# Patient Record
Sex: FEMALE | Race: WHITE | NOT HISPANIC OR LATINO | Employment: OTHER | URBAN - METROPOLITAN AREA
[De-identification: names, ages, dates, MRNs, and addresses within clinical notes are randomized per-mention and may not be internally consistent; named-entity substitution may affect disease eponyms.]

---

## 2018-06-30 ENCOUNTER — APPOINTMENT (EMERGENCY)
Dept: RADIOLOGY | Facility: HOSPITAL | Age: 53
End: 2018-06-30
Payer: MEDICARE

## 2018-06-30 ENCOUNTER — HOSPITAL ENCOUNTER (EMERGENCY)
Facility: HOSPITAL | Age: 53
Discharge: HOME | End: 2018-06-30
Attending: EMERGENCY MEDICINE | Admitting: EMERGENCY MEDICINE
Payer: MEDICARE

## 2018-06-30 VITALS
SYSTOLIC BLOOD PRESSURE: 136 MMHG | DIASTOLIC BLOOD PRESSURE: 72 MMHG | RESPIRATION RATE: 15 BRPM | TEMPERATURE: 98.8 F | WEIGHT: 130 LBS | HEART RATE: 78 BPM | HEIGHT: 59 IN | OXYGEN SATURATION: 96 % | BODY MASS INDEX: 26.21 KG/M2

## 2018-06-30 DIAGNOSIS — G43.909 MIGRAINE HEADACHE: Primary | ICD-10-CM

## 2018-06-30 PROCEDURE — 96374 THER/PROPH/DIAG INJ IV PUSH: CPT

## 2018-06-30 PROCEDURE — 99284 EMERGENCY DEPT VISIT MOD MDM: CPT

## 2018-06-30 PROCEDURE — 96375 TX/PRO/DX INJ NEW DRUG ADDON: CPT

## 2018-06-30 PROCEDURE — 70450 CT HEAD/BRAIN W/O DYE: CPT

## 2018-06-30 RX ORDER — VENLAFAXINE HYDROCHLORIDE 150 MG/1
CAPSULE, EXTENDED RELEASE ORAL
COMMUNITY
Start: 2018-06-21 | End: 2020-08-17 | Stop reason: SDUPTHER

## 2018-06-30 RX ORDER — LEVOTHYROXINE SODIUM 0.07 MG/1
75 TABLET ORAL
COMMUNITY
Start: 2018-04-23 | End: 2020-04-28

## 2018-06-30 RX ORDER — TOPIRAMATE 100 MG/1
100 TABLET, FILM COATED ORAL DAILY
COMMUNITY
Start: 2017-01-09 | End: 2020-04-28

## 2018-06-30 RX ORDER — SUMATRIPTAN 25 MG/1
25 TABLET, FILM COATED ORAL ONCE AS NEEDED
COMMUNITY
End: 2020-04-28

## 2018-06-30 RX ORDER — TOPIRAMATE 50 MG/1
100 TABLET, FILM COATED ORAL DAILY
COMMUNITY
Start: 2018-06-21 | End: 2020-08-17 | Stop reason: SDUPTHER

## 2018-06-30 RX ORDER — DEXAMETHASONE SODIUM PHOSPHATE 10 MG/ML
10 INJECTION, SOLUTION INTRAMUSCULAR; INTRAVENOUS ONCE
Status: COMPLETED | OUTPATIENT
Start: 2018-06-30 | End: 2018-06-30

## 2018-06-30 RX ORDER — METOCLOPRAMIDE HYDROCHLORIDE 5 MG/ML
10 INJECTION INTRAMUSCULAR; INTRAVENOUS ONCE
Status: COMPLETED | OUTPATIENT
Start: 2018-06-30 | End: 2018-06-30

## 2018-06-30 RX ORDER — DIPHENHYDRAMINE HYDROCHLORIDE 50 MG/ML
25 INJECTION INTRAMUSCULAR; INTRAVENOUS ONCE
Status: COMPLETED | OUTPATIENT
Start: 2018-06-30 | End: 2018-06-30

## 2018-06-30 RX ADMIN — METOCLOPRAMIDE 10 MG: 5 INJECTION, SOLUTION INTRAMUSCULAR; INTRAVENOUS at 03:15

## 2018-06-30 RX ADMIN — DIPHENHYDRAMINE HYDROCHLORIDE 25 MG: 50 INJECTION, SOLUTION INTRAMUSCULAR; INTRAVENOUS at 03:18

## 2018-06-30 RX ADMIN — DEXAMETHASONE SODIUM PHOSPHATE 10 MG: 10 INJECTION, SOLUTION INTRAMUSCULAR; INTRAVENOUS at 03:18

## 2018-06-30 NOTE — ED PROVIDER NOTES
History  Chief Complaint   Patient presents with    Migraine     Pt complaining of a migraine for 2 days and immatrex is not helping  Pt in ER with c/o migraine headache x 2 5 days  Pt states that she awakened with the pain and it has persisted since  Pt has a hx of migraines, but she states that this pain is worse than usual  Pt took Imitrex without relief  Pt with photophobia/phonophobia/vomiting, which is typical of her migraines  She denies fevers/neck pain            Prior to Admission Medications   Prescriptions Last Dose Informant Patient Reported? Taking? SUMAtriptan (IMITREX) 25 mg tablet   Yes Yes   Sig: Take 25 mg by mouth once as needed for migraine   levothyroxine 75 mcg tablet 2018 at 0600  Yes Yes   Sig: Take 75 mcg by mouth   topiramate (TOPAMAX) 100 mg tablet 2018 at 2000  Yes Yes   Si mg daily     topiramate (TOPAMAX) 50 MG tablet 2018 at 0900  Yes Yes   Si mg daily     venlafaxine (EFFEXOR-XR) 150 mg 24 hr capsule 2018 at 0900  Yes Yes   Sig: TAKE 1 CAPSULE ONCE A DAY  DO NOT CUT, CRUSH OR CHEW  Facility-Administered Medications: None       Past Medical History:   Diagnosis Date    Liver enzyme elevation     Migraines        Past Surgical History:   Procedure Laterality Date     SECTION      OVARIAN CYST SURGERY         Family History   Problem Relation Age of Onset   Elwyn Serge Parkinsonism Father     Hypertension Daughter     Cervical cancer Maternal Grandmother      I have reviewed and agree with the history as documented  Social History   Substance Use Topics    Smoking status: Former Smoker    Smokeless tobacco: Never Used    Alcohol use No        Review of Systems   Constitutional: Negative for chills and fever  Neurological: Positive for headaches  All other systems reviewed and are negative  Physical Exam  Physical Exam   Constitutional: She appears well-developed and well-nourished  No distress     HENT:   Head: Normocephalic and atraumatic  Eyes: Conjunctivae are normal  Pupils are equal, round, and reactive to light  Neck: Normal range of motion  Neck supple  Cardiovascular: Normal rate, regular rhythm and normal heart sounds  No murmur heard  Pulmonary/Chest: Effort normal and breath sounds normal  No respiratory distress  Abdominal: Soft  Bowel sounds are normal  She exhibits no distension  There is no tenderness  Musculoskeletal: Normal range of motion  She exhibits no edema or deformity  Neurological: She is alert  No cranial nerve deficit  Skin: Skin is warm and dry  No rash noted  She is not diaphoretic  No pallor  Psychiatric: She has a normal mood and affect  Her behavior is normal    Nursing note and vitals reviewed  Vital Signs  ED Triage Vitals [06/30/18 0241]   Temperature Pulse Respirations Blood Pressure SpO2   98 8 °F (37 1 °C) 74 18 (!) 183/86 98 %      Temp Source Heart Rate Source Patient Position - Orthostatic VS BP Location FiO2 (%)   Tympanic Monitor Lying Right arm --      Pain Score       Worst Possible Pain           Vitals:    06/30/18 0241 06/30/18 0429   BP: (!) 183/86 136/72   Pulse: 74 78   Patient Position - Orthostatic VS: Lying Lying       Visual Acuity      ED Medications  Medications   metoclopramide (REGLAN) injection 10 mg (10 mg Intravenous Given 6/30/18 0315)   diphenhydrAMINE (BENADRYL) injection 25 mg (25 mg Intravenous Given 6/30/18 0318)   dexamethasone (PF) (DECADRON) injection 10 mg (10 mg Intravenous Given 6/30/18 0318)       Diagnostic Studies  Results Reviewed     None                 CT head without contrast   Final Result by Michelle Roblero MD (06/30 0510)      No CT evidence of acute intracranial process  Workstation performed: [id number]                    Procedures  Procedures       Phone Contacts  ED Phone Contact    ED Course  ED Course as of Jul 02 0736   Sat Jun 30, 2018   0432 Pt is pain free at this time   CT report pending MDM  CritCare Time    Disposition  Final diagnoses:   Migraine headache     Time reflects when diagnosis was documented in both MDM as applicable and the Disposition within this note     Time User Action Codes Description Comment    6/30/2018  5:20 AM Carlota Crowe Matt [G43 909] Migraine headache       ED Disposition     ED Disposition Condition Comment    Discharge  Luis Alberto Herbert discharge to home/self care  Condition at discharge: Stable        Follow-up Information     Follow up With Specialties Details Why [address]  Schedule an appointment as soon as possible for a visit in 1 day for follow up [id number] [facility]    Buck Ambrosio MD Neurology Schedule an appointment as soon as possible for a visit in 1 day for follow up [address] [address]  [phone]            Discharge Medication List as of 6/30/2018  5:23 AM      CONTINUE these medications which have NOT CHANGED    Details   levothyroxine 75 mcg tablet Take 75 mcg by mouth, Starting Mon 4/23/2018, Historical Med      SUMAtriptan (IMITREX) 25 mg tablet Take 25 mg by mouth once as needed for migraine, Historical Med      !! topiramate (TOPAMAX) 100 mg tablet 100 mg daily  , Starting Mon 1/9/2017, Historical Med      !! topiramate (TOPAMAX) 50 MG tablet 50 mg daily  , Starting u 6/21/2018, Historical Med      venlafaxine (EFFEXOR-XR) 150 mg 24 hr capsule TAKE 1 CAPSULE ONCE A DAY  DO NOT CUT, CRUSH OR CHEW , Historical Med       !! - Potential duplicate medications found  Please discuss with provider  No discharge procedures on file      ED Provider  Electronically Signed by           aKyden Rosario DO  07/02/18 8990

## 2018-06-30 NOTE — ED NOTES
Patient transported to [address] [place] [address], [address] [place] [address]  06/30/18 5427

## 2018-06-30 NOTE — DISCHARGE INSTRUCTIONS
Migraine Headache   WHAT YOU NEED TO KNOW:   A migraine is a severe headache  The pain can be so severe that it interferes with your daily activities  A migraine can last a few hours up to several days  The exact cause of migraines is not known  DISCHARGE INSTRUCTIONS:   Return to the emergency department if:   · You have a headache that seems different or much worse than your usual migraine headache  · You have a severe headache with a fever or a stiff neck  · You have new problems with speech, vision, balance, or movement  · You feel like you are going to faint, you become confused, or you have a seizure  Contact your healthcare provider or neurologist if:   · Your migraines interfere with your daily activities  · Your medicines or treatments stop working  · You have questions or concerns about your condition or care  Medicines: You may need any of the following  Take medicine as soon as you feel a migraine begin  · Prescription pain medicine  may be given  Do not wait until the pain is severe before you take your medicine  · Migraine medicines  are used to help prevent a migraine or stop it once it starts  · Antinausea medicine  may be given to calm your stomach and to help prevent vomiting  This medicine can also help relieve pain  · Take your medicine as directed  Contact your healthcare provider if you think your medicine is not helping or if you have side effects  Tell him or her if you are allergic to any medicine  Keep a list of the medicines, vitamins, and herbs you take  Include the amounts, and when and why you take them  Bring the list or the pill bottles to follow-up visits  Carry your medicine list with you in case of an emergency  Manage your symptoms:   · Rest in a dark, quiet room  This will help decrease your pain  Sleep may also help relieve the pain  · Apply ice to decrease pain  Use an ice pack, or put crushed ice in a plastic bag   Cover the ice pack with a towel and place it on your head  Apply ice for 15 to 20 minutes every hour  · Apply heat to decrease pain and muscle spasms  Use a small towel dampened with warm water or a heating pad, or sit in a warm bath  Apply heat on the area for 20 to 30 minutes every 2 hours  You may alternate heat and ice  · Keep a migraine record  Write down when your migraines start and stop  Include your symptoms and what you were doing when a migraine began  Record what you ate or drank for 24 hours before the migraine started  Keep track of what you did to treat your migraine and if it worked  Bring the migraine record with you to visits with your healthcare provider  Follow up with your healthcare provider or neurologist as directed:  Bring your migraine record with you  Write down your questions so you remember to ask them during your visits  Prevent another migraine:   · Do not smoke  Nicotine and other chemicals in cigarettes and cigars can trigger a migraine or make it worse  Ask your healthcare provider for information if you currently smoke and need help to quit  E-cigarettes or smokeless tobacco still contain nicotine  Talk to your healthcare provider before you use these products  · Do not drink alcohol  Alcohol can trigger a migraine  It can also keep medicines used to treat your migraines from working  · Get regular exercise  Exercise may help prevent migraines  Talk to your healthcare provider about the best exercise plan for you  Try to get at least 30 minutes of exercise on most days  · Manage stress  Stress may trigger a migraine  Learn new ways to relax, such as deep breathing  · Create a sleep schedule  Go to bed and get up at the same times each day  Do not watch television before bed  · Eat regular meals  Include healthy foods such as include fruit, vegetables, whole-grain breads, low-fat dairy products, beans, lean meat, and fish   Do not have food or drinks that trigger your migraines  © 2017 [facility] [facility] Information is for End User's use only and may not be sold, redistributed or otherwise used for commercial purposes  All illustrations and images included in CareNotes® are the copyrighted property of A D A M , Inc  or Donnie Mclain  The above information is an  only  It is not intended as medical advice for individual conditions or treatments  Talk to your doctor, nurse or pharmacist before following any medical regimen to see if it is safe and effective for you

## 2020-04-28 ENCOUNTER — OFFICE VISIT (OUTPATIENT)
Dept: FAMILY MEDICINE CLINIC | Facility: CLINIC | Age: 55
End: 2020-04-28
Payer: MEDICARE

## 2020-04-28 VITALS
TEMPERATURE: 98 F | HEIGHT: 59 IN | WEIGHT: 132 LBS | BODY MASS INDEX: 26.61 KG/M2 | SYSTOLIC BLOOD PRESSURE: 114 MMHG | HEART RATE: 74 BPM | DIASTOLIC BLOOD PRESSURE: 70 MMHG

## 2020-04-28 DIAGNOSIS — G43.811 OTHER MIGRAINE WITH STATUS MIGRAINOSUS, INTRACTABLE: ICD-10-CM

## 2020-04-28 DIAGNOSIS — K21.9 GASTROESOPHAGEAL REFLUX DISEASE WITHOUT ESOPHAGITIS: ICD-10-CM

## 2020-04-28 DIAGNOSIS — Z00.00 MEDICARE ANNUAL WELLNESS VISIT, SUBSEQUENT: Primary | ICD-10-CM

## 2020-04-28 DIAGNOSIS — F32.5 MAJOR DEPRESSIVE DISORDER WITH SINGLE EPISODE, IN FULL REMISSION (HCC): ICD-10-CM

## 2020-04-28 DIAGNOSIS — R74.8 LIVER ENZYME ELEVATION: ICD-10-CM

## 2020-04-28 DIAGNOSIS — E03.9 HYPOTHYROIDISM, UNSPECIFIED TYPE: ICD-10-CM

## 2020-04-28 PROBLEM — F32.A DEPRESSION: Status: ACTIVE | Noted: 2020-04-28

## 2020-04-28 PROCEDURE — 1036F TOBACCO NON-USER: CPT | Performed by: FAMILY MEDICINE

## 2020-04-28 PROCEDURE — 3008F BODY MASS INDEX DOCD: CPT | Performed by: FAMILY MEDICINE

## 2020-04-28 PROCEDURE — 99203 OFFICE O/P NEW LOW 30 MIN: CPT | Performed by: FAMILY MEDICINE

## 2020-04-28 PROCEDURE — G0438 PPPS, INITIAL VISIT: HCPCS | Performed by: FAMILY MEDICINE

## 2020-04-28 RX ORDER — SUMATRIPTAN 100 MG/1
100 TABLET, FILM COATED ORAL ONCE AS NEEDED
COMMUNITY
Start: 2020-04-23 | End: 2020-12-04

## 2020-04-28 RX ORDER — CEFUROXIME AXETIL 250 MG/1
TABLET ORAL
COMMUNITY
Start: 2020-04-20 | End: 2020-07-28 | Stop reason: SDUPTHER

## 2020-04-28 RX ORDER — OMEPRAZOLE 20 MG/1
CAPSULE, DELAYED RELEASE ORAL
COMMUNITY
Start: 2020-04-04 | End: 2020-08-17 | Stop reason: SDUPTHER

## 2020-04-28 RX ORDER — LEVOTHYROXINE SODIUM 0.1 MG/1
100 TABLET ORAL DAILY
COMMUNITY
Start: 2020-04-04 | End: 2020-10-28 | Stop reason: SDUPTHER

## 2020-04-29 ENCOUNTER — TELEPHONE (OUTPATIENT)
Dept: ADMINISTRATIVE | Facility: OTHER | Age: 55
End: 2020-04-29

## 2020-05-12 ENCOUNTER — TELEPHONE (OUTPATIENT)
Dept: NEUROLOGY | Facility: CLINIC | Age: 55
End: 2020-05-12

## 2020-05-14 ENCOUNTER — TELEPHONE (OUTPATIENT)
Dept: NEUROLOGY | Facility: CLINIC | Age: 55
End: 2020-05-14

## 2020-05-14 ENCOUNTER — CONSULT (OUTPATIENT)
Dept: NEUROLOGY | Facility: CLINIC | Age: 55
End: 2020-05-14
Payer: MEDICARE

## 2020-05-14 VITALS
DIASTOLIC BLOOD PRESSURE: 84 MMHG | WEIGHT: 130.1 LBS | SYSTOLIC BLOOD PRESSURE: 125 MMHG | HEART RATE: 94 BPM | HEIGHT: 59 IN | RESPIRATION RATE: 16 BRPM | BODY MASS INDEX: 26.23 KG/M2

## 2020-05-14 DIAGNOSIS — G43.709 CHRONIC MIGRAINE WITHOUT AURA WITHOUT STATUS MIGRAINOSUS, NOT INTRACTABLE: Primary | ICD-10-CM

## 2020-05-14 DIAGNOSIS — M54.2 CERVICALGIA: ICD-10-CM

## 2020-05-14 DIAGNOSIS — G43.811 OTHER MIGRAINE WITH STATUS MIGRAINOSUS, INTRACTABLE: ICD-10-CM

## 2020-05-14 DIAGNOSIS — G44.86 CERVICOGENIC HEADACHE: ICD-10-CM

## 2020-05-14 PROCEDURE — 1036F TOBACCO NON-USER: CPT | Performed by: PSYCHIATRY & NEUROLOGY

## 2020-05-14 PROCEDURE — 3008F BODY MASS INDEX DOCD: CPT | Performed by: PSYCHIATRY & NEUROLOGY

## 2020-05-14 PROCEDURE — 99204 OFFICE O/P NEW MOD 45 MIN: CPT | Performed by: PSYCHIATRY & NEUROLOGY

## 2020-05-14 RX ORDER — PROCHLORPERAZINE MALEATE 10 MG
10 TABLET ORAL EVERY 6 HOURS PRN
Qty: 12 TABLET | Refills: 3 | Status: SHIPPED | OUTPATIENT
Start: 2020-05-14 | End: 2021-03-01 | Stop reason: SDUPTHER

## 2020-05-18 ENCOUNTER — EVALUATION (OUTPATIENT)
Dept: PHYSICAL THERAPY | Facility: CLINIC | Age: 55
End: 2020-05-18
Payer: MEDICARE

## 2020-05-18 DIAGNOSIS — M54.2 CERVICALGIA: ICD-10-CM

## 2020-05-18 DIAGNOSIS — G43.709 CHRONIC MIGRAINE WITHOUT AURA WITHOUT STATUS MIGRAINOSUS, NOT INTRACTABLE: ICD-10-CM

## 2020-05-18 DIAGNOSIS — G44.86 CERVICOGENIC HEADACHE: ICD-10-CM

## 2020-05-18 PROCEDURE — 97162 PT EVAL MOD COMPLEX 30 MIN: CPT | Performed by: PHYSICAL THERAPIST

## 2020-05-18 PROCEDURE — 97140 MANUAL THERAPY 1/> REGIONS: CPT | Performed by: PHYSICAL THERAPIST

## 2020-05-18 PROCEDURE — 97112 NEUROMUSCULAR REEDUCATION: CPT | Performed by: PHYSICAL THERAPIST

## 2020-05-22 ENCOUNTER — APPOINTMENT (OUTPATIENT)
Dept: PHYSICAL THERAPY | Facility: CLINIC | Age: 55
End: 2020-05-22
Payer: MEDICARE

## 2020-05-26 ENCOUNTER — OFFICE VISIT (OUTPATIENT)
Dept: PHYSICAL THERAPY | Facility: CLINIC | Age: 55
End: 2020-05-26
Payer: MEDICARE

## 2020-05-26 DIAGNOSIS — G44.86 CERVICOGENIC HEADACHE: ICD-10-CM

## 2020-05-26 DIAGNOSIS — M54.2 CERVICALGIA: Primary | ICD-10-CM

## 2020-05-26 DIAGNOSIS — G43.709 CHRONIC MIGRAINE WITHOUT AURA WITHOUT STATUS MIGRAINOSUS, NOT INTRACTABLE: ICD-10-CM

## 2020-05-26 PROCEDURE — 97112 NEUROMUSCULAR REEDUCATION: CPT | Performed by: PHYSICAL THERAPIST

## 2020-05-26 PROCEDURE — 97140 MANUAL THERAPY 1/> REGIONS: CPT | Performed by: PHYSICAL THERAPIST

## 2020-05-26 PROCEDURE — 97110 THERAPEUTIC EXERCISES: CPT | Performed by: PHYSICAL THERAPIST

## 2020-05-28 ENCOUNTER — TELEPHONE (OUTPATIENT)
Dept: NEUROLOGY | Facility: CLINIC | Age: 55
End: 2020-05-28

## 2020-05-29 ENCOUNTER — OFFICE VISIT (OUTPATIENT)
Dept: PHYSICAL THERAPY | Facility: CLINIC | Age: 55
End: 2020-05-29
Payer: MEDICARE

## 2020-05-29 DIAGNOSIS — G43.709 CHRONIC MIGRAINE WITHOUT AURA WITHOUT STATUS MIGRAINOSUS, NOT INTRACTABLE: ICD-10-CM

## 2020-05-29 DIAGNOSIS — G44.86 CERVICOGENIC HEADACHE: ICD-10-CM

## 2020-05-29 DIAGNOSIS — M54.2 CERVICALGIA: Primary | ICD-10-CM

## 2020-05-29 PROCEDURE — 97112 NEUROMUSCULAR REEDUCATION: CPT | Performed by: PHYSICAL THERAPIST

## 2020-05-29 PROCEDURE — 97140 MANUAL THERAPY 1/> REGIONS: CPT | Performed by: PHYSICAL THERAPIST

## 2020-06-01 ENCOUNTER — OFFICE VISIT (OUTPATIENT)
Dept: PHYSICAL THERAPY | Facility: CLINIC | Age: 55
End: 2020-06-01
Payer: MEDICARE

## 2020-06-01 DIAGNOSIS — G44.86 CERVICOGENIC HEADACHE: ICD-10-CM

## 2020-06-01 DIAGNOSIS — M54.2 CERVICALGIA: Primary | ICD-10-CM

## 2020-06-01 DIAGNOSIS — G43.709 CHRONIC MIGRAINE WITHOUT AURA WITHOUT STATUS MIGRAINOSUS, NOT INTRACTABLE: ICD-10-CM

## 2020-06-01 PROCEDURE — 97140 MANUAL THERAPY 1/> REGIONS: CPT | Performed by: PHYSICAL THERAPIST

## 2020-06-01 PROCEDURE — 97112 NEUROMUSCULAR REEDUCATION: CPT | Performed by: PHYSICAL THERAPIST

## 2020-06-01 PROCEDURE — 97110 THERAPEUTIC EXERCISES: CPT | Performed by: PHYSICAL THERAPIST

## 2020-06-05 ENCOUNTER — APPOINTMENT (OUTPATIENT)
Dept: PHYSICAL THERAPY | Facility: CLINIC | Age: 55
End: 2020-06-05
Payer: MEDICARE

## 2020-06-08 ENCOUNTER — OFFICE VISIT (OUTPATIENT)
Dept: PHYSICAL THERAPY | Facility: CLINIC | Age: 55
End: 2020-06-08
Payer: MEDICARE

## 2020-06-08 DIAGNOSIS — G43.709 CHRONIC MIGRAINE WITHOUT AURA WITHOUT STATUS MIGRAINOSUS, NOT INTRACTABLE: ICD-10-CM

## 2020-06-08 DIAGNOSIS — M54.2 CERVICALGIA: Primary | ICD-10-CM

## 2020-06-08 DIAGNOSIS — G44.86 CERVICOGENIC HEADACHE: ICD-10-CM

## 2020-06-08 PROCEDURE — 97140 MANUAL THERAPY 1/> REGIONS: CPT | Performed by: PHYSICAL THERAPIST

## 2020-06-08 PROCEDURE — 97112 NEUROMUSCULAR REEDUCATION: CPT | Performed by: PHYSICAL THERAPIST

## 2020-06-12 ENCOUNTER — OFFICE VISIT (OUTPATIENT)
Dept: PHYSICAL THERAPY | Facility: CLINIC | Age: 55
End: 2020-06-12
Payer: MEDICARE

## 2020-06-12 DIAGNOSIS — M54.2 CERVICALGIA: Primary | ICD-10-CM

## 2020-06-12 DIAGNOSIS — G43.709 CHRONIC MIGRAINE WITHOUT AURA WITHOUT STATUS MIGRAINOSUS, NOT INTRACTABLE: ICD-10-CM

## 2020-06-12 DIAGNOSIS — G44.86 CERVICOGENIC HEADACHE: ICD-10-CM

## 2020-06-12 PROCEDURE — 97140 MANUAL THERAPY 1/> REGIONS: CPT | Performed by: PHYSICAL THERAPIST

## 2020-06-12 PROCEDURE — 97112 NEUROMUSCULAR REEDUCATION: CPT | Performed by: PHYSICAL THERAPIST

## 2020-06-16 ENCOUNTER — EVALUATION (OUTPATIENT)
Dept: PHYSICAL THERAPY | Facility: CLINIC | Age: 55
End: 2020-06-16
Payer: MEDICARE

## 2020-06-16 DIAGNOSIS — G43.709 CHRONIC MIGRAINE WITHOUT AURA WITHOUT STATUS MIGRAINOSUS, NOT INTRACTABLE: ICD-10-CM

## 2020-06-16 DIAGNOSIS — G44.86 CERVICOGENIC HEADACHE: ICD-10-CM

## 2020-06-16 DIAGNOSIS — M54.2 CERVICALGIA: Primary | ICD-10-CM

## 2020-06-16 PROCEDURE — 97164 PT RE-EVAL EST PLAN CARE: CPT | Performed by: PHYSICAL THERAPIST

## 2020-06-16 PROCEDURE — 97140 MANUAL THERAPY 1/> REGIONS: CPT | Performed by: PHYSICAL THERAPIST

## 2020-06-22 ENCOUNTER — OFFICE VISIT (OUTPATIENT)
Dept: PHYSICAL THERAPY | Facility: CLINIC | Age: 55
End: 2020-06-22
Payer: MEDICARE

## 2020-06-22 DIAGNOSIS — G44.86 CERVICOGENIC HEADACHE: ICD-10-CM

## 2020-06-22 DIAGNOSIS — M54.2 CERVICALGIA: Primary | ICD-10-CM

## 2020-06-22 DIAGNOSIS — G43.709 CHRONIC MIGRAINE WITHOUT AURA WITHOUT STATUS MIGRAINOSUS, NOT INTRACTABLE: ICD-10-CM

## 2020-06-22 PROCEDURE — 97110 THERAPEUTIC EXERCISES: CPT | Performed by: PHYSICAL THERAPIST

## 2020-06-22 PROCEDURE — 97112 NEUROMUSCULAR REEDUCATION: CPT | Performed by: PHYSICAL THERAPIST

## 2020-06-22 PROCEDURE — 97140 MANUAL THERAPY 1/> REGIONS: CPT | Performed by: PHYSICAL THERAPIST

## 2020-06-25 NOTE — PROGRESS NOTES
Daily Note     Today's date: 2020  Patient name: Suleiman Shell  : 1965  MRN: [id number]  Referring provider: Rianna Osei MD  Dx:   No diagnosis found  Subjective: No new complaints today  The patient reports that she experienced a severe migraine over the weekend and presents today with some increased neck pain  Objective: See treatment diary below      Assessment: The PT performed focused OA manual and active treatments during today's treatment  Y/T/I on incline bench added to the patient's current program to promote further development of critical postural muscles in the posterior chain  The patient tolerated manual and active treatment well today  Plan: Continue per plan of care        Precautions: Hypothyroidism, depression      Manuals     Cervical side-glide (gr I-II) SRB SRB SRB SRB SRB SRB SRB SRB SRB    STM paraspinals, SCM SRB SRB SRB SRB SRB SRB SRB SRB SRB    Sub-occipital release SRB SRB SRB SRB SRB SRB SRB SRB SRB    Cervical OA nodding mob       SRB      OA headache SNAG        SRB SRB                              Neuro Re-Ed             Cervical retraction X20, HEP Supine  3 secx25 Supine  3 secx25 Supine  3 secx25 Supine  3 secx25 Supine  3 secx25       Cervical chin tuck + head lift     5 sec 2x10 5 sec 2x10 3 sec x20 3 sec x20 3 sec x20    Scapular retraction X20, HEP 3x10 3x10 3x10 3x10        Cervical propriocetion w/ laser  [place] tracing x3 ea [place] tracing x3 ea, horizontal & vertical line tracing [place] tracing x3 ea, horizontal & vertical line tracing [place] tracing x3 ea, horizontal & vertical line tracing Prone cervical retraction w/ laser, [place] tracing  Prone cervical retraction w/ laser, [place] tracing Prone cervical retraction w/ laser, [place] tracing    Cervical extension/rotatoin SNAG    2x10, HEP 2x10 ea 2x10 ea       OA ext/rot SNAG        2x10 ea 2x10 ea    Prone scap retract    2x10 ea 2x10 ea                                  Ther Ex             No money  3x10 GTB 3x10 GTB 3x10 GTB 3x10 GTB        Levator scap stretch  10 sec x10 ea 10 sec x10 ea 10 sec x10 ea 10 sec x10 ea        Y/T/I   3x10 GTB, HEP 3x10 GTB 3x10 GTB 3x10 GTB  Incline bench 3x10 ea Incline bench 3x10 ea    Seated D2 flexion   2x10 ea GTB, HEP 2x10 ea GTB 2x10 ea GTB 3x10 GTB                                                           Ther Activity                                       Gait Training                                       Modalities                                       Assessment IE     FOTO RE      Education HEP, prognosis, POC      UPOC, prognosis

## 2020-06-26 ENCOUNTER — APPOINTMENT (OUTPATIENT)
Dept: PHYSICAL THERAPY | Facility: CLINIC | Age: 55
End: 2020-06-26
Payer: MEDICARE

## 2020-06-29 ENCOUNTER — OFFICE VISIT (OUTPATIENT)
Dept: PHYSICAL THERAPY | Facility: CLINIC | Age: 55
End: 2020-06-29
Payer: MEDICARE

## 2020-06-29 DIAGNOSIS — M54.2 CERVICALGIA: Primary | ICD-10-CM

## 2020-06-29 DIAGNOSIS — G44.86 CERVICOGENIC HEADACHE: ICD-10-CM

## 2020-06-29 DIAGNOSIS — G43.709 CHRONIC MIGRAINE WITHOUT AURA WITHOUT STATUS MIGRAINOSUS, NOT INTRACTABLE: ICD-10-CM

## 2020-06-29 PROCEDURE — 97112 NEUROMUSCULAR REEDUCATION: CPT | Performed by: PHYSICAL THERAPIST

## 2020-06-29 PROCEDURE — 97140 MANUAL THERAPY 1/> REGIONS: CPT | Performed by: PHYSICAL THERAPIST

## 2020-07-02 NOTE — PROGRESS NOTES
Daily Note     Today's date: 7/3/2020  Patient name: Suleiman Shell  : 1965  MRN: [id number]  Referring provider: Rianna Osei MD  Dx:   Encounter Diagnosis     ICD-10-CM    1  Cervicalgia M54 2    2  Cervicogenic headache R51    3  Chronic migraine without aura without status migrainosus, not intractable G43 709                   Subjective: No new complaints today  The patient reports improvement in symptoms since previous session, reports that she had no pain all week  Objective: See treatment diary below      Assessment: The PT continued to promote cervical mobility and postural muscle control during today's treatment  No new exercises added to the patient's current program to promote consistency  The patient tolerated manual and active treatment well today  Plan: Continue per plan of care        Precautions: Hypothyroidism, depression      Manuals    Cervical side-glide (gr I-II) SRB    SRB SRB SRB SRB EM   STM paraspinals, SCM SRB    SRB SRB SRB SRB EM   Sub-occipital release SRB    SRB SRB SRB SRB EM   Cervical OA nodding mob SRB      SRB     OA headache SNAG SRB       SRB EM                           Neuro Re-Ed            Cervical retraction     Supine  3 secx25 Supine  3 secx25      Cervical chin tuck + head lift 3 sec x20    5 sec 2x10 5 sec 2x10 3 sec x20 3 sec x20 3 sec x20   Scapular retraction     3x10       Cervical propriocetion w/ laser Prone cervical retraction w/ laser, [place] tracing    [place] tracing x3 ea, horizontal & vertical line tracing Prone cervical retraction w/ laser, [place] tracing  Prone cervical retraction w/ laser, [place] tracing Prone cervical retraction w/ laser, [place] tracing   Cervical extension/rotatoin SNAG     2x10 ea 2x10 ea      OA ext/rot SNAG 2x10 ea       2x10 ea 2x10 ea   Prone scap retract     2x10 ea                               Ther Ex            No money     3x10 GTB       Levator scap stretch     10 sec x10 ea       Y/T/I Inclince bench 1# 3x10 ea    3x10 GTB 3x10 GTB  Incline bench 3x10 ea Inclince bench 3x10 ea   Seated D2 flexion     2x10 ea GTB 3x10 GTB                                                      Ther Activity                                    Gait Training                                    Modalities                                    Assessment      FOTO RE     Education       [address]     PT 1:1 5636 - [id number]

## 2020-07-03 ENCOUNTER — OFFICE VISIT (OUTPATIENT)
Dept: PHYSICAL THERAPY | Facility: CLINIC | Age: 55
End: 2020-07-03
Payer: MEDICARE

## 2020-07-03 DIAGNOSIS — G44.86 CERVICOGENIC HEADACHE: ICD-10-CM

## 2020-07-03 DIAGNOSIS — G43.709 CHRONIC MIGRAINE WITHOUT AURA WITHOUT STATUS MIGRAINOSUS, NOT INTRACTABLE: ICD-10-CM

## 2020-07-03 DIAGNOSIS — M54.2 CERVICALGIA: Primary | ICD-10-CM

## 2020-07-03 PROCEDURE — 97140 MANUAL THERAPY 1/> REGIONS: CPT | Performed by: PHYSICAL THERAPIST

## 2020-07-03 PROCEDURE — 97112 NEUROMUSCULAR REEDUCATION: CPT | Performed by: PHYSICAL THERAPIST

## 2020-07-07 ENCOUNTER — OFFICE VISIT (OUTPATIENT)
Dept: PHYSICAL THERAPY | Facility: CLINIC | Age: 55
End: 2020-07-07
Payer: MEDICARE

## 2020-07-07 DIAGNOSIS — G43.709 CHRONIC MIGRAINE WITHOUT AURA WITHOUT STATUS MIGRAINOSUS, NOT INTRACTABLE: ICD-10-CM

## 2020-07-07 DIAGNOSIS — M54.2 CERVICALGIA: Primary | ICD-10-CM

## 2020-07-07 DIAGNOSIS — G44.86 CERVICOGENIC HEADACHE: ICD-10-CM

## 2020-07-07 PROCEDURE — 97140 MANUAL THERAPY 1/> REGIONS: CPT | Performed by: PHYSICAL THERAPIST

## 2020-07-07 PROCEDURE — 97112 NEUROMUSCULAR REEDUCATION: CPT | Performed by: PHYSICAL THERAPIST

## 2020-07-07 NOTE — PROGRESS NOTES
Daily Note     Today's date: 2020  Patient name: Lala Lloyd  : 1965  MRN: [id number]  Referring provider: Tiffanie Ambrosio MD  Dx:   Encounter Diagnosis     ICD-10-CM    1  Cervicalgia M54 2    2  Cervicogenic headache R51    3  Chronic migraine without aura without status migrainosus, not intractable G43 709        Start Time: 0800  Stop Time: 0845  Total time in clinic (min): 45 minutes  The patient was treated by CARLOS Escalera under direct supervision of Karla Rob DPT    Subjective: Pt reports feeling a little tight in her neck today however over the weekend she reported feeling good with only 1 minor headache  Objective: See treatment diary below      Assessment: Tolerated responded to treatment well reporting increased "looseness" in her neck following manual treatment  Pt was able to complete 1 set of wall slides with chin tuck with her arms in scaption, but only 6/10 for her second set with her arms more in the sagittal plane  Wall slides with chin tuck were added to pt's program to promote cervical and scapular strengthening  Pt was able to increase weight for I's, T's, and Y's however could only complete 1 full set with 2# weights  Patient demonstrated fatigue post treatment, exhibited good technique with therapeutic exercises and would benefit from continued PT      Plan: Continue per plan of care        Precautions: Hypothyroidism, depression      Manuals    Cervical side-glide (gr I-II) SRB EM   SRB SRB SRB SRB EM   STM paraspinals, SCM SRB EM   SRB SRB SRB SRB EM   Sub-occipital release SRB EM   SRB SRB SRB SRB EM   Cervical OA nodding mob SRB EM     SRB     OA headache SNAG SRB EM      SRB EM                           Neuro Re-Ed            Cervical retraction     Supine  3 secx25 Supine  3 secx25      Cervical chin tuck + head lift 3 sec x20 3 sec x20   5 sec 2x10 5 sec 2x10 3 sec x20 3 sec x20 3 sec x20   Scapular retraction     3x10 Cervical propriocetion w/ laser Prone cervical retraction w/ laser, [place] tracing Prone cervical retraction w/ laser, [place] tracing   [place] tracing x3 ea, horizontal & vertical line tracing Prone cervical retraction w/ laser, [place] tracing  Prone cervical retraction w/ laser, [place] tracing Prone cervical retraction w/ laser, [place] tracing   Cervical extension/rotatoin SNAG     2x10 ea 2x10 ea      OA ext/rot SNAG 2x10 ea 2x10 ea      2x10 ea 2x10 ea   Prone scap retract     2x10 ea       Wall slides with chin tuck  1x10 scaption, 1x6 flexion                      Ther Ex            No money  2x10 GTB   3x10 GTB       Levator scap stretch     10 sec x10 ea       Y/T/I Inclince bench 1# 3x10 ea Inclince bench 1# 1x10, 2# 2x10   3x10 GTB 3x10 GTB  Incline bench 3x10 ea Inclince bench 3x10 ea   Seated D2 flexion     2x10 ea GTB 3x10 GTB                                                      Ther Activity                                    Gait Training                                    Modalities                                    Assessment      FOTO RE     Education       UPOC, prognosis     PT 1:1 5100 - [id number]

## 2020-07-08 NOTE — PROGRESS NOTES
Daily Note     Today's date: 2020  Patient name: Patricia Diego  : 1965  MRN: [id number]  Referring provider: Selene Wright MD  Dx:   Encounter Diagnosis     ICD-10-CM    1  Cervicalgia M54 2    2  Cervicogenic headache R51    3  Chronic migraine without aura without status migrainosus, not intractable G43 709        Start Time: 0755  Stop Time: 0840  Total time in clinic (min): 45 minutes  The patient was treated by CARLOS Escalera under direct supervision of Aurea Major DPT    Subjective: Pt reports waking up with a small migraine yesterday however feels fine today  Objective: See treatment diary below      Assessment: Pt was able to complete 3 sets of wall slides in the sagittal plane with no increase in symptoms  W's and B/L D2 pattern were added to pt's program in order to promote postural muscle strengthening  Pt required verbal cueing and physical demonstration for correct form with W's and lost form towards the end of the exercise due to fatigue  Patient demonstrated fatigue post treatment, exhibited good technique with therapeutic exercises and would benefit from continued PT      Plan: Continue per plan of care        Precautions: Hypothyroidism, depression      Manuals    Cervical side-glide (gr I-II) SRB EM EM  SRB SRB SRB SRB EM   STM paraspinals, SCM SRB EM EM  SRB SRB SRB SRB EM   Sub-occipital release SRB EM EM  SRB SRB SRB SRB EM   Cervical OA nodding mob SRB EM EM    SRB     OA headache SNAG SRB EM EM     SRB EM                           Neuro Re-Ed            Cervical retraction     Supine  3 secx25 Supine  3 secx25      Cervical chin tuck + head lift 3 sec x20 3 sec x20 3sec x20  5 sec 2x10 5 sec 2x10 3 sec x20 3 sec x20 3 sec x20   Scapular retraction     3x10       Cervical propriocetion w/ laser Prone cervical retraction w/ laser, [place] tracing Prone cervical retraction w/ laser, [place] tracing   [place] tracing x3 ea, horizontal & vertical line tracing Prone cervical retraction w/ laser, [place] tracing  Prone cervical retraction w/ laser, [place] tracing Prone cervical retraction w/ laser, [place] tracing   Cervical extension/rotatoin SNAG     2x10 ea 2x10 ea      OA ext/rot SNAG 2x10 ea 2x10 ea 2x10 ea     2x10 ea 2x10 ea   Prone scap retract     2x10 ea       Wall slides with chin tuck  1x10 scaption, 1x6 flexion 3x10 flexion                     Ther Ex            No money  2x10 GTB   3x10 GTB       Levator scap stretch     10 sec x10 ea       Y/T/I Inclince bench 1# 3x10 ea Inclince bench 1# 1x10, 2# 2x10 Inclince bench 2# 3x10  3x10 GTB 3x10 GTB  Incline bench 3x10 ea Inclince bench 3x10 ea   Seated D2 flexion     2x10 ea GTB 3x10 GTB      W's   2x10 GTB         Standing D2 flexion   2x10 RTB                                 Ther Activity                                    Gait Training                                    Modalities                                    Assessment      FOTO RE     Education       UPOC, prognosis     PT 1:1 9216 - [id number]

## 2020-07-09 ENCOUNTER — OFFICE VISIT (OUTPATIENT)
Dept: PHYSICAL THERAPY | Facility: CLINIC | Age: 55
End: 2020-07-09
Payer: MEDICARE

## 2020-07-09 DIAGNOSIS — G43.709 CHRONIC MIGRAINE WITHOUT AURA WITHOUT STATUS MIGRAINOSUS, NOT INTRACTABLE: ICD-10-CM

## 2020-07-09 DIAGNOSIS — G44.86 CERVICOGENIC HEADACHE: ICD-10-CM

## 2020-07-09 DIAGNOSIS — M54.2 CERVICALGIA: Primary | ICD-10-CM

## 2020-07-09 PROCEDURE — 97112 NEUROMUSCULAR REEDUCATION: CPT | Performed by: PHYSICAL THERAPIST

## 2020-07-09 PROCEDURE — 97140 MANUAL THERAPY 1/> REGIONS: CPT | Performed by: PHYSICAL THERAPIST

## 2020-07-13 ENCOUNTER — APPOINTMENT (OUTPATIENT)
Dept: PHYSICAL THERAPY | Facility: CLINIC | Age: 55
End: 2020-07-13
Payer: MEDICARE

## 2020-07-15 NOTE — PROGRESS NOTES
Daily Note     Today's date: 7/15/2020  Patient name: Nicole Chapman  : 1965  MRN: [id number]  Referring provider: Armida Juárez MD  Dx: No diagnosis found  Subjective: No new complaints today  The patient reports improvement in symptoms since having a migraine earlier in the week  Objective: See treatment diary below      Assessment: The PT reassessed the status of the patient's cervical spine during today's treatment  Further manual intervention added to the patient's current program to promote further symptom control  The patient tolerated manual and active treatment fair today  The patient was educated on the importance of engaging in her home exercise program even when she's experiencing increased headache symptoms  The patient was also instructed on the nature of her pain and how it can be exacerbated by other factors which are under her control  The patient reported the appropriate understanding    Plan: Continue per plan of care        Precautions: Hypothyroidism, depression      Manuals         Cervical side-glide (gr I-II) SRB EM EM SRB        STM paraspinals, SCM SRB EM EM SRB        Sub-occipital release SRB EM EM SRB        Cervical OA nodding mob SRB EM EM SRB        OA headache SNAG SRB EM EM SRB        Supine manual retraction w/ PT overpressure    SRB                    Neuro Re-Ed            Cervical retraction            Cervical chin tuck + head lift 3 sec x20 3 sec x20 3sec x20 3sec x20        Scapular retraction            Cervical propriocetion w/ laser Prone cervical retraction w/ laser, [place] tracing Prone cervical retraction w/ laser, [place] tracing          Cervical extension/rotatoin SNAG            OA ext/rot SNAG 2x10 ea 2x10 ea 2x10 ea 2x10 ea        Prone scap retract            Wall slides with chin tuck  1x10 scaption, 1x6 flexion 3x10 flexion HELD                    Ther Ex            No money  2x10 GTB          Levator scap stretch            Y/T/I Inclince bench 1# 3x10 ea Inclince bench 1# 1x10, 2# 2x10 Inclince bench 2# 3x10 HELD        Seated D2 flexion            W's   2x10 GTB HELD        Standing D2 flexion   2x10 RTB                                 Ther Activity                                    Gait Training                                    Modalities                                    Assessment    FOTO        Education    PNE x15 min

## 2020-07-16 ENCOUNTER — OFFICE VISIT (OUTPATIENT)
Dept: PHYSICAL THERAPY | Facility: CLINIC | Age: 55
End: 2020-07-16
Payer: MEDICARE

## 2020-07-16 DIAGNOSIS — M54.2 CERVICALGIA: Primary | ICD-10-CM

## 2020-07-16 DIAGNOSIS — G44.86 CERVICOGENIC HEADACHE: ICD-10-CM

## 2020-07-16 DIAGNOSIS — G43.709 CHRONIC MIGRAINE WITHOUT AURA WITHOUT STATUS MIGRAINOSUS, NOT INTRACTABLE: ICD-10-CM

## 2020-07-16 PROCEDURE — 97140 MANUAL THERAPY 1/> REGIONS: CPT | Performed by: PHYSICAL THERAPIST

## 2020-07-16 PROCEDURE — 97112 NEUROMUSCULAR REEDUCATION: CPT | Performed by: PHYSICAL THERAPIST

## 2020-07-17 ENCOUNTER — TELEPHONE (OUTPATIENT)
Dept: NEUROLOGY | Facility: CLINIC | Age: 55
End: 2020-07-17

## 2020-07-17 DIAGNOSIS — G43.709 CHRONIC MIGRAINE WITHOUT AURA WITHOUT STATUS MIGRAINOSUS, NOT INTRACTABLE: Primary | ICD-10-CM

## 2020-07-17 NOTE — TELEPHONE ENCOUNTER
Pt states that even with PA approval her medication is $400  She is asking if a Tier exception is possible  Made her aware that I can look into next week

## 2020-07-20 NOTE — PROGRESS NOTES
Re-evaluation Note     Today's date: 2020  Patient name: Shade Moreno  : 1965  MRN: [id number]  Referring provider: Mateo Michaels MD  Dx:   Encounter Diagnosis     ICD-10-CM    1  Cervicalgia M54 2    2  Cervicogenic headache R51    3  Chronic migraine without aura without status migrainosus, not intractable G43 709    4  Chronic left shoulder pain M25 512     G89 29        Start Time: 0755  Stop Time: 0840  Total time in clinic (min): 45 minutes  The PT was assisted by CARLOS Escalera throughout this assessment      Subjective: No new complaints today  The patient reports some change in symptoms since previous session  The patient reports 0/10 pain at it's worst over the past 24 hours, and reports 60% improvement in overall condition since beginning formal PT care  The patient's chief remaining concern is further strengthening of her neck to try and prevent her migraines      The patient reports increased incidence of left shoulder pain which is often exacerbated with a migraine and limits her ability to perform daily activities    Objective: See treatment diary below  Active Range of Motion   Cervical/Thoracic Spine       Cervical    Flexion:  Restriction level: minimal  Extension:  Restriction level: WFL  Left lateral flexion:  Restriction level: WFL  Right lateral flexion:  Restriction level minimal  Left rotation: 62 degrees  Right rotation: 72 degrees     Shoulders  Left Shoulder  Flexion: 160 degrees  Abduction: 170 degrees  External Rotation BTH: T2  Internal Rotation BTB: T10    Right Shoulder  Flexion: 168 degrees  Abduction: 174 degrees  External Rotation BTH: T5  Internal Rotation BTB: T8    Joint Play   Cervical Spine  Hypomobile: OA (improved), AA (impoved), C3 (improved), C4 (improved), C5, C6 and C7   Thoracic Spine  Hypomobile T1-T10    Shoulder  Left Shoulder  Glenohumeral Joint: WNL  Scapulothoracic Joint: hypomobile all planes  Acromioclavicular Joint: hypomobile and painful inferior and posterior glides     Right Shoulder  Glenohumeral Joint: WNL  Scapulothoracic Joint: WNL  Acromiclavicular Joint: WNL    Tests    Shoulder  Left Shoulder  Positive Hawkin's-Zackary test, Speed's test, Barillas's compression test, Yeargason's test, biceps load I & II test, AC compression    Negative External rotation lag, empty can test, anterior laxity exam    Right Shoulder  WNL    Headaches   Patient reports headaches: Yes, minor headache pain weekly, the patient reports she is able to manage these symptoms on her own  Migraines occur infrequently  Assessment: Kathleen Luke is a pleasant 63 y o  female who has been receiving PT intervention for cervical pain and associated headache symptoms  The patient has demonstrated decreased pain, increased strength, increased ROM, increased joint mobility and improved posture since beginning treatment  Pt still lacks some ROM with flexion, R SB and L rotation but has made improvements in all other motions  Pt still demonstrates increased lower cervical spine hypomobility and was given lowe cervical SNAGs in attempt to improve this  Pt still complains of HA/migraines however has had a lower frequency since starting therapy  Pt is a good candidate for continued therapy in order increase ROM and joint mobility  Primary remaining impairments include:    1)  Decreased endurance and neuromotor control in the deep cervical musculature  2)  Increased muscle tension throughout cervical paraspinal musculature   3)  Restricted mobility of the lower cervical spine    The patient is now also complaining of left shoulder pain which is limiting her ability to reach overhead, lift, sleep, and perform her daily activities due to increased pain  The patient's biggest concern is how limited she is when she experiences a combination of her headaches symptoms and significant pain in her shoulder      Primary impairments include:    1)  Poor thoracic spine and scapular mobility  2)  Decreased postural control leading to significant anterior translation/protraction    Goals    Cervical  Patient will be independent with home exercise program    Patient will be able to manage symptoms independently  Short Term Goals: to be achieved by 4 weeks  1) Patient to be independent with basic HEP  - MET  2) Decrease pain to 4/10 at its worst  - MET  3) Increase cervical spine ROM by 5-10 degrees in all deficient planes  Partially met  4) Increase UE strength by 1/2 MMT grade in all deficient planes  Met  5) Improve joint mobility in cervical spine to normal - partially met    Long Term Goals: to be achieved by discharge  1) FOTO equal to or greater than 55  - NOT MET, PRORESSING  2) Patient to be independent with comprehensive HEP  - Partially met  3) Cervical spine ROM WNL all planes to improve a/iadls  - partially met  4) Increase UE strength to 1 MMT grade in all planes to improve a/iadls  - Partially met  5) Lifting is improved to maximal level of function - NO MET - PROGRESSING    Shoulder (NEW 7/22/20)  Short Term Goals: to be achieved by 4 weeks  1) Patient to be independent with basic HEP  2) Decrease pain to 4/10 at its worst  3) Increase shoulder ROM by 5-10 degrees in all planes  4) Increase shoulder strength by 1/2 MMT grade in all deficient planes    Long Term Goals: to be achieved by discharge  1) Patient to be independent with comprehensive HEP  2) Patient will demonstrate maximal over head reaching  3) Increase UE strength to 5/5 MMT grade in all planes to improve a/iadls  4) Patient to report no sleep interruption secondary to pain        Plan  Plan details: Begin to incorporate care for the patient's shoulder pain in addition to cervical spine and headaches  Patient would benefit from: skilled physical therapy  Planned modality interventions: Modalities PRN    Planned therapy interventions: activity modification, manual therapy, neuromuscular re-education, patient education, therapeutic activities, therapeutic exercise, graded activity, home exercise program, behavior modification and self care  Frequency: 2x week  Duration in weeks: 6  Treatment plan discussed with: patient       Precautions: Hypothyroidism, depression      Manuals 7/2 7/7 7/9 7/16 7/22       Cervical side-glide (gr I-II) SRB EM EM SRB EM       STM paraspinals, SCM SRB EM EM SRB EM       Sub-occipital release SRB EM EM SRB        Cervical OA nodding mob SRB EM EM SRB        OA headache SNAG SRB EM EM SRB        Supine manual retraction w/ PT overpressure    SRB        T-Spine Supine HVLAT     SRB       Neuro Re-Ed            Cervical retraction            Cervical chin tuck + head lift 3 sec x20 3 sec x20 3sec x20 3sec x20        Scapular retraction            Cervical propriocetion w/ laser Prone cervical retraction w/ laser, [place] tracing Prone cervical retraction w/ laser, [place] tracing          Cervical extension/rotatoin SNAG            OA ext/rot SNAG 2x10 ea 2x10 ea 2x10 ea 2x10 ea        Prone scap retract            Wall slides with chin tuck  1x10 scaption, 1x6 flexion 3x10 flexion HELD        Lower cervical rotation SNAG     1x10 ea       Ther Ex            No money  2x10 GTB          Levator scap stretch            Y/T/I Inclince bench 1# 3x10 ea Inclince bench 1# 1x10, 2# 2x10 Inclince bench 2# 3x10 HELD        Seated D2 flexion            W's   2x10 GTB HELD        Standing D2 flexion   2x10 RTB         Scapular retraction     2x20       Seated Thoracic extension w ball behind back     1x10       pec doorway stretch     1x10, 3"       Ther Activity                                    Gait Training                                    Modalities                                    Assessment    KEENAN RE, PIERO assessment for shoulder       Education    PNE x15 min UHEP, UPOC, prognosis       PT 1:1 from [id number]

## 2020-07-20 NOTE — TELEPHONE ENCOUNTER
Called [facility] and spoke with Dorethia Schwab to initiate tier exception request  Tyrese Seymour all clinical questions for both a new PA and tier exception (I had to complete a new PA because pt's is due to  within 90 days)  PA was reapproved until 2021  Auth# [id number]  Dorethia Schwab then transferred me the clinical pharmacist who states that this medication is a brand tier 3 and they are unable to lower the tier because of this  Reviewed last office note  Future option noted in note is prescribing Ajovy  Called pt and made her aware of the above  She is agreeable to Ajovy to see if this medication would be more affordable  Script pending if you are agreeable,please send to pharmacy

## 2020-07-22 ENCOUNTER — EVALUATION (OUTPATIENT)
Dept: PHYSICAL THERAPY | Facility: CLINIC | Age: 55
End: 2020-07-22
Payer: MEDICARE

## 2020-07-22 DIAGNOSIS — M54.2 CERVICALGIA: Primary | ICD-10-CM

## 2020-07-22 DIAGNOSIS — G89.29 CHRONIC LEFT SHOULDER PAIN: ICD-10-CM

## 2020-07-22 DIAGNOSIS — M25.512 CHRONIC LEFT SHOULDER PAIN: ICD-10-CM

## 2020-07-22 DIAGNOSIS — G44.86 CERVICOGENIC HEADACHE: ICD-10-CM

## 2020-07-22 DIAGNOSIS — G43.709 CHRONIC MIGRAINE WITHOUT AURA WITHOUT STATUS MIGRAINOSUS, NOT INTRACTABLE: ICD-10-CM

## 2020-07-22 PROCEDURE — 97110 THERAPEUTIC EXERCISES: CPT | Performed by: PHYSICAL THERAPIST

## 2020-07-22 PROCEDURE — 97164 PT RE-EVAL EST PLAN CARE: CPT | Performed by: PHYSICAL THERAPIST

## 2020-07-22 PROCEDURE — 97140 MANUAL THERAPY 1/> REGIONS: CPT | Performed by: PHYSICAL THERAPIST

## 2020-07-23 NOTE — PROGRESS NOTES
Daily Note     Today's date: 2020  Patient name: Suleiman Shell  : 1965  MRN: [id number]  Referring provider: Rianna Osei MD  Dx:   Encounter Diagnosis     ICD-10-CM    1  Cervicalgia M54 2    2  Cervicogenic headache R51    3  Chronic migraine without aura without status migrainosus, not intractable G43 709        Start Time: 0756  Stop Time: 0841  Total time in clinic (min): 45 minutes    Subjective: No new complaints today  The patient reports improvement in symptoms since previous session, and reports that she was able to incorporate her shoulder interventions successfully  Objective: See treatment diary below      Assessment: The PT continued to provide treatment for the patient's cervical spine while simultaneously introducing new intervention for her shoulder following assessment performed last session  Introductory shoulder interventions added to the patient's current program to instruct the patient in appropriate home management moving forward  The patient tolerated manual and active treatment well today  Plan: Continue per plan of care        Precautions: Hypothyroidism, depression      Manuals       Cervical side-glide (gr I-II) SRB EM EM SRB EM SRB      STM paraspinals, SCM SRB EM EM SRB EM SRB      Sub-occipital release SRB EM EM SRB  SRB      Cervical OA nodding mob SRB EM EM SRB        OA headache SNAG SRB EM EM SRB        Supine manual retraction w/ PT overpressure    SRB        T-Spine Supine HVLAT     SRB       GHJ post/inf glides (gr II-III)      SRB      Neuro Re-Ed            Sidelying scapular AROM all planes      x20 ea      Cervical chin tuck + head lift 3 sec x20 3 sec x20 3sec x20 3sec x20        Scapular retraction            Cervical propriocetion w/ laser Prone cervical retraction w/ laser, [place] tracing Prone cervical retraction w/ laser, [place] tracing          Cervical extension/rotatoin SNAG            OA ext/rot SNAG 2x10 ea 2x10 ea 2x10 ea 2x10 ea        Prone scap retract            Wall slides with chin tuck  1x10 scaption, 1x6 flexion 3x10 flexion HELD        Lower cervical rotation SNAG     1x10 ea       Ther Ex            No money  2x10 GTB    Low/mid/hi x20 ea      Serratus press       3# 3x10      Y/T/I Inclince bench 1# 3x10 ea Inclince bench 1# 1x10, 2# 2x10 Inclince bench 2# 3x10 HELD  Prone unilateral T's 3# 3x10                  W's   2x10 GTB HELD        Standing D2 flexion   2x10 RTB         Scapular retraction     2x20       Seated Thoracic extension w ball behind back     1x10 x20      pec doorway stretch     1x10, 3"       Supine UE ER "butterfly" stretch, arms behind head      10 sec x20      Ther Activity                                    Gait Training                                    Modalities                                    Assessment    KEENAN RE, DA assessment for shoulder       Education    PNE x15 min Dariana Fields, karan [facility]

## 2020-07-24 ENCOUNTER — OFFICE VISIT (OUTPATIENT)
Dept: PHYSICAL THERAPY | Facility: CLINIC | Age: 55
End: 2020-07-24
Payer: MEDICARE

## 2020-07-24 DIAGNOSIS — G44.86 CERVICOGENIC HEADACHE: ICD-10-CM

## 2020-07-24 DIAGNOSIS — M54.2 CERVICALGIA: Primary | ICD-10-CM

## 2020-07-24 DIAGNOSIS — G43.709 CHRONIC MIGRAINE WITHOUT AURA WITHOUT STATUS MIGRAINOSUS, NOT INTRACTABLE: ICD-10-CM

## 2020-07-24 PROCEDURE — 97140 MANUAL THERAPY 1/> REGIONS: CPT | Performed by: PHYSICAL THERAPIST

## 2020-07-24 PROCEDURE — 97112 NEUROMUSCULAR REEDUCATION: CPT | Performed by: PHYSICAL THERAPIST

## 2020-07-28 DIAGNOSIS — G43.709 CHRONIC MIGRAINE WITHOUT AURA WITHOUT STATUS MIGRAINOSUS, NOT INTRACTABLE: Primary | ICD-10-CM

## 2020-07-28 RX ORDER — CEFUROXIME AXETIL 250 MG/1
TABLET ORAL
Qty: 4 CARTRIDGE | Refills: 2 | Status: SHIPPED | OUTPATIENT
Start: 2020-07-28 | End: 2020-12-23

## 2020-07-28 NOTE — TELEPHONE ENCOUNTER
Pt called requesting imitrex inj refill be sent to [facility]  This was previously prescribed by her previous neurologist  Rx entered   If agreeable, pls sign off    thanks              [phone] ok to leave detailed message

## 2020-07-30 ENCOUNTER — OFFICE VISIT (OUTPATIENT)
Dept: PHYSICAL THERAPY | Facility: CLINIC | Age: 55
End: 2020-07-30
Payer: MEDICARE

## 2020-07-30 DIAGNOSIS — M25.512 CHRONIC LEFT SHOULDER PAIN: ICD-10-CM

## 2020-07-30 DIAGNOSIS — M54.2 CERVICALGIA: Primary | ICD-10-CM

## 2020-07-30 DIAGNOSIS — G44.86 CERVICOGENIC HEADACHE: ICD-10-CM

## 2020-07-30 DIAGNOSIS — G89.29 CHRONIC LEFT SHOULDER PAIN: ICD-10-CM

## 2020-07-30 DIAGNOSIS — G43.709 CHRONIC MIGRAINE WITHOUT AURA WITHOUT STATUS MIGRAINOSUS, NOT INTRACTABLE: ICD-10-CM

## 2020-07-30 PROCEDURE — 97110 THERAPEUTIC EXERCISES: CPT | Performed by: PHYSICAL THERAPIST

## 2020-07-30 PROCEDURE — 97112 NEUROMUSCULAR REEDUCATION: CPT | Performed by: PHYSICAL THERAPIST

## 2020-07-30 PROCEDURE — 97140 MANUAL THERAPY 1/> REGIONS: CPT | Performed by: PHYSICAL THERAPIST

## 2020-07-30 NOTE — PROGRESS NOTES
Daily Note     Today's date: 2020  Patient name: Yuly Downey  : 1965  MRN: [id number]  Referring provider: Elbert Benitez MD  Dx:   Encounter Diagnosis     ICD-10-CM    1  Cervicalgia M54 2    2  Cervicogenic headache R51    3  Chronic migraine without aura without status migrainosus, not intractable G43 709    4  Chronic left shoulder pain M25 512     G89 29        Start Time: 0755  Stop Time: 0840  Total time in clinic (min): 45 minutes  The patient was treated by CARLOS Escalera under direct supervision of Carla Guzmán DPT    Subjective: Pt reported that she has had a few migraines this week and missed her appointment due to not setting her alarm  Objective: See treatment diary below      Assessment: Tolerated treatment well reporting that her neck felt better following manual treatment  Pt had increased hypertonicity in her cervical paraspinals which was improved through extended STM  Pt demonstrated increased thoracic extension following seated extensions over a ball  Pt reported no increased in shoulder with any interventions and reported feeling more loose in both her neck and shoulder following treatment  Patient demonstrated fatigue post treatment, exhibited good technique with therapeutic exercises and would benefit from continued PT      Plan: Potential discharge next visit       Precautions: Hypothyroidism, depression      Manuals    Cervical side-glide (gr I-II) SRB EM EM SRB EM SRB EM   STM paraspinals, SCM SRB EM EM SRB EM SRB EM   Sub-occipital release SRB EM EM SRB  SRB EM   Cervical OA nodding mob SRB EM EM SRB   EM   OA headache SNAG SRB EM EM SRB   EM   Supine manual retraction w/ PT overpressure    SRB      T-Spine Supine HVLAT     SRB     GHJ post/inf glides (gr II-III)      SRB EM   Neuro Re-Ed          Sidelying scapular AROM all planes      x20 ea x20 ea   Cervical chin tuck + head lift 3 sec x20 3 sec x20 3sec x20 3sec x20 Scapular retraction          Cervical propriocetion w/ laser Prone cervical retraction w/ laser, [place] tracing Prone cervical retraction w/ laser, [place] tracing        Cervical extension/rotatoin SNAG          OA ext/rot SNAG 2x10 ea 2x10 ea 2x10 ea 2x10 ea      Prone scap retract          Wall slides with chin tuck  1x10 scaption, 1x6 flexion 3x10 flexion HELD      Lower cervical rotation SNAG     1x10 ea     Ther Ex          No money  2x10 GTB    Low/mid/hi x20 ea Low/mid/hi x20 ea   Serratus press       3# 3x10 3# 3x10   Y/T/I Inclince bench 1# 3x10 ea Inclince bench 1# 1x10, 2# 2x10 Inclince bench 2# 3x10 HELD  Prone unilateral T's 3# 3x10 Prone unilateral T's 3# 3x10             W's   2x10 GTB HELD      Standing D2 flexion   2x10 RTB       Scapular retraction     2x20     Seated Thoracic extension w ball behind back     1x10 x20 x20   pec doorway stretch     1x10, 3"  1x10, 3"   Supine UE ER "butterfly" stretch, arms behind head      10 sec x20  10 sec x20   Face pulls       2x10 RTB   Ther Activity                              Gait Training                              Modalities                              Assessment    PIREO RESENDIZ assessment for shoulder     Education    PNE x15 min UHEP, UPOC, prognosis UHEP    PT 1:1 [id number]

## 2020-08-06 ENCOUNTER — APPOINTMENT (OUTPATIENT)
Dept: PHYSICAL THERAPY | Facility: CLINIC | Age: 55
End: 2020-08-06
Payer: MEDICARE

## 2020-08-10 NOTE — PROGRESS NOTES
Daily Note     Today's date: 2020  Patient name: Yuly Downey  : 1965  MRN: [id number]  Referring provider: Elbert Benitez MD  Dx:   Encounter Diagnosis     ICD-10-CM    1  Cervicalgia  M54 2    2  Cervicogenic headache  R51    3  Chronic migraine without aura without status migrainosus, not intractable  G43 709    4  Chronic left shoulder pain  M25 512     G89 29        Start Time: 0845  Stop Time: 930  Total time in clinic (min): 45 minutes  The patient was treated by CARLOS Escalera under direct supervision of Carla Guzmán DPT    Subjective: Pt reports that she has had a few minor migraines since the last visit almost 2 weeks ago  Pt also reports that her shoulder bothered her a bit yesterday however today it feels pretty good  Objective: See treatment diary below      Assessment: Yuly Downey is a pleasant 63 y o  female who has been receiving PT intervention for cervical and shoulder pain  Pt still has minor shoulder pain with activity at times however reports that she feels she can self manage with the HEP and education provided  The patient has demonstrated decreased pain, increased strength, increased ROM, increased joint mobility, improved body mechanics, improved posture  and increased activity tolerance since beginning treatment  Primary remaining impairments include:    1)  Minor shoulder pain     Goals     Cervical  Patient will be independent with home exercise program    Patient will be able to manage symptoms independently  Short Term Goals: to be achieved by 4 weeks  1) Patient to be independent with basic HEP  - MET  2) Decrease pain to 4/10 at its worst  - MET  3) Increase cervical spine ROM by 5-10 degrees in all deficient planes  Met  4) Increase UE strength by 1/2 MMT grade in all deficient planes  Met  5) Improve joint mobility in cervical spine to normal - Met    Long Term Goals: to be achieved by discharge  1) FOTO equal to or greater than 55   - MET  2) Patient to be independent with comprehensive HEP  - Met  3) Cervical spine ROM WNL all planes to improve a/iadls  - Met  4) Increase UE strength to 1 MMT grade in all planes to improve a/iadls   - Met  5) Lifting is improved to maximal level of function - MET     Shoulder (NEW 7/22/20)  Short Term Goals: to be achieved by 4 weeks  1) Patient to be independent with basic HEP - MET  2) Decrease pain to 4/10 at its worst - MET  3) Increase shoulder ROM by 5-10 degrees in all planes - MET  4) Increase shoulder strength by 1/2 MMT grade in all deficient planes - - MET     Long Term Goals: to be achieved by discharge  1) Patient to be independent with comprehensive HEP - MET  2) Patient will demonstrate maximal over head reaching - MET  3) Increase UE strength to 5/5 MMT grade in all planes to improve a/iadls - MET  4) Patient to report no sleep interruption secondary to pain - MET    Plan: The patient has met or exceeded her short & long term goals and is a candidate for discharge from formal PT care to an independent home program        Precautions: Hypothyroidism, depression      Manuals 7/2 7/7 7/9 7/16 7/22 7/24 7/30 8/6 8/11   Cervical side-glide (gr I-II) SRB EM EM SRB EM SRB EM EM EM   STM paraspinals, SCM SRB EM EM SRB EM SRB EM EM    Sub-occipital release SRB EM EM SRB  SRB EM EM    Cervical OA nodding mob SRB EM EM SRB   EM     OA headache SNAG SRB EM EM SRB   EM     Supine manual retraction w/ PT overpressure    SRB        T-Spine Supine HVLAT     SRB       GHJ post/inf glides (gr II-III)      SRB EM EM    Neuro Re-Ed            Sidelying scapular AROM all planes      x20 ea x20 ea x20 ea    Cervical chin tuck + head lift 3 sec x20 3 sec x20 3sec x20 3sec x20        Scapular retraction            Cervical propriocetion w/ laser Prone cervical retraction w/ laser, [place] tracing Prone cervical retraction w/ laser, [place] tracing          Cervical extension/rotatoin SNAG            OA ext/rot SNAG 2x10 ea 2x10 ea 2x10 ea 2x10 ea        Prone scap retract            Wall slides with chin tuck  1x10 scaption, 1x6 flexion 3x10 flexion HELD        Lower cervical rotation SNAG     1x10 ea       Ther Ex            No money  2x10 GTB    Low/mid/hi x20 ea Low/mid/hi x20 ea Low/mid/hi x20 ea Low/mid/hi x20 ea   Serratus press       3# 3x10 3# 3x10 3# 3x10 5# 3x10   Y/T/I Inclince bench 1# 3x10 ea Inclince bench 1# 1x10, 2# 2x10 Inclince bench 2# 3x10 HELD  Prone unilateral T's 3# 3x10 Prone unilateral T's 3# 3x10 Prone unilateral T's 3# 3x10 Prone unilateral T's 3# 3x10               W's   2x10 GTB HELD        Standing D2 flexion   2x10 RTB         Scapular retraction     2x20       Seated Thoracic extension w ball behind back     1x10 x20 x20 x20    pec doorway stretch     1x10, 3"  1x10, 3"  1x10, 3"  1x5, 10"   Supine UE ER "butterfly" stretch, arms behind head      10 sec x20  10 sec x20  10 sec x20  10 sec x20   Face pulls       2x10 RTB  2x10 RTB  2x10 RTB   Ther Activity                                    Gait Training                                    Modalities                                    Assessment    PIERO RESENDIZ assessment for shoulder       Education    PNE x15 min UHEP, UPOC, prognosis UHEP      PT 1:1 from 5149 - 5590

## 2020-08-11 ENCOUNTER — OFFICE VISIT (OUTPATIENT)
Dept: PHYSICAL THERAPY | Facility: CLINIC | Age: 55
End: 2020-08-11
Payer: MEDICARE

## 2020-08-11 DIAGNOSIS — G43.709 CHRONIC MIGRAINE WITHOUT AURA WITHOUT STATUS MIGRAINOSUS, NOT INTRACTABLE: ICD-10-CM

## 2020-08-11 DIAGNOSIS — G44.86 CERVICOGENIC HEADACHE: ICD-10-CM

## 2020-08-11 DIAGNOSIS — G89.29 CHRONIC LEFT SHOULDER PAIN: ICD-10-CM

## 2020-08-11 DIAGNOSIS — M25.512 CHRONIC LEFT SHOULDER PAIN: ICD-10-CM

## 2020-08-11 DIAGNOSIS — M54.2 CERVICALGIA: Primary | ICD-10-CM

## 2020-08-11 PROCEDURE — 97112 NEUROMUSCULAR REEDUCATION: CPT | Performed by: PHYSICAL THERAPIST

## 2020-08-11 PROCEDURE — 97140 MANUAL THERAPY 1/> REGIONS: CPT | Performed by: PHYSICAL THERAPIST

## 2020-08-11 PROCEDURE — 97110 THERAPEUTIC EXERCISES: CPT | Performed by: PHYSICAL THERAPIST

## 2020-08-17 DIAGNOSIS — K21.9 GERD (GASTROESOPHAGEAL REFLUX DISEASE): Primary | ICD-10-CM

## 2020-08-17 DIAGNOSIS — IMO0002 CHRONIC MIGRAINE: ICD-10-CM

## 2020-08-17 DIAGNOSIS — F32.A DEPRESSION: ICD-10-CM

## 2020-08-17 RX ORDER — TOPIRAMATE 50 MG/1
100 TABLET, FILM COATED ORAL 2 TIMES DAILY
Qty: 180 TABLET | Refills: 1 | Status: SHIPPED | OUTPATIENT
Start: 2020-08-17 | End: 2021-01-22 | Stop reason: SDUPTHER

## 2020-08-17 RX ORDER — OMEPRAZOLE 20 MG/1
20 CAPSULE, DELAYED RELEASE ORAL 2 TIMES DAILY
Qty: 180 CAPSULE | Refills: 1 | Status: SHIPPED | OUTPATIENT
Start: 2020-08-17 | End: 2021-10-26

## 2020-08-17 RX ORDER — VENLAFAXINE HYDROCHLORIDE 150 MG/1
150 CAPSULE, EXTENDED RELEASE ORAL DAILY
Qty: 90 CAPSULE | Refills: 1 | Status: SHIPPED | OUTPATIENT
Start: 2020-08-17 | End: 2021-03-08 | Stop reason: SDUPTHER

## 2020-08-19 NOTE — PROGRESS NOTES
[address] [facility] Consult - Follow up   PATIENT:  Svitlana Cook  MRN:  [id number]  :  1965  DATE OF SERVICE:  2020  REFERRED BY: No ref  provider found  PMD: Ericka Betts, DO    Assessment/Plan:     Svitlana Cook is a very pleasant 63 y o  female with a past medical history that includes hypothyroidism, migraines, GERD, depression, Cervical spinal stenosis, chronic neck pain, slightly elevated liver enzymes referred here for evaluation of headache  My initial evaluation 2020  Follow up 2020       Chronic migraine without aura without status migrainosus, not intractable  Cervicalgia  Cervicogenic headache  Luis Felipe Sanchez reports a long history of headaches and migraines have been difficult to control  She reports pain is read left-sided, worse when on the right side  Starts typically the back of the neck and radiates unilateral side of the head the frontal/I region  She denies aura reports typical associated migrainous features as well as some autonomic features  She denies any new worsening and did have slight improvement on aimovig but not enough  She denies any new or concerning symptoms  - as of 2020: 5 days a week migraines   - as of 2020:  Physical therapy has helped somewhat   migraines 3-4 times per week, in July 2-3 really bad migraines that lasted 3-4 days    Already on venlafaxine 150 mg, topiramate 100 mg BID, emgality was too expensive, trial of ajovy         Workup:  - Neurologic assessment reveals normal neurological exam   - noncontrast head CT 2018:  No acute pathology, Tiny cavum septum pellucidum, a normal variant    - MRI Brain  - normal for patient-she will try to obtain MRI brain on CD for my review     Preventative:  - referral to PT  - we discussed headache hygiene and lifestyle factors that may improve headaches  - she is already on through other providers topiramate 100 mg b i d , venlafaxine 150 mg daily  - trial of ajovy  Discussed proper use, possible side effects and risks  - past/failed: aimovig 70 mg, botox, venlafaxine, amitriptyline would be contraindicated due interaction with venlafaxine, propranolol the remote past, topiramate  - future options: 140 mg of aimovig, emgality     Abortive:  - discussed not taking over-the-counter or prescription pain medications more than 3 days per week to prevent medication overuse/rebound headache  - she is already prescribe for the providers: sumatriptan 100 mg p o  And 6mg/0 5mL inj   Discussed proper use, possible side effects and risks  -     prochlorperazine (COMPAZINE) 10 mg tablet; Take 1 tablet (10 mg total) by mouth every 6 (six) hours as needed for nausea or vomiting  Discussed proper use, possible side effects and risks  - past/failed:  OTC meds, sumatriptan mouth does not work as well as injection  - future options:  ubrelvy, Alternative Triptan, metoclopramide, Toradol IM or p o , could consider trial for 5 days of Depakote or dexamethasone 4 prolonged migraine, reyvow           Patient instructions      Follow up with eye doctor for dilated eye exam  Labs ordered by primary doc   Try and get MRI Brain on CD for my review      Headache/migraine treatment:   Abortive medications (for immediate treatment of a headache): It is ok to take ibuprofen, acetaminophen or naproxen (Advil, Tylenol,  Aleve, Excedrin) if they help your headaches you should limit these to No more than 3 times a week to avoid medication overuse/rebound headaches       Continued sumatriptan but no more than 3 days per week     Alternative is prochlorperazine/Compazine 10 mg - this is technically a nausea medication but can be used for migraines as well        Prescription preventive medications for headaches/migraines   (to take every day to help prevent headaches - not to take at the time of headache):  [x]? continue topiramate 50 mg in a m  And p m   And venlafaxine 150 mg daily  [x]? trial of ajovy      *Typically these types of medications take time untill you see the benefit, although some may see improvement in days, often it may take weeks, especially if the medication is being titrated up to a beneficial level  Please contact us if there are any concerns or questions regarding the medication          Self-Monitoring:  [x]? Headache calendar  Each day [place] a number from 0-10 indicating if there was a headache and how bad it was  This can be used to monitor gradual improvement and is helpful to make medication adjustments  You can do this on paper or there is an LUCIA for a smart phone called "Migraine e Diary"       Lifestyle Recommendations:  [x]? SLEEP - Maintain a regular sleep schedule: Adults need at least 7-8 hours of uninterrupted a night  Maintain good sleep hygiene:  Going to bed and waking up at consistent times, avoiding excessive daytime naps, avoiding caffeinated beverages in the evening, avoid excessive stimulation in the evening and generally using bed primarily for sleeping  One hour before bedtime would recommend turning lights down lower, decreasing your activity (may read quietly, listen to music at a low volume)  When you get into bed, should eliminate all technology (no texting, emailing, playing with your phone, iPad or tablet in bed)  [x]? HYDRATION - Maintain good hydration  Drink  2L of fluid a day (4 typical small water bottles)  [x]? DIET - Maintain good nutrition  In particular don't skip meals and try and eat healthy balanced meals regularly  [x]? TRIGGERS - Look for other triggers and avoid them: Limit caffeine to 1-2 cups a day or less  Avoid dietary triggers that you have noticed bring on your headaches (this could include aged cheese, peanuts, MSG, aspartame and nitrates)  [x]?  EXERCISE - physical exercise as we all know is good for you in many ways, and not only is good for your heart, but also is beneficial for your mental health, cognitive health and  chronic pain/headaches  I would encourage at the least 5 days of physical exercise weekly for at least 30 minutes       Education and Follow-up  [x]? Please call with any questions or concerns  Of course if any new concerning symptoms go to the emergency department  [x]? Follow up with WILMAN Martin 11/5/20           CC: We had the pleasure of evaluating Adeline Herbert in neurological consultation today  Kameron Martin is a 63 y o    right handed female who presents today for evaluation of headaches       History obtained from patient as well as available medical record review  History of Present Illness:   Current medical illnesses include hypothyroidism, migraines, GERD, depression, Cervical spinal stenosis, chronic neck pain, slightly elevated liver enzymes        Interval history as of 08/20/2020:    -she has seen physical therapy - has seen some improvement   -labs not done yet - but seeing PCP soon  -has  MRI brain on CD for my review - forgot it today       Headaches and migraines - 3-4 times per week   Topiramate 100 mg b i d  Venlafaxine 150 mg  -switched from plan for emgality to ajovy since more affordable with her insurance - but never got this either     Sumatriptan injection - helps   Prochlorperazine - helped a little    Headaches started at what age? 11years old  How often do the headaches occur?   - as of 5/14/2020: 5 days a week migraines   - as of 8/20/2020: 3-4 times per week, in July 2-3 really bad migraines that lasted 3-4 days    What time of the day do the headaches start? A lot wakes up with them   How long do the headaches last? Over 4 hours up to 3 days  Are you ever headache free? Yes     Aura? without aura     Last eye exam: 2 years ago, normal except needing glasses         Where is your headache located and pain quality?    - Right or left side, worse when on right  - start back of neck   - worse on right side comes up the back of the head and goes to frontal/eye region on that side  - pulsating, pounding, sharp stabbing, pressure     What is the intensity of pain? Average: 7-8/10, worst 10/10  Associated symptoms:   [x]? Nausea       [x]? Vomiting        []? Diarrhea  [x]? Insomnia    [x]? Stiff or sore neck   [x]? Problems with concentration  [x]? Photophobia     [x]? Phonophobia      [x]? Osmophobia  []? Blurred vision   [x]? Prefer quiet, dark room  [x]? Light-headed or dizzy     [x]? Tinnitus   []? Hands or feet tingle or feel numb/paresthesias       []? Red ear      []? Ptosis      []? Facial droop  [x]? Lacrimation - tears  [x]? Nasal congestion   []? Flushing of face  []? Change in pupil size     Things that make the headache worse? No specific movements, any movement      Headache triggers:  unknown     Have you seen someone else for headaches or pain? Yes, neurology, pain management  Have you had trigger point injection performed and how often? No  Have you had Botox injection performed and how often? Yes, 4 sessions and did not help   Have you had epidural injections or transforaminal injections performed? Yes  Are you current pregnant or planning on getting pregnant? No - no regular periods since early 402  Have you ever had any Brain imaging? yes - MRI Brain 2015 - normal     What medications do you take or have you taken for your headaches?    ABORTIVE:    OTC medications have been ineffective      Sumatriptan 100 mg - works well unless vomiting   Sumatriptan 6mg/0 5mL inj - works well      Has had zofran for nausea before     Past:  advil migraine  ED cocktails - help - compazine, benadryl   Prednisone      PREVENTIVE:      Venlafaxine 150 mg - 5 5 years   Topiramate 100 mg BID - more than 5 years     Past:  aimovig 70 mg- last took in January - was decreasing N/V but not huge decrease in migraines  Amitriptyline would be contraindicated due to interaction with venlafaxine  Inderal - proproanolol         Alternative therapies used in the past for headaches?  PT for chronic neck pain in the past - last 4 years ago - helped      LIFESTYLE  Sleep   - averages: 930-730   Problems falling asleep?:   No  Problems staying asleep?:  Yes, 2   - snores  - sometimes naps, not usually  - sometimes falls asleep watching TV  - never had a sleep study     Physical activity: none     Water: 4 bottles per day  Caffeine: 1-2 cups per day     Mood: - depression after  and granddaughter passing away      The following portions of the patient's history were reviewed and updated as appropriate: allergies, current medications, past family history, past medical history, past social history, past surgical history and problem list      Pertinent family history:  Family history of headaches:  migraine headaches in mother, grandmother and daughter, granddaughter  Any family history of aneurysms - No     Pertinent social history:  Work: no  Education: 12th  Lives with mother     Illicit Drugs: denies  Alcohol/tobacco: Denies tobacco use, alcohol intake: rarely      Past Medical History:     Past Medical History:   Diagnosis Date    Bilateral breast cysts June 2019 r    right breast cysts; dense breast tissue bilaterally    Cervical spinal stenosis 2015    Liver enzyme elevation     Migraines        Patient Active Problem List   Diagnosis    Hypothyroidism    GERD (gastroesophageal reflux disease)    Depression       Medications:      Current Outpatient Medications   Medication Sig Dispense Refill    levothyroxine 100 mcg tablet 100 mcg daily       omeprazole (PriLOSEC) 20 mg delayed release capsule Take 1 capsule (20 mg total) by mouth 2 (two) times a day 180 capsule 1    prochlorperazine (COMPAZINE) 10 mg tablet Take 1 tablet (10 mg total) by mouth every 6 (six) hours as needed for nausea or vomiting 12 tablet 3    SUMAtriptan (IMITREX) 100 mg tablet 100 mg once as needed       SUMAtriptan Succinate 6 MG/0 5ML SOAJ 1 injection for severe headache  may repeat once after 2 hour  Max 2 per week 4 Cartridge 2    topiramate (TOPAMAX) 50 MG tablet Take 2 tablets (100 mg total) by mouth 2 (two) times a day 180 tablet 1    venlafaxine (EFFEXOR-XR) 150 mg 24 hr capsule Take 1 capsule (150 mg total) by mouth daily 90 capsule 1    Fremanezumab-vfrm (Ajovy) 225 MG/1 5ML SOAJ Inject 225 mg under the skin every 30 (thirty) days 1 pen 11     No current facility-administered medications for this visit  Allergies:    No Known Allergies    Family History:     Family History   Problem Relation Age of Onset    Parkinsonism Father     Dementia Father     Hypertension Daughter     Cervical cancer Maternal Grandmother     Bipolar disorder Sister     No Known Problems Mother        Social History:     Social History     Socioeconomic History    Marital status:       Spouse name: Not on file    Number of children: Not on file    Years of education: Not on file    Highest education level: Not on file   Occupational History    Not on file   Social Needs    Financial resource strain: Not on file    Food insecurity     Worry: Not on file     Inability: Not on file    Transportation needs     Medical: Not on file     Non-medical: Not on file   Tobacco Use    Smoking status: Former Smoker    Smokeless tobacco: Never Used   Substance and Sexual Activity    Alcohol use: No    Drug use: No    Sexual activity: Not Currently   Lifestyle    Physical activity     Days per week: Not on file     Minutes per session: Not on file    Stress: Not on file   Relationships    Social connections     Talks on phone: Not on file     Gets together: Not on file     Attends Congregation service: Not on file     Active member of club or organization: Not on file     Attends meetings of clubs or organizations: Not on file     Relationship status: Not on file    Intimate partner violence     Fear of current or ex partner: Not on file     Emotionally abused: Not on file     Physically abused: Not on file     Forced sexual activity: Not on file   Other Topics Concern    Not on file   Social History Narrative    Not on file         Objective:       Physical Exam:                                                                 Vitals:            Constitutional:    /80 (BP Location: Left arm, Patient Position: Sitting, Cuff Size: Standard)   Temp 98 4 °F (36 9 °C) (Oral)   Ht 4' 11" (1 499 m)   Wt 61 7 kg (136 lb)   BMI 27 47 kg/m²   BP Readings from Last 3 Encounters:   08/20/20 120/80   05/14/20 125/84   04/28/20 114/70     Pulse Readings from Last 3 Encounters:   05/14/20 94   04/28/20 74   06/30/18 78         Well developed, well nourished, well groomed  No dysmorphic features  HEENT:  Normocephalic atraumatic  See neuro exam   Chest:  Respirations regular and unlabored  Cardiovascular:  Regular rate, no observed significant swelling  Musculoskeletal:  Full range of motion  (see below under neurologic exam for evaluation of motor function and gait)   Skin:  warm and dry, not diaphoretic  No apparent birthmarks or stigmata of neurocutaneous disease  Psychiatric:  Normal behavior and appropriate affect        Neurological Examination:     Mental status/cognitive function:   Recent and remote memory intact  Attention span and concentration as well as fund of knowledge are appropriate for age  Normal language and spontaneous speech  Cranial Nerves:  III, IV, VI-Pupils were equal, round  Extraocular movements were full and conjugate   VII-facial expression symmetric  VIII-hearing grossly intact bilaterally   Motor Exam: symmetric bulk throughout  no atrophy, fasciculations or abnormal movements noted     Coordination:  no apparent dysmetria, ataxia or tremor noted  Gait: steady casual gait      Pertinent lab results:   No labs noted in system     Imaging:   - noncontrast head CT 06/30/2018:  No acute pathology, Tiny cavum septum pellucidum, a normal variant    - MRI Brain 2015 - normal  Review of Systems:   ROS obtained by medical assistant Personally reviewed and updated if indicated  Review of Systems   Constitutional: Negative  Negative for appetite change and fever  HENT: Negative  Negative for hearing loss, tinnitus, trouble swallowing and voice change  Eyes: Negative  Negative for photophobia and pain  Respiratory: Negative  Negative for shortness of breath  Cardiovascular: Negative  Negative for palpitations  Gastrointestinal: Negative  Negative for nausea and vomiting  Endocrine: Negative  Negative for cold intolerance  Genitourinary: Negative  Negative for dysuria, frequency and urgency  Musculoskeletal: Negative  Negative for myalgias and neck pain  Skin: Negative  Negative for rash  Neurological: Negative  Negative for dizziness, tremors, seizures, syncope, facial asymmetry, speech difficulty, weakness, light-headedness, numbness and headaches  Hematological: Negative  Does not bruise/bleed easily  Psychiatric/Behavioral: Negative  Negative for confusion, hallucinations and sleep disturbance  I have spent over 25 minutes with Patient  today in which greater than 50% of this time was spent in counseling/coordination of care regarding Prognosis, Risks and benefits of tx options, Intructions for management, Patient education, Importance of tx compliance, Risk factor reductions and Impressions        Author:  Gregg Carpio MD 8/20/2020 10:01 AM

## 2020-08-20 ENCOUNTER — OFFICE VISIT (OUTPATIENT)
Dept: NEUROLOGY | Facility: CLINIC | Age: 55
End: 2020-08-20
Payer: MEDICARE

## 2020-08-20 VITALS
BODY MASS INDEX: 27.42 KG/M2 | WEIGHT: 136 LBS | DIASTOLIC BLOOD PRESSURE: 80 MMHG | SYSTOLIC BLOOD PRESSURE: 120 MMHG | TEMPERATURE: 98.4 F | HEIGHT: 59 IN

## 2020-08-20 DIAGNOSIS — M54.2 CERVICALGIA: ICD-10-CM

## 2020-08-20 DIAGNOSIS — G43.709 CHRONIC MIGRAINE WITHOUT AURA WITHOUT STATUS MIGRAINOSUS, NOT INTRACTABLE: Primary | ICD-10-CM

## 2020-08-20 DIAGNOSIS — G44.86 CERVICOGENIC HEADACHE: ICD-10-CM

## 2020-08-20 PROCEDURE — 3008F BODY MASS INDEX DOCD: CPT | Performed by: PSYCHIATRY & NEUROLOGY

## 2020-08-20 PROCEDURE — 99214 OFFICE O/P EST MOD 30 MIN: CPT | Performed by: PSYCHIATRY & NEUROLOGY

## 2020-08-20 PROCEDURE — 1036F TOBACCO NON-USER: CPT | Performed by: PSYCHIATRY & NEUROLOGY

## 2020-08-20 RX ORDER — FREMANEZUMAB-VFRM 225 MG/1.5ML
225 INJECTION SUBCUTANEOUS
Qty: 1 PEN | Refills: 11 | Status: SHIPPED | OUTPATIENT
Start: 2020-08-20 | End: 2020-08-24

## 2020-08-20 NOTE — TELEPHONE ENCOUNTER
I called [facility] as this is where Hermes Mares was sent to  Spoke to pharmacist  She states that they thought the patient moved and was not using their pharmacy any longer  She states she tried to run medication but said it needed a PA  I reviewed chart and does not look like we received notification of this  I then called patient  She states she uses [facility] in [place]  Pended new script below  please sign off  [facility]   ID [id number]  BIn [id number]  PCN  [id number]  grp [id number]  phone# [phone]    Patient states she has greater than 20 migraine days per month     Attempted to submit on [facility]  Key: [id number]  "[facility] is not able to process this request through [address] [place] [address], please contact the plan at [phone] or fax in request to [phone]"

## 2020-08-20 NOTE — TELEPHONE ENCOUNTER
Signed off on ajovy in July, seeing patient in office today and she never heard about getting ajovy, could someone please follow up on this?

## 2020-08-20 NOTE — TELEPHONE ENCOUNTER
Called [facility] at [phone] and spoke to Aide Brady  She states we can do PA over the phone  CASE # [id number]  Qty 1 5mL per 30 days  She states as of now, there are no clinical questions to submit but she is going to send to a pharmacist for review  Pharmacist will review and send clinical questions if they are indicated   Will await fax/determination

## 2020-08-20 NOTE — PATIENT INSTRUCTIONS
Follow up with eye doctor for dilated eye exam  Labs ordered by primary doc   Try and get MRI Brain on CD for my review      Headache/migraine treatment:   Abortive medications (for immediate treatment of a headache): It is ok to take ibuprofen, acetaminophen or naproxen (Advil, Tylenol,  Aleve, Excedrin) if they help your headaches you should limit these to No more than 3 times a week to avoid medication overuse/rebound headaches       Continued sumatriptan but no more than 3 days per week     Alternative is prochlorperazine/Compazine 10 mg - this is technically a nausea medication but can be used for migraines as well        Prescription preventive medications for headaches/migraines   (to take every day to help prevent headaches - not to take at the time of headache):  [x]? continue topiramate 50 mg in a m  And p m  And venlafaxine 150 mg daily  [x]? trial of ajovy      *Typically these types of medications take time untill you see the benefit, although some may see improvement in days, often it may take weeks, especially if the medication is being titrated up to a beneficial level  Please contact us if there are any concerns or questions regarding the medication          Self-Monitoring:  [x]? Headache calendar  Each day [place] a number from 0-10 indicating if there was a headache and how bad it was  This can be used to monitor gradual improvement and is helpful to make medication adjustments  You can do this on paper or there is an LUCIA for a smart phone called "Migraine e Diary"       Lifestyle Recommendations:  [x]? SLEEP - Maintain a regular sleep schedule: Adults need at least 7-8 hours of uninterrupted a night  Maintain good sleep hygiene:  Going to bed and waking up at consistent times, avoiding excessive daytime naps, avoiding caffeinated beverages in the evening, avoid excessive stimulation in the evening and generally using bed primarily for sleeping    One hour before bedtime would recommend turning lights down lower, decreasing your activity (may read quietly, listen to music at a low volume)  When you get into bed, should eliminate all technology (no texting, emailing, playing with your phone, iPad or tablet in bed)  [x]? HYDRATION - Maintain good hydration  Drink  2L of fluid a day (4 typical small water bottles)  [x]? DIET - Maintain good nutrition  In particular don't skip meals and try and eat healthy balanced meals regularly  [x]? TRIGGERS - Look for other triggers and avoid them: Limit caffeine to 1-2 cups a day or less  Avoid dietary triggers that you have noticed bring on your headaches (this could include aged cheese, peanuts, MSG, aspartame and nitrates)  [x]? EXERCISE - physical exercise as we all know is good for you in many ways, and not only is good for your heart, but also is beneficial for your mental health, cognitive health and  chronic pain/headaches  I would encourage at the least 5 days of physical exercise weekly for at least 30 minutes       Education and Follow-up  [x]? Please call with any questions or concerns  Of course if any new concerning symptoms go to the emergency department  [x]?  Follow up with WILMAN Coronado 11/5/20

## 2020-08-21 NOTE — TELEPHONE ENCOUNTER
Received fax that Cornelius Morales is denied due to not being on formulary  Called [phone] and spoke to Brittany  I spoke to Brittany and he states that Cornelius Morales was approved  Effective dates 4/22/20-8/20/21  He transferred me to coverage dept so we can get approved letter   They will fax approval to [facility] #    Case # [id number]    Called patient and made her aware of approval  Called pharmacy and left a vm to make them aware of approval

## 2020-08-24 ENCOUNTER — TELEPHONE (OUTPATIENT)
Dept: NEUROLOGY | Facility: CLINIC | Age: 55
End: 2020-08-24

## 2020-08-24 ENCOUNTER — TELEPHONE (OUTPATIENT)
Dept: FAMILY MEDICINE CLINIC | Facility: CLINIC | Age: 55
End: 2020-08-24

## 2020-08-24 DIAGNOSIS — G43.709 CHRONIC MIGRAINE WITHOUT AURA WITHOUT STATUS MIGRAINOSUS, NOT INTRACTABLE: Primary | ICD-10-CM

## 2020-08-24 NOTE — TELEPHONE ENCOUNTER
Patient called  She has a follow appt w/ you on 09/02/20  She is concerned because she has been extremely fatigued lately and she has been having pain under her left armpit  She is requesting you order labs and a mammogram for her

## 2020-08-24 NOTE — TELEPHONE ENCOUNTER
Again, I would rather examine her first rather than blindly order studies and then have to order more  If she wants to be see sooner offer with Briana

## 2020-08-24 NOTE — TELEPHONE ENCOUNTER
Totally agree to that, had discussed with her already that we would go to aimovig 140 mg monthly if the others not approved  Just sent   140 mg tends to work much better than 70 mg

## 2020-08-24 NOTE — TELEPHONE ENCOUNTER
Patient stated she cannot afford the ajovy  It would cost her over $100 per month  She's questioning if she could try the aimovig again, but try the higher dose (she stated she was on the lower dose ordered by another physician in the past)  Ulices Albert

## 2020-08-25 ENCOUNTER — TELEPHONE (OUTPATIENT)
Dept: FAMILY MEDICINE CLINIC | Facility: CLINIC | Age: 55
End: 2020-08-25

## 2020-08-25 ENCOUNTER — OFFICE VISIT (OUTPATIENT)
Dept: FAMILY MEDICINE CLINIC | Facility: CLINIC | Age: 55
End: 2020-08-25
Payer: MEDICARE

## 2020-08-25 VITALS
SYSTOLIC BLOOD PRESSURE: 124 MMHG | HEART RATE: 82 BPM | DIASTOLIC BLOOD PRESSURE: 82 MMHG | TEMPERATURE: 97.9 F | HEIGHT: 59 IN | BODY MASS INDEX: 27.21 KG/M2 | WEIGHT: 135 LBS

## 2020-08-25 DIAGNOSIS — N64.4 BREAST PAIN, LEFT: Primary | ICD-10-CM

## 2020-08-25 DIAGNOSIS — N64.4 PAIN OF LEFT BREAST: Primary | ICD-10-CM

## 2020-08-25 DIAGNOSIS — R53.83 FATIGUE, UNSPECIFIED TYPE: ICD-10-CM

## 2020-08-25 DIAGNOSIS — Z00.00 HEALTH CARE MAINTENANCE: ICD-10-CM

## 2020-08-25 PROCEDURE — 99214 OFFICE O/P EST MOD 30 MIN: CPT | Performed by: NURSE PRACTITIONER

## 2020-08-25 PROCEDURE — 1036F TOBACCO NON-USER: CPT | Performed by: NURSE PRACTITIONER

## 2020-08-25 PROCEDURE — 3008F BODY MASS INDEX DOCD: CPT | Performed by: NURSE PRACTITIONER

## 2020-08-25 NOTE — TELEPHONE ENCOUNTER
Fax received from pharmacy  Aimovig requires WILMAN STOVALL submitted via [facility]  Key: [id number]  Awaiting determination      [employer] Part D at [phone]

## 2020-08-25 NOTE — PROGRESS NOTES
Assessment/Plan:       Diagnoses and all orders for this visit:    Breast pain, left  -     Mammo diagnostic bilateral w 3d & cad; Future    Fatigue, unspecified type  -     Comprehensive metabolic panel; Future  -     TSH, 3rd generation; Future  -     CBC and differential; Future    Health care maintenance  -     Lipid panel; Future    #1 Breast pain, left  Discussed with patient plan to obtain diagnostic bilateral mammogram  #2 Fatigue, unspecified  Discussed with patient plan to obtain lab work to further evaluate potential etiology  #3 Health care maintenance  Discussed with patient due to going for other lab work will also obtain lipid panel  Patient instructed to call if no improvement in 72 hours or symptoms worsen    Subjective:      Patient ID: Radha Burns is a 63 y o  female  63 y  o female presenting with fatigue and left axilla pain that radiates to her breast  Patient reports that she moved to the region at the beginning of the year and as not had any lab testing performed this year due to current pandemic  She does have hypothyroidism and levels not check for awhile so thinking that might be one of her issues  She also suffers from migraine headaches so was thinking some of the fatigue could be related to them  She reports pain in her left axilla that as been recently radiating to her upper left breast  She states that her last mammogram did show multiple cysts in the right breast so was thinking that she might have them in the left breast  She denies noticing any lumps around the breast         Family History   Problem Relation Age of Onset    Parkinsonism Father     Dementia Father     Hypertension Daughter     Cervical cancer Maternal Grandmother     Bipolar disorder Sister     No Known Problems Mother      Social History     Socioeconomic History    Marital status:       Spouse name: Not on file    Number of children: Not on file    Years of education: Not on file    Highest education level: Not on file   Occupational History    Not on file   Social Needs    Financial resource strain: Not on file    Food insecurity     Worry: Not on file     Inability: Not on file    Transportation needs     Medical: Not on file     Non-medical: Not on file   Tobacco Use    Smoking status: Former Smoker    Smokeless tobacco: Never Used   Substance and Sexual Activity    Alcohol use: No    Drug use: No    Sexual activity: Not Currently   Lifestyle    Physical activity     Days per week: Not on file     Minutes per session: Not on file    Stress: Not on file   Relationships    Social connections     Talks on phone: Not on file     Gets together: Not on file     Attends Church service: Not on file     Active member of club or organization: Not on file     Attends meetings of clubs or organizations: Not on file     Relationship status: Not on file    Intimate partner violence     Fear of current or ex partner: Not on file     Emotionally abused: Not on file     Physically abused: Not on file     Forced sexual activity: Not on file   Other Topics Concern    Not on file   Social History Narrative    Not on file     E-Cigarette/Vaping    E-Cigarette Use Never User      E-Cigarette/Vaping Substances     Past Medical History:   Diagnosis Date    Bilateral breast cysts 2019 r    right breast cysts; dense breast tissue bilaterally    Cervical spinal stenosis 2015    Liver enzyme elevation     Migraines      Past Surgical History:   Procedure Laterality Date     SECTION      two    OVARIAN CYST SURGERY       No Known Allergies    Current Outpatient Medications:     Erenumab-aooe 140 MG/ML SOAJ, Inject 140 mg under the skin every 30 (thirty) days, Disp: 1 pen, Rfl: 11    levothyroxine 100 mcg tablet, 100 mcg daily , Disp: , Rfl:     omeprazole (PriLOSEC) 20 mg delayed release capsule, Take 1 capsule (20 mg total) by mouth 2 (two) times a day, Disp: 180 capsule, Rfl: 1   prochlorperazine (COMPAZINE) 10 mg tablet, Take 1 tablet (10 mg total) by mouth every 6 (six) hours as needed for nausea or vomiting, Disp: 12 tablet, Rfl: 3    SUMAtriptan (IMITREX) 100 mg tablet, 100 mg once as needed , Disp: , Rfl:     SUMAtriptan Succinate 6 MG/0 5ML SOAJ, 1 injection for severe headache  may repeat once after 2 hour  Max 2 per week, Disp: 4 Cartridge, Rfl: 2    topiramate (TOPAMAX) 50 MG tablet, Take 2 tablets (100 mg total) by mouth 2 (two) times a day, Disp: 180 tablet, Rfl: 1    venlafaxine (EFFEXOR-XR) 150 mg 24 hr capsule, Take 1 capsule (150 mg total) by mouth daily, Disp: 90 capsule, Rfl: 1    Review of Systems   Constitutional: Positive for fatigue  Negative for activity change, appetite change, chills, fever and unexpected weight change  HENT: Negative  Eyes: Negative for visual disturbance  Respiratory: Negative for cough, chest tightness, shortness of breath and wheezing  Cardiovascular: Negative for chest pain, palpitations and leg swelling  Gastrointestinal: Negative for abdominal pain, diarrhea and nausea  Endocrine: Negative for polydipsia and polyuria  Genitourinary: Negative  Musculoskeletal: Negative  Left axilla pain radiating to left upper breat   Skin: Negative  Allergic/Immunologic: Negative  Neurological: Negative for dizziness, weakness, light-headedness and headaches  Hematological: Negative for adenopathy  Does not bruise/bleed easily  Psychiatric/Behavioral: Negative  Objective:    /82   Pulse 82   Temp 97 9 °F (36 6 °C)   Ht 4' 11" (1 499 m)   Wt 61 2 kg (135 lb)   BMI 27 27 kg/m² (Reviewed)     Physical Exam  Vitals signs reviewed  Constitutional:       General: She is not in acute distress  Appearance: Normal appearance  She is well-developed and well-groomed  She is not ill-appearing  HENT:      Head: Normocephalic and atraumatic        Right Ear: External ear normal       Left Ear: External ear normal       Nose: Nose normal       Comments: Patient had a facial covering in place as per office protocol     Mouth/Throat:      Lips: Pink  Mouth: Mucous membranes are moist       Pharynx: Oropharynx is clear  Eyes:      General: Lids are normal       Extraocular Movements: Extraocular movements intact  Conjunctiva/sclera: Conjunctivae normal       Pupils: Pupils are equal, round, and reactive to light  Neck:      Musculoskeletal: Normal range of motion  Trachea: Trachea normal    Cardiovascular:      Rate and Rhythm: Normal rate and regular rhythm  Pulses: Normal pulses  Radial pulses are 2+ on the right side and 2+ on the left side  Dorsalis pedis pulses are 2+ on the right side and 2+ on the left side  Posterior tibial pulses are 2+ on the right side and 2+ on the left side  Heart sounds: Normal heart sounds  No murmur  No friction rub  No gallop  Pulmonary:      Effort: Pulmonary effort is normal       Breath sounds: Normal breath sounds  Chest:      Breasts: Breasts are symmetrical          Right: Normal          Left: Mass and tenderness present  No nipple discharge or skin change  Abdominal:      General: Abdomen is flat  Bowel sounds are normal       Palpations: Abdomen is soft  Tenderness: There is no abdominal tenderness  Musculoskeletal:      Right lower leg: No edema  Left lower leg: No edema  Lymphadenopathy:      Cervical: No cervical adenopathy  Skin:     General: Skin is warm and dry  Capillary Refill: Capillary refill takes less than 2 seconds  Coloration: Skin is not jaundiced, pale or sallow  Neurological:      Mental Status: She is alert and oriented to person, place, and time  Cranial Nerves: Cranial nerves are intact  Motor: Motor function is intact  Psychiatric:         Mood and Affect: Mood normal          Behavior: Behavior normal  Behavior is cooperative

## 2020-08-25 NOTE — TELEPHONE ENCOUNTER
Patient called   Central scheduling said her mammo should be ordered as a b/l diagnostic mammogram because of the pain in one side and hx of cysts in the other

## 2020-08-26 ENCOUNTER — APPOINTMENT (OUTPATIENT)
Dept: LAB | Facility: CLINIC | Age: 55
End: 2020-08-26
Payer: MEDICARE

## 2020-08-26 DIAGNOSIS — R53.83 FATIGUE, UNSPECIFIED TYPE: ICD-10-CM

## 2020-08-26 DIAGNOSIS — Z00.00 HEALTH CARE MAINTENANCE: ICD-10-CM

## 2020-08-26 LAB
ALBUMIN SERPL BCP-MCNC: 3.9 G/DL (ref 3.5–5)
ALP SERPL-CCNC: 168 U/L (ref 46–116)
ALT SERPL W P-5'-P-CCNC: 29 U/L (ref 12–78)
ANION GAP SERPL CALCULATED.3IONS-SCNC: 7 MMOL/L (ref 4–13)
AST SERPL W P-5'-P-CCNC: 17 U/L (ref 5–45)
BASOPHILS # BLD AUTO: 0.06 THOUSANDS/ΜL (ref 0–0.1)
BASOPHILS NFR BLD AUTO: 1 % (ref 0–1)
BILIRUB SERPL-MCNC: 0.28 MG/DL (ref 0.2–1)
BUN SERPL-MCNC: 20 MG/DL (ref 5–25)
CALCIUM SERPL-MCNC: 9 MG/DL (ref 8.3–10.1)
CHLORIDE SERPL-SCNC: 110 MMOL/L (ref 100–108)
CHOLEST SERPL-MCNC: 215 MG/DL (ref 50–200)
CO2 SERPL-SCNC: 24 MMOL/L (ref 21–32)
CREAT SERPL-MCNC: 0.9 MG/DL (ref 0.6–1.3)
EOSINOPHIL # BLD AUTO: 0.27 THOUSAND/ΜL (ref 0–0.61)
EOSINOPHIL NFR BLD AUTO: 4 % (ref 0–6)
ERYTHROCYTE [DISTWIDTH] IN BLOOD BY AUTOMATED COUNT: 13.2 % (ref 11.6–15.1)
GFR SERPL CREATININE-BSD FRML MDRD: 72 ML/MIN/1.73SQ M
GLUCOSE P FAST SERPL-MCNC: 91 MG/DL (ref 65–99)
HCT VFR BLD AUTO: 45.3 % (ref 34.8–46.1)
HDLC SERPL-MCNC: 59 MG/DL
HGB BLD-MCNC: 14.5 G/DL (ref 11.5–15.4)
IMM GRANULOCYTES # BLD AUTO: 0 THOUSAND/UL (ref 0–0.2)
IMM GRANULOCYTES NFR BLD AUTO: 0 % (ref 0–2)
LDLC SERPL CALC-MCNC: 133 MG/DL (ref 0–100)
LYMPHOCYTES # BLD AUTO: 2.05 THOUSANDS/ΜL (ref 0.6–4.47)
LYMPHOCYTES NFR BLD AUTO: 33 % (ref 14–44)
MCH RBC QN AUTO: 29 PG (ref 26.8–34.3)
MCHC RBC AUTO-ENTMCNC: 32 G/DL (ref 31.4–37.4)
MCV RBC AUTO: 91 FL (ref 82–98)
MONOCYTES # BLD AUTO: 0.53 THOUSAND/ΜL (ref 0.17–1.22)
MONOCYTES NFR BLD AUTO: 8 % (ref 4–12)
NEUTROPHILS # BLD AUTO: 3.39 THOUSANDS/ΜL (ref 1.85–7.62)
NEUTS SEG NFR BLD AUTO: 54 % (ref 43–75)
NONHDLC SERPL-MCNC: 156 MG/DL
NRBC BLD AUTO-RTO: 0 /100 WBCS
PLATELET # BLD AUTO: 327 THOUSANDS/UL (ref 149–390)
PMV BLD AUTO: 9.9 FL (ref 8.9–12.7)
POTASSIUM SERPL-SCNC: 4.3 MMOL/L (ref 3.5–5.3)
PROT SERPL-MCNC: 7.5 G/DL (ref 6.4–8.2)
RBC # BLD AUTO: 5 MILLION/UL (ref 3.81–5.12)
SODIUM SERPL-SCNC: 141 MMOL/L (ref 136–145)
TRIGL SERPL-MCNC: 114 MG/DL
TSH SERPL DL<=0.05 MIU/L-ACNC: 0.45 UIU/ML (ref 0.36–3.74)
WBC # BLD AUTO: 6.3 THOUSAND/UL (ref 4.31–10.16)

## 2020-08-26 PROCEDURE — 36415 COLL VENOUS BLD VENIPUNCTURE: CPT

## 2020-08-26 PROCEDURE — 85025 COMPLETE CBC W/AUTO DIFF WBC: CPT

## 2020-08-26 PROCEDURE — 80053 COMPREHEN METABOLIC PANEL: CPT

## 2020-08-26 PROCEDURE — 80061 LIPID PANEL: CPT

## 2020-08-26 PROCEDURE — 84443 ASSAY THYROID STIM HORMONE: CPT

## 2020-08-28 ENCOUNTER — HOSPITAL ENCOUNTER (OUTPATIENT)
Dept: MAMMOGRAPHY | Facility: CLINIC | Age: 55
Discharge: HOME | End: 2020-08-28
Payer: MEDICARE

## 2020-08-28 ENCOUNTER — HOSPITAL ENCOUNTER (OUTPATIENT)
Dept: ULTRASOUND IMAGING | Facility: CLINIC | Age: 55
Discharge: HOME | End: 2020-08-28
Payer: MEDICARE

## 2020-08-28 VITALS — HEIGHT: 59 IN | BODY MASS INDEX: 27.21 KG/M2 | WEIGHT: 135 LBS

## 2020-08-28 DIAGNOSIS — N64.4 PAIN OF LEFT BREAST: ICD-10-CM

## 2020-08-28 DIAGNOSIS — N64.4 BREAST PAIN: ICD-10-CM

## 2020-08-28 PROCEDURE — 76642 ULTRASOUND BREAST LIMITED: CPT

## 2020-08-28 PROCEDURE — 77066 DX MAMMO INCL CAD BI: CPT

## 2020-08-28 PROCEDURE — G0279 TOMOSYNTHESIS, MAMMO: HCPCS

## 2020-08-31 ENCOUNTER — TELEPHONE (OUTPATIENT)
Dept: FAMILY MEDICINE CLINIC | Facility: CLINIC | Age: 55
End: 2020-08-31

## 2020-08-31 NOTE — TELEPHONE ENCOUNTER
----- Message from [id number] sent at 8/31/2020  9:23 AM EDT -----  The mammogram was normal - inquire if still having left breast pains

## 2020-09-04 ENCOUNTER — OFFICE VISIT (OUTPATIENT)
Dept: FAMILY MEDICINE CLINIC | Facility: CLINIC | Age: 55
End: 2020-09-04
Payer: MEDICARE

## 2020-09-04 VITALS
WEIGHT: 135 LBS | HEART RATE: 80 BPM | TEMPERATURE: 98.1 F | BODY MASS INDEX: 27.21 KG/M2 | SYSTOLIC BLOOD PRESSURE: 120 MMHG | DIASTOLIC BLOOD PRESSURE: 80 MMHG | HEIGHT: 59 IN

## 2020-09-04 DIAGNOSIS — E03.9 HYPOTHYROIDISM, UNSPECIFIED TYPE: ICD-10-CM

## 2020-09-04 DIAGNOSIS — M79.622 LEFT AXILLARY PAIN: Primary | ICD-10-CM

## 2020-09-04 DIAGNOSIS — F32.5 MAJOR DEPRESSIVE DISORDER WITH SINGLE EPISODE, IN FULL REMISSION (HCC): ICD-10-CM

## 2020-09-04 DIAGNOSIS — N32.81 OAB (OVERACTIVE BLADDER): ICD-10-CM

## 2020-09-04 PROCEDURE — 99214 OFFICE O/P EST MOD 30 MIN: CPT | Performed by: FAMILY MEDICINE

## 2020-09-04 RX ORDER — TOLTERODINE 4 MG/1
4 CAPSULE, EXTENDED RELEASE ORAL DAILY
Qty: 30 CAPSULE | Refills: 2 | Status: SHIPPED | OUTPATIENT
Start: 2020-09-04 | End: 2021-04-13 | Stop reason: ALTCHOICE

## 2020-09-04 RX ORDER — METHYLPREDNISOLONE 4 MG/1
TABLET ORAL
Qty: 21 EACH | Refills: 0 | Status: SHIPPED | OUTPATIENT
Start: 2020-09-04 | End: 2021-04-13 | Stop reason: ALTCHOICE

## 2020-09-15 ENCOUNTER — TELEPHONE (OUTPATIENT)
Dept: NEUROLOGY | Facility: CLINIC | Age: 55
End: 2020-09-15

## 2020-09-21 ENCOUNTER — TELEPHONE (OUTPATIENT)
Dept: FAMILY MEDICINE CLINIC | Facility: CLINIC | Age: 55
End: 2020-09-21

## 2020-09-21 NOTE — TELEPHONE ENCOUNTER
When she is due for a refill, I will try to find something more affordable for her overactive bladder

## 2020-09-21 NOTE — TELEPHONE ENCOUNTER
Patient reporting in  She is doing better with armpit pain  She used prednisone and she also changed deodorant   She was unable to  Detrol [place] due to cost  She is in the [place]ut hole and it was $180

## 2020-10-26 ENCOUNTER — OFFICE VISIT (OUTPATIENT)
Dept: AUDIOLOGY | Facility: CLINIC | Age: 55
End: 2020-10-26
Payer: MEDICARE

## 2020-10-26 DIAGNOSIS — H90.3 SENSORINEURAL HEARING LOSS, BILATERAL: Primary | ICD-10-CM

## 2020-10-26 PROCEDURE — 92557 COMPREHENSIVE HEARING TEST: CPT | Performed by: AUDIOLOGIST

## 2020-10-26 PROCEDURE — 92567 TYMPANOMETRY: CPT | Performed by: AUDIOLOGIST

## 2020-10-28 DIAGNOSIS — E03.9 HYPOTHYROIDISM: Primary | ICD-10-CM

## 2020-10-28 RX ORDER — LEVOTHYROXINE SODIUM 0.1 MG/1
100 TABLET ORAL DAILY
Qty: 30 TABLET | Refills: 3 | Status: SHIPPED | OUTPATIENT
Start: 2020-10-28 | End: 2021-04-02

## 2020-11-04 ENCOUNTER — TELEPHONE (OUTPATIENT)
Dept: NEUROLOGY | Facility: CLINIC | Age: 55
End: 2020-11-04

## 2020-11-05 ENCOUNTER — TELEMEDICINE (OUTPATIENT)
Dept: NEUROLOGY | Facility: CLINIC | Age: 55
End: 2020-11-05
Payer: MEDICARE

## 2020-11-05 DIAGNOSIS — G43.709 CHRONIC MIGRAINE WITHOUT AURA WITHOUT STATUS MIGRAINOSUS, NOT INTRACTABLE: Primary | ICD-10-CM

## 2020-11-05 PROCEDURE — G2012 BRIEF CHECK IN BY MD/QHP: HCPCS | Performed by: PHYSICIAN ASSISTANT

## 2020-11-05 RX ORDER — GABAPENTIN 300 MG/1
300 CAPSULE ORAL
Qty: 30 CAPSULE | Refills: 3 | Status: SHIPPED | OUTPATIENT
Start: 2020-11-05 | End: 2021-03-01 | Stop reason: SDUPTHER

## 2020-12-02 ENCOUNTER — TELEPHONE (OUTPATIENT)
Dept: NEUROLOGY | Facility: CLINIC | Age: 55
End: 2020-12-02

## 2020-12-04 DIAGNOSIS — G43.709 CHRONIC MIGRAINE WITHOUT AURA WITHOUT STATUS MIGRAINOSUS, NOT INTRACTABLE: Primary | ICD-10-CM

## 2020-12-04 RX ORDER — SUMATRIPTAN 100 MG/1
TABLET, FILM COATED ORAL
Qty: 9 TABLET | Refills: 0 | Status: SHIPPED | OUTPATIENT
Start: 2020-12-04 | End: 2021-01-07 | Stop reason: SDUPTHER

## 2020-12-21 DIAGNOSIS — G43.709 CHRONIC MIGRAINE WITHOUT AURA WITHOUT STATUS MIGRAINOSUS, NOT INTRACTABLE: ICD-10-CM

## 2020-12-21 RX ORDER — SUMATRIPTAN 100 MG/1
TABLET, FILM COATED ORAL
Qty: 9 TABLET | Refills: 0 | OUTPATIENT
Start: 2020-12-21

## 2020-12-22 ENCOUNTER — TELEPHONE (OUTPATIENT)
Dept: NEUROLOGY | Facility: CLINIC | Age: 55
End: 2020-12-22

## 2020-12-22 DIAGNOSIS — G43.709 CHRONIC MIGRAINE WITHOUT AURA WITHOUT STATUS MIGRAINOSUS, NOT INTRACTABLE: ICD-10-CM

## 2020-12-23 DIAGNOSIS — G43.709 CHRONIC MIGRAINE WITHOUT AURA WITHOUT STATUS MIGRAINOSUS, NOT INTRACTABLE: ICD-10-CM

## 2020-12-23 RX ORDER — CEFUROXIME AXETIL 250 MG/1
TABLET ORAL
Qty: 4 ML | Refills: 0 | Status: SHIPPED | OUTPATIENT
Start: 2020-12-23 | End: 2021-01-25 | Stop reason: SDUPTHER

## 2021-01-04 DIAGNOSIS — G43.709 CHRONIC MIGRAINE WITHOUT AURA WITHOUT STATUS MIGRAINOSUS, NOT INTRACTABLE: ICD-10-CM

## 2021-01-07 DIAGNOSIS — G43.709 CHRONIC MIGRAINE WITHOUT AURA WITHOUT STATUS MIGRAINOSUS, NOT INTRACTABLE: ICD-10-CM

## 2021-01-07 RX ORDER — SUMATRIPTAN 100 MG/1
TABLET, FILM COATED ORAL
Qty: 9 TABLET | Refills: 0 | Status: SHIPPED | OUTPATIENT
Start: 2021-01-07 | End: 2021-02-05 | Stop reason: SDUPTHER

## 2021-01-22 DIAGNOSIS — IMO0002 CHRONIC MIGRAINE: ICD-10-CM

## 2021-01-22 RX ORDER — TOPIRAMATE 50 MG/1
100 TABLET, FILM COATED ORAL 2 TIMES DAILY
Qty: 180 TABLET | Refills: 1 | Status: SHIPPED | OUTPATIENT
Start: 2021-01-22 | End: 2021-09-07

## 2021-01-25 DIAGNOSIS — G43.709 CHRONIC MIGRAINE WITHOUT AURA WITHOUT STATUS MIGRAINOSUS, NOT INTRACTABLE: ICD-10-CM

## 2021-01-25 RX ORDER — CEFUROXIME AXETIL 250 MG/1
TABLET ORAL
Qty: 4 ML | Refills: 0 | Status: SHIPPED | OUTPATIENT
Start: 2021-01-25 | End: 2021-02-24 | Stop reason: SDUPTHER

## 2021-01-25 RX ORDER — CEFUROXIME AXETIL 250 MG/1
TABLET ORAL
Qty: 4 ML | Refills: 0 | OUTPATIENT
Start: 2021-01-25

## 2021-01-25 NOTE — TELEPHONE ENCOUNTER
Pt called for refill of sumatriptan injections  migraine started over night  States that she has been getting headaches a lot  Took a sumatriptan tab but then vomited  +n/v   +light sensitivity  She can't confirm her medications at this time due to pain  Currently 10/10  topamax 50mg   Gabapentin 300mg 1 tab hs  Venlafaxine     When migraine gets this bad the sumatriptan injection is the only thing that helps      Has not tried decadron or olanzapine in the past   depakote sounds familiar but not sure if she took it in the past     Please advise  [phone] to leave detailed message

## 2021-02-05 DIAGNOSIS — G43.709 CHRONIC MIGRAINE WITHOUT AURA WITHOUT STATUS MIGRAINOSUS, NOT INTRACTABLE: ICD-10-CM

## 2021-02-05 RX ORDER — SUMATRIPTAN 100 MG/1
TABLET, FILM COATED ORAL
Qty: 9 TABLET | Refills: 0 | Status: SHIPPED | OUTPATIENT
Start: 2021-02-05 | End: 2021-03-01 | Stop reason: SDUPTHER

## 2021-02-16 ENCOUNTER — TELEPHONE (OUTPATIENT)
Dept: NEUROLOGY | Facility: CLINIC | Age: 56
End: 2021-02-16

## 2021-02-16 NOTE — TELEPHONE ENCOUNTER
Called to remind patient of their upcoming appointment with Dr Jennifer Rosenberg in [facility]  Voicemail left to advise patient to call back with any urgent symptoms or concerns  Patient also made aware that we will be calling back two days prior to appointment to complete the COVID screening

## 2021-02-24 DIAGNOSIS — G43.709 CHRONIC MIGRAINE WITHOUT AURA WITHOUT STATUS MIGRAINOSUS, NOT INTRACTABLE: ICD-10-CM

## 2021-02-24 RX ORDER — CEFUROXIME AXETIL 250 MG/1
TABLET ORAL
Qty: 4 ML | Refills: 6 | Status: SHIPPED | OUTPATIENT
Start: 2021-02-24 | End: 2022-03-07 | Stop reason: SDUPTHER

## 2021-03-01 ENCOUNTER — OFFICE VISIT (OUTPATIENT)
Dept: NEUROLOGY | Facility: CLINIC | Age: 56
End: 2021-03-01
Payer: MEDICARE

## 2021-03-01 VITALS
HEIGHT: 59 IN | WEIGHT: 144 LBS | BODY MASS INDEX: 29.03 KG/M2 | HEART RATE: 99 BPM | DIASTOLIC BLOOD PRESSURE: 98 MMHG | SYSTOLIC BLOOD PRESSURE: 136 MMHG

## 2021-03-01 DIAGNOSIS — G43.709 CHRONIC MIGRAINE WITHOUT AURA WITHOUT STATUS MIGRAINOSUS, NOT INTRACTABLE: Primary | ICD-10-CM

## 2021-03-01 PROCEDURE — 99214 OFFICE O/P EST MOD 30 MIN: CPT | Performed by: PSYCHIATRY & NEUROLOGY

## 2021-03-01 RX ORDER — GABAPENTIN 300 MG/1
300 CAPSULE ORAL
Qty: 30 CAPSULE | Refills: 3 | Status: SHIPPED | OUTPATIENT
Start: 2021-03-01 | End: 2022-05-23

## 2021-03-01 RX ORDER — PROCHLORPERAZINE MALEATE 10 MG
10 TABLET ORAL EVERY 6 HOURS PRN
Qty: 12 TABLET | Refills: 3 | Status: SHIPPED | OUTPATIENT
Start: 2021-03-01

## 2021-03-01 RX ORDER — SUMATRIPTAN 100 MG/1
TABLET, FILM COATED ORAL
Qty: 9 TABLET | Refills: 6 | Status: SHIPPED | OUTPATIENT
Start: 2021-03-01 | End: 2021-08-05

## 2021-03-01 NOTE — PROGRESS NOTES
Review of Systems    {LimROS-complete:81037}      Review of Systems   Constitutional: Negative  Negative for appetite change and fever  HENT: Negative  Negative for hearing loss, tinnitus, trouble swallowing and voice change  Eyes: Negative  Negative for photophobia and pain  Respiratory: Negative  Negative for shortness of breath  Cardiovascular: Negative  Negative for palpitations  Gastrointestinal: Negative  Negative for nausea and vomiting  Endocrine: Negative  Negative for cold intolerance  Genitourinary: Negative  Negative for dysuria, frequency and urgency  Musculoskeletal: Negative  Negative for myalgias and neck pain  Skin: Negative  Negative for rash  Neurological: Positive for headaches (4 per week)  Negative for dizziness, tremors, seizures, syncope, facial asymmetry, speech difficulty, weakness, light-headedness and numbness  Hematological: Negative  Does not bruise/bleed easily  Psychiatric/Behavioral: Negative  Negative for confusion, hallucinations and sleep disturbance

## 2021-03-01 NOTE — PROGRESS NOTES
[address] [facility] Consult - Follow up   PATIENT:  Mayelin Anna  MRN:  [id number]  :  1965  DATE OF SERVICE:  3/1/2021  REFERRED BY: No ref  provider found  PMD: Maricruz Ann DO    Assessment/Plan:   Klaudia Angel a very pleasant 61 y  o  female with a past medical history that includes hypothyroidism, migraines, GERD, depression, Cervical spinal stenosis, chronic neck pain, slightly elevated liver enzymes referred here for evaluation of headache  My initial evaluation 2020  Follow up 2020, 3/1/2021       Chronic migraine without aura without status migrainosus, not intractable  Tiffany reports a long history of headaches and migraines have been difficult to control   She reports pain is often left-sided, worse when on the right side   Starts typically the back of the neck and radiates unilateral side of the head the frontal/I region   She denies aura, reports typical associated migrainous features as well as some autonomic features   She denies any new worsening and did have slight improvement on aimovig but not enough   She denies any new or concerning symptoms  - as of 2020: 5 days a week migraines   - as of 2020:  Physical therapy has helped somewhat   migraines 3-4 times per week, in July 2-3 really bad migraines that lasted 3-4 days  Already on venlafaxine 150 mg, topiramate 100 mg BID, emgality was too expensive, trial of ajovy      - as of 3/1/2021: She continues to have uncontrolled migraines with milder migraines 4 days per week really bad migraines 3 times a month    Continue topiramate 100 mg b i d , venlafaxine 150 mg, all CGRPs are too expensive rec'd emgality [facility] program       Workup:  - Neurologic assessment reveals normal neurological exam   - noncontrast head CT 2018:  No acute pathology, Tiny cavum septum pellucidum, a normal variant    - MRI Brain  - normal for patient-she will try to obtain MRI brain on CD for my review     Preventative:  - we discussed headache hygiene and lifestyle factors that may improve headaches  - she is already on through other providers topiramate 100 mg b i d , venlafaxine 150 mg daily, gabapentin 300 mg nightly   - trial of emgality  Discussed proper use, possible side effects and risks  - past/failed: aimovig 70 mg, botox, venlafaxine, amitriptyline would be contraindicated due interaction with venlafaxine, propranolol the remote past, topiramate, gabapentin   - future options: 140 mg of aimovig, ajovy    Abortive:  - discussed not taking over-the-counter or prescription pain medications more than 3 days per week to prevent medication overuse/rebound headache  - sumatriptan 100 mg p o  And 6mg/0 5mL inj   Discussed proper use, possible side effects and risks  -     prochlorperazine (COMPAZINE) 10 mg tablet; Take 1 tablet (10 mg total) by mouth every 6 (six) hours as needed for nausea or vomiting  Discussed proper use, possible side effects and risks  - past/failed:  OTC meds, sumatriptan by mouth does not work as well as injection  - future options:  ubrelvy, Alternative Triptan, metoclopramide, Toradol IM or p o , could consider trial for 5 days of Depakote or dexamethasone 4 prolonged migraine, reyvow           Patient instructions      Go to [employer] and fill out [employer] patient assistance program     Follow up with eye doctor for dilated eye exam     Headache/migraine treatment:   Abortive medications (for immediate treatment of a headache):    It is ok to take ibuprofen, acetaminophen or naproxen (Advil, Tylenol,  Aleve, Excedrin) if they help your headaches you should limit these to No more than 3 times a week to avoid medication overuse/rebound headaches       Continue sumatriptan but no more than 3 days per week     Alternative is prochlorperazine/Compazine 10 mg - this is technically a nausea medication but can be used for migraines as well        Prescription preventive medications for headaches/migraines   (to take every day to help prevent headaches - not to take at the time of headache):  [x]? ? continue topiramate 50 mg in a m  And p m  And venlafaxine 150 mg daily  [x]? ? trial of emgality  Emgality/Galcanezumab - the 1st dose is 240 mg loading dose of 2 consecutive 120 mg injections  Thereafter, 120 mg injections every 30 days     Keep out of direct sunlight  Prior to administration, allow to come to room temperature for 30 minutes  Do not warm using a heat source (eg, microwave or hot water)  Do not shake  Administer in abdomen (avoiding 2 inches around the navel), thigh, upper arm, or buttocks avoiding areas of skin that are tender, bruised, red or hard  Deliver entire contents of single-use prefilled pen or syringe        *Typically these types of medications take time untill you see the benefit, although some may see improvement in days, often it may take weeks, especially if the medication is being titrated up to a beneficial level  Please contact us if there are any concerns or questions regarding the medication          Self-Monitoring:  [x]? ? Headache calendar  Each day [place] a number from 0-10 indicating if there was a headache and how bad it was   This can be used to monitor gradual improvement and is helpful to make medication adjustments  You can do this on paper or there is an LUCIA for a smart phone called "Migraine e Diary"       Lifestyle Recommendations:  [x]? ? SLEEP - Maintain a regular sleep schedule: Adults need at least 7-8 hours of uninterrupted a night  Maintain good sleep hygiene:  Going to bed and waking up at consistent times, avoiding excessive daytime naps, avoiding caffeinated beverages in the evening, avoid excessive stimulation in the evening and generally using bed primarily for sleeping   One hour before bedtime would recommend turning lights down lower, decreasing your activity (may read quietly, listen to music at a low volume)  When you get into bed, should eliminate all technology (no texting, emailing, playing with your phone, iPad or tablet in bed)  [x]?? HYDRATION - Maintain good hydration   Drink  2L of fluid a day (4 typical small water bottles)  [x]? ? DIET - Maintain good nutrition  In particular don't skip meals and try and eat healthy balanced meals regularly  [x]? ? TRIGGERS - Look for other triggers and avoid them: Limit caffeine to 1-2 cups a day or less  Avoid dietary triggers that you have noticed bring on your headaches (this could include aged cheese, peanuts, MSG, aspartame and nitrates)  [x]? ? EXERCISE - physical exercise as we all know is good for you in many ways, and not only is good for your heart, but also is beneficial for your mental health, cognitive health and  chronic pain/headaches  I would encourage at the least 5 days of physical exercise weekly for at least 30 minutes       Education and Follow-up  [x]? ? Please call with any questions or concerns  Of course if any new concerning symptoms go to the emergency department  [x]? ? Follow up 3 months Dr Natalia Bojorquez and 6 months WILMAN Ghotra            CC: We had the pleasure of evaluating Tiffany Herbert in neurological consultation today  Antonio Boy a  right handed female who presents today for evaluation of headaches       History obtained from patient as well as available medical record review    History of Present Illness:     Interval history as of 03/01/2021:   - She saw WILMAN Pham 11/5/20, see EMR  - eye doctor apmt end of this month     Headaches and migraines  4 headaches per week that improve with injection if caught early, worse 3 times a month    Preventive:  Venlafaxine 150mg , topiramate 100 mg BID  - was not able to start ajovy due to price   -Gabapentin 300 mg p o  q h s  added 11/05/2020    Abortive:   Sumatriptan injection - helps   Prochlorperazine - helped a little     Interval history as of 08/20/2020:    -she has seen physical therapy - has seen some improvement   -labs not done yet - but seeing PCP soon  -has  MRI brain on CD for my review - forgot it today         Headaches and migraines - 3-4 times per week   Topiramate 100 mg b i d  Venlafaxine 150 mg  -switched from plan for emgality to ajovy since more affordable with her insurance - but never got this either      Sumatriptan injection - helps   Prochlorperazine - helped a little     Headaches started at what age? 11years old  How often do the headaches occur?   - as of 5/14/2020: 5 days a week migraines   - as of 8/20/2020: 3-4 times per week, in July 2-3 really bad migraines that lasted 3-4 days    What time of the day do the headaches start?  A lot wakes up with them   How long do the headaches last? Over 4 hours up to 3 days  Are you ever headache free? Yes     Aura? without aura     Last eye exam: 2 years ago, normal except needing glasses         Where is your headache located and pain quality?   - Right or left side, worse when on right  - start back of neck   - worse on right side comes up the back of the head and goes to frontal/eye region on that side  - pulsating, pounding, sharp stabbing, pressure     What is the intensity of pain? Average: 7-8/10, worst 10/10  Associated symptoms:   [x]? ? Nausea       [x]? ? Vomiting        []? ?Iram Lose  [x]? ? Insomnia    [x]? ? Stiff or sore neck   [x]? ? Problems with concentration  [x]? ? Photophobia     [x]? ?Phonophobia      [x]? ? Osmophobia  []? ? Blurred vision   [x]? ? Prefer quiet, dark room  [x]? ? Light-headed or dizzy     [x]? ? Tinnitus   []? ? Hands or feet tingle or feel numb/paresthesias       []? ? Red ear      []? ? Ptosis      []? ? Facial droop  [x]? ? Lacrimation - tears  [x]? ? Nasal congestion   []? ? Flushing of face  []? ? Change in pupil size     Things that make the headache worse? No specific movements, any movement      Headache triggers:  unknown     Have you seen someone else for headaches or pain? Yes, neurology, pain management  Have you had trigger point injection performed and how often? No  Have you had Botox injection performed and how often? Yes, 4 sessions and did not help   Have you had epidural injections or transforaminal injections performed? Yes  Are you current pregnant or planning on getting pregnant? No - no regular periods since early 402  Have you ever had any Brain imaging? yes - MRI Brain 2015 - normal     What medications do you take or have you taken for your headaches?    ABORTIVE:    OTC medications have been ineffective      Sumatriptan 100 mg - works well unless vomiting   Sumatriptan 6mg/0 5mL inj - works well      Has had zofran for nausea before     Past:  advil migraine  ED cocktails - help - compazine, benadryl   Prednisone      PREVENTIVE:      Venlafaxine 150 mg - 5 5 years   Topiramate 100 mg BID - more than 5 years     Past:  aimovig 70 mg- last took in January - was decreasing N/V but not huge decrease in migraines  Amitriptyline would be contraindicated due to interaction with venlafaxine  Inderal - proproanolol         Alternative therapies used in the past for headaches? PT for chronic neck pain in the past - last 4 years ago - helped      LIFESTYLE  Sleep   - averages: 930-730   Problems falling asleep?:   No  Problems staying asleep?:  Yes, 2   - snores  - sometimes naps, not usually  - sometimes falls asleep watching TV  - never had a sleep study     Physical activity: none     Water: 4 bottles per day  Caffeine: 1-2 cups per day     Mood: - depression after  and granddaughter passing away      The following portions of the patient's history were reviewed and updated as appropriate: allergies, current medications, past family history, past medical history, past social history, past surgical history and problem list      Pertinent family history:  Family history of headaches:  migraine headaches in mother, grandmother and daughter, granddaughter  Any family history of aneurysms - No     Pertinent social history:  Work: no  [employer]  Lives with mother     Illicit Drugs: denies  Alcohol/tobacco: Denies tobacco use, alcohol intake: rarely         Past Medical History:     Past Medical History:   Diagnosis Date    Bilateral breast cysts June 2019 r    right breast cysts; dense breast tissue bilaterally    Cervical spinal stenosis 2015    Liver enzyme elevation     Migraines        Patient Active Problem List   Diagnosis    Hypothyroidism    GERD (gastroesophageal reflux disease)    Depression    Chronic migraine without aura without status migrainosus, not intractable       Medications:      Current Outpatient Medications   Medication Sig Dispense Refill    gabapentin (NEURONTIN) 300 mg capsule Take 1 capsule (300 mg total) by mouth daily at bedtime 30 capsule 3    levothyroxine 100 mcg tablet Take 1 tablet (100 mcg total) by mouth daily 30 tablet 3    omeprazole (PriLOSEC) 20 mg delayed release capsule Take 1 capsule (20 mg total) by mouth 2 (two) times a day 180 capsule 1    prochlorperazine (COMPAZINE) 10 mg tablet Take 1 tablet (10 mg total) by mouth every 6 (six) hours as needed for nausea or vomiting 12 tablet 3    SUMAtriptan (IMITREX) 100 mg tablet carlos enrique 1/2 tab to 1 tablet by mouth at start of headache  May repeat once after 2 hours  Use triptans only 2 day per week  9 tablet 6    SUMAtriptan Succinate 6 MG/0 5ML SOAJ inject 1 injection for severe headache  may repeat once after 2 hours   max 2 per week 4 mL 6    topiramate (TOPAMAX) 50 MG tablet Take 2 tablets (100 mg total) by mouth 2 (two) times a day 180 tablet 1    venlafaxine (EFFEXOR-XR) 150 mg 24 hr capsule Take 1 capsule (150 mg total) by mouth daily 90 capsule 1    Galcanezumab-gnlm 120 MG/ML SOAJ Inject 240 mg under the skin once for 1 dose 2 pen 0    Galcanezumab-gnlm 120 MG/ML SOAJ Inject 120 mg under the skin every 30 (thirty) days 1 pen 11    methylPREDNISolone 4 MG tablet therapy pack Use as directed on package (Patient not taking: Reported on 2020) 21 each 0    tolterodine (DETROL LA) 4 mg 24 hr capsule Take 1 capsule (4 mg total) by mouth daily 30 capsule 2     No current facility-administered medications for this visit  Allergies:    No Known Allergies    Family History:     Family History   Problem Relation Age of Onset    Parkinsonism Father     Dementia Father     Hypertension Daughter     Cervical cancer Maternal Grandmother     Bipolar disorder Sister     No Known Problems Mother        Social History:     Social History     Socioeconomic History    Marital status:       Spouse name: Not on file    Number of children: Not on file    Years of education: Not on file    Highest education level: Not on file   Occupational History    Not on file   Social Needs    Financial resource strain: Not on file    Food insecurity     Worry: Not on file     Inability: Not on file    Transportation needs     Medical: Not on file     Non-medical: Not on file   Tobacco Use    Smoking status: Former Smoker     Quit date:      Years since quittin 1    Smokeless tobacco: Never Used   Substance and Sexual Activity    Alcohol use: No    Drug use: No    Sexual activity: Not Currently   Lifestyle    Physical activity     Days per week: Not on file     Minutes per session: Not on file    Stress: Not on file   Relationships    Social connections     Talks on phone: Not on file     Gets together: Not on file     Attends Christianity service: Not on file     Active member of club or organization: Not on file     Attends meetings of clubs or organizations: Not on file     Relationship status: Not on file    Intimate partner violence     Fear of current or ex partner: Not on file     Emotionally abused: Not on file     Physically abused: Not on file     Forced sexual activity: Not on file   Other Topics Concern    Not on file   Social History Narrative    Not on file         Objective:       Physical Exam:                                                                 Vitals:            Constitutional:    /98 (BP Location: Left arm, Patient Position: Sitting, Cuff Size: Standard)   Pulse 99   Ht 4' 11" (1 499 m)   Wt 65 3 kg (144 lb)   BMI 29 08 kg/m²   BP Readings from Last 3 Encounters:   03/01/21 136/98   09/04/20 120/80   08/25/20 124/82     Pulse Readings from Last 3 Encounters:   03/01/21 99   09/04/20 80   08/25/20 82         Well developed, well nourished, well groomed  No dysmorphic features  HEENT:  Normocephalic atraumatic  See neuro exam   Chest:  Respirations regular and unlabored  Cardiovascular:  Regular rate, no observed significant swelling  Musculoskeletal:  Full range of motion  (see below under neurologic exam for evaluation of motor function and gait)   Skin:  warm and dry, not diaphoretic  No apparent birthmarks or stigmata of neurocutaneous disease  Psychiatric:  Normal behavior and appropriate affect        Neurological Examination:     Mental status/cognitive function:   Recent and remote memory intact  Attention span and concentration as well as fund of knowledge are appropriate for age  Normal language and spontaneous speech  Cranial Nerves:  III, IV, VI-Pupils were equal, round  Extraocular movements were full and conjugate   VII-facial expression symmetric  VIII-hearing grossly intact bilaterally   Motor Exam: symmetric bulk throughout  no atrophy, fasciculations or abnormal movements noted     Coordination:  no apparent dysmetria, ataxia or tremor noted  Gait: steady casual gait       Pertinent lab results:   8/26/20 CMP with chloride 110, alk-phos 168   CBC unremarkable  TSH normal      Imaging:   - noncontrast head CT 06/30/2018:  No acute pathology, Tiny cavum septum pellucidum, a normal variant    - MRI Brain 2015 - normal  Review of Systems:   ROS obtained by medical assistant Personally reviewed and updated if indicated  Review of Systems   Constitutional: Negative  Negative for appetite change and fever  HENT: Negative  Negative for hearing loss, tinnitus, trouble swallowing and voice change  Eyes: Negative  Negative for photophobia and pain  Respiratory: Negative  Negative for shortness of breath  Cardiovascular: Negative  Negative for palpitations  Gastrointestinal: Negative  Negative for nausea and vomiting  Endocrine: Negative  Negative for cold intolerance  Genitourinary: Negative  Negative for dysuria, frequency and urgency  Musculoskeletal: Negative  Negative for myalgias and neck pain  Skin: Negative  Negative for rash  Neurological: Positive for headaches (4 per week)  Negative for dizziness, tremors, seizures, syncope, facial asymmetry, speech difficulty, weakness, light-headedness and numbness  Hematological: Negative  Does not bruise/bleed easily  Psychiatric/Behavioral: Negative  Negative for confusion, hallucinations and sleep disturbance  I have spent 25 minutes with Patient  today in which greater than 50% of this time was spent in counseling/coordination of care  I also spent 10 minutes non face to face for this patient the same day         Author:  Carmen Maldonado MD 3/1/2021 4:26 PM

## 2021-03-01 NOTE — PATIENT INSTRUCTIONS
Go to [employer] and fill out [employer] patient assistance program     Follow up with eye doctor for dilated eye exam     Headache/migraine treatment:   Abortive medications (for immediate treatment of a headache): It is ok to take ibuprofen, acetaminophen or naproxen (Advil, Tylenol,  Aleve, Excedrin) if they help your headaches you should limit these to No more than 3 times a week to avoid medication overuse/rebound headaches       Continue sumatriptan but no more than 3 days per week     Alternative is prochlorperazine/Compazine 10 mg - this is technically a nausea medication but can be used for migraines as well        Prescription preventive medications for headaches/migraines   (to take every day to help prevent headaches - not to take at the time of headache):  [x]? ? continue topiramate 50 mg in a m  And p m  And venlafaxine 150 mg daily  [x]? ? trial of emgality  Emgality/Galcanezumab - the 1st dose is 240 mg loading dose of 2 consecutive 120 mg injections  Thereafter, 120 mg injections every 30 days     Keep out of direct sunlight  Prior to administration, allow to come to room temperature for 30 minutes  Do not warm using a heat source (eg, microwave or hot water)  Do not shake  Administer in abdomen (avoiding 2 inches around the navel), thigh, upper arm, or buttocks avoiding areas of skin that are tender, bruised, red or hard  Deliver entire contents of single-use prefilled pen or syringe        *Typically these types of medications take time untill you see the benefit, although some may see improvement in days, often it may take weeks, especially if the medication is being titrated up to a beneficial level  Please contact us if there are any concerns or questions regarding the medication          Self-Monitoring:  [x]? ? Headache calendar   Each day [place] a number from 0-10 indicating if there was a headache and how bad it was   This can be used to monitor gradual improvement and is helpful to make medication adjustments  You can do this on paper or there is an LUCIA for a smart phone called "Migraine e Diary"       Lifestyle Recommendations:  [x]? ? SLEEP - Maintain a regular sleep schedule: Adults need at least 7-8 hours of uninterrupted a night  Maintain good sleep hygiene:  Going to bed and waking up at consistent times, avoiding excessive daytime naps, avoiding caffeinated beverages in the evening, avoid excessive stimulation in the evening and generally using bed primarily for sleeping   One hour before bedtime would recommend turning lights down lower, decreasing your activity (may read quietly, listen to music at a low volume)  When you get into bed, should eliminate all technology (no texting, emailing, playing with your phone, iPad or tablet in bed)  [x]?? HYDRATION - Maintain good hydration   Drink  2L of fluid a day (4 typical small water bottles)  [x]? ? DIET - Maintain good nutrition  In particular don't skip meals and try and eat healthy balanced meals regularly  [x]? ? TRIGGERS - Look for other triggers and avoid them: Limit caffeine to 1-2 cups a day or less  Avoid dietary triggers that you have noticed bring on your headaches (this could include aged cheese, peanuts, MSG, aspartame and nitrates)  [x]? ? EXERCISE - physical exercise as we all know is good for you in many ways, and not only is good for your heart, but also is beneficial for your mental health, cognitive health and  chronic pain/headaches  I would encourage at the least 5 days of physical exercise weekly for at least 30 minutes       Education and Follow-up  [x]? ? Please call with any questions or concerns  Of course if any new concerning symptoms go to the emergency department  [x]? ? Follow up 3 months Dr Rabia Monsalve and 6 months WILMAN French

## 2021-03-02 ENCOUNTER — TELEPHONE (OUTPATIENT)
Dept: NEUROLOGY | Facility: CLINIC | Age: 56
End: 2021-03-02

## 2021-03-02 NOTE — TELEPHONE ENCOUNTER
Received fax from [place], [place] needed for Emgality    KEY: [id number]  Submitted on [facility]    Of note, in office note, it is indicated that pt may need to use [facility] if either not approved or too expensive

## 2021-03-08 DIAGNOSIS — F32.A DEPRESSION: ICD-10-CM

## 2021-03-08 RX ORDER — VENLAFAXINE HYDROCHLORIDE 150 MG/1
150 CAPSULE, EXTENDED RELEASE ORAL DAILY
Qty: 90 CAPSULE | Refills: 0 | Status: SHIPPED | OUTPATIENT
Start: 2021-03-08 | End: 2021-10-18 | Stop reason: ALTCHOICE

## 2021-04-02 DIAGNOSIS — E03.9 HYPOTHYROIDISM: ICD-10-CM

## 2021-04-02 RX ORDER — LEVOTHYROXINE SODIUM 0.1 MG/1
TABLET ORAL
Qty: 30 TABLET | Refills: 0 | Status: SHIPPED | OUTPATIENT
Start: 2021-04-02 | End: 2021-05-21 | Stop reason: SDUPTHER

## 2021-04-13 ENCOUNTER — OFFICE VISIT (OUTPATIENT)
Dept: FAMILY MEDICINE CLINIC | Facility: CLINIC | Age: 56
End: 2021-04-13
Payer: MEDICARE

## 2021-04-13 ENCOUNTER — TELEPHONE (OUTPATIENT)
Dept: ADMINISTRATIVE | Facility: OTHER | Age: 56
End: 2021-04-13

## 2021-04-13 VITALS
SYSTOLIC BLOOD PRESSURE: 118 MMHG | BODY MASS INDEX: 28.63 KG/M2 | HEIGHT: 59 IN | TEMPERATURE: 97.8 F | WEIGHT: 142 LBS | HEART RATE: 123 BPM | OXYGEN SATURATION: 97 % | DIASTOLIC BLOOD PRESSURE: 82 MMHG

## 2021-04-13 DIAGNOSIS — E03.9 HYPOTHYROIDISM, UNSPECIFIED TYPE: ICD-10-CM

## 2021-04-13 DIAGNOSIS — R06.00 DYSPNEA ON EXERTION: ICD-10-CM

## 2021-04-13 DIAGNOSIS — M25.373 UNSTABLE ANKLE, UNSPECIFIED LATERALITY: Primary | ICD-10-CM

## 2021-04-13 DIAGNOSIS — Z23 ENCOUNTER FOR IMMUNIZATION: ICD-10-CM

## 2021-04-13 DIAGNOSIS — E78.00 HYPERCHOLESTEROLEMIA: ICD-10-CM

## 2021-04-13 DIAGNOSIS — R53.83 OTHER FATIGUE: ICD-10-CM

## 2021-04-13 DIAGNOSIS — F32.5 MAJOR DEPRESSIVE DISORDER WITH SINGLE EPISODE, IN FULL REMISSION (HCC): ICD-10-CM

## 2021-04-13 DIAGNOSIS — G43.709 CHRONIC MIGRAINE WITHOUT AURA WITHOUT STATUS MIGRAINOSUS, NOT INTRACTABLE: ICD-10-CM

## 2021-04-13 PROCEDURE — 99215 OFFICE O/P EST HI 40 MIN: CPT | Performed by: FAMILY MEDICINE

## 2021-04-13 PROCEDURE — 93000 ELECTROCARDIOGRAM COMPLETE: CPT | Performed by: FAMILY MEDICINE

## 2021-04-13 NOTE — PROGRESS NOTES
BMI Counseling: Body mass index is 28 68 kg/m²  The BMI is above normal  Nutrition recommendations include reducing portion sizes, decreasing overall calorie intake and moderation in carbohydrate intake  Patient ID: Rekha Brooks is a 61 y o  female  HPI: 61 y  o female presents for complaints of bilateral ankle instability  She has some dull, constant pain which worsens with weight bearing  She denies any recent injury  But symptoms have been persistant for past few mos  She also complains of dyspnea on exertion and is concerned due to a significant family history of heart disease  She is due for a check of her tsh and complains of fatigue  She states her depression is well controlled on effexor XR  Her migraine headaches are stable on topamax therapy  SUBJECTIVE    Family History   Problem Relation Age of Onset    Parkinsonism Father     Dementia Father     Hypertension Daughter     Cervical cancer Maternal Grandmother     Bipolar disorder Sister     No Known Problems Mother      Social History     Socioeconomic History    Marital status:       Spouse name: Not on file    Number of children: Not on file    Years of education: Not on file    Highest education level: Not on file   Occupational History    Not on file   Social Needs    Financial resource strain: Not on file    Food insecurity     Worry: Not on file     Inability: Not on file    Transportation needs     Medical: Not on file     Non-medical: Not on file   Tobacco Use    Smoking status: Former Smoker     Quit date:      Years since quittin 3    Smokeless tobacco: Never Used   Substance and Sexual Activity    Alcohol use: No    Drug use: No    Sexual activity: Not Currently   Lifestyle    Physical activity     Days per week: Not on file     Minutes per session: Not on file    Stress: Not on file   Relationships    Social connections     Talks on phone: Not on file     Gets together: Not on file Attends [employer] service: Not on file     Active member of club or organization: Not on file     Attends meetings of clubs or organizations: Not on file     Relationship status: Not on file    Intimate partner violence     Fear of current or ex partner: Not on file     Emotionally abused: Not on file     Physically abused: Not on file     Forced sexual activity: Not on file   Other Topics Concern    Not on file   Social History Narrative    Not on file     Past Medical History:   Diagnosis Date    Bilateral breast cysts 2019 r    right breast cysts; dense breast tissue bilaterally    Cervical spinal stenosis 2015    Liver enzyme elevation     Migraines      Past Surgical History:   Procedure Laterality Date     SECTION      two    OVARIAN CYST SURGERY       No Known Allergies    Current Outpatient Medications:     gabapentin (NEURONTIN) 300 mg capsule, Take 1 capsule (300 mg total) by mouth daily at bedtime, Disp: 30 capsule, Rfl: 3    Galcanezumab-gnlm 120 MG/ML SOAJ, Inject 120 mg under the skin every 30 (thirty) days, Disp: 1 pen, Rfl: 11    levothyroxine 100 mcg tablet, TAKE ONE TABLET BY MOUTH ONE TIME DAILY , Disp: 30 tablet, Rfl: 0    omeprazole (PriLOSEC) 20 mg delayed release capsule, Take 1 capsule (20 mg total) by mouth 2 (two) times a day, Disp: 180 capsule, Rfl: 1    prochlorperazine (COMPAZINE) 10 mg tablet, Take 1 tablet (10 mg total) by mouth every 6 (six) hours as needed for nausea or vomiting, Disp: 12 tablet, Rfl: 3    SUMAtriptan (IMITREX) 100 mg tablet, carlos enrique 1/2 tab to 1 tablet by mouth at start of headache  May repeat once after 2 hours  Use triptans only 2 day per week , Disp: 9 tablet, Rfl: 6    SUMAtriptan Succinate 6 MG/0 5ML SOAJ, inject 1 injection for severe headache  may repeat once after 2 hours   max 2 per week, Disp: 4 mL, Rfl: 6    topiramate (TOPAMAX) 50 MG tablet, Take 2 tablets (100 mg total) by mouth 2 (two) times a day, Disp: 180 tablet, Rfl: 1   venlafaxine (EFFEXOR-XR) 150 mg 24 hr capsule, Take 1 capsule (150 mg total) by mouth daily, Disp: 90 capsule, Rfl: 0    Review of Systems  Constitutional:     Denies fever, chills ,fatigue ,weakness ,weight loss, weight gain     ENT: Denies earache ,loss of hearing ,nosebleed, nasal discharge,nasal congestion ,sore throat ,hoarseness  Pulmonary: Denies shortness of breath ,cough  +,dyspnea on exertion,denies any  orthopnea  Or PND   Cardiovascular:  Denies bradycardia , tachycardia  ,palpations, lower extremity edema leg, claudication  Breast:  Denies new or changing breast lumps ,nipple discharge ,nipple changes  Abdomen:  Denies abdominal pain , anorexia , indigestion, nausea, vomiting, constipation, diarrhea  Musculoskeletal: Denies myalgias,+ arthralgia of ankles bilateral as well as instability but no swelling,+ joint stiffness ,denies any  limb pain,or limb swelling  Gu: denies dysuria, polyuria  Skin: Denies skin rash, skin lesion, skin wound, itching, dry skin  Neuro: Denies headache, numbness, tingling, confusion, loss of consciousness, dizziness, vertigo  Psychiatric: Denies feelings of depression, suicidal ideation, anxiety, sleep disturbances    OBJECTIVE  /82   Pulse (!) 123   Temp 97 8 °F (36 6 °C)   Ht 4' 11" (1 499 m)   Wt 64 4 kg (142 lb)   SpO2 97%   BMI 28 68 kg/m²   Constitutional:   NAD, well appearing and well nourished      ENT:   Conjunctiva and lids: no injection, edema, or discharge     Pupils and iris: LUIS bilaterally    External inspection of ears and nose: normal without deformities or discharge  Otoscopic exam: Canals patent without erythema  Nasal mucosa, septum and turbinates: Normal or edema or discharge         Oropharynx:  Moist mucosa, normal tongue and tonsils without lesions  No erythema        Pulmonary:Respiratory effort normal rate and rhythm, no increased work of breathing   Auscultation of lungs:  Clear bilaterally with no adventitious breath sounds Cardiovascular: regular rate and rhythm, S1 and S2, no murmur, no edema and/or varicosities of LE      Abdomen: Soft and non-distended     Positive bowel sounds      No heptomegaly or splenomegaly      Gu: no suprapubic tenderness or CVA tenderness, no urethral discharge  Lymphatic:  No anterior or posterior cervical lymphadenopathy         Musculoskeletal:  Gait and station: Normal gait      Digits and nails normal without clubbing or cyanosis       Inspection/palpation of joints, bones, and muscles:  +lateral ankle tenderness about lateral malleolus bilaterally without swelling, full active and passive range of motion of ankles bilaterally       Skin: Normal skin turgor and no rashes      Neuro:      Normal reflexes      Psych:   alert and oriented to person, place and time     normal mood and affect       Assessment/Plan:Diagnoses and all orders for this visit:    Unstable ankle, unspecified laterality  -     XR ankle 3+ vw left; Future  -     XR ankle 3+ vw right; Future    Dyspnea on exertion  -     Echo stress test w contrast if indicated; Future  -     POCT ECG    Hypercholesterolemia  -     Lipid Panel with Direct LDL reflex; Future    Major depressive disorder with single episode, in full remission [facility] )  Comments:  Continue with effexor therapy  Hypothyroidism, unspecified type  -     TSH, 3rd generation; Future    Other fatigue  -     Comprehensive metabolic panel; Future    Chronic migraine without aura without status migrainosus, not intractable  Comments:  Stable on topamax and prn imitrex for rescue        Reviewed with patient plan to treat with above plan      Patient instructed to call in 72 hours if not feeling better or if symptoms worsen

## 2021-04-13 NOTE — TELEPHONE ENCOUNTER
----- Message from Lina Nielsen, [address] [place] sent at 4/13/2021 11:19 AM EDT -----  Regarding: pap - fam med tami  04/13/21 11:19 AM    Hello, our patient José Stokes has had Pap Smear (HPV) aka Cervical Cancer Screening completed/performed  Please assist in updating the patient chart by pulling the Care Everywhere (CE) document  The date of service is 10/2019       Thank you,  Lina Nielsen MA [facility] MED Nita Beatty Unable to reach patient. Will try again later.

## 2021-04-13 NOTE — TELEPHONE ENCOUNTER
Upon review of the In Basket request we were able to locate, review, and update the patient chart as requested for Pap Smear (HPV) aka Cervical Cancer Screening  Any additional questions or concerns should be emailed to the Practice Liaisons via [email or web address] email, please do not reply via In Basket      Thank you  Jos Brady

## 2021-04-14 ENCOUNTER — TRANSCRIBE ORDERS (OUTPATIENT)
Dept: LAB | Facility: CLINIC | Age: 56
End: 2021-04-14

## 2021-04-14 ENCOUNTER — APPOINTMENT (OUTPATIENT)
Dept: LAB | Facility: CLINIC | Age: 56
End: 2021-04-14
Payer: MEDICARE

## 2021-04-14 ENCOUNTER — APPOINTMENT (OUTPATIENT)
Dept: RADIOLOGY | Facility: CLINIC | Age: 56
End: 2021-04-14
Payer: MEDICARE

## 2021-04-14 DIAGNOSIS — M25.373 UNSTABLE ANKLE, UNSPECIFIED LATERALITY: ICD-10-CM

## 2021-04-14 DIAGNOSIS — E03.9 HYPOTHYROIDISM, UNSPECIFIED TYPE: ICD-10-CM

## 2021-04-14 DIAGNOSIS — R53.83 OTHER FATIGUE: ICD-10-CM

## 2021-04-14 DIAGNOSIS — E78.00 HYPERCHOLESTEROLEMIA: ICD-10-CM

## 2021-04-14 LAB
ALBUMIN SERPL BCP-MCNC: 4.1 G/DL (ref 3.5–5)
ALP SERPL-CCNC: 202 U/L (ref 46–116)
ALT SERPL W P-5'-P-CCNC: 52 U/L (ref 12–78)
ANION GAP SERPL CALCULATED.3IONS-SCNC: 7 MMOL/L (ref 4–13)
AST SERPL W P-5'-P-CCNC: 34 U/L (ref 5–45)
BILIRUB SERPL-MCNC: 0.41 MG/DL (ref 0.2–1)
BUN SERPL-MCNC: 17 MG/DL (ref 5–25)
CALCIUM SERPL-MCNC: 9.1 MG/DL (ref 8.3–10.1)
CHLORIDE SERPL-SCNC: 108 MMOL/L (ref 100–108)
CHOLEST SERPL-MCNC: 275 MG/DL (ref 50–200)
CO2 SERPL-SCNC: 24 MMOL/L (ref 21–32)
CREAT SERPL-MCNC: 1.04 MG/DL (ref 0.6–1.3)
GFR SERPL CREATININE-BSD FRML MDRD: 60 ML/MIN/1.73SQ M
GLUCOSE P FAST SERPL-MCNC: 103 MG/DL (ref 65–99)
HDLC SERPL-MCNC: 75 MG/DL
LDLC SERPL CALC-MCNC: 173 MG/DL (ref 0–100)
POTASSIUM SERPL-SCNC: 4 MMOL/L (ref 3.5–5.3)
PROT SERPL-MCNC: 7.6 G/DL (ref 6.4–8.2)
SODIUM SERPL-SCNC: 139 MMOL/L (ref 136–145)
TRIGL SERPL-MCNC: 136 MG/DL
TSH SERPL DL<=0.05 MIU/L-ACNC: 3.79 UIU/ML (ref 0.36–3.74)

## 2021-04-14 PROCEDURE — 73610 X-RAY EXAM OF ANKLE: CPT

## 2021-04-14 PROCEDURE — 84443 ASSAY THYROID STIM HORMONE: CPT

## 2021-04-14 PROCEDURE — 36415 COLL VENOUS BLD VENIPUNCTURE: CPT

## 2021-04-14 PROCEDURE — 80061 LIPID PANEL: CPT

## 2021-04-14 PROCEDURE — 80053 COMPREHEN METABOLIC PANEL: CPT

## 2021-04-20 ENCOUNTER — TELEPHONE (OUTPATIENT)
Dept: FAMILY MEDICINE CLINIC | Facility: CLINIC | Age: 56
End: 2021-04-20

## 2021-04-20 ENCOUNTER — TELEPHONE (OUTPATIENT)
Dept: NEUROLOGY | Facility: CLINIC | Age: 56
End: 2021-04-20

## 2021-04-20 DIAGNOSIS — G43.019 INTRACTABLE MIGRAINE WITHOUT AURA AND WITHOUT STATUS MIGRAINOSUS: Primary | ICD-10-CM

## 2021-04-20 DIAGNOSIS — M25.572 ACUTE BILATERAL ANKLE PAIN: Primary | ICD-10-CM

## 2021-04-20 DIAGNOSIS — M25.571 ACUTE BILATERAL ANKLE PAIN: Primary | ICD-10-CM

## 2021-04-20 RX ORDER — DEXAMETHASONE 2 MG/1
2 TABLET ORAL
Qty: 5 TABLET | Refills: 0 | Status: SHIPPED | OUTPATIENT
Start: 2021-04-20 | End: 2021-04-25

## 2021-04-20 NOTE — TELEPHONE ENCOUNTER
Her labs are fine:  First of all normal glucose is 100 or below 103 is considered to be acceptable  Alk phos is an enzyme that gets raised from arthritis  Her chol panel is looked at as a whole; if the ration (total divided by good , HDL) is <5 then her risk of blocking arteries is low  Hers is 3; she is fine    Lastly, her tsh is 5/10 above normal, that is considered lab error and Im not changing a dose of med based on that slight amount

## 2021-04-20 NOTE — TELEPHONE ENCOUNTER
Patient reviewed blood work on my chart and is concerned about some high results that she saw   Please address

## 2021-04-20 NOTE — TELEPHONE ENCOUNTER
I recommend Decadron 2 mg daily for 5 days to try and abort this headache, let me know if she is agreeable

## 2021-04-20 NOTE — TELEPHONE ENCOUNTER
Pt calls in to state that she is on day 3 of a migraine and having trouble getting sumatriptan from her pharmacy  I called [address]  The problem is that pt filled this med 18 days ago and it is too soon to fill  I made pt aware that she cannot have more sumatriptan tablets at this time  She states that the compazine has not been helpful as she is also having nausea and vomiting  Pt is having stabbing pain behind right eyes and back of head on right side  She is unaware if she has every tried a depakote or decadron taper in the past but would be agreeable  Please advise

## 2021-04-24 ENCOUNTER — IMMUNIZATIONS (OUTPATIENT)
Dept: FAMILY MEDICINE CLINIC | Facility: HOSPITAL | Age: 56
End: 2021-04-24

## 2021-04-24 DIAGNOSIS — Z23 ENCOUNTER FOR IMMUNIZATION: Primary | ICD-10-CM

## 2021-04-24 PROCEDURE — 0001A SARS-COV-2 / COVID-19 MRNA VACCINE (PFIZER-BIONTECH) 30 MCG: CPT

## 2021-04-24 PROCEDURE — 91300 SARS-COV-2 / COVID-19 MRNA VACCINE (PFIZER-BIONTECH) 30 MCG: CPT

## 2021-05-04 ENCOUNTER — CONSULT (OUTPATIENT)
Dept: OBGYN CLINIC | Facility: CLINIC | Age: 56
End: 2021-05-04
Payer: MEDICARE

## 2021-05-04 VITALS
WEIGHT: 142 LBS | BODY MASS INDEX: 28.63 KG/M2 | SYSTOLIC BLOOD PRESSURE: 140 MMHG | HEIGHT: 59 IN | DIASTOLIC BLOOD PRESSURE: 103 MMHG | HEART RATE: 99 BPM

## 2021-05-04 DIAGNOSIS — M25.572 ACUTE BILATERAL ANKLE PAIN: ICD-10-CM

## 2021-05-04 DIAGNOSIS — M25.571 ACUTE BILATERAL ANKLE PAIN: ICD-10-CM

## 2021-05-04 DIAGNOSIS — S93.491A SPRAIN OF ANTERIOR TALOFIBULAR LIGAMENT OF RIGHT ANKLE, INITIAL ENCOUNTER: Primary | ICD-10-CM

## 2021-05-04 DIAGNOSIS — M25.571 PAIN, JOINT, ANKLE AND FOOT, RIGHT: ICD-10-CM

## 2021-05-04 PROCEDURE — 99203 OFFICE O/P NEW LOW 30 MIN: CPT | Performed by: ORTHOPAEDIC SURGERY

## 2021-05-04 NOTE — PROGRESS NOTES
DAE Odom  Attending, Orthopaedic Surgery  Foot and [facility] [address] [facility]        ORTHOPAEDIC FOOT AND ANKLE CLINIC VISIT     Assessment:     Encounter Diagnoses   Name Primary?  Acute bilateral ankle pain     Pain, joint, ankle and foot, right     Sprain of anterior talofibular ligament of right ankle, initial encounter Yes              Plan:   · The patient verbalized understanding of exam findings and treatment plan  We engaged in the shared decision-making process and treatment options were discussed at length with the patient  Surgical and conservative management discussed today along with risks and benefits  · Edmond Owens has pain over the right ATFL and ankle weakness on exam  · Recommend PT to work on ankle strengthening  · Compression stocking for swelling control  · Discussed importance of supportive shoe wear  Return in about 7 weeks (around 6/22/2021)  History of Present Illness:   Chief Complaint:   Chief Complaint   Patient presents with    Right Ankle - Pain     Doug Cunha is a 61 y o  female who is being seen for right ankle pain and instability  Patient reports for the past 3 months, she reports recurrent episodes of ankle instability while going up/down steps  Pain is localized at anterolateral right ankle with minimal radiating and described as sharp and severe  She reports intermittent swelling of the left ankle but no significant pain  Patient denies numbness, tingling or radicular pain  Denies history of neuropathy  Patient does not smoke, does not have diabetes and does not take blood thinners  Patient denies family history of anesthesia complications and has not had any complications with anesthesia       Pain/symptom timing:  Worse during the day when active  Pain/symptom context:  Worse with activites and work  Pain/symptom modifying factors:  Rest makes better, activities make worse  Pain/symptom associated signs/symptoms: none    Prior treatment   · NSAIDsNo    · Injections No   · Bracing/Orthotics No   · Physical Therapy No     Orthopedic Surgical History:   See below    Past Medical, Surgical and Social History:  Past Medical History:  has a past medical history of Bilateral breast cysts (2019 r), Cervical spinal stenosis (), Liver enzyme elevation, and Migraines  Problem List: does not have any pertinent problems on file  Past Surgical History:  has a past surgical history that includes Ovarian cyst surgery and  section  Family History: family history includes Bipolar disorder in her sister; Cervical cancer in her maternal grandmother; Dementia in her father; Hypertension in her daughter; No Known Problems in her mother; Parkinsonism in her father  Social History:  reports that she quit smoking about 23 years ago  She has never used smokeless tobacco  She reports that she does not drink alcohol or use drugs  Current Medications: has a current medication list which includes the following prescription(s): gabapentin, galcanezumab-gnlm, levothyroxine, omeprazole, prochlorperazine, sumatriptan, sumatriptan succinate, topiramate, and venlafaxine  Allergies: has No Known Allergies  Review of Systems:  General- denies fever/chills  HEENT- denies hearing loss or sore throat  Eyes- denies eye pain or visual disturbances, denies red eyes  Respiratory- denies cough or SOB  Cardio- denies chest pain or palpitations  GI- denies abdominal pain  Endocrine- denies urinary frequency  Urinary- denies pain with urination  Musculoskeletal- Negative except noted above  Skin- denies rashes or wounds  Neurological- denies dizziness or headache  Psychiatric- denies anxiety or difficulty concentrating    Physical Exam:   BP (!) 140/103   Pulse 99   Ht 4' 11" (1 499 m)   Wt 64 4 kg (142 lb)   BMI 28 68 kg/m²   General/Constitutional: No apparent distress: well-nourished and well developed    Eyes: normal ocular motion  Cardio: RRR, Normal S1S2, No m/r/g  Lymphatic: No appreciable lymphadenopathy  Respiratory: Non-labored breathing, CTA b/l no w/c/r  Vascular: No edema, swelling or tenderness, except as noted in detailed exam   Integumentary: No impressive skin lesions present, except as noted in detailed exam   Neuro: No ataxia or tremors noted  Psych: Normal mood and affect, oriented to person, place and time  Appropriate affect  Musculoskeletal: Normal, except as noted in detailed exam and in HPI  Examination    Right    Gait Normal   Musculoskeletal Mildly tender to palpation at ATFL    Skin Normal       Nails Normal    Range of Motion  15 degrees dorsiflexion, 40 degrees plantarflexion  Subtalar motion: normal    Stability Stable - negative anterior drawer    Muscle Strength 5/5 tibialis anterior  5/5 gastrocnemius-soleus  4/5 posterior tibialis  4/5 peroneal/eversion strength  5/5 EHL  5/5 FHL    Neurologic Normal    Sensation  Intact to light touch throughout sural, saphenous, superficial peroneal, deep peroneal and medial/lateral plantar nerve distributions  [place] 5 07 filament (10g) testing  deferred  Cardiovascular Brisk capillary refill < 2 seconds,intact DP and PT pulses    Special Tests None      Imaging Studies:   3 views of the right ankle were taken, reviewed and interpreted independently that demonstrate no fracture, dislocation, or any other significant bony abnormalities  Reviewed by me personally  Scribe Attestation    I,:  Zohreh Christina PA-C am acting as a scribe while in the presence of the attending physician :       I,:  Terrence Lambert MD personally performed the services described in this documentation    as scribed in my presence :               Dory Cross Lachman, MD  Foot & Ankle Surgery   Department [place] [address]      I personally performed the service  Dory Cross Lachman, MD

## 2021-05-04 NOTE — PATIENT INSTRUCTIONS
Begin PT at this time     Recommend compression stocking (knee high, 20-30 mm Hg pressure) for swelling control     Recommend supportive shoe wear    Gabriel Funez, [employer], Venkatesh are good brands but I recommend going to a dedicate shoe store (not [employer] or [employer] ) At these types of stores, they have experts that can fit you for shoes appropriate for your foot problem  [employer]  [address] [place] [address]a [address], [address]    [employer] [address] [facility], [address] [place], [address] [facility]    [place] [employer]   [address]   [facility] [address], [address] [facility]  [address] [facility], [facility] 35, [address] [place]     [employer]  [address] [facility] [address], [place], [address] [place] [address] [address] [place] [address], [address] [facility], [place]     [employer]  [address] [place], [place]

## 2021-05-10 NOTE — PROGRESS NOTES
PT EVALUATION    Today's date: 21  Patient name: José Miguel Acosta  : 1965  MRN: [id number]  Referring provider: Almas Infante PA-C  Dx:   1  Sprain of anterior talofibular ligament of right ankle, initial encounter        José Miguel Acosta is a 61 y o  female who presents with signs and symptoms consistent of chronic R ankle pain and instability  Patient presents with pain, decreased strength, decreased ROM and decreased joint mobility  Patient has the most pain in the anterolateral ankle and made worse with eversion AROM and PROM  Patient has difficulty with coordinating inversion and eversion into full range despite having full PROM  Patient has moderate pain but biggest impairments are strength, proprioception, and coordination issues  Due to these impairments, Patient has difficulty performing a/iadls, recreational activities and work-related activities  Patient would benefit from skilled physical therapy to address the impairments, improve their level of function, and to improve their overall quality of life  Impairments:    restricted ROM    decreased strength   activity intolerance   abnormal gait   imbalance     Prognosis:  Good  Positive and negative prognostic indicator(s):  positive attitude about recovery    Goals:    Short Term Goals: to be achieved by 4 weeks  1) Patient to be independent with basic HEP  2) Decrease pain to 2/10 at its worst   3) Increase ankle AROM by 5-10 degrees   4) Increase LE strength by 1/2 MMT grade in all deficient planes  Long Term Goals: to be achieved by discharge  1) FOTO equal to or greater than target score indicating improvements with overall function  2) Patient will demonstrate improvements with stability with ascending/descending stairs as well as on uneven surfaces in order to participate in daily activities  3) Patient will be able to tolerate prolong walking with little to no discomfort in order to participate in daily activities    4) Patient to be independent in comprehensive [facility]  Planned interventions:  home exercise program, patient education, manual therapy, flexibility, functional range of motion exercises, strengthening and balance and weight bearing training    Duration in visits:  8  Frequency: 2 visits per week  Duration in weeks:  4    History of Current Injury: Patient reports that about 3-3 5 months ago patient notes that she started to have ankle instability while going up and down steps  Patient notes that she hasn't not fallen but on the second to last step patient noted losing her balance because of her ankle giving out  Patient notes that the L ankle swells but once and while but she feels the instability is worse on the R>L  Patient notes that she does get pain associated with his that is intermittent  Patient notes that the pain occurs based on the activity that she is doing  Patient denies a certain WILLIAM and no episodes in the past       Pain location: anterolateral ankle pain (area of the ATFL); localized pain denies radiating pain  Pain descriptors: sharp pain (lingers for a little while)   Pain at Currently:  4/10  Pain at Best:  0/10  Pain at Worst:  5/10      Aggravating factors: going up and down the steps, prolong walking, walking on uneven surfaces  Easing factors: rest, tylenol (helps sometimes)     Imaging: x-ray   Special Questions: denies numbness, tingling, burning  No sleep disturbances or waking up from pain  If it is sore patient notes having trouble falling asleep but once she falls asleep she is fine  Hobbies/Interests: none  Occupation: Not working       Patient goals:  decreased pain, increased mobility, increased strength and improved balance  Patient reports goals for physical therapy would be to decrease pain and increase the strength         Objective     Observations     Additional Observation Details  No swelling, redness, or skin changes     Tenderness   Left Ankle/Foot   Tenderness in the anterior talofibular ligament  Right Ankle/Foot   Tenderness in the anterior talofibular ligament  Neurological Testing     Sensation     Ankle/Foot   Left Ankle/Foot   Intact: light touch    Right Ankle/Foot   Intact: light touch     Active Range of Motion   Left Ankle/Foot   Dorsiflexion (ke): WFL  Plantar flexion: WFL  Inversion: 5 degrees   Eversion: 5 degrees     Right Ankle/Foot   Dorsiflexion (ke): WFL  Plantar flexion: WFL and with pain  Inversion: 5 degrees   Eversion: 5 degrees     Additional Active Range of Motion Details  Patient has difficulty with coordination inversion and eversion movements; full passive ROM     Passive Range of Motion   Left Ankle/Foot  Normal passive range of motion    Right Ankle/Foot  Normal passive range of motion    Joint Play   Left Ankle/Foot  Joints within functional limits are the subtalar joint  Hypomobile in the talocrural joint and midfoot  Right Ankle/Foot  Joints within functional limits are the subtalar joint  Hypomobile in the talocrural joint and midfoot  Strength/Myotome Testing     Left Hip   Planes of Motion   Flexion: 4+  Extension: 4  Abduction: 4    Right Hip   Planes of Motion   Flexion: 4+  Extension: 4  Abduction: 4    Left Knee   Flexion: 4+  Extension: 4+    Right Knee   Flexion: 4+  Extension: 4+    Left Ankle/Foot   Dorsiflexion: 4+  Plantar flexion: 4  Inversion: 4  Eversion: 4    Right Ankle/Foot   Dorsiflexion: 4+  Plantar flexion: 4  Inversion: 4  Eversion: 4    Tests     Right Ankle/Foot   Positive for eversion talar tilt  Negative for inversion talar tilt       Additional Tests Details  Bilateral heel raise: able to complete 10 with mild pain   Single heel raise: able to perform 5 with moderate pain; decreased heel clearance compared to bilateral    SLS:   R: 6 seconds   L: 3 seconds           Precautions: chronic pain      Manuals 5/11            DF mobs              Manual stretching Neuro Re-Ed             SLS             SLS with band              Tandem stance              Wobble board              Rocker board              FT on foam beam              Lateral walking on foam beam              Ther Ex             3-way hip              4 way ankle              DF slides              Heel raises bilateral              Single leg heel raise                                        Recumbent bike              Ther Activity             Step-ups onto foam             bosu lunges              Gait Training                                       HEP             See media

## 2021-05-11 ENCOUNTER — EVALUATION (OUTPATIENT)
Dept: PHYSICAL THERAPY | Facility: CLINIC | Age: 56
End: 2021-05-11
Payer: MEDICARE

## 2021-05-11 DIAGNOSIS — S93.491A SPRAIN OF ANTERIOR TALOFIBULAR LIGAMENT OF RIGHT ANKLE, INITIAL ENCOUNTER: ICD-10-CM

## 2021-05-11 PROCEDURE — 97110 THERAPEUTIC EXERCISES: CPT | Performed by: PHYSICAL THERAPIST

## 2021-05-11 PROCEDURE — 97161 PT EVAL LOW COMPLEX 20 MIN: CPT | Performed by: PHYSICAL THERAPIST

## 2021-05-13 ENCOUNTER — APPOINTMENT (OUTPATIENT)
Dept: PHYSICAL THERAPY | Facility: CLINIC | Age: 56
End: 2021-05-13
Payer: MEDICARE

## 2021-05-15 ENCOUNTER — IMMUNIZATIONS (OUTPATIENT)
Dept: FAMILY MEDICINE CLINIC | Facility: HOSPITAL | Age: 56
End: 2021-05-15

## 2021-05-15 DIAGNOSIS — Z23 ENCOUNTER FOR IMMUNIZATION: Primary | ICD-10-CM

## 2021-05-15 PROCEDURE — 0002A SARS-COV-2 / COVID-19 MRNA VACCINE (PFIZER-BIONTECH) 30 MCG: CPT

## 2021-05-15 PROCEDURE — 91300 SARS-COV-2 / COVID-19 MRNA VACCINE (PFIZER-BIONTECH) 30 MCG: CPT

## 2021-05-17 ENCOUNTER — APPOINTMENT (OUTPATIENT)
Dept: PHYSICAL THERAPY | Facility: CLINIC | Age: 56
End: 2021-05-17
Payer: MEDICARE

## 2021-05-20 ENCOUNTER — OFFICE VISIT (OUTPATIENT)
Dept: PHYSICAL THERAPY | Facility: CLINIC | Age: 56
End: 2021-05-20
Payer: MEDICARE

## 2021-05-20 DIAGNOSIS — S93.491A SPRAIN OF ANTERIOR TALOFIBULAR LIGAMENT OF RIGHT ANKLE, INITIAL ENCOUNTER: Primary | ICD-10-CM

## 2021-05-20 PROCEDURE — 97112 NEUROMUSCULAR REEDUCATION: CPT | Performed by: PHYSICAL THERAPIST

## 2021-05-20 PROCEDURE — 97140 MANUAL THERAPY 1/> REGIONS: CPT | Performed by: PHYSICAL THERAPIST

## 2021-05-20 PROCEDURE — 97110 THERAPEUTIC EXERCISES: CPT | Performed by: PHYSICAL THERAPIST

## 2021-05-20 NOTE — PROGRESS NOTES
Daily Note     Today's date: 2021  Patient name: Samuel Tijerina  : 1965  MRN: [id number]  Referring provider: Fredo Pagan  Dx:   Encounter Diagnosis     ICD-10-CM    1  Sprain of anterior talofibular ligament of right ankle, initial encounter  S93 [id number]        Start Time:   Stop Time:   Total time in clinic (min): 45 minutes    Subjective: Patient reports, "My ankle feels good today but yesterday I was having a lot of pain "      Objective: See treatment diary below      Assessment: Patient tolerated treatment well  Patient responded well to the introduction of exercise program this date  Patient demonstrated improved quality of movement as well as control with inversion and eversion compared to initial evaluation  Patient challenged appropriately with dynamic strengthening exercises  Patient had minimal complaints of pain throughout session  Patient would benefit from continued PT      Plan: Continue per plan of care        Precautions: chronic pain      Manuals             DF mobs  Gr2-3 ACL            Manual stretching                                        Neuro Re-Ed             SLS             SLS with band              Tandem stance  3x30"             Wobble board  seated   30x ea direction            Rocker board  1 min ea             FT on foam beam  3x30"             Lateral walking on foam beam  6x             Ther Ex             3-way hip              4 way ankle  YTB 30x ea             DF slides              Heel raises bilateral  3x10             Single leg heel raise                                        Recumbent bike  lvl 3 5 min             Ther Activity             Step-ups onto foam             bosu lunges              Gait Training                                       HEP             See media

## 2021-05-21 DIAGNOSIS — E03.9 HYPOTHYROIDISM: ICD-10-CM

## 2021-05-21 RX ORDER — LEVOTHYROXINE SODIUM 0.1 MG/1
100 TABLET ORAL DAILY
Qty: 30 TABLET | Refills: 0 | Status: SHIPPED | OUTPATIENT
Start: 2021-05-21 | End: 2021-06-18 | Stop reason: SDUPTHER

## 2021-05-24 ENCOUNTER — OFFICE VISIT (OUTPATIENT)
Dept: PHYSICAL THERAPY | Facility: CLINIC | Age: 56
End: 2021-05-24
Payer: MEDICARE

## 2021-05-24 ENCOUNTER — HOSPITAL ENCOUNTER (OUTPATIENT)
Dept: NON INVASIVE DIAGNOSTICS | Facility: CLINIC | Age: 56
Discharge: HOME | End: 2021-05-24
Payer: MEDICARE

## 2021-05-24 DIAGNOSIS — R06.00 DYSPNEA ON EXERTION: ICD-10-CM

## 2021-05-24 DIAGNOSIS — S93.491A SPRAIN OF ANTERIOR TALOFIBULAR LIGAMENT OF RIGHT ANKLE, INITIAL ENCOUNTER: Primary | ICD-10-CM

## 2021-05-24 PROCEDURE — 93351 STRESS TTE COMPLETE: CPT | Performed by: INTERNAL MEDICINE

## 2021-05-24 PROCEDURE — 97112 NEUROMUSCULAR REEDUCATION: CPT | Performed by: PHYSICAL THERAPIST

## 2021-05-24 PROCEDURE — 97110 THERAPEUTIC EXERCISES: CPT | Performed by: PHYSICAL THERAPIST

## 2021-05-24 PROCEDURE — 93350 STRESS TTE ONLY: CPT

## 2021-05-24 NOTE — PROGRESS NOTES
Daily Note     Today's date: 2021  Patient name: Nell Milan  : 1965  MRN: [id number]  Referring provider: Morales Carreon  Dx:   Encounter Diagnosis     ICD-10-CM    1  Sprain of anterior talofibular ligament of right ankle, initial encounter  S93 [id number]        Start Time: 915  Stop Time: 1000  Total time in clinic (min): 45 minutes    Subjective: Patient reports, "My ankle was a little sore the day after but then was fine  It was doing okay today "      Objective: See treatment diary below      Assessment: Patient tolerated treatment well  Patient challenged appropriately with dynamic strengthening exercises by demonstrating medial-lateral sway  Patient had minimal complaints of pain throughout session  Patient would benefit from continued PT      Plan: Continue per plan of care        Precautions: chronic pain      Manuals            DF mobs  Gr2-3 ACL            Manual stretching                                        Neuro Re-Ed             SLS             SLS with band              Tandem stance  3x30"  3x30"   Left in front of right           Wobble board   FWD-BWD  Side-Side seated   30x ea direction seated   30x ea direction           Rocker board   FWD-BWD  LAT 1 min ea  1 min ea            FT on foam beam  3x30"  3x30"            Lateral walking on foam beam  6x  8x            Ther Ex             3-way hip              4 way ankle  YTB 30x ea  YTB 30x ea            DF slides              Heel raises bilateral  3x10  3x10           Single leg heel raise                                        Recumbent bike  lvl 3 5 min  lvl 3 5 min            Ther Activity             Step-ups onto foam             bosu lunges   2x10            Gait Training                                       HEP             See media

## 2021-05-25 ENCOUNTER — TELEPHONE (OUTPATIENT)
Dept: NEUROLOGY | Facility: CLINIC | Age: 56
End: 2021-05-25

## 2021-05-25 ENCOUNTER — APPOINTMENT (OUTPATIENT)
Dept: PHYSICAL THERAPY | Facility: CLINIC | Age: 56
End: 2021-05-25
Payer: MEDICARE

## 2021-05-25 DIAGNOSIS — R94.39 ABNORMAL STRESS ECHO: Primary | ICD-10-CM

## 2021-05-25 LAB
MAX DIASTOLIC BP: 96 MMHG
MAX HEART RATE: 148 BPM
MAX PREDICTED HEART RATE: 163 BPM
MAX. SYSTOLIC BP: 176 MMHG
PROTOCOL NAME: NORMAL
TARGET HR FORMULA: NORMAL
TEST INDICATION: NORMAL
TIME IN EXERCISE PHASE: NORMAL

## 2021-05-25 NOTE — TELEPHONE ENCOUNTER
Called and spoke to patient - confirmed upcoming appointment with Dr Selina Puentes  Provided patient with apt date, time and location  Informed patient that check in is at least 15 minutes prior to apt time

## 2021-05-27 ENCOUNTER — OFFICE VISIT (OUTPATIENT)
Dept: PHYSICAL THERAPY | Facility: CLINIC | Age: 56
End: 2021-05-27
Payer: MEDICARE

## 2021-05-27 DIAGNOSIS — S93.491A SPRAIN OF ANTERIOR TALOFIBULAR LIGAMENT OF RIGHT ANKLE, INITIAL ENCOUNTER: Primary | ICD-10-CM

## 2021-05-27 PROCEDURE — 97110 THERAPEUTIC EXERCISES: CPT

## 2021-05-27 PROCEDURE — 97112 NEUROMUSCULAR REEDUCATION: CPT

## 2021-05-27 PROCEDURE — 97140 MANUAL THERAPY 1/> REGIONS: CPT

## 2021-05-27 NOTE — PROGRESS NOTES
Daily Note     Today's date: 2021  Patient name: Doug Cunha  : 1965  MRN: [id number]  Referring provider: Eliana Li  Dx:   Encounter Diagnosis     ICD-10-CM    1  Sprain of anterior talofibular ligament of right ankle, initial encounter  [id number]                   Subjective: Patient noted that she is feeling pretty good today  Objective: See treatment diary below      Assessment: Tolerated treatment fair  Patient needed VC to correct form with TB ankle 4 way to not move her knee while performing exercise  Patient still challenged with rocker board balance m/l         Plan: Continue per plan of care        Precautions: chronic pain      Manuals           DF mobs  Gr2-3 ACL            Manual stretching    AF                                     Neuro Re-Ed             SLS             SLS with band              Tandem stance  3x30"  3x30"   Left in front of right 3x30"   Left in front of right          Wobble board   FWD-BWD  Side-Side seated   30x ea direction seated   30x ea direction seated 30x ea direction          Rocker board   FWD-BWD  LAT 1 min ea  1 min ea  1 min ea          FT on foam beam  3x30"  3x30"  3 x 30"          Lateral walking on foam beam  6x  8x  8x          Ther Ex             3-way hip              4 way ankle  YTB 30x ea  YTB 30x ea  YTB 30x ea           DF slides              Heel raises bilateral  3x10  3x10 3x10          Single leg heel raise                                        Recumbent bike  lvl 3 5 min  lvl 3 5 min  lvl 3 5 min          Ther Activity             Step-ups onto foam             bosu lunges   2x10  2x10          Gait Training                                       HEP             See media

## 2021-06-01 ENCOUNTER — OFFICE VISIT (OUTPATIENT)
Dept: NEUROLOGY | Facility: CLINIC | Age: 56
End: 2021-06-01
Payer: MEDICARE

## 2021-06-01 VITALS
SYSTOLIC BLOOD PRESSURE: 131 MMHG | HEIGHT: 59 IN | DIASTOLIC BLOOD PRESSURE: 84 MMHG | WEIGHT: 139 LBS | BODY MASS INDEX: 28.02 KG/M2 | HEART RATE: 97 BPM

## 2021-06-01 DIAGNOSIS — G43.709 CHRONIC MIGRAINE WITHOUT AURA WITHOUT STATUS MIGRAINOSUS, NOT INTRACTABLE: Primary | ICD-10-CM

## 2021-06-01 PROCEDURE — 99215 OFFICE O/P EST HI 40 MIN: CPT | Performed by: PSYCHIATRY & NEUROLOGY

## 2021-06-01 NOTE — PROGRESS NOTES
[facility] Consult - Follow up   PATIENT:  Vera Hernandez  MRN:  [id number]  :  1965  DATE OF SERVICE:  2021  REFERRED BY: No ref  provider found  PMD: Santi Parents, DO    Assessment/Plan:     Karuna Qureshi a very pleasant 61 y  o  female with a past medical history that includes hypothyroidism, migraines, GERD, depression, Cervical spinal stenosis, chronic neck pain, slightly elevated liver enzymes referred here for evaluation of headache  My initial evaluation 2020  Follow up 2020, 3/1/2021, 2021    Chronic migraine without aura without status migrainosus, not intractable  Tiffany reports a long history of headaches and migraines have been difficult to control   She reports pain is often left-sided, worse when on the right side   Starts typically the back of the neck and radiates unilateral side of the head the frontal/I region   She denies aura, reports typical associated migrainous features as well as some autonomic features   She denies any new worsening and did have slight improvement on aimovig but not enough   She denies any new or concerning symptoms  - as of 2020: 5 days a week migraines   - as of 2020:  Physical therapy has helped somewhat   migraines 3-4 times per week, in July 2-3 really bad migraines that lasted 3-4 days   Already on venlafaxine 150 mg, topiramate 100 mg BID, emgality was too expensive, trial of ajovy      - as of 3/1/2021: She continues to have uncontrolled migraines with milder migraines 4 days per week really bad migraines 3 times a month  Continue topiramate 100 mg b i d , venlafaxine 150 mg, all CGRPs are too expensive rec'd emgality [facility] program    - as of 2021: She reports migraines on average still 3 days per week, some weeks better than others   On topiramate 100 mg BID (and not sure if helping - discussed could consider very gradual wean down to 50 mg BID see if changes migraines or possible side effects) and venlafaxine 150 mg through PCP  Trying to get emgality through [facility], but has not heard back yet  In past 3 months 3 bad migraines ones  Typically migraines improve with sumatriptan PO or Inj, back up prochlorperazine  We discussed options and she would not like to add medications at this time  Workup:  - Neurologic assessment reveals normal neurological exam   - noncontrast head CT 06/30/2018:  No acute pathology, Tiny cavum septum pellucidum, a normal variant    - MRI Brain 2015 - normal per patient-she will try to obtain MRI brain on CD for my review     Preventative:  - we discussed headache hygiene and lifestyle factors that may improve headaches  - she is already on through other providers topiramate 100 mg b i d , venlafaxine 150 mg daily  - continue gabapentin 300 mg nightly  Discussed proper use, possible side effects and risks  - trial of emgality  Discussed proper use, possible side effects and risks  - past/failed: aimovig 70 mg, botox, venlafaxine, amitriptyline would be contraindicated due interaction with venlafaxine, propranolol the remote past, topiramate, gabapentin, botox for 4 sessions without improvement   - future options: could consider increasing gabapentin, verapamil, 140 mg of aimovig, ajovy, consider [facility] referral - mentioned to patient 6/1/21 as a future option if needed since I am not a fellowship trained headache specialist      Abortive:  - discussed not taking over-the-counter or prescription pain medications more than 3 days per week to prevent medication overuse/rebound headache  - sumatriptan 100 mg p o  And 6mg/0 5mL inj   Discussed proper use, possible side effects and risks  -     prochlorperazine (COMPAZINE) 10 mg tablet; Take 1 tablet (10 mg total) by mouth every 6 (six) hours as needed for nausea or vomiting  Discussed proper use, possible side effects and risks     - past/failed:  OTC meds, sumatriptan by mouth does not work as well as injection  - future options:  ubrelvy, Alternative Triptan, metoclopramide, Toradol IM or p o , could consider trial for 5 days of Depakote or dexamethasone 4 prolonged migraine, reyvow           Patient instructions      Go to [employer] and fill out [employer] patient assistance program     Headache/migraine treatment:   Abortive medications (for immediate treatment of a headache): It is ok to take ibuprofen, acetaminophen or naproxen (Advil, Tylenol,  Aleve, Excedrin) if they help your headaches you should limit these to No more than 3 times a week to avoid medication overuse/rebound headaches       Continue sumatriptan but no more than 3 days per week, dont take shot and pill together      Alternative is prochlorperazine/Compazine 10 mg - this is technically a nausea medication but can be used for migraines as well        Prescription preventive medications for headaches/migraines   (to take every day to help prevent headaches - not to take at the time of headache):  [x]? ?? topiramate  And venlafaxine through PCP  - if you want could consider weaning down topiramate to 50 mg in am and 100 mg in pm for 1 month and if no worsening try 50 mg in am and in pm for 1 month   - if at any point you find migraines generally are worse, go back up     Continue gabapentin 300 mg nightly for now     [x]? ?? trial of emgality      Emgality/Galcanezumab - the 1st dose is 240 mg loading dose of 2 consecutive 120 mg injections   Thereafter, 120 mg injections every 30 days     Keep out of direct sunlight  Prior to administration, allow to come to room temperature for 30 minutes  Do not warm using a heat source (eg, microwave or hot water)  Do not shake  Administer in abdomen (avoiding 2 inches around the navel), thigh, upper arm, or buttocks avoiding areas of skin that are tender, bruised, red or hard   Deliver entire contents of single-use prefilled pen or syringe         *Typically these types of medications take time untill you see the benefit, although some may see improvement in days, often it may take weeks, especially if the medication is being titrated up to a beneficial level  Please contact us if there are any concerns or questions regarding the medication          Lifestyle Recommendations:  [x]? ?? SLEEP - Maintain a regular sleep schedule: Adults need at least 7-8 hours of uninterrupted a night  Maintain good sleep hygiene:  Going to bed and waking up at consistent times, avoiding excessive daytime naps, avoiding caffeinated beverages in the evening, avoid excessive stimulation in the evening and generally using bed primarily for sleeping   One hour before bedtime would recommend turning lights down lower, decreasing your activity (may read quietly, listen to music at a low volume)  When you get into bed, should eliminate all technology (no texting, emailing, playing with your phone, iPad or tablet in bed)  [x]??? HYDRATION - Maintain good hydration   Drink  2L of fluid a day (4 typical small water bottles)  [x]? ?? DIET - Maintain good nutrition  In particular don't skip meals and try and eat healthy balanced meals regularly  [x]? ?? TRIGGERS - Look for other triggers and avoid them: Limit caffeine to 1-2 cups a day or less  Avoid dietary triggers that you have noticed bring on your headaches (this could include aged cheese, peanuts, MSG, aspartame and nitrates)  [x]? ?? EXERCISE - physical exercise as we all know is good for you in many ways, and not only is good for your heart, but also is beneficial for your mental health, cognitive health and  chronic pain/headaches  I would encourage at the least 5 days of physical exercise weekly for at least 30 minutes       Education and Follow-up  [x]? ?? Please call with any questions or concerns  Of course if any new concerning symptoms go to the emergency department  [x]??? Follow up as scheduled with Agustín Forward 9/14/21           CC:    We had the pleasure of evaluating Tiffany Herbert in neurological consultation today  Reg Vivar a  right handed female who presents today for evaluation of headaches       History obtained from patient as well as available medical record review  History of Present Illness:   Interval history as of 6/1/2021  - saw eye doctor and got new glasses and then saw ophthalmology - cataracts and needs laser iridotomy - both being done in June and July     Headaches and migraines   On average 3 migraines per week     Preventative: Through PCP: topiramate 100 mg BID, venlafaxine 150 mg daily - she is not sure if this is helping   -Gabapentin 300 mg p o  q h s  added 11/05/2020 By Rubi Coleman   - emgality approved 3/2/21 - was not able to start emgality due to jaffe -did [facility] and have not heard back yet     Abortive:   Sumatriptan injection - helps (gets 2 needles every 8-9 days)  Sumatriptan 100 mg PO (9 pills a month)  Prochlorperazine - not as helpful as sumatriptan, but takes  - 4/20/21 - decadron for 5 days - helped a little     Denies bothersome side effects          Interval history as of 03/01/2021:   - She saw WILMAN Pham 11/5/20, see EMR  - eye doctor apmt end of this month      Headaches and migraines  4 headaches per week that improve with injection if caught early, worse 3 times a month     Preventive:  Venlafaxine 150mg , topiramate 100 mg BID  - was not able to start ajovy due to price   -Gabapentin 300 mg p o  q h s  added 11/05/2020     Abortive:   Sumatriptan injection - helps   Prochlorperazine - helped a little     Interval history as of 08/20/2020:    -she has seen physical therapy - has seen some improvement   -labs not done yet - but seeing PCP soon  -has  MRI brain on CD for my review - forgot it today         Headaches and migraines - 3-4 times per week   Topiramate 100 mg b i d    Venlafaxine 150 mg  -switched from plan for emgality to ajovy since more affordable with her insurance - but never got this either      Sumatriptan injection - helps   Prochlorperazine - helped a little     Headaches started at what age? 11years old  How often do the headaches occur?   - as of 5/14/2020: 5 days a week migraines   - as of 8/20/2020: 3-4 times per week, in July 2-3 really bad migraines that lasted 3-4 days    What time of the day do the headaches start?  A lot wakes up with them   How long do the headaches last? Over 4 hours up to 3 days  Are you ever headache free? Yes     Aura? without aura     Last eye exam: 2 years ago, normal except needing glasses         Where is your headache located and pain quality?   - Right or left side, worse when on right  - start back of neck   - worse on right side comes up the back of the head and goes to frontal/eye region on that side  - pulsating, pounding, sharp stabbing, pressure     What is the intensity of pain? Average: 7-8/10, worst 10/10  Associated symptoms:   [x]? ?? Nausea       [x]? ?? Vomiting        []??? Diarrhea  [x]? ?? Insomnia    [x]? ?? Stiff or sore neck   [x]? ?? Problems with concentration  [x]? ?? Photophobia     [x]? ? ? Phonophobia      [x]? ?? Osmophobia  []??? Blurred vision   [x]? ?? Prefer quiet, dark room  [x]??? Light-headed or dizzy     [x]? ?? Tinnitus   []? ?? Hands or feet tingle or feel numb/paresthesias       []? ?? Red ear      []? ?? Ptosis      []??? Facial droop  [x]? ?? Lacrimation - tears  [x]? ?? Nasal congestion   []? ?? Flushing of face  []? ?? Change in pupil size     Things that make the headache worse? No specific movements, any movement      Headache triggers:  unknown     Have you seen someone else for headaches or pain? Yes, neurology, pain management  Have you had trigger point injection performed and how often? No  Have you had Botox injection performed and how often? Yes, 4 sessions and did not help   Have you had epidural injections or transforaminal injections performed? Yes  Are you current pregnant or planning on getting pregnant? No - no regular periods since early 402  Have you ever had any Brain imaging? yes - MRI Brain 2015 - normal     What medications do you take or have you taken for your headaches?    ABORTIVE:    OTC medications have been ineffective      Sumatriptan 100 mg - works well unless vomiting   Sumatriptan 6mg/0 5mL inj - works well      Has had zofran for nausea before     Past:  advil migraine  ED cocktails - help - compazine, benadryl   Prednisone      PREVENTIVE:      Venlafaxine 150 mg - 5 5 years   Topiramate 100 mg BID - more than 5 years     Past:  aimovig 70 mg- last took in January - was decreasing N/V but not huge decrease in migraines  Amitriptyline would be contraindicated due to interaction with venlafaxine  Inderal - proproanolol         Alternative therapies used in the past for headaches? PT for chronic neck pain in the past - last 4 years ago - helped      LIFESTYLE  Sleep   - averages: 930-730   Problems falling asleep?:   No  Problems staying asleep?:  Yes, 2   - snores  - sometimes naps, not usually  - sometimes falls asleep watching TV  - never had a sleep study     Physical activity: none     Water: 4 bottles per day  Caffeine: 1-2 cups per day     Mood: - depression after  and granddaughter passing away      The following portions of the patient's history were reviewed and updated as appropriate: allergies, current medications, past family history, past medical history, past social history, past surgical history and problem list      Pertinent family history:  Family history of headaches:  migraine headaches in mother, grandmother and daughter, granddaughter  Any family history of aneurysms - No     Pertinent social history:  Work: no  [address] [employer]  Lives with mother     Illicit Drugs: denies  Alcohol/tobacco: Denies tobacco use, alcohol intake: rarely      Past Medical History:     Past Medical History:   Diagnosis Date    Bilateral breast cysts June 2019 r    right breast cysts; dense breast tissue bilaterally    Cervical spinal stenosis 2015    Liver enzyme elevation     Migraines        Patient Active Problem List   Diagnosis    Hypothyroidism    GERD (gastroesophageal reflux disease)    Depression    Chronic migraine without aura without status migrainosus, not intractable    Major depressive disorder with single episode, in full remission ([facility])       Medications:      Current Outpatient Medications   Medication Sig Dispense Refill    gabapentin (NEURONTIN) 300 mg capsule Take 1 capsule (300 mg total) by mouth daily at bedtime 30 capsule 3    levothyroxine 100 mcg tablet Take 1 tablet (100 mcg total) by mouth daily 30 tablet 0    omeprazole (PriLOSEC) 20 mg delayed release capsule Take 1 capsule (20 mg total) by mouth 2 (two) times a day 180 capsule 1    prochlorperazine (COMPAZINE) 10 mg tablet Take 1 tablet (10 mg total) by mouth every 6 (six) hours as needed for nausea or vomiting 12 tablet 3    SUMAtriptan (IMITREX) 100 mg tablet carlos enrique 1/2 tab to 1 tablet by mouth at start of headache  May repeat once after 2 hours  Use triptans only 2 day per week  9 tablet 6    SUMAtriptan Succinate 6 MG/0 5ML SOAJ inject 1 injection for severe headache  may repeat once after 2 hours  max 2 per week 4 mL 6    topiramate (TOPAMAX) 50 MG tablet Take 2 tablets (100 mg total) by mouth 2 (two) times a day 180 tablet 1    venlafaxine (EFFEXOR-XR) 150 mg 24 hr capsule Take 1 capsule (150 mg total) by mouth daily 90 capsule 0    Galcanezumab-gnlm 120 MG/ML SOAJ Inject 120 mg under the skin every 30 (thirty) days (Patient not taking: Reported on 6/1/2021) 1 pen 11     No current facility-administered medications for this visit           Allergies:    No Known Allergies    Family History:     Family History   Problem Relation Age of Onset    Parkinsonism Father     Dementia Father     Hypertension Daughter     Cervical cancer Maternal Grandmother     Bipolar disorder Sister  No Known Problems Mother        Social History:     Social History     Socioeconomic History    Marital status:       Spouse name: Not on file    Number of children: Not on file    Years of education: Not on file    Highest education level: Not on file   Occupational History    Not on file   Social Needs    Financial resource strain: Not on file    Food insecurity     Worry: Not on file     Inability: Not on file    Transportation needs     Medical: Not on file     Non-medical: Not on file   Tobacco Use    Smoking status: Former Smoker     Quit date:      Years since quittin 4    Smokeless tobacco: Never Used   Substance and Sexual Activity    Alcohol use: No    Drug use: No    Sexual activity: Not Currently   Lifestyle    Physical activity     Days per week: Not on file     Minutes per session: Not on file    Stress: Not on file   Relationships    Social connections     Talks on phone: Not on file     Gets together: Not on file     Attends Faith service: Not on file     Active member of club or organization: Not on file     Attends meetings of clubs or organizations: Not on file     Relationship status: Not on file    Intimate partner violence     Fear of current or ex partner: Not on file     Emotionally abused: Not on file     Physically abused: Not on file     Forced sexual activity: Not on file   Other Topics Concern    Not on file   Social History Narrative    Not on file         Objective:       Physical Exam:                                                                 Vitals:            Constitutional:    /84 (BP Location: Left arm, Patient Position: Sitting, Cuff Size: Standard)   Pulse 97   Ht 4' 11" (1 499 m)   Wt 63 kg (139 lb)   BMI 28 07 kg/m²   BP Readings from Last 3 Encounters:   21 131/84   21 (!) 140/103   21 118/82     Pulse Readings from Last 3 Encounters:   21 97   21 99   21 (!) 123         Well developed, well nourished, well groomed  No dysmorphic features  HEENT:  Normocephalic atraumatic  See neuro exam   Chest:  Respirations appear regular and unlabored  Cardiovascular:  Regular rate, no observed significant swelling  Musculoskeletal:  (see below under neurologic exam for evaluation of motor function and gait)   Skin:  warm and dry, not diaphoretic  No apparent birthmarks or stigmata of neurocutaneous disease  Psychiatric:  Normal behavior and appropriate affect        Neurological Examination:     Mental status/cognitive function:   Attention span and concentration as well as fund of knowledge are appropriate for age  Normal language and spontaneous speech  Cranial Nerves:  III, IV, VI-Pupils were equal, round  Extraocular movements were full and conjugate   VII-facial expression symmetric  VIII-hearing grossly intact bilaterally   Motor Exam: symmetric bulk throughout  no atrophy, fasciculations or abnormal movements noted  Coordination:  no apparent dysmetria, ataxia or tremor noted  Gait: steady casual gait          Pertinent lab results:   8/26/20 CMP with chloride 110, alk-phos 168   CBC unremarkable  TSH normal      Imaging:   - noncontrast head CT 06/30/2018:  No acute pathology, Tiny cavum septum pellucidum, a normal variant    - MRI Brain 2015 - normal    Review of Systems:   ROS obtained by medical assistant Personally reviewed and updated if indicated  Review of Systems   Constitutional: Negative  Negative for appetite change and fever  HENT: Negative  Negative for hearing loss, tinnitus, trouble swallowing and voice change  Eyes: Negative  Negative for photophobia and pain  Respiratory: Negative  Negative for shortness of breath  Cardiovascular: Negative  Negative for palpitations  Gastrointestinal: Negative  Negative for nausea and vomiting  Endocrine: Negative  Negative for cold intolerance  Genitourinary: Negative   Negative for dysuria, frequency and urgency  Musculoskeletal: Negative  Negative for myalgias and neck pain  Skin: Negative  Negative for rash  Neurological: Positive for headaches (3 per week)  Negative for dizziness, tremors, seizures, syncope, facial asymmetry, speech difficulty, weakness, light-headedness and numbness  Hematological: Negative  Does not bruise/bleed easily  Psychiatric/Behavioral: Negative  Negative for confusion, hallucinations and sleep disturbance  I have spent 25 minutes with Patient  today in which greater than 50% of this time was spent in counseling/coordination of care  I also spent 16 minutes non face to face for this patient the same day         Author:  Doug Brooks MD 6/1/2021 1:01 PM

## 2021-06-01 NOTE — PATIENT INSTRUCTIONS
Go to [employer] and fill out [employer] patient assistance program        Headache/migraine treatment:   Abortive medications (for immediate treatment of a headache): It is ok to take ibuprofen, acetaminophen or naproxen (Advil, Tylenol,  Aleve, Excedrin) if they help your headaches you should limit these to No more than 3 times a week to avoid medication overuse/rebound headaches       Continue sumatriptan but no more than 3 days per week, dont take shot and pill together      Alternative is prochlorperazine/Compazine 10 mg - this is technically a nausea medication but can be used for migraines as well        Prescription preventive medications for headaches/migraines   (to take every day to help prevent headaches - not to take at the time of headache):  [x]? ?? topiramate  And venlafaxine through PCP  - if you want could consider weaning down topiramate to 50 mg in am and 100 mg in pm for 1 month and if no worsening try 50 mg in am and in pm for 1 month   - if at any point you find migraines generally are worse, go back up     Continue gabapentin 300 mg nightly for now     [x]? ?? trial of emgality      Emgality/Galcanezumab - the 1st dose is 240 mg loading dose of 2 consecutive 120 mg injections   Thereafter, 120 mg injections every 30 days     Keep out of direct sunlight  Prior to administration, allow to come to room temperature for 30 minutes  Do not warm using a heat source (eg, microwave or hot water)  Do not shake  Administer in abdomen (avoiding 2 inches around the navel), thigh, upper arm, or buttocks avoiding areas of skin that are tender, bruised, red or hard  Deliver entire contents of single-use prefilled pen or syringe         *Typically these types of medications take time untill you see the benefit, although some may see improvement in days, often it may take weeks, especially if the medication is being titrated up to a beneficial level   Please contact us if there are any concerns or questions regarding the medication          Lifestyle Recommendations:  [x]? ?? SLEEP - Maintain a regular sleep schedule: Adults need at least 7-8 hours of uninterrupted a night  Maintain good sleep hygiene:  Going to bed and waking up at consistent times, avoiding excessive daytime naps, avoiding caffeinated beverages in the evening, avoid excessive stimulation in the evening and generally using bed primarily for sleeping   One hour before bedtime would recommend turning lights down lower, decreasing your activity (may read quietly, listen to music at a low volume)  When you get into bed, should eliminate all technology (no texting, emailing, playing with your phone, iPad or tablet in bed)  [x]??? HYDRATION - Maintain good hydration   Drink  2L of fluid a day (4 typical small water bottles)  [x]? ?? DIET - Maintain good nutrition  In particular don't skip meals and try and eat healthy balanced meals regularly  [x]? ?? TRIGGERS - Look for other triggers and avoid them: Limit caffeine to 1-2 cups a day or less  Avoid dietary triggers that you have noticed bring on your headaches (this could include aged cheese, peanuts, MSG, aspartame and nitrates)  [x]? ?? EXERCISE - physical exercise as we all know is good for you in many ways, and not only is good for your heart, but also is beneficial for your mental health, cognitive health and  chronic pain/headaches  I would encourage at the least 5 days of physical exercise weekly for at least 30 minutes       Education and Follow-up  [x]? ?? Please call with any questions or concerns  Of course if any new concerning symptoms go to the emergency department    [x]??? Follow up as scheduled with Hali Hickey 9/14/21

## 2021-06-03 ENCOUNTER — OFFICE VISIT (OUTPATIENT)
Dept: PHYSICAL THERAPY | Facility: CLINIC | Age: 56
End: 2021-06-03
Payer: MEDICARE

## 2021-06-03 DIAGNOSIS — S93.491A SPRAIN OF ANTERIOR TALOFIBULAR LIGAMENT OF RIGHT ANKLE, INITIAL ENCOUNTER: Primary | ICD-10-CM

## 2021-06-03 PROCEDURE — 97110 THERAPEUTIC EXERCISES: CPT | Performed by: PHYSICAL THERAPIST

## 2021-06-03 PROCEDURE — 97530 THERAPEUTIC ACTIVITIES: CPT | Performed by: PHYSICAL THERAPIST

## 2021-06-03 PROCEDURE — 97112 NEUROMUSCULAR REEDUCATION: CPT | Performed by: PHYSICAL THERAPIST

## 2021-06-03 NOTE — PROGRESS NOTES
Daily Note     Today's date: 6/3/2021  Patient name: José Miguel Acosta  : 1965  MRN: [id number]  Referring provider: Filemon Churchill  Dx:   Encounter Diagnosis     ICD-10-CM    1  Sprain of anterior talofibular ligament of right ankle, initial encounter  S93 491A        Start Time: 1633  Stop Time: 1700  Total time in clinic (min): 45 minutes    Subjective: Patient noted that she is feeling pretty good today  Objective: See treatment diary below      Assessment: Patient tolerated treatment well  Patient  Challenged with SLS this date  Patient demonstrated moderate medial lateral instability at the ankle requiring fingertip support on the bar for correction  Patient had minimal complaints of pain throughout session  Patient would benefit from continued skilled physical therapy to address the impairments, improve their level of function, and to improve their overall quality of life  Plan: Continue per plan of care        Precautions: chronic pain      Manuals 5/20 5/24 5/27 6/3         DF mobs  Gr2-3 ACL            Manual stretching    AF                                     Neuro Re-Ed             SLS    3x30" with 1 finger tip touch         SLS with band              Tandem stance  3x30"  3x30"   Left in front of right 3x30"   Left in front of right 3x30"   Left in front of right         Wobble board   FWD-BWD  Side-Side seated   30x ea direction seated   30x ea direction seated 30x ea direction seated 30x ea direction         Rocker board   FWD-BWD  LAT 1 min ea  1 min ea  1 min ea          FT on foam beam  3x30"  3x30"  3 x 30" 3 x 30"         Lateral walking on foam beam  6x  8x  8x 8x         Ther Ex             3-way hip              4 way ankle  YTB 30x ea  YTB 30x ea  YTB 30x ea  RTB 30x ea          DF slides              Heel raises bilateral  3x10  3x10 3x10 3x10          Single leg heel raise                                        Recumbent bike  lvl 3 5 min  lvl 3 5 min  lvl 3 5 min lvl 3 5 min         Ther Activity             Step-ups onto foam             bosu lunges   2x10  2x10 2x10         Gait Training                                       HEP             See media

## 2021-06-07 ENCOUNTER — APPOINTMENT (OUTPATIENT)
Dept: PHYSICAL THERAPY | Facility: CLINIC | Age: 56
End: 2021-06-07
Payer: MEDICARE

## 2021-06-08 ENCOUNTER — CONSULT (OUTPATIENT)
Dept: CARDIOLOGY CLINIC | Facility: CLINIC | Age: 56
End: 2021-06-08
Payer: MEDICARE

## 2021-06-08 VITALS
HEART RATE: 112 BPM | OXYGEN SATURATION: 98 % | WEIGHT: 140.2 LBS | SYSTOLIC BLOOD PRESSURE: 134 MMHG | DIASTOLIC BLOOD PRESSURE: 102 MMHG | HEIGHT: 59 IN | BODY MASS INDEX: 28.26 KG/M2

## 2021-06-08 DIAGNOSIS — R94.39 ABNORMAL STRESS ECHO: ICD-10-CM

## 2021-06-08 DIAGNOSIS — R06.02 SOB (SHORTNESS OF BREATH): Primary | ICD-10-CM

## 2021-06-08 PROCEDURE — 99204 OFFICE O/P NEW MOD 45 MIN: CPT | Performed by: INTERNAL MEDICINE

## 2021-06-08 PROCEDURE — 93000 ELECTROCARDIOGRAM COMPLETE: CPT | Performed by: INTERNAL MEDICINE

## 2021-06-08 NOTE — PROGRESS NOTES
Consultation - Cardiology   Ana Ulloa 61 y o  female MRN: [id number]  Unit/Bed#:  Encounter: [id number]  Physician Requesting Consult: No att  providers found  Reason for Consult / Principal Problem: SOB    Assessment:  1  SOB / WEAVER  2  Hypercholesterolemia      Plan:  Her stress echo study was not able to fully evaluate for myocardial ischemia  I will ordered an exercise nuclear stress test and also an echo to fully evaluate her EF and evaluate for ischemia  I will defer her cholesterol therapy to her PCP  If her she test is normal, I will see her back on an as needed basis  History of Present Illness     HPI: Ana Ulloa is a 61y o  year old female who denies any past cardiac history  She had a recent stress echo study done to evaluate for symptoms of WEAVER  She denies CP or chest pressure  She exercised for 6 minutes without CP  ECG response was normal  ECHO images were sub optimal  EF was read as 45 to 55%  Could not evaluate wall motion abnl  She continues to get WEAVER at lower levels of activity  She denies CP or chest pressure  She denies FH of CAD, HTN, DM  She quit smoking in   LDL is 173  ECG shows ST with  BPM, no ischemia          Review of Systems:    Alert awake oriented, comfortable, denies any complaints  No fevers chills nausea vomiting  No weakness, dizziness, seizures  positive for - shortness of breath  Denies any palpitations, chest pain, diaphoresis  Denies leg edema, pain or paresthesias  Denies any skin rashes  Denies abdominal pain, bloody stools, masses  Denies any depression or suicidal ideations      Historical Information   Past Medical History:   Diagnosis Date    Bilateral breast cysts 2019 r    right breast cysts; dense breast tissue bilaterally    Cervical spinal stenosis 2015    Liver enzyme elevation     Migraines      Past Surgical History:   Procedure Laterality Date     SECTION      two    OVARIAN CYST SURGERY Social History     Substance and Sexual Activity   Alcohol Use No     Social History     Substance and Sexual Activity   Drug Use No     Social History     Tobacco Use   Smoking Status Former Smoker    Quit date:     Years since quittin 4   Smokeless Tobacco Never Used     Family History: non-contributory    Meds/Allergies   all current active meds have been reviewed  No Known Allergies    Objective   Vitals: Blood pressure (!) 134/102, pulse (!) 112, height 4' 11" (1 499 m), weight 63 6 kg (140 lb 3 2 oz), SpO2 98 %  , Body mass index is 28 32 kg/m²  ,   Weight (last 2 days)     Date/Time   Weight    21 0834   63 6 (140 2)                        Physical Exam:  GEN: [facility] NEENA Herbert appears well, alert and oriented x 3, pleasant and cooperative   HEENT: pupils equal, round, and reactive to light; extraocular muscles intact  NECK: supple, no carotid bruits   HEART: regular rhythm, normal S1 and S2, no murmurs, clicks, gallops or rubs   LUNGS: clear to auscultation bilaterally; no wheezes, rales, or rhonchi   ABDOMEN: normal bowel sounds, soft, no tenderness, no distention  EXTREMITIES: peripheral pulses normal; no clubbing, cyanosis, or edema  NEURO: no focal findings   SKIN: normal without suspicious lesions on exposed skin    Lab Results:   Consult on 2021   Component Date Value Ref Range Status    INTERPRETATIONS 2021    Final    Sinus Tachycardia                         Imaging: I have personally reviewed pertinent reports

## 2021-06-10 ENCOUNTER — APPOINTMENT (OUTPATIENT)
Dept: PHYSICAL THERAPY | Facility: CLINIC | Age: 56
End: 2021-06-10
Payer: MEDICARE

## 2021-06-15 ENCOUNTER — APPOINTMENT (OUTPATIENT)
Dept: PHYSICAL THERAPY | Facility: CLINIC | Age: 56
End: 2021-06-15
Payer: MEDICARE

## 2021-06-17 ENCOUNTER — APPOINTMENT (OUTPATIENT)
Dept: PHYSICAL THERAPY | Facility: CLINIC | Age: 56
End: 2021-06-17
Payer: MEDICARE

## 2021-06-18 DIAGNOSIS — E03.9 HYPOTHYROIDISM: ICD-10-CM

## 2021-06-18 RX ORDER — LEVOTHYROXINE SODIUM 0.1 MG/1
100 TABLET ORAL DAILY
Qty: 30 TABLET | Refills: 0 | Status: SHIPPED | OUTPATIENT
Start: 2021-06-18 | End: 2021-07-12

## 2021-06-18 RX ORDER — DEXAMETHASONE 2 MG/1
2 TABLET ORAL 2 TIMES DAILY WITH MEALS
Qty: 60 TABLET | Refills: 0 | Status: SHIPPED | OUTPATIENT
Start: 2021-06-18

## 2021-06-21 ENCOUNTER — APPOINTMENT (OUTPATIENT)
Dept: PHYSICAL THERAPY | Facility: CLINIC | Age: 56
End: 2021-06-21
Payer: MEDICARE

## 2021-06-21 NOTE — PROGRESS NOTES
PT DISCHARGE    Patient canceled last appointment noting her ankle feeling good but is suffering severe migraines and unable to come in consistently  Patient appropriate for DC this date

## 2021-06-24 ENCOUNTER — APPOINTMENT (OUTPATIENT)
Dept: PHYSICAL THERAPY | Facility: CLINIC | Age: 56
End: 2021-06-24
Payer: MEDICARE

## 2021-07-12 DIAGNOSIS — E03.9 HYPOTHYROIDISM: ICD-10-CM

## 2021-07-12 RX ORDER — LEVOTHYROXINE SODIUM 0.1 MG/1
TABLET ORAL
Qty: 30 TABLET | Refills: 0 | Status: SHIPPED | OUTPATIENT
Start: 2021-07-12 | End: 2021-09-07

## 2021-08-06 ENCOUNTER — HOSPITAL ENCOUNTER (OUTPATIENT)
Dept: NON INVASIVE DIAGNOSTICS | Facility: CLINIC | Age: 56
Discharge: HOME | End: 2021-08-06
Payer: MEDICARE

## 2021-08-06 DIAGNOSIS — R06.02 SOB (SHORTNESS OF BREATH): ICD-10-CM

## 2021-08-06 PROCEDURE — 78452 HT MUSCLE IMAGE SPECT MULT: CPT

## 2021-08-06 PROCEDURE — A9502 TC99M TETROFOSMIN: HCPCS

## 2021-08-06 PROCEDURE — 93017 CV STRESS TEST TRACING ONLY: CPT

## 2021-08-06 PROCEDURE — 93016 CV STRESS TEST SUPVJ ONLY: CPT | Performed by: INTERNAL MEDICINE

## 2021-08-06 PROCEDURE — 93018 CV STRESS TEST I&R ONLY: CPT | Performed by: INTERNAL MEDICINE

## 2021-08-06 PROCEDURE — G1004 CDSM NDSC: HCPCS

## 2021-08-06 PROCEDURE — 78452 HT MUSCLE IMAGE SPECT MULT: CPT | Performed by: INTERNAL MEDICINE

## 2021-08-13 LAB
CHEST PAIN STATEMENT: NORMAL
MAX DIASTOLIC BP: 100 MMHG
MAX HEART RATE: 157 BPM
MAX PREDICTED HEART RATE: 163 BPM
MAX. SYSTOLIC BP: 166 MMHG
PROTOCOL NAME: NORMAL
REASON FOR TERMINATION: NORMAL
TARGET HR FORMULA: NORMAL
TIME IN EXERCISE PHASE: NORMAL

## 2021-08-23 ENCOUNTER — POST-OP CHECK (OUTPATIENT)
Dept: URBAN - METROPOLITAN AREA CLINIC 6 | Facility: CLINIC | Age: 56
End: 2021-08-23

## 2021-08-23 DIAGNOSIS — H40.033: ICD-10-CM

## 2021-08-23 PROCEDURE — 92014 COMPRE OPH EXAM EST PT 1/>: CPT

## 2021-08-23 ASSESSMENT — TONOMETRY
OS_IOP_MMHG: 16
OD_IOP_MMHG: 20
OD_IOP_MMHG: 14
OS_IOP_MMHG: 21

## 2021-08-23 ASSESSMENT — VISUAL ACUITY
OD_CC: 20/25+1
OS_CC: 20/30

## 2021-09-01 ENCOUNTER — HOSPITAL ENCOUNTER (EMERGENCY)
Facility: HOSPITAL | Age: 56
Discharge: HOME | End: 2021-09-01
Attending: EMERGENCY MEDICINE
Payer: MEDICARE

## 2021-09-01 ENCOUNTER — APPOINTMENT (EMERGENCY)
Dept: CT IMAGING | Facility: HOSPITAL | Age: 56
End: 2021-09-01
Payer: MEDICARE

## 2021-09-01 ENCOUNTER — APPOINTMENT (EMERGENCY)
Dept: RADIOLOGY | Facility: HOSPITAL | Age: 56
End: 2021-09-01
Payer: MEDICARE

## 2021-09-01 VITALS
DIASTOLIC BLOOD PRESSURE: 66 MMHG | SYSTOLIC BLOOD PRESSURE: 142 MMHG | RESPIRATION RATE: 18 BRPM | OXYGEN SATURATION: 98 % | HEART RATE: 109 BPM | TEMPERATURE: 99.4 F

## 2021-09-01 DIAGNOSIS — R51.9 HEADACHE: ICD-10-CM

## 2021-09-01 DIAGNOSIS — B34.9 VIRAL ILLNESS: Primary | ICD-10-CM

## 2021-09-01 LAB
ANION GAP SERPL CALCULATED.3IONS-SCNC: 13 MMOL/L (ref 4–13)
BASOPHILS # BLD AUTO: 0.06 THOUSANDS/ΜL (ref 0–0.1)
BASOPHILS NFR BLD AUTO: 1 % (ref 0–1)
BUN SERPL-MCNC: 21 MG/DL (ref 5–25)
CALCIUM SERPL-MCNC: 8.9 MG/DL (ref 8.3–10.1)
CHLORIDE SERPL-SCNC: 98 MMOL/L (ref 100–108)
CO2 SERPL-SCNC: 21 MMOL/L (ref 21–32)
CREAT SERPL-MCNC: 1.02 MG/DL (ref 0.6–1.3)
EOSINOPHIL # BLD AUTO: 0.4 THOUSAND/ΜL (ref 0–0.61)
EOSINOPHIL NFR BLD AUTO: 5 % (ref 0–6)
ERYTHROCYTE [DISTWIDTH] IN BLOOD BY AUTOMATED COUNT: 12.7 % (ref 11.6–15.1)
FLUAV RNA RESP QL NAA+PROBE: NEGATIVE
FLUBV RNA RESP QL NAA+PROBE: NEGATIVE
GFR SERPL CREATININE-BSD FRML MDRD: 61 ML/MIN/1.73SQ M
GLUCOSE SERPL-MCNC: 103 MG/DL (ref 65–140)
HCT VFR BLD AUTO: 42.9 % (ref 34.8–46.1)
HGB BLD-MCNC: 14 G/DL (ref 11.5–15.4)
IMM GRANULOCYTES # BLD AUTO: 0.03 THOUSAND/UL (ref 0–0.2)
IMM GRANULOCYTES NFR BLD AUTO: 0 % (ref 0–2)
LYMPHOCYTES # BLD AUTO: 1.66 THOUSANDS/ΜL (ref 0.6–4.47)
LYMPHOCYTES NFR BLD AUTO: 22 % (ref 14–44)
MCH RBC QN AUTO: 28.7 PG (ref 26.8–34.3)
MCHC RBC AUTO-ENTMCNC: 32.6 G/DL (ref 31.4–37.4)
MCV RBC AUTO: 88 FL (ref 82–98)
MONOCYTES # BLD AUTO: 0.73 THOUSAND/ΜL (ref 0.17–1.22)
MONOCYTES NFR BLD AUTO: 10 % (ref 4–12)
NEUTROPHILS # BLD AUTO: 4.82 THOUSANDS/ΜL (ref 1.85–7.62)
NEUTS SEG NFR BLD AUTO: 62 % (ref 43–75)
NRBC BLD AUTO-RTO: 0 /100 WBCS
PLATELET # BLD AUTO: 252 THOUSANDS/UL (ref 149–390)
PMV BLD AUTO: 10.1 FL (ref 8.9–12.7)
POTASSIUM SERPL-SCNC: 3.8 MMOL/L (ref 3.5–5.3)
RBC # BLD AUTO: 4.88 MILLION/UL (ref 3.81–5.12)
RSV RNA RESP QL NAA+PROBE: NEGATIVE
SARS-COV-2 RNA RESP QL NAA+PROBE: NEGATIVE
SODIUM SERPL-SCNC: 132 MMOL/L (ref 136–145)
WBC # BLD AUTO: 7.7 THOUSAND/UL (ref 4.31–10.16)

## 2021-09-01 PROCEDURE — 0241U HB NFCT DS VIR RESP RNA 4 TRGT: CPT | Performed by: EMERGENCY MEDICINE

## 2021-09-01 PROCEDURE — 36415 COLL VENOUS BLD VENIPUNCTURE: CPT | Performed by: EMERGENCY MEDICINE

## 2021-09-01 PROCEDURE — 71045 X-RAY EXAM CHEST 1 VIEW: CPT

## 2021-09-01 PROCEDURE — 99284 EMERGENCY DEPT VISIT MOD MDM: CPT

## 2021-09-01 PROCEDURE — 96361 HYDRATE IV INFUSION ADD-ON: CPT

## 2021-09-01 PROCEDURE — 80048 BASIC METABOLIC PNL TOTAL CA: CPT | Performed by: EMERGENCY MEDICINE

## 2021-09-01 PROCEDURE — 99284 EMERGENCY DEPT VISIT MOD MDM: CPT | Performed by: EMERGENCY MEDICINE

## 2021-09-01 PROCEDURE — 96374 THER/PROPH/DIAG INJ IV PUSH: CPT

## 2021-09-01 PROCEDURE — 96375 TX/PRO/DX INJ NEW DRUG ADDON: CPT

## 2021-09-01 PROCEDURE — 85025 COMPLETE CBC W/AUTO DIFF WBC: CPT | Performed by: EMERGENCY MEDICINE

## 2021-09-01 PROCEDURE — 70450 CT HEAD/BRAIN W/O DYE: CPT

## 2021-09-01 PROCEDURE — G1004 CDSM NDSC: HCPCS

## 2021-09-01 RX ORDER — GUAIFENESIN 600 MG
600 TABLET, EXTENDED RELEASE 12 HR ORAL EVERY 12 HOURS SCHEDULED
Qty: 14 TABLET | Refills: 0 | Status: SHIPPED | OUTPATIENT
Start: 2021-09-01 | End: 2021-09-08

## 2021-09-01 RX ORDER — ONDANSETRON 2 MG/ML
4 INJECTION INTRAMUSCULAR; INTRAVENOUS ONCE
Status: COMPLETED | OUTPATIENT
Start: 2021-09-01 | End: 2021-09-01

## 2021-09-01 RX ORDER — GUAIFENESIN/DEXTROMETHORPHAN 100-10MG/5
10 SYRUP ORAL ONCE
Status: COMPLETED | OUTPATIENT
Start: 2021-09-01 | End: 2021-09-01

## 2021-09-01 RX ORDER — KETOROLAC TROMETHAMINE 30 MG/ML
15 INJECTION, SOLUTION INTRAMUSCULAR; INTRAVENOUS ONCE
Status: COMPLETED | OUTPATIENT
Start: 2021-09-01 | End: 2021-09-01

## 2021-09-01 RX ORDER — FLUTICASONE PROPIONATE 50 MCG
1 SPRAY, SUSPENSION (ML) NASAL DAILY
Qty: 16 G | Refills: 0 | Status: SHIPPED | OUTPATIENT
Start: 2021-09-01 | End: 2021-10-18 | Stop reason: ALTCHOICE

## 2021-09-01 RX ADMIN — GUAIFENESIN AND DEXTROMETHORPHAN 10 ML: 100; 10 SYRUP ORAL at 18:18

## 2021-09-01 RX ADMIN — ONDANSETRON 4 MG: 2 INJECTION INTRAMUSCULAR; INTRAVENOUS at 17:48

## 2021-09-01 RX ADMIN — KETOROLAC TROMETHAMINE 15 MG: 30 INJECTION, SOLUTION INTRAMUSCULAR at 17:48

## 2021-09-01 RX ADMIN — SODIUM CHLORIDE 1000 ML: 0.9 INJECTION, SOLUTION INTRAVENOUS at 17:48

## 2021-09-01 NOTE — ED PROVIDER NOTES
History  Chief Complaint   Patient presents with    Fever - 9 weeks to 74 years     pt [facility] saturday dx with viral infection  pt continues with headache, nausea and cough  pt covid test on Saturday was negative     62 yo female comes in for headache fever cough congestion  Started Friday went to urgent care Saturday negative covid test told "viral" headache worsening mostly right temple took migraine meds at home without relief  History provided by:  Patient   used: No    Fever - 9 weeks to 74 years  Max temp prior to arrival:  101  Temp source:  Oral  Severity:  Moderate  Onset quality:  Sudden  Timing:  Intermittent  Progression:  Waxing and waning  Chronicity:  New  Ineffective treatments:  Acetaminophen and ibuprofen  Associated symptoms: chills, congestion, cough and headaches    Associated symptoms: no chest pain, no diarrhea, no ear pain and no rash    Risk factors: no contaminated food, no occupational exposure and no recent travel        Prior to Admission Medications   Prescriptions Last Dose Informant Patient Reported? Taking? Galcanezumab-gnlm 120 MG/ML SOAJ  Self No No   Sig: Inject 120 mg under the skin every 30 (thirty) days   SUMAtriptan (IMITREX) 100 mg tablet   No No   Sig: take 1/2 to 1 tablet by mouth at start of headache  may repeat once after 2 hours  use triptans only 2 days per week   SUMAtriptan Succinate 6 MG/0 5ML SOAJ  Self No No   Sig: inject 1 injection for severe headache  may repeat once after 2 hours   max 2 per week   dexamethasone (DECADRON) 2 mg tablet   No No   Sig: Take 1 tablet (2 mg total) by mouth 2 (two) times a day with meals   gabapentin (NEURONTIN) 300 mg capsule  Self No No   Sig: Take 1 capsule (300 mg total) by mouth daily at bedtime   levothyroxine 100 mcg tablet   No No   Sig: TAKE ONE TABLET BY MOUTH DAILY    omeprazole (PriLOSEC) 20 mg delayed release capsule  Self No No   Sig: Take 1 capsule (20 mg total) by mouth 2 (two) times a day   prochlorperazine (COMPAZINE) 10 mg tablet  Self No No   Sig: Take 1 tablet (10 mg total) by mouth every 6 (six) hours as needed for nausea or vomiting   topiramate (TOPAMAX) 50 MG tablet  Self No No   Sig: Take 2 tablets (100 mg total) by mouth 2 (two) times a day   venlafaxine (EFFEXOR-XR) 150 mg 24 hr capsule  Self No No   Sig: Take 1 capsule (150 mg total) by mouth daily      Facility-Administered Medications: None       Past Medical History:   Diagnosis Date    Bilateral breast cysts 2019 r    right breast cysts; dense breast tissue bilaterally    Cervical spinal stenosis 2015    Liver enzyme elevation     Migraines        Past Surgical History:   Procedure Laterality Date     SECTION      two    OVARIAN CYST SURGERY         Family History   Problem Relation Age of Onset    Parkinsonism Father     Dementia Father     Hypertension Daughter     Cervical cancer Maternal Grandmother     Bipolar disorder Sister     No Known Problems Mother      I have reviewed and agree with the history as documented  E-Cigarette/Vaping    E-Cigarette Use Never User      E-Cigarette/Vaping Substances    Nicotine No     THC No     CBD No     Flavoring No     Other No     Unknown No      Social History     Tobacco Use    Smoking status: Former Smoker     Quit date:      Years since quittin 6    Smokeless tobacco: Never Used   Vaping Use    Vaping Use: Never used   Substance Use Topics    Alcohol use: No    Drug use: No       Review of Systems   Constitutional: Positive for chills and fever  Negative for fatigue  HENT: Positive for congestion  Negative for ear pain  Eyes: Negative for discharge and redness  Respiratory: Positive for cough  Negative for apnea, shortness of breath and wheezing  Cardiovascular: Negative for chest pain  Gastrointestinal: Negative for abdominal pain and diarrhea  Endocrine: Negative for cold intolerance and polydipsia  Genitourinary: Negative for difficulty urinating and hematuria  Musculoskeletal: Negative for arthralgias and back pain  Skin: Negative for color change and rash  Allergic/Immunologic: Negative for environmental allergies and immunocompromised state  Neurological: Positive for headaches  Negative for numbness  Hematological: Negative for adenopathy  Does not bruise/bleed easily  Psychiatric/Behavioral: Negative for agitation and behavioral problems  Physical Exam  Physical Exam  Vitals and nursing note reviewed  Constitutional:       Appearance: Normal appearance  She is well-developed  She is not toxic-appearing  HENT:      Head: Normocephalic and atraumatic  Right Ear: External ear normal  A middle ear effusion is present  Left Ear: External ear normal  A middle ear effusion is present  Nose: Congestion present  No nasal deformity or rhinorrhea  Mouth/Throat:      Dentition: Normal dentition  Pharynx: Uvula midline  Eyes:      General: Lids are normal          Right eye: No discharge  Left eye: No discharge  Conjunctiva/sclera: Conjunctivae normal       Pupils: Pupils are equal, round, and reactive to light  Neck:      Vascular: No carotid bruit or JVD  Trachea: Trachea normal    Cardiovascular:      Rate and Rhythm: Normal rate and regular rhythm  No extrasystoles are present  Chest Wall: PMI is not displaced  Pulses: Normal pulses  Pulmonary:      Effort: Pulmonary effort is normal  No accessory muscle usage or respiratory distress  Breath sounds: Normal breath sounds  No wheezing, rhonchi or rales  Abdominal:      General: Bowel sounds are normal       Palpations: Abdomen is soft  Abdomen is not rigid  There is no mass  Tenderness: There is no abdominal tenderness  There is no guarding or rebound  Musculoskeletal:      Right shoulder: No swelling, deformity or bony tenderness  Normal range of motion        Cervical back: Normal range of motion and neck supple  No deformity, tenderness or bony tenderness  Lymphadenopathy:      Cervical: No cervical adenopathy  Skin:     General: Skin is warm and dry  Findings: No rash  Neurological:      Mental Status: She is alert and oriented to person, place, and time  GCS: GCS eye subscore is 4  GCS verbal subscore is 5  GCS motor subscore is 6  Cranial Nerves: No cranial nerve deficit  Sensory: No sensory deficit  Deep Tendon Reflexes: Reflexes are normal and symmetric  Psychiatric:         Speech: Speech normal          Behavior: Behavior normal          Vital Signs  ED Triage Vitals [09/01/21 1723]   Temperature Pulse Respirations Blood Pressure SpO2   99 4 °F (37 4 °C) (!) 109 18 142/66 98 %      Temp Source Heart Rate Source Patient Position - Orthostatic VS BP Location FiO2 (%)   Oral Monitor Sitting Left arm --      Pain Score       --           Vitals:    09/01/21 1723   BP: 142/66   Pulse: (!) 109   Patient Position - Orthostatic VS: Sitting         Visual Acuity      ED Medications  Medications   ondansetron (ZOFRAN) injection 4 mg (4 mg Intravenous Given 9/1/21 1748)   ketorolac (TORADOL) injection 15 mg (15 mg Intravenous Given 9/1/21 1748)   sodium chloride 0 9 % bolus 1,000 mL (1,000 mL Intravenous New Bag 9/1/21 1748)   dextromethorphan-guaiFENesin (ROBITUSSIN DM) oral syrup 10 mL (10 mL Oral Given 9/1/21 1818)       Diagnostic Studies  Results Reviewed     Procedure Component Value Units Date/Time    COVID19, Influenza A/B, RSV PCR, [facility] [[id number]]  (Normal) Collected: 09/01/21 1746    Lab Status: Final result Specimen: Nares from Nasopharyngeal Swab Updated: 09/01/21 1837     SARS-CoV-2 Negative     INFLUENZA A PCR Negative     INFLUENZA B PCR Negative     RSV PCR Negative    Narrative: This test has been authorized by FDA under an EUA (Emergency Use Assay) for use by authorized laboratories    Clinical caution and judgement should be used with the interpretation of these results with consideration of the clinical impression and other laboratory testing  Testing reported as "Positive" or "Negative" has been proven to be accurate according to standard laboratory validation requirements  All testing is performed with control materials showing appropriate reactivity at standard intervals      Basic metabolic panel [[id number]]  (Abnormal) Collected: 09/01/21 1746    Lab Status: Final result Specimen: Blood from Arm, Right Updated: 09/01/21 1828     Sodium 132 mmol/L      Potassium 3 8 mmol/L      Chloride 98 mmol/L      CO2 21 mmol/L      ANION GAP 13 mmol/L      BUN 21 mg/dL      Creatinine 1 02 mg/dL      Glucose 103 mg/dL      Calcium 8 9 mg/dL      eGFR 61 ml/min/1 73sq m     Narrative:      [place] guidelines for Chronic Kidney Disease (CKD):     Stage 1 with normal or high GFR (GFR > 90 mL/min/1 73 square meters)    Stage 2 Mild CKD (GFR = 60-89 mL/min/1 73 square meters)    Stage 3A Moderate CKD (GFR = 45-59 mL/min/1 73 square meters)    Stage 3B Moderate CKD (GFR = 30-44 mL/min/1 73 square meters)    Stage 4 Severe CKD (GFR = 15-29 mL/min/1 73 square meters)    Stage 5 End Stage CKD (GFR <15 mL/min/1 73 square meters)  Note: GFR calculation is accurate only with a steady state creatinine    CBC and differential [[id number]] Collected: 09/01/21 1746    Lab Status: Final result Specimen: Blood from Arm, Right Updated: 09/01/21 1759     WBC 7 70 Thousand/uL      RBC 4 88 Million/uL      Hemoglobin 14 0 g/dL      Hematocrit 42 9 %      MCV 88 fL      MCH 28 7 pg      MCHC 32 6 g/dL      RDW 12 7 %      MPV 10 1 fL      Platelets 095 Thousands/uL      nRBC 0 /100 WBCs      Neutrophils Relative 62 %      Immat GRANS % 0 %      Lymphocytes Relative 22 %      Monocytes Relative 10 %      Eosinophils Relative 5 %      Basophils Relative 1 %      Neutrophils Absolute 4 82 Thousands/µL      Immature Grans Absolute 0 03 Thousand/uL      Lymphocytes Absolute 1 66 Thousands/µL      Monocytes Absolute 0 73 Thousand/µL      Eosinophils Absolute 0 40 Thousand/µL      Basophils Absolute 0 06 Thousands/µL                  CT head without contrast   Final Result by Mirtha Ahmadi MD (09/01 1814)      No acute intracranial abnormality  Workstation performed: [id number]         XR chest 1 view portable    (Results Pending)              Procedures  Procedures         ED Course                                           MDM  Number of Diagnoses or Management Options  Headache: new and requires workup  Viral illness: new and requires workup     Amount and/or Complexity of Data Reviewed  Clinical lab tests: ordered and reviewed  Tests in the radiology section of CPT®: ordered and reviewed  Tests in the medicine section of CPT®: ordered and reviewed  Independent visualization of images, tracings, or specimens: yes    Patient Progress  Patient progress: stable      Disposition  Final diagnoses:   Viral illness   Headache     Time reflects when diagnosis was documented in both MDM as applicable and the Disposition within this note     Time User Action Codes Description Comment    9/1/2021  7:10 PM Ronnie ERIC Add [B34 9] Viral illness     9/1/2021  7:10 PM Greta Catherine Add [R51 9] Headache       ED Disposition     ED Disposition Condition Date/Time Comment    Discharge Stable Wed Sep 1, 2021  7:10 PM Cassidy Herbert discharge to home/self care  Follow-up Information     Follow up With Specialties Details Why DO [facility] Schedule an appointment as soon as possible for a visit   [address]  [phone]            Patient's Medications   Discharge Prescriptions    FLUTICASONE (FLONASE) 50 MCG/ACT NASAL SPRAY    1 spray into each nostril daily       Start Date: 9/1/2021  End Date: --       Order Dose: 1 spray       Quantity: 16 g Refills: 0    GUAIFENESIN (MUCINEX) 600 MG 12 HR TABLET    Take 1 tablet (600 mg total) by mouth every 12 (twelve) hours for 7 days       Start Date: 9/1/2021  End Date: 9/8/2021       Order Dose: 600 mg       Quantity: 14 tablet    Refills: 0     No discharge procedures on file      PDMP Review     None          ED Provider  Electronically Signed by           Carrie Ulrich DO  09/01/21 2795

## 2021-09-05 DIAGNOSIS — IMO0002 CHRONIC MIGRAINE: ICD-10-CM

## 2021-09-05 DIAGNOSIS — E03.9 HYPOTHYROIDISM: ICD-10-CM

## 2021-09-07 RX ORDER — LEVOTHYROXINE SODIUM 0.1 MG/1
TABLET ORAL
Qty: 30 TABLET | Refills: 0 | Status: SHIPPED | OUTPATIENT
Start: 2021-09-07 | End: 2021-10-05

## 2021-09-07 RX ORDER — TOPIRAMATE 50 MG/1
TABLET, FILM COATED ORAL
Qty: 180 TABLET | Refills: 0 | Status: SHIPPED | OUTPATIENT
Start: 2021-09-07 | End: 2022-04-14 | Stop reason: SDUPTHER

## 2021-10-04 DIAGNOSIS — E03.9 HYPOTHYROIDISM: ICD-10-CM

## 2021-10-05 RX ORDER — LEVOTHYROXINE SODIUM 0.1 MG/1
TABLET ORAL
Qty: 30 TABLET | Refills: 0 | Status: SHIPPED | OUTPATIENT
Start: 2021-10-05 | End: 2021-11-15

## 2021-10-18 ENCOUNTER — OFFICE VISIT (OUTPATIENT)
Dept: FAMILY MEDICINE CLINIC | Facility: CLINIC | Age: 56
End: 2021-10-18
Payer: MEDICARE

## 2021-10-18 VITALS
HEIGHT: 59 IN | SYSTOLIC BLOOD PRESSURE: 120 MMHG | BODY MASS INDEX: 28.02 KG/M2 | DIASTOLIC BLOOD PRESSURE: 78 MMHG | WEIGHT: 139 LBS | HEART RATE: 98 BPM | TEMPERATURE: 98.1 F

## 2021-10-18 DIAGNOSIS — Z12.31 BREAST CANCER SCREENING BY MAMMOGRAM: ICD-10-CM

## 2021-10-18 DIAGNOSIS — E03.9 HYPOTHYROIDISM, UNSPECIFIED TYPE: ICD-10-CM

## 2021-10-18 DIAGNOSIS — E78.00 HYPERCHOLESTEROLEMIA: ICD-10-CM

## 2021-10-18 DIAGNOSIS — Z00.00 MEDICARE ANNUAL WELLNESS VISIT, SUBSEQUENT: Primary | ICD-10-CM

## 2021-10-18 DIAGNOSIS — R11.0 NAUSEA: ICD-10-CM

## 2021-10-18 DIAGNOSIS — G43.709 CHRONIC MIGRAINE WITHOUT AURA WITHOUT STATUS MIGRAINOSUS, NOT INTRACTABLE: ICD-10-CM

## 2021-10-18 DIAGNOSIS — K21.9 GASTROESOPHAGEAL REFLUX DISEASE WITHOUT ESOPHAGITIS: ICD-10-CM

## 2021-10-18 PROCEDURE — 99214 OFFICE O/P EST MOD 30 MIN: CPT | Performed by: FAMILY MEDICINE

## 2021-10-18 PROCEDURE — G0439 PPPS, SUBSEQ VISIT: HCPCS | Performed by: FAMILY MEDICINE

## 2021-10-18 RX ORDER — ONDANSETRON 4 MG/1
4 TABLET, FILM COATED ORAL EVERY 8 HOURS PRN
Qty: 20 TABLET | Refills: 0
Start: 2021-10-18 | End: 2022-05-23 | Stop reason: SDUPTHER

## 2021-10-19 ENCOUNTER — APPOINTMENT (OUTPATIENT)
Dept: LAB | Facility: CLINIC | Age: 56
End: 2021-10-19
Payer: MEDICARE

## 2021-10-19 DIAGNOSIS — E78.00 HYPERCHOLESTEROLEMIA: ICD-10-CM

## 2021-10-19 DIAGNOSIS — E03.9 HYPOTHYROIDISM, UNSPECIFIED TYPE: ICD-10-CM

## 2021-10-19 LAB
CHOLEST SERPL-MCNC: 226 MG/DL (ref 50–200)
HDLC SERPL-MCNC: 68 MG/DL
LDLC SERPL CALC-MCNC: 135 MG/DL (ref 0–100)
TRIGL SERPL-MCNC: 114 MG/DL
TSH SERPL DL<=0.05 MIU/L-ACNC: 0.7 UIU/ML (ref 0.36–3.74)

## 2021-10-19 PROCEDURE — 36415 COLL VENOUS BLD VENIPUNCTURE: CPT

## 2021-10-19 PROCEDURE — 84443 ASSAY THYROID STIM HORMONE: CPT

## 2021-10-19 PROCEDURE — 80061 LIPID PANEL: CPT

## 2021-10-26 DIAGNOSIS — K21.9 GERD (GASTROESOPHAGEAL REFLUX DISEASE): ICD-10-CM

## 2021-10-26 RX ORDER — OMEPRAZOLE 20 MG/1
CAPSULE, DELAYED RELEASE ORAL
Qty: 30 CAPSULE | Refills: 0 | Status: SHIPPED | OUTPATIENT
Start: 2021-10-26 | End: 2021-12-23

## 2021-11-15 DIAGNOSIS — E03.9 HYPOTHYROIDISM: ICD-10-CM

## 2021-11-15 RX ORDER — LEVOTHYROXINE SODIUM 0.1 MG/1
TABLET ORAL
Qty: 30 TABLET | Refills: 0 | Status: SHIPPED | OUTPATIENT
Start: 2021-11-15 | End: 2021-12-23

## 2021-11-30 ENCOUNTER — APPOINTMENT (EMERGENCY)
Dept: RADIOLOGY | Facility: HOSPITAL | Age: 56
End: 2021-11-30
Payer: MEDICARE

## 2021-11-30 ENCOUNTER — HOSPITAL ENCOUNTER (EMERGENCY)
Facility: HOSPITAL | Age: 56
Discharge: HOME | End: 2021-11-30
Attending: EMERGENCY MEDICINE
Payer: MEDICARE

## 2021-11-30 VITALS
WEIGHT: 135 LBS | SYSTOLIC BLOOD PRESSURE: 128 MMHG | DIASTOLIC BLOOD PRESSURE: 64 MMHG | RESPIRATION RATE: 16 BRPM | BODY MASS INDEX: 27.27 KG/M2 | OXYGEN SATURATION: 98 % | TEMPERATURE: 98 F | HEART RATE: 84 BPM

## 2021-11-30 DIAGNOSIS — R55 PRE-SYNCOPE: ICD-10-CM

## 2021-11-30 DIAGNOSIS — S92.009A CALCANEAL FRACTURE: Primary | ICD-10-CM

## 2021-11-30 LAB
ANION GAP SERPL CALCULATED.3IONS-SCNC: 17 MMOL/L (ref 4–13)
BASOPHILS # BLD AUTO: 0.05 THOUSANDS/ΜL (ref 0–0.1)
BASOPHILS NFR BLD AUTO: 1 % (ref 0–1)
BUN SERPL-MCNC: 21 MG/DL (ref 5–25)
CALCIUM SERPL-MCNC: 8.9 MG/DL (ref 8.3–10.1)
CARDIAC TROPONIN I PNL SERPL HS: 2 NG/L
CHLORIDE SERPL-SCNC: 106 MMOL/L (ref 100–108)
CO2 SERPL-SCNC: 19 MMOL/L (ref 21–32)
CREAT SERPL-MCNC: 1.17 MG/DL (ref 0.6–1.3)
EOSINOPHIL # BLD AUTO: 0.17 THOUSAND/ΜL (ref 0–0.61)
EOSINOPHIL NFR BLD AUTO: 2 % (ref 0–6)
ERYTHROCYTE [DISTWIDTH] IN BLOOD BY AUTOMATED COUNT: 13.4 % (ref 11.6–15.1)
GFR SERPL CREATININE-BSD FRML MDRD: 52 ML/MIN/1.73SQ M
GLUCOSE SERPL-MCNC: 103 MG/DL (ref 65–140)
GLUCOSE SERPL-MCNC: 108 MG/DL (ref 65–140)
HCT VFR BLD AUTO: 41.9 % (ref 34.8–46.1)
HGB BLD-MCNC: 13.9 G/DL (ref 11.5–15.4)
IMM GRANULOCYTES # BLD AUTO: 0.02 THOUSAND/UL (ref 0–0.2)
IMM GRANULOCYTES NFR BLD AUTO: 0 % (ref 0–2)
LYMPHOCYTES # BLD AUTO: 3.37 THOUSANDS/ΜL (ref 0.6–4.47)
LYMPHOCYTES NFR BLD AUTO: 40 % (ref 14–44)
MCH RBC QN AUTO: 28.9 PG (ref 26.8–34.3)
MCHC RBC AUTO-ENTMCNC: 33.2 G/DL (ref 31.4–37.4)
MCV RBC AUTO: 87 FL (ref 82–98)
MONOCYTES # BLD AUTO: 0.54 THOUSAND/ΜL (ref 0.17–1.22)
MONOCYTES NFR BLD AUTO: 6 % (ref 4–12)
NEUTROPHILS # BLD AUTO: 4.37 THOUSANDS/ΜL (ref 1.85–7.62)
NEUTS SEG NFR BLD AUTO: 51 % (ref 43–75)
NRBC BLD AUTO-RTO: 0 /100 WBCS
PLATELET # BLD AUTO: 358 THOUSANDS/UL (ref 149–390)
PMV BLD AUTO: 9.9 FL (ref 8.9–12.7)
POTASSIUM SERPL-SCNC: 3.5 MMOL/L (ref 3.5–5.3)
RBC # BLD AUTO: 4.81 MILLION/UL (ref 3.81–5.12)
SODIUM SERPL-SCNC: 142 MMOL/L (ref 136–145)
WBC # BLD AUTO: 8.52 THOUSAND/UL (ref 4.31–10.16)

## 2021-11-30 PROCEDURE — 99285 EMERGENCY DEPT VISIT HI MDM: CPT

## 2021-11-30 PROCEDURE — 93005 ELECTROCARDIOGRAM TRACING: CPT

## 2021-11-30 PROCEDURE — 96360 HYDRATION IV INFUSION INIT: CPT

## 2021-11-30 PROCEDURE — 73700 CT LOWER EXTREMITY W/O DYE: CPT

## 2021-11-30 PROCEDURE — 80048 BASIC METABOLIC PNL TOTAL CA: CPT | Performed by: PHYSICIAN ASSISTANT

## 2021-11-30 PROCEDURE — 96361 HYDRATE IV INFUSION ADD-ON: CPT

## 2021-11-30 PROCEDURE — 73610 X-RAY EXAM OF ANKLE: CPT

## 2021-11-30 PROCEDURE — 84484 ASSAY OF TROPONIN QUANT: CPT | Performed by: PHYSICIAN ASSISTANT

## 2021-11-30 PROCEDURE — 85025 COMPLETE CBC W/AUTO DIFF WBC: CPT | Performed by: PHYSICIAN ASSISTANT

## 2021-11-30 PROCEDURE — 99284 EMERGENCY DEPT VISIT MOD MDM: CPT | Performed by: PHYSICIAN ASSISTANT

## 2021-11-30 PROCEDURE — 73630 X-RAY EXAM OF FOOT: CPT

## 2021-11-30 PROCEDURE — 82948 REAGENT STRIP/BLOOD GLUCOSE: CPT

## 2021-11-30 PROCEDURE — 36415 COLL VENOUS BLD VENIPUNCTURE: CPT | Performed by: PHYSICIAN ASSISTANT

## 2021-11-30 RX ORDER — OXYCODONE HYDROCHLORIDE 5 MG/1
5 TABLET ORAL EVERY 6 HOURS PRN
Qty: 8 TABLET | Refills: 0 | Status: SHIPPED | OUTPATIENT
Start: 2021-11-30 | End: 2021-12-02

## 2021-11-30 RX ADMIN — SODIUM CHLORIDE 1000 ML: 0.9 INJECTION, SOLUTION INTRAVENOUS at 12:08

## 2021-12-01 ENCOUNTER — OFFICE VISIT (OUTPATIENT)
Dept: OBGYN CLINIC | Facility: CLINIC | Age: 56
End: 2021-12-01
Payer: MEDICARE

## 2021-12-01 VITALS — DIASTOLIC BLOOD PRESSURE: 68 MMHG | SYSTOLIC BLOOD PRESSURE: 110 MMHG | HEART RATE: 114 BPM

## 2021-12-01 DIAGNOSIS — S92.001A CLOSED DISPLACED FRACTURE OF RIGHT CALCANEUS, UNSPECIFIED PORTION OF CALCANEUS, INITIAL ENCOUNTER: Primary | ICD-10-CM

## 2021-12-01 PROCEDURE — 99214 OFFICE O/P EST MOD 30 MIN: CPT | Performed by: ORTHOPAEDIC SURGERY

## 2021-12-04 LAB
ATRIAL RATE: 70 BPM
PR INTERVAL: 144 MS
QRS AXIS: 4 DEGREES
QRSD INTERVAL: 82 MS
QT INTERVAL: 414 MS
QTC INTERVAL: 447 MS
T WAVE AXIS: 8 DEGREES
VENTRICULAR RATE: 70 BPM

## 2021-12-04 PROCEDURE — 93010 ELECTROCARDIOGRAM REPORT: CPT | Performed by: INTERNAL MEDICINE

## 2021-12-07 ENCOUNTER — OFFICE VISIT (OUTPATIENT)
Dept: OBGYN CLINIC | Facility: CLINIC | Age: 56
End: 2021-12-07
Payer: MEDICARE

## 2021-12-07 VITALS
DIASTOLIC BLOOD PRESSURE: 82 MMHG | BODY MASS INDEX: 27.21 KG/M2 | WEIGHT: 135 LBS | SYSTOLIC BLOOD PRESSURE: 142 MMHG | HEIGHT: 59 IN | HEART RATE: 87 BPM

## 2021-12-07 DIAGNOSIS — S92.001A CLOSED DISPLACED FRACTURE OF RIGHT CALCANEUS, UNSPECIFIED PORTION OF CALCANEUS, INITIAL ENCOUNTER: Primary | ICD-10-CM

## 2021-12-07 PROCEDURE — 28400 CLTX CALCANEAL FX W/O MNPJ: CPT | Performed by: ORTHOPAEDIC SURGERY

## 2021-12-07 PROCEDURE — 99213 OFFICE O/P EST LOW 20 MIN: CPT | Performed by: ORTHOPAEDIC SURGERY

## 2021-12-07 RX ORDER — ASPIRIN 81 MG/1
81 TABLET ORAL 2 TIMES DAILY
Qty: 84 TABLET | Refills: 0 | Status: SHIPPED | OUTPATIENT
Start: 2021-12-07 | End: 2022-05-23 | Stop reason: ALTCHOICE

## 2021-12-13 ENCOUNTER — TELEPHONE (OUTPATIENT)
Dept: OBGYN CLINIC | Facility: HOSPITAL | Age: 56
End: 2021-12-13

## 2021-12-14 ENCOUNTER — OFFICE VISIT (OUTPATIENT)
Dept: OBGYN CLINIC | Facility: CLINIC | Age: 56
End: 2021-12-14
Payer: MEDICARE

## 2021-12-14 VITALS — BODY MASS INDEX: 27.21 KG/M2 | WEIGHT: 135 LBS | HEIGHT: 59 IN

## 2021-12-14 DIAGNOSIS — S92.001A CLOSED DISPLACED FRACTURE OF RIGHT CALCANEUS, UNSPECIFIED PORTION OF CALCANEUS, INITIAL ENCOUNTER: Primary | ICD-10-CM

## 2021-12-14 PROCEDURE — 29405 APPL SHORT LEG CAST: CPT | Performed by: ORTHOPAEDIC SURGERY

## 2021-12-14 PROCEDURE — 99024 POSTOP FOLLOW-UP VISIT: CPT | Performed by: ORTHOPAEDIC SURGERY

## 2021-12-23 DIAGNOSIS — E03.9 HYPOTHYROIDISM: ICD-10-CM

## 2021-12-23 DIAGNOSIS — K21.9 GERD (GASTROESOPHAGEAL REFLUX DISEASE): ICD-10-CM

## 2021-12-23 RX ORDER — OMEPRAZOLE 20 MG/1
CAPSULE, DELAYED RELEASE ORAL
Qty: 30 CAPSULE | Refills: 0 | Status: SHIPPED | OUTPATIENT
Start: 2021-12-23 | End: 2022-01-19 | Stop reason: SDUPTHER

## 2021-12-23 RX ORDER — LEVOTHYROXINE SODIUM 0.1 MG/1
TABLET ORAL
Qty: 30 TABLET | Refills: 0 | Status: SHIPPED | OUTPATIENT
Start: 2021-12-23 | End: 2022-01-19 | Stop reason: SDUPTHER

## 2021-12-28 ENCOUNTER — OFFICE VISIT (OUTPATIENT)
Dept: OBGYN CLINIC | Facility: CLINIC | Age: 56
End: 2021-12-28

## 2021-12-28 ENCOUNTER — APPOINTMENT (OUTPATIENT)
Dept: RADIOLOGY | Facility: AMBULARY SURGERY CENTER | Age: 56
End: 2021-12-28
Attending: ORTHOPAEDIC SURGERY
Payer: MEDICARE

## 2021-12-28 VITALS — WEIGHT: 135 LBS | BODY MASS INDEX: 27.21 KG/M2 | HEIGHT: 59 IN

## 2021-12-28 DIAGNOSIS — S92.344A CLOSED NONDISPLACED FRACTURE OF FOURTH METATARSAL BONE OF RIGHT FOOT, INITIAL ENCOUNTER: ICD-10-CM

## 2021-12-28 DIAGNOSIS — S92.001A CLOSED DISPLACED FRACTURE OF RIGHT CALCANEUS, UNSPECIFIED PORTION OF CALCANEUS, INITIAL ENCOUNTER: ICD-10-CM

## 2021-12-28 DIAGNOSIS — S92.001A CLOSED DISPLACED FRACTURE OF RIGHT CALCANEUS, UNSPECIFIED PORTION OF CALCANEUS, INITIAL ENCOUNTER: Primary | ICD-10-CM

## 2021-12-28 PROCEDURE — 99024 POSTOP FOLLOW-UP VISIT: CPT | Performed by: ORTHOPAEDIC SURGERY

## 2021-12-28 PROCEDURE — 73630 X-RAY EXAM OF FOOT: CPT

## 2022-01-11 ENCOUNTER — EVALUATION (OUTPATIENT)
Dept: PHYSICAL THERAPY | Facility: CLINIC | Age: 57
End: 2022-01-11
Payer: MEDICARE

## 2022-01-11 DIAGNOSIS — R26.9 ABNORMALITY OF GAIT: ICD-10-CM

## 2022-01-11 DIAGNOSIS — M25.671 STIFFNESS OF RIGHT ANKLE JOINT: ICD-10-CM

## 2022-01-11 DIAGNOSIS — S92.001D CLOSED DISPLACED FRACTURE OF RIGHT CALCANEUS WITH ROUTINE HEALING, UNSPECIFIED PORTION OF CALCANEUS, SUBSEQUENT ENCOUNTER: Primary | ICD-10-CM

## 2022-01-11 DIAGNOSIS — R26.89 BALANCE PROBLEM: ICD-10-CM

## 2022-01-11 PROCEDURE — 97162 PT EVAL MOD COMPLEX 30 MIN: CPT | Performed by: PHYSICAL THERAPIST

## 2022-01-11 PROCEDURE — 97110 THERAPEUTIC EXERCISES: CPT | Performed by: PHYSICAL THERAPIST

## 2022-01-11 NOTE — PROGRESS NOTES
PT Evaluation     Today's date: 2022  Patient name: Qian Ayala  : 1965  MRN: [id number]  Referring provider: Lina Zaidi MD  Dx:   Encounter Diagnosis     ICD-10-CM    1  Closed displaced fracture of right calcaneus with routine healing, unspecified portion of calcaneus, subsequent encounter  S92 001D Ambulatory referral to Physical Therapy   2  Stiffness of right ankle joint  M25 671    3  Abnormality of gait  R26 9    4  Balance problem  R26 89        Start Time: 1120  Stop Time: 1220  Total time in clinic (min): 60 minutes    Assessment  Assessment details: Qian Ayala is a pleasant 61 y o  female who presents s/p R calcaneal fracture on 21 when she stepped down from a ladder directly onto her heel  The primary movement problem is R ankle hypomobility secondary to  non-operative healing and immobilization resulting in weight bearing intolerance, strength and balance deficits and limiting her ability to care for self, drive, exercise or recreation, get off the toilet, get out of a chair, walk, perform stairs and care for her grandchildren  No further referral appears necessary at this time based upon examination results  I expect she will be able to meet her goal of independent ambulation and taking care of her grandchildren by discharge  The patient's greatest concerns are worry over not knowing what's wrong, wanting to avoid surgery, fear of not being able to keep active and future ill health (and wanting to prevent it)  Problem List:  1) ankle hypomobility  2) ankle weakness  3) gait abnormality     Etiologic factors include WILLIAM and possible bone density deficiency     Impairments: abnormal coordination, abnormal gait, abnormal or restricted ROM, activity intolerance, impaired balance, impaired physical strength, lacks appropriate home exercise program, pain with function, weight-bearing intolerance and poor posture     Symptom irritability: highUnderstanding of Dx/Px/POC: good   Prognosis: fair  Prognosis details: Positive prognostic indicators include positive attitude toward recovery and good understanding of diagnosis and treatment plan options  Negative prognostic indicators include physician recommendation for surgical intervention but patient declining and opting for conservative treatment  Goals  Patient will be independent with home exercise program    Patient will be able to manage symptoms independently      (STG) Impairment Goals 4-6 weeks  - Improve ankle AROM to equal to the unaffected lower extremity  - Increase hip strength to 5/5 throughout  - Increase ankle strength to 5/5 throughout  - Patient will be able to walk with no gait abnormalities or pain    (LTG) Functional Goals 6-8 weeks  - Return to Prior Level of Function  - Increase Functional Status Measure (FOTO) to: predicted outcome  - Patient will be independent with HEP  - Patient will be able to squat without increased pain/compensation/difficulty  - Patient will be able to perform sit to stand without increased pain/compensation/difficulty   - Patient will be able to ascend and descend stairs without increased pain/compensation/difficulty   - Patient will be able play with her grandchildren without limitation      Plan  Plan details:  Will plan to treat 2x/week for the first 1-2 months, tapering down to 1x/week towards discharge  Patient would benefit from: skilled physical therapy  Referral necessary: No  Planned modality interventions: cryotherapy and thermotherapy: hydrocollator packs  Planned therapy interventions: activity modification, balance/weight bearing training, body mechanics training, flexibility, functional ROM exercises, gait training, home exercise program, therapeutic exercise, therapeutic activities, stretching, strengthening, postural training, patient education, neuromuscular re-education, manual therapy and joint mobilization  Frequency: 2x week  Duration in weeks: 16  Treatment plan discussed with: patient        Subjective Evaluation    History of Present Illness  Mechanism of injury: WORK/SCHOOL: Patient is on disability due to her migraines  HOME LIFE: Patient lives with her mother and nephew  HOBBIES/EXERCISE: Patient enjoys watching her granddaughters (3 and 8) which keeps her busy  PLOF: Patient reports that both of her ankles would give out (R>L) when she would go up and down the stairs which she did have some PT for in the past      HISTORY OF CURRENT INJURY: Patient reports that she was helping her sister on 21 when she misjudged her last 2 steps and came down on her heel  She was unable to walk on it  She was in a cast for a few weeks and recently transitioned into a boot for about 2 weeks now  She is still NWB, but has another appointment on   She reports having no pain at this time and that it "feels really good"  She has been crawling up the stairs and coming down on her bottom to get upstairs  PAIN LOCATION/DESCRIPTORS: when she did have pain it was medial foot near the heel but she has not has pain recently  AGGRAVATING FACTORS: nothing she can think of   EASES: not using ice, heat or any pain meds   DAY PATTERN: sleeping through the night well  IMAGING: Pt reports that Dr Laith Baldwin told her it is well healing   PATIENT GOALS: Patient would like to be able to return to walking without pain and normally  Pain  Current pain ratin  At best pain ratin  At worst pain ratin  Progression: improved    Social Support  Steps to enter house: yes  Stairs in house: yes   Lives in: multiple-level home  Lives with: adult children    Employment status: not working  Patient Goals  Patient goal: return to PLOF and be able to play wiht her grandchildren         Objective     Observations     Right Ankle/Foot   Positive for atrophy, effusion and trophic changes       Palpation     Additional Palpation Details  Warmth noted in R foot  Dorsalis pedis pulse palpated    Tenderness     Right Ankle/Foot   No tenderness       Active Range of Motion   Left Ankle/Foot   Dorsiflexion (ke): 10 degrees   Plantar flexion: 35 degrees   Inversion: 20 degrees   Eversion: 10 degrees     Right Ankle/Foot   Dorsiflexion (ke): 4 degrees   Plantar flexion: 25 degrees   Inversion: 9 degrees   Eversion: 8 degrees     Strength/Myotome Testing     Left Ankle/Foot   Normal strength    Right Ankle/Foot   Dorsiflexion: 3+  Plantar flexion: 3 (pain)  Inversion: 3+  Eversion: 3+    Additional Strength Details  Knee strength 5/5 preston   Hip strength 4+/5 preston    Swelling   Left Ankle/Foot   Metatarsal heads: 21 5 cm  Figure 8: 54 5 cm    Right Ankle/Foot   Metatarsal heads: 22 5 cm  Figure 8: 52 5 cm                 Diagnosis: R calcaneal fracture (11/30/21) with non-operative healing   Precautions: NWB until cleared by Dr Boston Georges   Primary Goals: ankle ROM, strength, gait, be able to take care of her grandchildren   *asterisks by exercise = given for HEP   Manuals 1/11       R ankle PROM        Ankle isometrics                                There Ex        bike        Ankle ABC's* 1x       Ankle pumps* 20x       Gastroc stretch* 10" 10x w/strap       Soleus stretch        SLR 4-way        Ankle TB 4-way        Seated INV/EV slides        Seated HR/TR        Neuro Re-Ed        Seated BAPS                                                                                                 Re-evaluation              Ther Act                                         Modalities             Ice PRN

## 2022-01-13 ENCOUNTER — OFFICE VISIT (OUTPATIENT)
Dept: PHYSICAL THERAPY | Facility: CLINIC | Age: 57
End: 2022-01-13
Payer: MEDICARE

## 2022-01-13 DIAGNOSIS — S92.001D CLOSED DISPLACED FRACTURE OF RIGHT CALCANEUS WITH ROUTINE HEALING, UNSPECIFIED PORTION OF CALCANEUS, SUBSEQUENT ENCOUNTER: Primary | ICD-10-CM

## 2022-01-13 DIAGNOSIS — M25.671 STIFFNESS OF RIGHT ANKLE JOINT: ICD-10-CM

## 2022-01-13 DIAGNOSIS — R26.9 ABNORMALITY OF GAIT: ICD-10-CM

## 2022-01-13 DIAGNOSIS — R26.89 BALANCE PROBLEM: ICD-10-CM

## 2022-01-13 PROCEDURE — 97140 MANUAL THERAPY 1/> REGIONS: CPT | Performed by: PHYSICAL THERAPIST

## 2022-01-13 PROCEDURE — 97110 THERAPEUTIC EXERCISES: CPT | Performed by: PHYSICAL THERAPIST

## 2022-01-13 NOTE — PROGRESS NOTES
Daily Note     Today's date: 2022  Patient name: José Stokes  : 1965  MRN: [id number]  Referring provider: Thelma Cardona MD  Dx:   Encounter Diagnosis     ICD-10-CM    1  Closed displaced fracture of right calcaneus with routine healing, unspecified portion of calcaneus, subsequent encounter  S92 001D    2  Stiffness of right ankle joint  M25 671    3  Abnormality of gait  R26 9    4  Balance problem  R26 89                   Subjective: She notes compliance with HEP  Objective: See treatment diary below      Assessment: Tolerated treatment well  Patient would benefit from continued PT  She was able to initiate bike while in the camboot for increased blood flow  She tolerated initiation of towel slide ankle inv/ever with moderate assistance to avoid hip compensation  She demonstrates draining plantar ecchymosis  She tolerated PROM this date  She was able to perform SLR flexion without evidence of quad lag  Plan: Continue per plan of care           Diagnosis: R calcaneal fracture (21) with non-operative healing   Precautions: NWB until cleared by Dr Renae Zuleta   Primary Goals: ankle ROM, strength, gait, be able to take care of her grandchildren   *asterisks by exercise = given for HEP   Manuals       R ankle PROM  RS      Ankle isometrics                                There Ex        bike  5' in boot      Ankle ABC's* 1x       Ankle pumps* 20x 20x      Gastroc stretch* 10" 10x w/strap       Soleus stretch  10x10" with strap      SLR 4-way  flx 20x      Ankle TB 4-way        Seated INV/EV slides  Towel slides 10x with mod A      Seated HR/TR  2x10 with towel cushion      Neuro Re-Ed        Seated BAPS                                                                                                 Re-evaluation              Ther Act                                         Modalities             Ice PRN

## 2022-01-14 ENCOUNTER — TELEPHONE (OUTPATIENT)
Dept: OBGYN CLINIC | Facility: OTHER | Age: 57
End: 2022-01-14

## 2022-01-14 NOTE — TELEPHONE ENCOUNTER
I did not see any mention of Ace bandage or compression stocking in the most recent office note, recommend at this time she continue with Ace bandage if that was what was discussed in the office

## 2022-01-14 NOTE — TELEPHONE ENCOUNTER
Patient called in , her and physical therapy wanted to know if you think wearing a Compression Sleeve instead of her ACE Bandage would be okay?     C/b # [phone]

## 2022-01-14 NOTE — TELEPHONE ENCOUNTER
Patient given above information and we will reach out to Dr Orlin Ireland and PA on Monday  Please advise regarding okay for compression sleeve instead of ace wrap?

## 2022-01-17 ENCOUNTER — APPOINTMENT (OUTPATIENT)
Dept: PHYSICAL THERAPY | Facility: CLINIC | Age: 57
End: 2022-01-17
Payer: MEDICARE

## 2022-01-18 ENCOUNTER — OFFICE VISIT (OUTPATIENT)
Dept: PHYSICAL THERAPY | Facility: CLINIC | Age: 57
End: 2022-01-18
Payer: MEDICARE

## 2022-01-18 DIAGNOSIS — M25.671 STIFFNESS OF RIGHT ANKLE JOINT: ICD-10-CM

## 2022-01-18 DIAGNOSIS — R26.89 BALANCE PROBLEM: ICD-10-CM

## 2022-01-18 DIAGNOSIS — R26.9 ABNORMALITY OF GAIT: ICD-10-CM

## 2022-01-18 DIAGNOSIS — S92.001D CLOSED DISPLACED FRACTURE OF RIGHT CALCANEUS WITH ROUTINE HEALING, UNSPECIFIED PORTION OF CALCANEUS, SUBSEQUENT ENCOUNTER: Primary | ICD-10-CM

## 2022-01-18 PROCEDURE — 97140 MANUAL THERAPY 1/> REGIONS: CPT | Performed by: PHYSICAL THERAPIST

## 2022-01-18 PROCEDURE — 97110 THERAPEUTIC EXERCISES: CPT | Performed by: PHYSICAL THERAPIST

## 2022-01-18 PROCEDURE — 97112 NEUROMUSCULAR REEDUCATION: CPT | Performed by: PHYSICAL THERAPIST

## 2022-01-18 NOTE — PROGRESS NOTES
Daily Note     Today's date: 2022  Patient name: Ortiz Bacon  : 1965  MRN: [id number]  Referring provider: Anneliese Ruth MD  Dx:   Encounter Diagnosis     ICD-10-CM    1  Closed displaced fracture of right calcaneus with routine healing, unspecified portion of calcaneus, subsequent encounter  S92 001D    2  Stiffness of right ankle joint  M25 671    3  Abnormality of gait  R26 9    4  Balance problem  R26 89        Start Time: 1445  Stop Time: 1530  Total time in clinic (min): 45 minutes    Subjective: Patient reports that her foot has been feeling good and that she was not sore following last session  Objective: See treatment diary below      Assessment: Tolerated treatment well  Patient demonstrated fatigue post treatment, exhibited good technique with therapeutic exercises and would benefit from continued PT  Patients ankle mobility continues to improve steadily  She does have some mild discomfort with dorsiflexion at end range  Incorporated some foot intrinsic muscle strengthening which was tolerated well  Her ankle motor control and proprioception from a non-WB position is improving  Will continue to progress as able and per protocol  Plan: Continue per plan of care  Progress treatment as tolerated         Diagnosis: R calcaneal fracture (21) with non-operative healing   Precautions: NWB until cleared by Dr Lucas    Primary Goals: ankle ROM, strength, gait, be able to take care of her grandchildren   *asterisks by exercise = given for HEP   Manuals      R ankle PROM  RS LCB     Ankle isometrics                                There Ex        bike  5' in boot 5 min in boot     Ankle ABC's* 1x       Ankle pumps* 20x 20x      Gastroc stretch* 10" 10x w/strap  10" 10x w/strap     Soleus stretch  10x10" with strap 10" 10x w/strap     SLR 4-way  flx 20x      Ankle TB 4-way   OTB 2x10 ea     Seated INV/EV slides  Towel slides 10x with mod A      Frog pickup    1x Seated HR/TR  2x10 with towel cushion 2x10 preston with towel cushion     Neuro Re-Ed        Seated BAPS   20x AP/ML & circles                                                                                              Re-evaluation              Ther Act                                         Modalities             Ice PRN

## 2022-01-19 DIAGNOSIS — K21.9 GERD (GASTROESOPHAGEAL REFLUX DISEASE): ICD-10-CM

## 2022-01-19 DIAGNOSIS — E03.9 HYPOTHYROIDISM: ICD-10-CM

## 2022-01-19 RX ORDER — OMEPRAZOLE 20 MG/1
CAPSULE, DELAYED RELEASE ORAL
Qty: 30 CAPSULE | Refills: 0 | Status: SHIPPED | OUTPATIENT
Start: 2022-01-19 | End: 2022-02-18

## 2022-01-19 RX ORDER — LEVOTHYROXINE SODIUM 0.1 MG/1
100 TABLET ORAL DAILY
Qty: 30 TABLET | Refills: 0 | Status: SHIPPED | OUTPATIENT
Start: 2022-01-19 | End: 2022-02-18

## 2022-01-20 ENCOUNTER — OFFICE VISIT (OUTPATIENT)
Dept: PHYSICAL THERAPY | Facility: CLINIC | Age: 57
End: 2022-01-20
Payer: MEDICARE

## 2022-01-20 DIAGNOSIS — R26.89 BALANCE PROBLEM: ICD-10-CM

## 2022-01-20 DIAGNOSIS — R26.9 ABNORMALITY OF GAIT: ICD-10-CM

## 2022-01-20 DIAGNOSIS — S92.001D CLOSED DISPLACED FRACTURE OF RIGHT CALCANEUS WITH ROUTINE HEALING, UNSPECIFIED PORTION OF CALCANEUS, SUBSEQUENT ENCOUNTER: Primary | ICD-10-CM

## 2022-01-20 DIAGNOSIS — M25.671 STIFFNESS OF RIGHT ANKLE JOINT: ICD-10-CM

## 2022-01-20 PROCEDURE — 97140 MANUAL THERAPY 1/> REGIONS: CPT | Performed by: PHYSICAL THERAPIST

## 2022-01-20 PROCEDURE — 97112 NEUROMUSCULAR REEDUCATION: CPT | Performed by: PHYSICAL THERAPIST

## 2022-01-20 PROCEDURE — 97110 THERAPEUTIC EXERCISES: CPT | Performed by: PHYSICAL THERAPIST

## 2022-01-20 NOTE — PROGRESS NOTES
Daily Note     Today's date: 2022  Patient name: Trini Goodman  : 1965  MRN: [id number]  Referring provider: Shirl Nissen, MD  Dx:   Encounter Diagnosis     ICD-10-CM    1  Closed displaced fracture of right calcaneus with routine healing, unspecified portion of calcaneus, subsequent encounter  S92 001D    2  Stiffness of right ankle joint  M25 671    3  Abnormality of gait  R26 9    4  Balance problem  R26 89        Start Time:   Stop Time:   Total time in clinic (min): 45 minutes    Subjective: Patient reports that she felt good following last session and offers no concerns or complaints tonight  Objective: See treatment diary below      Assessment: Tolerated treatment well  Patient demonstrated fatigue post treatment, exhibited good technique with therapeutic exercises and would benefit from continued PT  Session focused on ankle mobility and muscle activation in non-weightbearing positions  Her ankle ROM is continuing to improve steadily each session  She has some difficulty isolating pure inversion and eversion movement and tends to kick her hip in which requires cues  She is having no pain at this time and has remained in NWB as she is awaiting clearance from the physician  Will progress per orders  Plan: Continue per plan of care  Progress treatment as tolerated  Progress treament per protocol        Diagnosis: R calcaneal fracture (21) with non-operative healing   Precautions: NWB until cleared by Dr Boston Georges   Primary Goals: ankle ROM, strength, gait, be able to take care of her grandchildren   *asterisks by exercise = given for HEP   Manuals     R ankle PROM  RS LCB LCB    Ankle isometrics                                There Ex        bike  5' in boot 5 min in boot 5 min in boot    Ankle ABC's* 1x       Ankle pumps* 20x 20x      Gastroc stretch* 10" 10x w/strap  10" 10x w/strap 10" 10x w/strap    Soleus stretch  10x10" with strap 10" 10x w/strap 10" 10x w/strap    SLR 4-way  flx 20x      Ankle TB 4-way   OTB 2x10 ea GTB 20x ea    Seated INV/EV slides  Towel slides 10x with mod A  INV/EV 1 min    Frog pickup    1x 1x    Seated HR/TR  2x10 with towel cushion 2x10 preston with towel cushion 2x10 preston with towel cushion    Neuro Re-Ed        Seated BAPS   20x AP/ML & circles 20x AP/ML & circles                                                                                             Re-evaluation              Ther Act                                         Modalities             Ice PRN

## 2022-01-25 ENCOUNTER — APPOINTMENT (OUTPATIENT)
Dept: RADIOLOGY | Facility: AMBULARY SURGERY CENTER | Age: 57
End: 2022-01-25
Attending: ORTHOPAEDIC SURGERY
Payer: MEDICARE

## 2022-01-25 ENCOUNTER — OFFICE VISIT (OUTPATIENT)
Dept: PHYSICAL THERAPY | Facility: CLINIC | Age: 57
End: 2022-01-25
Payer: MEDICARE

## 2022-01-25 ENCOUNTER — OFFICE VISIT (OUTPATIENT)
Dept: OBGYN CLINIC | Facility: CLINIC | Age: 57
End: 2022-01-25

## 2022-01-25 VITALS — HEIGHT: 59 IN | BODY MASS INDEX: 27.21 KG/M2 | WEIGHT: 135 LBS

## 2022-01-25 DIAGNOSIS — S92.344A CLOSED NONDISPLACED FRACTURE OF FOURTH METATARSAL BONE OF RIGHT FOOT, INITIAL ENCOUNTER: ICD-10-CM

## 2022-01-25 DIAGNOSIS — R26.89 BALANCE PROBLEM: ICD-10-CM

## 2022-01-25 DIAGNOSIS — S92.001A CLOSED DISPLACED FRACTURE OF RIGHT CALCANEUS, UNSPECIFIED PORTION OF CALCANEUS, INITIAL ENCOUNTER: Primary | ICD-10-CM

## 2022-01-25 DIAGNOSIS — S92.001D CLOSED DISPLACED FRACTURE OF RIGHT CALCANEUS WITH ROUTINE HEALING, UNSPECIFIED PORTION OF CALCANEUS, SUBSEQUENT ENCOUNTER: Primary | ICD-10-CM

## 2022-01-25 DIAGNOSIS — R26.9 ABNORMALITY OF GAIT: ICD-10-CM

## 2022-01-25 DIAGNOSIS — M25.671 STIFFNESS OF RIGHT ANKLE JOINT: ICD-10-CM

## 2022-01-25 PROCEDURE — 97110 THERAPEUTIC EXERCISES: CPT | Performed by: PHYSICAL THERAPIST

## 2022-01-25 PROCEDURE — 97140 MANUAL THERAPY 1/> REGIONS: CPT | Performed by: PHYSICAL THERAPIST

## 2022-01-25 PROCEDURE — 99024 POSTOP FOLLOW-UP VISIT: CPT | Performed by: ORTHOPAEDIC SURGERY

## 2022-01-25 PROCEDURE — 73630 X-RAY EXAM OF FOOT: CPT

## 2022-01-25 NOTE — PATIENT INSTRUCTIONS
Continue physical therapy as directed     Continue nonweightbearing to the right leg in CAM boot     CAM boot does not need to be worn while sleeping

## 2022-01-25 NOTE — PROGRESS NOTES
Daily Note     Today's date: 2022  Patient name: Suleiman Shell  : 1965  MRN: [id number]  Referring provider: Lew Leggett MD  Dx:   Encounter Diagnosis     ICD-10-CM    1  Closed displaced fracture of right calcaneus with routine healing, unspecified portion of calcaneus, subsequent encounter  S92 001D    2  Stiffness of right ankle joint  M25 671    3  Abnormality of gait  R26 9    4  Balance problem  R26 89        Start Time: 1800  Stop Time: 1845  Total time in clinic (min): 45 minutes    Subjective: Patient reports that she had a follow up with Dr Alphonso Hooper and he would like her to stay in NWB for 4 more weeks  Objective: See treatment diary below      Assessment: Tolerated treatment well  Patient demonstrated fatigue post treatment, exhibited good technique with therapeutic exercises and would benefit from continued PT  Session incorporated some hip strengthening today since she will be NWB for 4 more weeks  She had fatigue with this  She has a difficult time isolating ankle inversion and eversion ranges without hip compensation  Will continue to progress hip and NWB strengthening as well as ankle ROM and mobility  Plan: Continue per plan of care  Progress treament per protocol        Diagnosis: R calcaneal fracture (21) with non-operative healing   Precautions: NWB until cleared by Dr Alphonso Hooper   Primary Goals: ankle ROM, strength, gait, be able to take care of her grandchildren   *asterisks by exercise = given for HEP   Manuals    R ankle PROM  RS LCB LCB LCB   Ankle isometrics                                There Ex        bike  5' in boot 5 min in boot 5 min in boot 5 min in boot   Ankle ABC's* 1x       Ankle pumps* 20x 20x      Gastroc stretch* 10" 10x w/strap  10" 10x w/strap 10" 10x w/strap 10" 10x w/strap   Soleus stretch  10x10" with strap 10" 10x w/strap 10" 10x w/strap 10" 10x w/strap   SLR 3-way  flx 20x   1# 2x10 preston   Ankle TB 4-way   OTB 2x10 ea GTB 20x ea GTB 30x ea   Seated INV/EV slides  Towel slides 10x with mod A  INV/EV 1 min    Frog pickup    1x 1x    Seated HR/TR  2x10 with towel cushion 2x10 preston with towel cushion 2x10 preston with towel cushion 2x10 preston with towel cushion   Neuro Re-Ed        Seated BAPS   20x AP/ML & circles 20x AP/ML & circles 20x AP/ML & circles                                                                                            Re-evaluation              Ther Act                                         Modalities             Ice PRN

## 2022-01-27 ENCOUNTER — TELEPHONE (OUTPATIENT)
Dept: OBGYN CLINIC | Facility: HOSPITAL | Age: 57
End: 2022-01-27

## 2022-01-27 ENCOUNTER — OFFICE VISIT (OUTPATIENT)
Dept: PHYSICAL THERAPY | Facility: CLINIC | Age: 57
End: 2022-01-27
Payer: MEDICARE

## 2022-01-27 DIAGNOSIS — R26.9 ABNORMALITY OF GAIT: ICD-10-CM

## 2022-01-27 DIAGNOSIS — R26.89 BALANCE PROBLEM: ICD-10-CM

## 2022-01-27 DIAGNOSIS — M25.671 STIFFNESS OF RIGHT ANKLE JOINT: ICD-10-CM

## 2022-01-27 DIAGNOSIS — S92.001D CLOSED DISPLACED FRACTURE OF RIGHT CALCANEUS WITH ROUTINE HEALING, UNSPECIFIED PORTION OF CALCANEUS, SUBSEQUENT ENCOUNTER: Primary | ICD-10-CM

## 2022-01-27 PROCEDURE — 97140 MANUAL THERAPY 1/> REGIONS: CPT | Performed by: PHYSICAL THERAPIST

## 2022-01-27 PROCEDURE — 97110 THERAPEUTIC EXERCISES: CPT | Performed by: PHYSICAL THERAPIST

## 2022-01-27 NOTE — PROGRESS NOTES
Daily Note     Today's date: 2022  Patient name: Nell Milan  : 1965  MRN: [id number]  Referring provider: Ramez Meehan MD  Dx:   Encounter Diagnosis     ICD-10-CM    1  Closed displaced fracture of right calcaneus with routine healing, unspecified portion of calcaneus, subsequent encounter  S92 001D    2  Stiffness of right ankle joint  M25 671    3  Abnormality of gait  R26 9    4  Balance problem  R26 89        Start Time:   Stop Time:   Total time in clinic (min): 45 minutes    Subjective: Patient reports that her foot is feeling good  No new updates since last session  Objective: See treatment diary below      Assessment: Tolerated treatment well  Patient demonstrated fatigue post treatment, exhibited good technique with therapeutic exercises and would benefit from continued PT  Session continues to focus on range of motion/stretches for her ankle and hip strengthening since she is not cleared for weight bearing yet  I did notice an improvement in her range of motion and mobility today overall with better activation of invertors and evertors vs using hip strategy for ankle TB exercises  We progressed her hip strengthening today with good tolerance  Will progress per protocol  Plan: Continue per plan of care  Progress treatment as tolerated  Progress treament per protocol        Diagnosis: R calcaneal fracture (21) with non-operative healing   Precautions: NWB until cleared by Dr Elizabeth Almendarez   Primary Goals: ankle ROM, strength, gait, be able to take care of her grandchildren   *asterisks by exercise = given for HEP   Manuals    R ankle PROM LCB RS LCB LCB LCB   Ankle isometrics                                There Ex        bike Upright bike 5 mins in boot 5' in boot 5 min in boot 5 min in boot 5 min in boot   Ankle ABC's*        Ankle pumps*  20x      Gastroc stretch* 10x10" with strap  10" 10x w/strap 10" 10x w/strap 10" 10x w/strap Soleus stretch 10x10" with strap 10x10" with strap 10" 10x w/strap 10" 10x w/strap 10" 10x w/strap   SLR 3-way 1 5# 2x10 preston flx 20x   1# 2x10 preston   Ankle TB 4-way GTB 30x ea  OTB 2x10 ea GTB 20x ea GTB 30x ea   Bridges from p-ball 2x10 DL       Hamstring curl Blue TB 2x10 R       Seated INV/EV slides  Towel slides 10x with mod A  INV/EV 1 min    Frog pickup    1x 1x    Seated HR/TR 2x10 preston with towel cushion 2x10 with towel cushion 2x10 preston with towel cushion 2x10 preston with towel cushion 2x10 preston with towel cushion   Neuro Re-Ed        Seated BAPS   20x AP/ML & circles 20x AP/ML & circles 20x AP/ML & circles                                                                                            Re-evaluation              Ther Act                                         Modalities             Ice PRN

## 2022-02-01 ENCOUNTER — APPOINTMENT (OUTPATIENT)
Dept: PHYSICAL THERAPY | Facility: CLINIC | Age: 57
End: 2022-02-01
Payer: MEDICARE

## 2022-02-03 ENCOUNTER — OFFICE VISIT (OUTPATIENT)
Dept: PHYSICAL THERAPY | Facility: CLINIC | Age: 57
End: 2022-02-03
Payer: MEDICARE

## 2022-02-03 DIAGNOSIS — R26.89 BALANCE PROBLEM: ICD-10-CM

## 2022-02-03 DIAGNOSIS — M25.671 STIFFNESS OF RIGHT ANKLE JOINT: ICD-10-CM

## 2022-02-03 DIAGNOSIS — R26.9 ABNORMALITY OF GAIT: ICD-10-CM

## 2022-02-03 DIAGNOSIS — S92.001D CLOSED DISPLACED FRACTURE OF RIGHT CALCANEUS WITH ROUTINE HEALING, UNSPECIFIED PORTION OF CALCANEUS, SUBSEQUENT ENCOUNTER: Primary | ICD-10-CM

## 2022-02-03 PROCEDURE — 97110 THERAPEUTIC EXERCISES: CPT | Performed by: PHYSICAL THERAPIST

## 2022-02-03 PROCEDURE — 97140 MANUAL THERAPY 1/> REGIONS: CPT | Performed by: PHYSICAL THERAPIST

## 2022-02-03 NOTE — PROGRESS NOTES
Daily Note     Today's date: 2/3/2022  Patient name: Dalia Reveles  : 1965  MRN: [id number]  Referring provider: Dyan Hoffman MD  Dx:   Encounter Diagnosis     ICD-10-CM    1  Closed displaced fracture of right calcaneus with routine healing, unspecified portion of calcaneus, subsequent encounter  S92 001D    2  Stiffness of right ankle joint  M25 671    3  Abnormality of gait  R26 9    4  Balance problem  R26 89        Start Time:   Stop Time:   Total time in clinic (min): 45 minutes    Subjective: Patient reports that her foot is feeling a lot better  She feels good being able to move it again  Objective: See treatment diary below      Assessment: Tolerated treatment well  Patient demonstrated fatigue post treatment, exhibited good technique with therapeutic exercises and would benefit from continued PT  Patients range of motion continues to improve slowly  She tolerates stretching well with no discomfort in her calcaneal region  Her hip strength continues to steadily improve as well  Plan: Continue per plan of care  Progress treatment as tolerated  Progress treament per protocol        Diagnosis: R calcaneal fracture (21) with non-operative healing   Precautions: NWB until cleared by Dr Steph Murphy   Primary Goals: ankle ROM, strength, gait, be able to take care of her grandchildren   *asterisks by exercise = given for HEP   Manuals 1/27 2/3 1/18 1/20 1/25   R ankle PROM LCB LCB LCB LCB LCB   Ankle isometrics                                There Ex        bike Upright bike 5 mins in boot Upright bike 5 mins in boot 5 min in boot 5 min in boot 5 min in boot   Ankle ABC's*        Ankle pumps*        Gastroc stretch* 10x10" with strap 10x10" with strap 10" 10x w/strap 10" 10x w/strap 10" 10x w/strap   Soleus stretch 10x10" with strap 10x10" with strap 10" 10x w/strap 10" 10x w/strap 10" 10x w/strap   SLR 3-way 1 5# 2x10 preston 1 5# 2x10 preston   1# 2x10 preston   Ankle TB 4-way GTB 30x ea GTB 30x ea OTB 2x10 ea GTB 20x ea GTB 30x ea   Bridges from p-ball 2x10 DL 3x10 DL      Hamstring curl Blue TB 2x10 R       Seated INV/EV slides    INV/EV 1 min    Frog pickup    1x 1x    Seated HR/TR 2x10 preston with towel cushion 2x10 preston with towel cushion 2x10 preston with towel cushion 2x10 preston with towel cushion 2x10 preston with towel cushion   Neuro Re-Ed        Seated BAPS  20x AP/ML & circles 20x AP/ML & circles 20x AP/ML & circles 20x AP/ML & circles                                                                                            Re-evaluation              Ther Act                                         Modalities             Ice PRN

## 2022-02-08 ENCOUNTER — EVALUATION (OUTPATIENT)
Dept: PHYSICAL THERAPY | Facility: CLINIC | Age: 57
End: 2022-02-08
Payer: MEDICARE

## 2022-02-08 DIAGNOSIS — S92.001D CLOSED DISPLACED FRACTURE OF RIGHT CALCANEUS WITH ROUTINE HEALING, UNSPECIFIED PORTION OF CALCANEUS, SUBSEQUENT ENCOUNTER: Primary | ICD-10-CM

## 2022-02-08 DIAGNOSIS — R26.9 ABNORMALITY OF GAIT: ICD-10-CM

## 2022-02-08 DIAGNOSIS — R26.89 BALANCE PROBLEM: ICD-10-CM

## 2022-02-08 DIAGNOSIS — M25.671 STIFFNESS OF RIGHT ANKLE JOINT: ICD-10-CM

## 2022-02-08 PROCEDURE — 97110 THERAPEUTIC EXERCISES: CPT | Performed by: PHYSICAL THERAPIST

## 2022-02-08 PROCEDURE — 97140 MANUAL THERAPY 1/> REGIONS: CPT | Performed by: PHYSICAL THERAPIST

## 2022-02-08 NOTE — PROGRESS NOTES
PT Re-Evaluation     Today's date: 2022  Patient name: Wallace Wang  : 1965  MRN: [id number]  Referring provider: Jeremiah Haque MD  Dx:   Encounter Diagnosis     ICD-10-CM    1  Closed displaced fracture of right calcaneus with routine healing, unspecified portion of calcaneus, subsequent encounter  S92 001D    2  Stiffness of right ankle joint  M25 671    3  Abnormality of gait  R26 9    4  Balance problem  R26 89        Start Time:   Stop Time:   Total time in clinic (min): 45 minutes    Assessment  Assessment details: Wallace Wang has been compliant with attending PT and home exercise program since initial eval  She has maintained her non weight bearing status faithfully  Evausten Burn has made improvements in subjective and objective data since initial eval but continues to have limitations compared to prior level of function  Her ankle strength and motion has improved  ROM is equal to her unaffected LE, however strength is decreased  She has some mild pain with resisted PF  She will maintain non weight bearing status until given clearance from Dr Gutierrez Ar  Evausten Burn continues to have deficits in the above listed impairments and would benefit from additional skilled PT to address these deficits to return to prior level of function  Impairments: abnormal coordination, abnormal gait, abnormal or restricted ROM, activity intolerance, impaired balance, impaired physical strength, lacks appropriate home exercise program, pain with function, weight-bearing intolerance and poor posture     Symptom irritability: highUnderstanding of Dx/Px/POC: good   Prognosis: fair  Prognosis details: Positive prognostic indicators include positive attitude toward recovery and good understanding of diagnosis and treatment plan options  Negative prognostic indicators include physician recommendation for surgical intervention but patient declining and opting for conservative treatment         Goals  Patient will be independent with home exercise program  (met)  Patient will be able to manage symptoms independently  (progressing)    (STG) Impairment Goals 4-6 weeks  - Improve ankle AROM to equal to the unaffected lower extremity (met)  - Increase hip strength to 5/5 throughout (progressing)  - Increase ankle strength to 5/5 throughout (progressing)  - Patient will be able to walk with no gait abnormalities or pain (progressing)    (LTG) Functional Goals 6-8 weeks  - Return to Prior Level of Function (progressing)  - Increase Functional Status Measure (FOTO) to: predicted outcome (progressing)  - Patient will be independent with HEP (progressing)  - Patient will be able to squat without increased pain/compensation/difficulty (progressing)  - Patient will be able to perform sit to stand without increased pain/compensation/difficulty  (progressing)  - Patient will be able to ascend and descend stairs without increased pain/compensation/difficulty  (progressing)  - Patient will be able play with her grandchildren without limitation (progressing)      Plan  Plan details:  Will plan to treat 2x/week for the first 1-2 months, tapering down to 1x/week towards discharge  Patient would benefit from: skilled physical therapy  Referral necessary: No  Planned modality interventions: cryotherapy and thermotherapy: hydrocollator packs  Planned therapy interventions: activity modification, balance/weight bearing training, body mechanics training, flexibility, functional ROM exercises, gait training, home exercise program, therapeutic exercise, therapeutic activities, stretching, strengthening, postural training, patient education, neuromuscular re-education, manual therapy and joint mobilization  Frequency: 2x week  Duration in weeks: 12  Treatment plan discussed with: patient        Subjective Evaluation    History of Present Illness  Mechanism of injury: WORK/SCHOOL: Patient is on disability due to her migraines  HOME LIFE: Patient lives with her mother and nephew  HOBBIES/EXERCISE: Patient enjoys watching her granddaughters (4 and 8) which keeps her busy  PLOF: Patient reports that both of her ankles would give out (R>L) when she would go up and down the stairs which she did have some PT for in the past      HISTORY OF CURRENT INJURY: Patient reports that she was helping her sister on 21 when she misjudged her last 2 steps and came down on her heel  She was unable to walk on it  She was in a cast for a few weeks and recently transitioned into a boot for about 2 weeks now  She is still NWB, but has another appointment on   She reports having no pain at this time and that it "feels really good"  She has been crawling up the stairs and coming down on her bottom to get upstairs  PAIN LOCATION/DESCRIPTORS: when she did have pain it was medial foot near the heel but she has not has pain recently  AGGRAVATING FACTORS: nothing she can think of   EASES: not using ice, heat or any pain meds   DAY PATTERN: sleeping through the night well  IMAGING: Pt reports that Dr Humphrey Ask told her it is well healing   PATIENT GOALS: Patient would like to be able to return to walking without pain and normally  Re-assessment (22): Patient reports that since beginning PT, she feels like her foot moves a lot better and it feels a lot better  Says that she is able to get in and out of the tub at her daughters house now  Swelling has decreased but it is still swelling at times  She is still NWB at this time but reports that she keeps it moving throughout the day to improve her motion  More recently, she had some pain at the dorsum of her foot near her toes and lateral foot for unknown reason  She thinks maybe because she has been moving it more       Pain  Current pain ratin  At best pain ratin  At worst pain ratin  Quality: burning and throbbing  Progression: improved    Social Support  Steps to enter house: yes  Stairs in house: yes   Lives in: multiple-level home  Lives with: adult children    Employment status: not working  Patient Goals  Patient goal: return to PLOF and be able to play wiht her grandchildren         Objective     Observations     Right Ankle/Foot   Positive for atrophy, effusion and trophic changes  Palpation     Additional Palpation Details  Warmth noted in R foot  Dorsalis pedis pulse palpated    Tenderness     Right Ankle/Foot   No tenderness       Active Range of Motion   Left Ankle/Foot   Dorsiflexion (ke): 10 degrees   Plantar flexion: 35 degrees   Inversion: 20 degrees   Eversion: 10 degrees     Right Ankle/Foot   Dorsiflexion (ke): 10 degrees   Plantar flexion: 45 degrees   Inversion: 20 degrees   Eversion: 11 degrees     Strength/Myotome Testing     Left Ankle/Foot   Normal strength    Right Ankle/Foot   Dorsiflexion: 4+  Plantar flexion: 4- (mild pain)  Inversion: 4  Eversion: 4    Additional Strength Details  Knee strength 5/5 preston   Hip strength 4+/5 preston    Swelling   Left Ankle/Foot   Metatarsal heads: 21 5 cm  Figure 8: 54 5 cm    Right Ankle/Foot   Metatarsal heads: 22 cm  Figure 8: 54 cm                 Diagnosis: R calcaneal fracture (11/30/21) with non-operative healing   Precautions: NWB until cleared by Dr Som Hernandez   Primary Goals: ankle ROM, strength, gait, be able to take care of her grandchildren   *asterisks by exercise = given for HEP   Manuals 1/27 2/3 2/8 1/20 1/25   R ankle PROM LCB LCB LCB LCB LCB   Ankle isometrics                                There Ex        bike Upright bike 5 mins in boot Upright bike 5 mins in boot Upright bike 5 mins in boot 5 min in boot 5 min in boot   Ankle ABC's*        Ankle pumps*        Gastroc stretch* 10x10" with strap 10x10" with strap 10x10" with strap 10" 10x w/strap 10" 10x w/strap   Soleus stretch 10x10" with strap 10x10" with strap 10x10" with strap 10" 10x w/strap 10" 10x w/strap   SLR 3-way 1 5# 2x10 preston 1 5# 2x10 preston   1# 2x10 preston   Ankle TB 4-way GTB 30x ea GTB 30x ea GTB 30x ea GTB 20x ea GTB 30x ea   Bridges from p-ball 2x10 DL 3x10 DL 2x10 SL     Hamstring curl Blue TB 2x10 R       Seated INV/EV slides    INV/EV 1 min    Frog pickup     1x    Seated HR/TR 2x10 preston with towel cushion 2x10 preston with towel cushion  2x10 preston with towel cushion 2x10 preston with towel cushion   Neuro Re-Ed        Seated BAPS  20x AP/ML & circles  20x AP/ML & circles 20x AP/ML & circles                                                                                            Re-evaluation     LCB        Ther Act                                       Modalities             Ice PRN

## 2022-02-10 ENCOUNTER — APPOINTMENT (OUTPATIENT)
Dept: PHYSICAL THERAPY | Facility: CLINIC | Age: 57
End: 2022-02-10
Payer: MEDICARE

## 2022-02-15 ENCOUNTER — OFFICE VISIT (OUTPATIENT)
Dept: PHYSICAL THERAPY | Facility: CLINIC | Age: 57
End: 2022-02-15
Payer: MEDICARE

## 2022-02-15 ENCOUNTER — TELEPHONE (OUTPATIENT)
Dept: NEUROLOGY | Facility: CLINIC | Age: 57
End: 2022-02-15

## 2022-02-15 DIAGNOSIS — S92.001D CLOSED DISPLACED FRACTURE OF RIGHT CALCANEUS WITH ROUTINE HEALING, UNSPECIFIED PORTION OF CALCANEUS, SUBSEQUENT ENCOUNTER: Primary | ICD-10-CM

## 2022-02-15 DIAGNOSIS — M25.671 STIFFNESS OF RIGHT ANKLE JOINT: ICD-10-CM

## 2022-02-15 DIAGNOSIS — R26.9 ABNORMALITY OF GAIT: ICD-10-CM

## 2022-02-15 DIAGNOSIS — G43.709 CHRONIC MIGRAINE WITHOUT AURA WITHOUT STATUS MIGRAINOSUS, NOT INTRACTABLE: Primary | ICD-10-CM

## 2022-02-15 DIAGNOSIS — R26.89 BALANCE PROBLEM: ICD-10-CM

## 2022-02-15 PROCEDURE — 97140 MANUAL THERAPY 1/> REGIONS: CPT | Performed by: PHYSICAL THERAPIST

## 2022-02-15 PROCEDURE — 97110 THERAPEUTIC EXERCISES: CPT | Performed by: PHYSICAL THERAPIST

## 2022-02-15 NOTE — LETTER
Kasey Cadet [phone]      Our office has been unsuccessful in trying to reach you by phone regarding:      ? Other:______[employer] safety net ____________________________________________      Please contact [facility] worker if you are interested in applying for the [address] [place] [address] for assistance with the cost of medication  and follow the prompts        Sincerely,            Addison Richards, MSW     [phone]  [facility]

## 2022-02-15 NOTE — TELEPHONE ENCOUNTER
We need better prevention  Agree with triptan overuse advice  I believe from documentation we have not been able to get emgality through [facility]?  I recommend if better covered going back to aimovig but at the higher/normal dose of 140 mg

## 2022-02-15 NOTE — PROGRESS NOTES
Daily Note     Today's date: 2/15/2022  Patient name: Ana Ulloa  : 1965  MRN: [id number]  Referring provider: Marina Higginbotham MD  Dx:   Encounter Diagnosis     ICD-10-CM    1  Closed displaced fracture of right calcaneus with routine healing, unspecified portion of calcaneus, subsequent encounter  S92 001D    2  Stiffness of right ankle joint  M25 671    3  Abnormality of gait  R26 9    4  Balance problem  R26 89                   Subjective: Patient notes that her foot is starting to feel back to normal  She notes that she is scheduled for another ortho f/u and x-rays next week  Objective: See treatment diary below      Assessment: Tolerated treatment well  Patient would benefit from continued PT  She demonstrates well maintained ankle motion  She demonstrates only mild midfoot/forefoot restrictions  She demonstrated greater hip compensation with resisted ankle inversion > eversion  She required verbal cues throughout session to perform slow eccentric movements  Plan: Continue per plan of care  Progress into WB when healing and physician permit         Diagnosis: R calcaneal fracture (21) with non-operative healing   Precautions: NWB until cleared by Dr Maximino Zhu   Primary Goals: ankle ROM, strength, gait, be able to take care of her grandchildren   *asterisks by exercise = given for HEP   Manuals 1/27 2/3 2/8 2/15 1/25   R ankle PROM LCB LCB LCB RS LCB   Ankle isometrics                                There Ex        bike Upright bike 5 mins in boot Upright bike 5 mins in boot Upright bike 5 mins in boot Upright bike 5 mins in boot 5 min in boot   Ankle ABC's*        Ankle pumps*        Gastroc stretch* 10x10" with strap 10x10" with strap 10x10" with strap 10x10" with strap 10" 10x w/strap   Soleus stretch 10x10" with strap 10x10" with strap 10x10" with strap 10x10" with strap 10" 10x w/strap   SLR 3-way 1 5# 2x10 preston 1 5# 2x10 preston   1# 2x10 preston   Ankle TB 4-way GTB 30x ea GTB 30x ea GTB 30x ea GTB 30xea GTB 30x ea   Bridges from p-ball 2x10 DL 3x10 DL 2x10 SL DL 3x10    Hamstring curl Blue TB 2x10 R       Seated INV/EV slides        Frog pickup         Seated HR/TR 2x10 preston with towel cushion 2x10 preston with towel cushion  2x10 b/l with towel cushion with slow eccentric 2x10 preston with towel cushion   Neuro Re-Ed        Seated BAPS  20x AP/ML & circles   20x AP/ML & circles                                                                                            Re-evaluation     LCB       Ther Act                                     Modalities             Ice PRN

## 2022-02-15 NOTE — TELEPHONE ENCOUNTER
Pt called and states that she received a letter in the mail re:sumatriptan tab  Prescription plan dept needs additional info or else they will not pay for this med  States that pharmacy fills sumatriptan every 2 weeks  States that she takes about 3-4 tabs per week, sometimes more f her migraines gets really bad  States that 9 tabs last her for 18 days  Advised that we do not recommend she take any triptan med more than 3 days in any 1 week due to  CVA risk with overuse  Sig: take 1/2 to 1 tab by mouth at start of headache  May repeat once after 2 hours  Use only 3 days per week  9 days per month  She verbalized understanding  Pt is willing to try alt abortive med  Requesting be sent to [address] on file    Pt has upcoming appt w/ Elizabeth Junes in May  I offered sooner appt but declined bec she does not drive d/t recent foot injury  She is cleared to drive the beginning of April  Requesting appt be scheduled beginning of April w/ Betzaida  Placed on cancellation list per pt's request      [facility], spoke w/ pharmacist Tien Horton and states that sumatriptan 9 for 21 days was last processed on 12/24/21  Pt is now using   [address] [phone]  [address] [place] [address], spoke w/Kellee and states sumatriptan was filled on 1/11/22 (9 tabs), 1/22/22 (9 tabs) and 2/1/22 (9 tabs)  Advised that sumatriptan is ordered 9 tabs per 30 days  Should not be filled before 30 days  She verbalized understanding  No issue w/ the ins  Dr Jenifer Mchugh, do you want to order alt med?         [phone] ok to leave detailed message

## 2022-02-16 NOTE — TELEPHONE ENCOUNTER
Called and advised pt of all of the below  She verbalized clear understanding  States that emgality was approved but has a high copay  Agreeable to try aimovig 140 mg monthly  Also requesting script for alt abortive med

## 2022-02-16 NOTE — TELEPHONE ENCOUNTER
Aimovig sent  I am not sure an alternative abortive medication would be the answer since no matter what abortive medication she has ideally I would recommend no more than 3 days per week  Is sumatriptan not helping? Has she tried the prochlorperazine? Could consider muscle relaxant for migraines at night instead of Triptan    Could consider Toradol PO occasionally in moderation if she would be interested

## 2022-02-17 ENCOUNTER — OFFICE VISIT (OUTPATIENT)
Dept: PHYSICAL THERAPY | Facility: CLINIC | Age: 57
End: 2022-02-17
Payer: MEDICARE

## 2022-02-17 DIAGNOSIS — R26.89 BALANCE PROBLEM: ICD-10-CM

## 2022-02-17 DIAGNOSIS — M25.671 STIFFNESS OF RIGHT ANKLE JOINT: ICD-10-CM

## 2022-02-17 DIAGNOSIS — R26.9 ABNORMALITY OF GAIT: ICD-10-CM

## 2022-02-17 DIAGNOSIS — S92.001D CLOSED DISPLACED FRACTURE OF RIGHT CALCANEUS WITH ROUTINE HEALING, UNSPECIFIED PORTION OF CALCANEUS, SUBSEQUENT ENCOUNTER: Primary | ICD-10-CM

## 2022-02-17 PROCEDURE — 97110 THERAPEUTIC EXERCISES: CPT | Performed by: PHYSICAL THERAPIST

## 2022-02-17 PROCEDURE — 97140 MANUAL THERAPY 1/> REGIONS: CPT | Performed by: PHYSICAL THERAPIST

## 2022-02-17 NOTE — PROGRESS NOTES
Daily Note     Today's date: 2022  Patient name: Rekha Brooks  : 1965  MRN: [id number]  Referring provider: Chante Walsh MD  Dx:   Encounter Diagnosis     ICD-10-CM    1  Closed displaced fracture of right calcaneus with routine healing, unspecified portion of calcaneus, subsequent encounter  S92 001D    2  Stiffness of right ankle joint  M25 671    3  Abnormality of gait  R26 9    4  Balance problem  R26 89        Start Time:   Stop Time:   Total time in clinic (min): 45 minutes    Subjective: Patient reports that she hit her ankle on the coffee table and now she has some soreness and feels like her ankle is swollen again  She is worried that she injured herself again  Objective: See treatment diary below      Assessment: Tolerated treatment well  Patient exhibited good technique with therapeutic exercises and would benefit from continued PT  Patient did have some increased swelling and tenderness at her medial ankle today following hitting her ankle on her coffee table  I used a percussion tool on her which was negative  She was able to tolerate all exercises well and I do not believe that there is injury aside from possible bruise with tenderness to medial ankle  Will continue to progress as able and she follows up with her physician next week  Plan: Continue per plan of care  Progress treatment as tolerated         Diagnosis: R calcaneal fracture (21) with non-operative healing   Precautions: NWB until cleared by Dr Alma Delia Armendariz   Primary Goals: ankle ROM, strength, gait, be able to take care of her grandchildren   *asterisks by exercise = given for HEP   Manuals 1/27 2/3 2/8 2/15 2/17   R ankle PROM LCB LCB LCB RS LCB   Ankle isometrics                                There Ex        bike Upright bike 5 mins in boot Upright bike 5 mins in boot Upright bike 5 mins in boot Upright bike 5 mins in boot Upright bike 5 mins in boot   Ankle ABC's*        Ankle pumps* Gastroc stretch* 10x10" with strap 10x10" with strap 10x10" with strap 10x10" with strap 10x10" with strap   Soleus stretch 10x10" with strap 10x10" with strap 10x10" with strap 10x10" with strap 10x10" with strap   SLR 3-way 1 5# 2x10 preston 1 5# 2x10 preston      Ankle TB 4-way GTB 30x ea GTB 30x ea GTB 30x ea GTB 30xea GTB 30xea   Bridges from p-ball 2x10 DL 3x10 DL 2x10 SL DL 3x10    Hamstring curl Blue TB 2x10 R       Seated INV/EV slides        Frog pickup         Seated HR/TR 2x10 preston with towel cushion 2x10 preston with towel cushion  2x10 b/l with towel cushion with slow eccentric 2x10 b/l with towel cushion with slow eccentric   Neuro Re-Ed        Seated BAPS  20x AP/ML & circles                                                                                               Re-evaluation     LCB       Ther Act                                     Modalities             Ice PRN

## 2022-02-18 DIAGNOSIS — K21.9 GERD (GASTROESOPHAGEAL REFLUX DISEASE): ICD-10-CM

## 2022-02-18 DIAGNOSIS — E03.9 HYPOTHYROIDISM: ICD-10-CM

## 2022-02-18 RX ORDER — LEVOTHYROXINE SODIUM 0.1 MG/1
TABLET ORAL
Qty: 30 TABLET | Refills: 0 | Status: SHIPPED | OUTPATIENT
Start: 2022-02-18 | End: 2022-03-21

## 2022-02-18 RX ORDER — OMEPRAZOLE 20 MG/1
CAPSULE, DELAYED RELEASE ORAL
Qty: 30 CAPSULE | Refills: 0 | Status: SHIPPED | OUTPATIENT
Start: 2022-02-18 | End: 2022-03-21

## 2022-02-22 ENCOUNTER — APPOINTMENT (OUTPATIENT)
Dept: RADIOLOGY | Facility: AMBULARY SURGERY CENTER | Age: 57
End: 2022-02-22
Attending: ORTHOPAEDIC SURGERY
Payer: MEDICARE

## 2022-02-22 ENCOUNTER — OFFICE VISIT (OUTPATIENT)
Dept: PHYSICAL THERAPY | Facility: CLINIC | Age: 57
End: 2022-02-22
Payer: MEDICARE

## 2022-02-22 ENCOUNTER — OFFICE VISIT (OUTPATIENT)
Dept: OBGYN CLINIC | Facility: CLINIC | Age: 57
End: 2022-02-22

## 2022-02-22 VITALS
WEIGHT: 135 LBS | HEIGHT: 59 IN | DIASTOLIC BLOOD PRESSURE: 84 MMHG | SYSTOLIC BLOOD PRESSURE: 124 MMHG | BODY MASS INDEX: 27.21 KG/M2 | HEART RATE: 101 BPM

## 2022-02-22 DIAGNOSIS — R26.9 ABNORMALITY OF GAIT: ICD-10-CM

## 2022-02-22 DIAGNOSIS — S92.344A CLOSED NONDISPLACED FRACTURE OF FOURTH METATARSAL BONE OF RIGHT FOOT, INITIAL ENCOUNTER: ICD-10-CM

## 2022-02-22 DIAGNOSIS — S92.001D CLOSED DISPLACED FRACTURE OF RIGHT CALCANEUS WITH ROUTINE HEALING, UNSPECIFIED PORTION OF CALCANEUS, SUBSEQUENT ENCOUNTER: Primary | ICD-10-CM

## 2022-02-22 DIAGNOSIS — M25.671 STIFFNESS OF RIGHT ANKLE JOINT: ICD-10-CM

## 2022-02-22 DIAGNOSIS — S92.344A CLOSED NONDISPLACED FRACTURE OF FOURTH METATARSAL BONE OF RIGHT FOOT, INITIAL ENCOUNTER: Primary | ICD-10-CM

## 2022-02-22 DIAGNOSIS — R26.89 BALANCE PROBLEM: ICD-10-CM

## 2022-02-22 PROCEDURE — 73630 X-RAY EXAM OF FOOT: CPT

## 2022-02-22 PROCEDURE — 97140 MANUAL THERAPY 1/> REGIONS: CPT | Performed by: PHYSICAL THERAPIST

## 2022-02-22 PROCEDURE — 99024 POSTOP FOLLOW-UP VISIT: CPT | Performed by: ORTHOPAEDIC SURGERY

## 2022-02-22 NOTE — PROGRESS NOTES
DAE Mccallum  Attending, Orthopaedic Surgery  [facility] and [address] [facility] [address] [facility]      ORTHOPAEDIC FOOT AND ANKLE CLINIC VISIT     Assessment:     Encounter Diagnosis   Name Primary?  Closed nondisplaced fracture of fourth metatarsal bone of right foot, initial encounter Yes       DOI 11/30/21     Plan:   · The patient verbalized understanding of exam findings and treatment plan  We engaged in the shared decision-making process and treatment options were discussed at length with the patient  Surgical and conservative management discussed today along with risks and benefits  · Summer Burn is now 3 months out from her initial injury date and x-rays obtained today demonstrates complete healing of 4th metatarsal and calcaneus fractures  · Plan provided to begin weightbearing in cam boot, and plan to wean cam boot to supportive sneaker  · Continue vitamin D3 and calcium  · Discontinue ASA  · Continue recommended compression stocking of 20-30 mm hg    · Weightbearing x-rays of the right foot needed at next visit  Return in about 4 weeks (around 3/22/2022) for Fracture Care F/U, Recheck, Weightbearing x-ray's, right foot  History of Present Illness:   Chief Complaint:   Chief Complaint   Patient presents with    Right [facility] Amberly is a 61 y o  female who is being seen in follow-up for Right closed nondisplaced fracture of fourth metatarsal bone and closed displaced fracture of right calcaneus  When we last saw she we recommended nonweightbearing in cam boot, continue Pt  Pain has  improved  Residual pain is localized at 4th metatarsal and calcaneus with minimal radiating and described as sharp and severe  Patient notes she is doing well and is attending PT  She notes having several nosebleed and is concern if that is from the ASA she is taking while being nonweightbearing        Pain/symptom timing:  Worse during the day when active  Pain/symptom context: Worse with activites and work  Pain/symptom modifying factors:  Rest makes better, activities make worse  Pain/symptom associated signs/symptoms: none    Prior treatment   · NSAIDsNo   · Injections No   · Bracing/Orthotics Yes    · Physical Therapy Yes     Orthopedic Surgical History:   See Below    Past Medical, Surgical and Social History:  Past Medical History:  has a past medical history of Bilateral breast cysts (2019 r), Cervical spinal stenosis (), Liver enzyme elevation, and Migraines  Problem List: does not have any pertinent problems on file  Past Surgical History:  has a past surgical history that includes Ovarian cyst surgery and  section  Family History: family history includes Bipolar disorder in her sister; Cervical cancer in her maternal grandmother; Dementia in her father; Hypertension in her daughter; No Known Problems in her mother; Parkinsonism in her father  Social History:  reports that she quit smoking about 24 years ago  She has never used smokeless tobacco  She reports that she does not drink alcohol and does not use drugs  Current Medications: has a current medication list which includes the following prescription(s): aspirin, dexamethasone, erenumab-aooe, gabapentin, levothyroxine, omeprazole, ondansetron, prochlorperazine, sumatriptan, sumatriptan succinate, and topiramate  Allergies: has No Known Allergies       Review of Systems:  General- denies fever/chills  HEENT- denies hearing loss or sore throat  Eyes- denies eye pain or visual disturbances, denies red eyes  Respiratory- denies cough or SOB  Cardio- denies chest pain or palpitations  GI- denies abdominal pain  Endocrine- denies urinary frequency  Urinary- denies pain with urination  Musculoskeletal- Negative except noted above  Skin- denies rashes or wounds  Neurological- denies dizziness or headache  Psychiatric- denies anxiety or difficulty concentrating    Physical Exam:   Ht 4' 11" (1 499 m)   Wt 61 2 kg (135 lb)   BMI 27 27 kg/m²   General/Constitutional: No apparent distress: well-nourished and well developed  Eyes: normal ocular motion  Lymphatic: No appreciable lymphadenopathy  Respiratory: Non-labored breathing  Vascular: No edema, swelling or tenderness, except as noted in detailed exam   Integumentary: No impressive skin lesions present, except as noted in detailed exam   Neuro: No ataxia or tremors noted  Psych: Normal mood and affect, oriented to person, place and time  Appropriate affect  Musculoskeletal: Normal, except as noted in detailed exam and in HPI  Examination    Right    Gait Antalgic   Musculoskeletal Tender to palpation at 4th metatarsal and calcaneus    Skin Normal       Nails Normal    Range of Motion  Unable to assess due to injury degrees dorsiflexion, unable to assess due to injury degrees plantarflexion  Subtalar motion: unable to assess     Stability Stable    Muscle Strength 5/5 tibialis anterior  5/5 gastrocnemius-soleus  5/5 posterior tibialis  5/5 peroneal/eversion strength  5/5 EHL  5/5 FHL    Neurologic Normal    Sensation  Intact to light touch throughout sural, saphenous, superficial peroneal, deep peroneal and medial/lateral plantar nerve distributions  Marianna-Faye 5 07 filament (10g) testing  deferred  Cardiovascular Brisk capillary refill < 2 seconds,intact DP and PT pulses    Special Tests None      Imaging Studies:     3 views of the Right foot were obtained, reviewed and interpreted independently which demonstrate healed 4th metatarsal fracture and calcaneus fracture  Reviewed by me personally  Thurman Pattee Lachman, MD  Foot & Ankle Surgery   Department of [address] [place] [address]      I personally performed the service  Thurman Pattee Lachman, MD    Scribe Attestation    I,:  Oscar Munson MA am acting as a scribe while in the presence of the attending physician :       I,:  Tim Quintero MD personally performed the services described in this documentation    as scribed in my presence :

## 2022-02-22 NOTE — TELEPHONE ENCOUNTER
[employer] placed in the mail [employer] safety net application to patient   Will call patient to explain program

## 2022-02-22 NOTE — PATIENT INSTRUCTIONS
2 days of partial weight bearing 50%  Then, 2 days of partial weight bearing 75%  Then, 2 days of partial weight bearing 90%  Then full weight bearing in the boot and wean your crutches/rolling walker  Then two weeks of WBAT in the CAM boot    You may begin weaning your boot and transitioning to a sneaker 3/14  It is important to do this gradually to avoid aggravating the healing process  1  3/14, you may come out of the boot into a sneaker for 2 hours  2  3/15, you may come out of the boot into a sneaker for 4 hours,  3  The next day, you may come out of the boot into a sneaker for 6 hours  4  Continue this (adding 2 hours per day) as you tolerate  For example, if you do 6 hours out of the boot into a sneaker and your foot swells more than usual at night and it is difficult to control the discomfort, do not advance to 8 hours the next day, stay at 6 hours until you are able to tolerate it  Elevation, Ice and tylenol and staying off of it at night will be important to aide in this transition out of the boot  Swelling and soreness are normal as you begin to do more with the injured leg  Recommend taking the following supplements: Vitamin D3-4000 units per day and Calcium 1200 mg per day  This will help with bone healing  Compression stocking should continue for swelling control      Return to clinic in 4 weeks for repeat Xrays

## 2022-02-22 NOTE — PROGRESS NOTES
Daily Note     Today's date: 2022  Patient name: Nicole Chapman  : 1965  MRN: [id number]  Referring provider: Gela Banegas MD  Dx:   Encounter Diagnosis     ICD-10-CM    1  Closed displaced fracture of right calcaneus with routine healing, unspecified portion of calcaneus, subsequent encounter  S92 001D    2  Stiffness of right ankle joint  M25 671    3  Abnormality of gait  R26 9    4  Balance problem  R26 89        Start Time:   Stop Time:   Total time in clinic (min): 45 minutes    Subjective: Patient has followed up with the doctor and has been cleared to being weaning into weight bearing  Today is is allowed to bear 50% BW  She feels good about this  Objective: See treatment diary below      Assessment: Tolerated treatment well  Patient would benefit from continued PT  Patient is not able to bear 50% weight on her R LE today  Used the biodex for visual feedback on how much weight this is and she did have a difficult time achieving the 50%, but with some practice was able to  She will progress per protocol and at next visit will be at 75%  She has not increase in pain following return to [facility] today  Plan: Continue per plan of care  Progress treatment as tolerated  Progress treament per protocol        Diagnosis: R calcaneal fracture (21) with non-operative healing   Precautions: return to weight bearing protocol attached to chart   Primary Goals: ankle ROM, strength, gait, be able to take care of her grandchildren   *asterisks by exercise = given for HEP   Manuals  2/3 2/8 2/15 2/17   R ankle PROM LCB LCB LCB RS LCB   Ankle isometrics                                There Ex        bike Upright bike 5 mins Upright bike 5 mins in boot Upright bike 5 mins in boot Upright bike 5 mins in boot Upright bike 5 mins in boot   Ankle ABC's*        Ankle pumps*        Gastroc stretch* 10x10" with strap 10x10" with strap 10x10" with strap 10x10" with strap 10x10" with strap Soleus stretch 10x10" with strap 10x10" with strap 10x10" with strap 10x10" with strap 10x10" with strap   SLR 3-way  1 5# 2x10 preston      Ankle TB 4-way GTB 30xea GTB 30x ea GTB 30x ea GTB 30xea GTB 30xea   Bridges from p-ball  3x10 DL 2x10 SL DL 3x10    Hamstring curl        Seated INV/EV slides        Frog pickup         Seated HR/TR 30x ea (focus on weight going through R LE) 2x10 preston with towel cushion  2x10 b/l with towel cushion with slow eccentric 2x10 b/l with towel cushion with slow eccentric   Neuro Re-Ed        Seated BAPS  20x AP/ML & circles                                                                                               Re-evaluation     LCB       Ther Act            weight bearing on biodex 50%                        Modalities             Ice PRN

## 2022-02-22 NOTE — TELEPHONE ENCOUNTER
Pt made aware of below  States that [employer] is almost $500, she can't afford that  She is agreeable to looking into pt assistance  Social work-can you assist with pt assistance    Sumatriptan helps that she doesn't get them as bad that she vomits and doesn't need to go to the hospital   It does take migraine away, but she is getting migraines 2-3 times a week for the most part  So far, this week she has not had one  She has tried compazine, and she does not feel it was helpful  She is agreeable to muscle relaxer or toradol    Please advise

## 2022-02-22 NOTE — TELEPHONE ENCOUNTER
If she is otherwise not taking daily NSAIDs or aspirin for other reasons, she could take toradol 10 mg po in moderation, but looks like on review of records she is taking asa 81 mg BID?     If so she could use cyclobenzaprine 5 mg for migraines prior to bed to see if this helps a bit

## 2022-02-23 RX ORDER — KETOROLAC TROMETHAMINE 10 MG/1
10 TABLET, FILM COATED ORAL DAILY PRN
Qty: 10 TABLET | Refills: 0 | Status: SHIPPED | OUTPATIENT
Start: 2022-02-23

## 2022-02-23 NOTE — TELEPHONE ENCOUNTER
new asked if PA was completed for [employer]    called pharm, PA not required, she has a high deductible   new made aware of above

## 2022-02-23 NOTE — TELEPHONE ENCOUNTER
pt made aware of below  she states that the dr told her taht she could stop taking asa bid  she was taking this for her broken foot  is not taking any NSAIDS  she is agreeable to toradol  Please send to honey on file  Call then transferred to [facility]

## 2022-02-24 ENCOUNTER — APPOINTMENT (OUTPATIENT)
Dept: PHYSICAL THERAPY | Facility: CLINIC | Age: 57
End: 2022-02-24
Payer: MEDICARE

## 2022-03-01 ENCOUNTER — OFFICE VISIT (OUTPATIENT)
Dept: PHYSICAL THERAPY | Facility: CLINIC | Age: 57
End: 2022-03-01
Payer: MEDICARE

## 2022-03-01 DIAGNOSIS — R26.9 ABNORMALITY OF GAIT: ICD-10-CM

## 2022-03-01 DIAGNOSIS — S92.001D CLOSED DISPLACED FRACTURE OF RIGHT CALCANEUS WITH ROUTINE HEALING, UNSPECIFIED PORTION OF CALCANEUS, SUBSEQUENT ENCOUNTER: Primary | ICD-10-CM

## 2022-03-01 DIAGNOSIS — R26.89 BALANCE PROBLEM: ICD-10-CM

## 2022-03-01 DIAGNOSIS — M25.671 STIFFNESS OF RIGHT ANKLE JOINT: ICD-10-CM

## 2022-03-01 PROCEDURE — 97110 THERAPEUTIC EXERCISES: CPT | Performed by: PHYSICAL THERAPIST

## 2022-03-01 PROCEDURE — 97530 THERAPEUTIC ACTIVITIES: CPT | Performed by: PHYSICAL THERAPIST

## 2022-03-01 NOTE — PROGRESS NOTES
Daily Note     Today's date: 3/1/2022  Patient name: Doug Cunha  : 1965  MRN: [id number]  Referring provider: Migel Albarado MD  Dx:   Encounter Diagnosis     ICD-10-CM    1  Closed displaced fracture of right calcaneus with routine healing, unspecified portion of calcaneus, subsequent encounter  S92 001D    2  Stiffness of right ankle joint  M25 671    3  Abnormality of gait  R26 9    4  Balance problem  R26 89        Start Time: 1750  Stop Time: 184  Total time in clinic (min): 55 minutes    Subjective: Patient reports that her medial ankle where she bumped it on the coffee table is feel better since she has been icing it  Says that he has been practicing putting weight through her R foot more  Objective: See treatment diary below      Assessment: Tolerated treatment well  Patient demonstrated fatigue post treatment, exhibited good technique with therapeutic exercises and would benefit from continued PT  Patient was able to tolerate primarily weight bearing exercises throughout session tonight with no increase in pain or soreness from baseline  She was able to progress to more functional exercises such as squatting and step ups with her boot on  Also practiced gait with SPC today and she will look into getting a cane for home use as we transition out of the RW  She is doing very well  Plan: Continue per plan of care  Progress treatment as tolerated  Progress treament per protocol        Diagnosis: R calcaneal fracture (21) with non-operative healing   Precautions: return to weight bearing protocol attached to chart   Primary Goals: ankle ROM, strength, gait, be able to take care of her grandchildren   *asterisks by exercise = given for HEP   Manuals 2/22 3/1 2/8 2/15 2/17   R ankle PROM LCB LCB LCB RS LCB   Ankle isometrics                                There Ex        bike Upright bike 5 mins Upright bike 5 mins Upright bike 5 mins in boot Upright bike 5 mins in boot Upright bike 5 mins in boot   Ankle ABC's*        Ankle pumps*        Gastroc stretch* 10x10" with strap  10x10" with strap 10x10" with strap 10x10" with strap   Soleus stretch 10x10" with strap  10x10" with strap 10x10" with strap 10x10" with strap   SLR 3-way        Ankle TB 4-way GTB 30xea  GTB 30x ea GTB 30xea GTB 30xea   Bridges from p-ball   2x10 SL DL 3x10    TRX squat  2x10       sidestepping  RTB 4 laps at mirror      Frog pickup         Seated HR/TR 30x ea (focus on weight going through R LE) 10# on R knee, 30x  2x10 b/l with towel cushion with slow eccentric 2x10 b/l with towel cushion with slow eccentric   Neuro Re-Ed        Seated BAPS                                                                                                 Re-evaluation     LCB       Ther Act            weight bearing on biodex 50%   100% BW fwd/rev, lat       Step ups  4" 20x fwd/lat with UE support      gait training with SPC    5 mins         Modalities             Ice PRN

## 2022-03-02 NOTE — TELEPHONE ENCOUNTER
MSW phoned patient who informed that she received application to apply for [employer]  She is not at her house as she is staying with her daughter  She will return home on Friday  Okay for BEATRICE to follow up with her next Monday

## 2022-03-03 ENCOUNTER — EVALUATION (OUTPATIENT)
Dept: PHYSICAL THERAPY | Facility: CLINIC | Age: 57
End: 2022-03-03
Payer: MEDICARE

## 2022-03-03 DIAGNOSIS — R26.9 ABNORMALITY OF GAIT: ICD-10-CM

## 2022-03-03 DIAGNOSIS — R26.89 BALANCE PROBLEM: ICD-10-CM

## 2022-03-03 DIAGNOSIS — M25.671 STIFFNESS OF RIGHT ANKLE JOINT: ICD-10-CM

## 2022-03-03 DIAGNOSIS — S92.001D CLOSED DISPLACED FRACTURE OF RIGHT CALCANEUS WITH ROUTINE HEALING, UNSPECIFIED PORTION OF CALCANEUS, SUBSEQUENT ENCOUNTER: Primary | ICD-10-CM

## 2022-03-03 PROCEDURE — 97110 THERAPEUTIC EXERCISES: CPT | Performed by: PHYSICAL THERAPIST

## 2022-03-03 PROCEDURE — 97140 MANUAL THERAPY 1/> REGIONS: CPT | Performed by: PHYSICAL THERAPIST

## 2022-03-03 PROCEDURE — 97112 NEUROMUSCULAR REEDUCATION: CPT | Performed by: PHYSICAL THERAPIST

## 2022-03-03 NOTE — PROGRESS NOTES
PT Re-Evaluation     Today's date: 3/3/2022  Patient name: Lala Lloyd  : 1965  MRN: [id number]  Referring provider: Jackelin Espinal MD  Dx:   Encounter Diagnosis     ICD-10-CM    1  Closed displaced fracture of right calcaneus with routine healing, unspecified portion of calcaneus, subsequent encounter  S92 001D    2  Stiffness of right ankle joint  M25 671    3  Abnormality of gait  R26 9    4  Balance problem  R26 89        Start Time:   Stop Time:   Total time in clinic (min): 45 minutes    Assessment  Assessment details: Lala Lloyd has been compliant with attending PT and home exercise program since initial eval  She has been compliant with her weight bearing precautions and is now able to bear full weight through her affected LE and is using a SPC for mobility  Pablo Ventura has made improvements in subjective and objective data since initial eval but continues to have limitations compared to prior level of function  Her ankle strength and motion has improved  ROM is equal to her unaffected LE, however strength is decreased  She has no pain with weight bearing and will being to wean out of the boot as of 3/14 per protocol  Pablo Ventura continues to have deficits in the above listed impairments and would benefit from additional skilled PT to address these deficits to return to prior level of function      Impairments: abnormal coordination, abnormal gait, activity intolerance, impaired balance, impaired physical strength, lacks appropriate home exercise program and weight-bearing intolerance    Symptom irritability: lowUnderstanding of Dx/Px/POC: good   Prognosis: fair    Goals  Patient will be independent with home exercise program  (met)  Patient will be able to manage symptoms independently  (progressing)    (STG) Impairment Goals 4-6 weeks  - Improve ankle AROM to equal to the unaffected lower extremity (met)  - Increase hip strength to 5/5 throughout (progressing)  - Increase ankle strength to 5/5 throughout (progressing)  - Patient will be able to walk with no gait abnormalities or pain (progressing)    (LTG) Functional Goals 6-8 weeks  - Return to Prior Level of Function (progressing)  - Increase Functional Status Measure (FOTO) to: predicted outcome (met)  - Patient will be independent with HEP (progressing)  - Patient will be able to squat without increased pain/compensation/difficulty (progressing)  - Patient will be able to perform sit to stand without increased pain/compensation/difficulty  (progressing)  - Patient will be able to ascend and descend stairs without increased pain/compensation/difficulty  (progressing)  - Patient will be able play with her grandchildren without limitation (progressing)      Plan  Patient would benefit from: skilled physical therapy  Referral necessary: No  Planned modality interventions: cryotherapy and thermotherapy: hydrocollator packs  Planned therapy interventions: activity modification, balance/weight bearing training, body mechanics training, flexibility, functional ROM exercises, gait training, home exercise program, therapeutic exercise, therapeutic activities, stretching, strengthening, postural training, patient education, neuromuscular re-education, manual therapy and joint mobilization  Frequency: 2x week  Duration in weeks: 6  Treatment plan discussed with: patient        Subjective Evaluation    History of Present Illness  Mechanism of injury: WORK/SCHOOL: Patient is on disability due to her migraines  HOME LIFE: Patient lives with her mother and nephew  HOBBIES/EXERCISE: Patient enjoys watching her granddaughters (4 and 8) which keeps her busy  PLOF: Patient reports that both of her ankles would give out (R>L) when she would go up and down the stairs which she did have some PT for in the past      HISTORY OF CURRENT INJURY: Patient reports that she was helping her sister on 11/30/21 when she misjudged her last 2 steps and came down on her heel   She was unable to walk on it  She was in a cast for a few weeks and recently transitioned into a boot for about 2 weeks now  She is still NWB, but has another appointment on   She reports having no pain at this time and that it "feels really good"  She has been crawling up the stairs and coming down on her bottom to get upstairs  PAIN LOCATION/DESCRIPTORS: when she did have pain it was medial foot near the heel but she has not has pain recently  AGGRAVATING FACTORS: nothing she can think of   EASES: not using ice, heat or any pain meds   DAY PATTERN: sleeping through the night well  IMAGING: Pt reports that Dr Faustino Francis told her it is well healing   PATIENT GOALS: Patient would like to be able to return to walking without pain and normally  Re-assessment (22): Patient reports that since beginning PT, she feels like her foot moves a lot better and it feels a lot better  Says that she is able to get in and out of the tub at her daughters house now  Swelling has decreased but it is still swelling at times  She is still NWB at this time but reports that she keeps it moving throughout the day to improve her motion  More recently, she had some pain at the dorsum of her foot near her toes and lateral foot for unknown reason  She thinks maybe because she has been moving it more  Re-assessment (3/3/22): Patient reports that now that she is able to weight-bear that she is able to go up and down the stairs, cook, clean and do her ADL's  She cannot go down her basement stairs because they are narrow  She has now transitioned from her walker to a [facility] and is doing well with this  She has some restlessness at night in her foot, but says it is not painful  Still has some swelling after PT sessions  Overall she feels about 75% back to normal      Pain  Current pain ratin  At best pain ratin  At worst pain rating: 3  Quality: just uncomfortable at times    Progression: improved    Social Support  Steps to enter house: yes  Stairs in house: yes   Lives in: multiple-level home  Lives with: adult children    Employment status: not working  Patient Goals  Patient goal: return to PLOF and be able to play with her grandchildren         Objective     Observations     Right Ankle/Foot   Positive for atrophy, effusion and trophic changes  Palpation     Additional Palpation Details  Dorsalis pedis pulse palpated, (2+)    Tenderness     Right Ankle/Foot   No tenderness       Active Range of Motion   Left Ankle/Foot   Dorsiflexion (ke): 10 degrees   Plantar flexion: 35 degrees   Inversion: 20 degrees   Eversion: 10 degrees     Right Ankle/Foot   Dorsiflexion (ke): 10 degrees   Plantar flexion: 48 degrees   Inversion: 20 degrees   Eversion: 11 degrees     Strength/Myotome Testing     Left Ankle/Foot   Normal strength    Right Ankle/Foot   Dorsiflexion: 5  Plantar flexion: 4 (not tested against gravity)  Inversion: 4+  Eversion: 4+    Additional Strength Details  Knee strength 5/5 preston   Hip strength 4+/5 preston    Swelling   Left Ankle/Foot   Metatarsal heads: 21 5 cm  Figure 8: 54 5 cm    Right Ankle/Foot   Metatarsal heads: 22 cm  Figure 8: 54 cm                 Diagnosis: R calcaneal fracture (11/30/21) with non-operative healing   Precautions: return to weight bearing protocol attached to chart   Primary Goals: ankle ROM, strength, gait, be able to take care of her grandchildren   *asterisks by exercise = given for HEP   Manuals 2/22 3/1 3/3 2/15 2/17   R ankle PROM LCB LCB LCB RS LCB   Ankle isometrics                                There Ex        bike Upright bike 5 mins Upright bike 5 mins  Upright bike 5 mins in boot Upright bike 5 mins in boot   Ankle ABC's*        Ankle pumps*        Gastroc stretch* 10x10" with strap   10x10" with strap 10x10" with strap   Soleus stretch 10x10" with strap   10x10" with strap 10x10" with strap   SLR 3-way        Ankle TB 4-way GTB 30xea   GTB 30xea GTB 30xea   Bridges from p-ball    DL 3x10    TRX squat  2x10       sidestepping  RTB 4 laps at mirror RTB 4 laps at [employer] pickup         Seated HR/TR 30x ea (focus on weight going through R LE) 10# on R knee, 30x 15# on R knee, 40x 2x10 b/l with towel cushion with slow eccentric 2x10 b/l with towel cushion with slow eccentric   Neuro Re-Ed        Seated BAPS        Wobble board   2'/2'                                                                                      Re-evaluation     LCB       Ther Act            weight bearing on biodex 50%   100% BW fwd/rev, lat       Step ups  4" 20x fwd/lat with UE support 6" 20x fwd/lat with UE support     gait training with SPC    5 mins         Modalities             Ice PRN

## 2022-03-07 DIAGNOSIS — G43.709 CHRONIC MIGRAINE WITHOUT AURA WITHOUT STATUS MIGRAINOSUS, NOT INTRACTABLE: ICD-10-CM

## 2022-03-07 RX ORDER — CEFUROXIME AXETIL 250 MG/1
TABLET ORAL
Qty: 4 ML | Refills: 6 | Status: SHIPPED | OUTPATIENT
Start: 2022-03-07 | End: 2022-05-10

## 2022-03-07 NOTE — TELEPHONE ENCOUNTER
MSW phoned patient regarding [employer] application to learn if she has any question and if interested in applying, please call back

## 2022-03-07 NOTE — TELEPHONE ENCOUNTER
Pt called c/o migraine  When did migraine start? Sat morning and all day Sunday  Denies migraine at this time  Sat, she vomited 5-6x  Sensitivity to light and sound  Sunday, no vomiting  Location/Description: throbbing and sharp pain the whole right side of back of her head and forehead    Pain scale: Sat rating 9/10  Sunday rating 7/10    Precipitating factors: unknown  Current migraine medications are confirmed as:  toradol 10 mg prn as prescribed  She took 1 tab Saturday but not effective  Took it again Sunday, but not effective  Sumatriptan 100 mg prn as prescribed  She did not take this med sat or Sunday  The last time she took 1 tab last tues or Wed  She has 2 tabs left and she wants to save this when she gets a really bad migraine  She is out of sumatriptan inj  It has been awhile since she took this inj  No available refill  Pt states that sumatriptan inj usually works  Requesting script be sent to [address] on file  She did not start [address] yet  She is in the process filling out the copay assistance form  Medications tried in the past? Tried decadron in the past but not effective  Not tried depakote or olanzapine  Pt states that she does not want to try new med  She would like a script for sumatriptan inj as this med works

## 2022-03-08 ENCOUNTER — OFFICE VISIT (OUTPATIENT)
Dept: PHYSICAL THERAPY | Facility: CLINIC | Age: 57
End: 2022-03-08
Payer: MEDICARE

## 2022-03-08 DIAGNOSIS — M25.671 STIFFNESS OF RIGHT ANKLE JOINT: Primary | ICD-10-CM

## 2022-03-08 DIAGNOSIS — R26.9 ABNORMALITY OF GAIT: ICD-10-CM

## 2022-03-08 DIAGNOSIS — R26.89 BALANCE PROBLEM: ICD-10-CM

## 2022-03-08 DIAGNOSIS — S92.001D CLOSED DISPLACED FRACTURE OF RIGHT CALCANEUS WITH ROUTINE HEALING, UNSPECIFIED PORTION OF CALCANEUS, SUBSEQUENT ENCOUNTER: ICD-10-CM

## 2022-03-08 PROCEDURE — 97530 THERAPEUTIC ACTIVITIES: CPT | Performed by: PHYSICAL THERAPIST

## 2022-03-08 PROCEDURE — 97110 THERAPEUTIC EXERCISES: CPT | Performed by: PHYSICAL THERAPIST

## 2022-03-08 PROCEDURE — 97140 MANUAL THERAPY 1/> REGIONS: CPT | Performed by: PHYSICAL THERAPIST

## 2022-03-08 NOTE — PROGRESS NOTES
Daily Note     Today's date: 3/8/2022  Patient name: Pedro Perrin  : 1965  MRN: [id number]  Referring provider: Jean Carlos Farooq MD  Dx:   Encounter Diagnosis     ICD-10-CM    1  Stiffness of right ankle joint  M25 671    2  Closed displaced fracture of right calcaneus with routine healing, unspecified portion of calcaneus, subsequent encounter  S92 001D    3  Abnormality of gait  R26 9    4  Balance problem  R26 89        Start Time: 1800  Stop Time: 1845  Total time in clinic (min): 45 minutes    Subjective: Patient reports that her medial ankle where she bumped it on the coffee table is feel better since she has been icing it  Says that he has been practicing putting weight through her R foot more  Objective: See treatment diary below      Assessment: Patient tolerated session well  She demonstrates a step to gait pattern and I instructed her in a step through for better gait mechanics  She has been trying walk without her cane but antalgia increases so I encouraged her to use her SPC to encourage proper gait mechanics  She complains of some knee pain with terminal extension so TKE was perform to work on controlled extension  She is anxious to get out of her boot  Will progress as able  Plan: Continue per plan of care  Progress treatment as tolerated  Progress treament per protocol        Diagnosis: R calcaneal fracture (21) with non-operative healing   Precautions: return to weight bearing protocol attached to chart   Primary Goals: ankle ROM, strength, gait, be able to take care of her grandchildren   *asterisks by exercise = given for HEP   Manuals 2/22 3/1 3/8 2/15 2/17   R ankle PROM LCB LCB LCB RS LCB   Ankle isometrics                                There Ex        bike Upright bike 5 mins Upright bike 5 mins Upright bike 5 mins Upright bike 5 mins in boot Upright bike 5 mins in boot   Ankle ABC's*        Ankle pumps*        Gastroc stretch* 10x10" with strap   10x10" with strap 10x10" with strap   Soleus stretch 10x10" with strap   10x10" with strap 10x10" with strap   SLR 3-way        Ankle TB 4-way GTB 30xea   GTB 30xea GTB 30xea   Bridges from p-ball    DL 3x10    TRX squat  2x10  2x10     sidestepping  RTB 4 laps at mirror RTB 4 laps at mirror     TKE   YTB 10x 5"     Seated HR/TR 30x ea (focus on weight going through R LE) 10# on R knee, 30x 15# R knee to fatigue  2x10 b/l with towel cushion with slow eccentric 2x10 b/l with towel cushion with slow eccentric   Neuro Re-Ed        Seated BAPS        Wobble board   2'/2'     Tandem walk   Attempted and painful for R knee                                                                              Re-evaluation            Ther Act            weight bearing on biodex 50%   100% BW fwd/rev, lat       Step ups  4" 20x fwd/lat with UE support 4" 20x fwd/lat with UE support     gait training with SPC    5 mins        Modalities             Ice PRN

## 2022-03-09 NOTE — TELEPHONE ENCOUNTER
Attempt #3    MSW lvm requesting call back to learn if interested in applying for [employer] pap  BEATRICE also sent to patient letter in the mail

## 2022-03-10 ENCOUNTER — OFFICE VISIT (OUTPATIENT)
Dept: PHYSICAL THERAPY | Facility: CLINIC | Age: 57
End: 2022-03-10
Payer: MEDICARE

## 2022-03-10 DIAGNOSIS — M25.671 STIFFNESS OF RIGHT ANKLE JOINT: Primary | ICD-10-CM

## 2022-03-10 DIAGNOSIS — R26.89 BALANCE PROBLEM: ICD-10-CM

## 2022-03-10 DIAGNOSIS — R26.9 ABNORMALITY OF GAIT: ICD-10-CM

## 2022-03-10 DIAGNOSIS — S92.001D CLOSED DISPLACED FRACTURE OF RIGHT CALCANEUS WITH ROUTINE HEALING, UNSPECIFIED PORTION OF CALCANEUS, SUBSEQUENT ENCOUNTER: ICD-10-CM

## 2022-03-10 PROCEDURE — 97140 MANUAL THERAPY 1/> REGIONS: CPT | Performed by: PHYSICAL THERAPIST

## 2022-03-10 PROCEDURE — 97112 NEUROMUSCULAR REEDUCATION: CPT | Performed by: PHYSICAL THERAPIST

## 2022-03-10 PROCEDURE — 97110 THERAPEUTIC EXERCISES: CPT | Performed by: PHYSICAL THERAPIST

## 2022-03-10 NOTE — PROGRESS NOTES
Daily Note     Today's date: 3/10/2022  Patient name: Suleiman Shell  : 1965  MRN: [id number]  Referring provider: Lew Leggett MD  Dx:   Encounter Diagnosis     ICD-10-CM    1  Stiffness of right ankle joint  M25 671    2  Closed displaced fracture of right calcaneus with routine healing, unspecified portion of calcaneus, subsequent encounter  S92 001D    3  Abnormality of gait  R26 9    4  Balance problem  R26 89        Start Time:   Stop Time:   Total time in clinic (min): 45 minutes    Subjective: Patient reports that her ankle was a little sore following last session but that he knee is fine  Objective: See treatment diary below      Assessment: Tolerated treatment well  Patient demonstrated fatigue post treatment and would benefit from continued PT  Patient was able to demonstrate improved gait mechanics today with SPC  Says that she has been working on this at home  She remains anxious to wean out of the boot and get back to her normal activities  She had no knee pain during todays session  Will plan to begin weaning out of her boot per protocol next week  Plan: Continue per plan of care  Progress treatment as tolerated  Progress treament per protocol        Diagnosis: R calcaneal fracture (21) with non-operative healing   Precautions: return to weight bearing protocol attached to chart   Primary Goals: ankle ROM, strength, gait, be able to take care of her grandchildren   *asterisks by exercise = given for HEP   Manuals 2/22 3/1 3/8 3/10 2/17   R ankle PROM LCB LCB LCB LCB LCB   Ankle isometrics                                There Ex        bike Upright bike 5 mins Upright bike 5 mins Upright bike 5 mins Upright bike 5 mins Upright bike 5 mins in boot   Ankle ABC's*        Ankle pumps*        Gastroc stretch* 10x10" with strap    10x10" with strap   Soleus stretch 10x10" with strap    10x10" with strap   SLR 3-way        Ankle TB 4-way GTB 30xea   GTB 30xea GTB 30xea   Bridges from p-ball        TRX squat  2x10  2x10     sidestepping  RTB 4 laps at mirror RTB 4 laps at mirror     TKE   YTB 10x 5" YTB 2x10 5"    Seated HR/TR 30x ea (focus on weight going through R LE) 10# on R knee, 30x 15# R knee to fatigue  15# R knee to fatigue  2x10 b/l with towel cushion with slow eccentric   Neuro Re-Ed        Seated BAPS        Wobble board   2'/2' 2'/2'    Tandem walk   Attempted and painful for R knee                                                                              Re-evaluation            Ther Act            weight bearing on biodex 50%   100% BW fwd/rev, lat       Step ups  4" 20x fwd/lat with UE support 4" 20x fwd/lat with UE support 4" 20x fwd/lat with UE support    gait training with SPC    5 mins        Modalities             Ice PRN

## 2022-03-15 ENCOUNTER — OFFICE VISIT (OUTPATIENT)
Dept: PHYSICAL THERAPY | Facility: CLINIC | Age: 57
End: 2022-03-15
Payer: MEDICARE

## 2022-03-15 DIAGNOSIS — G43.709 CHRONIC MIGRAINE WITHOUT AURA WITHOUT STATUS MIGRAINOSUS, NOT INTRACTABLE: ICD-10-CM

## 2022-03-15 DIAGNOSIS — R26.89 BALANCE PROBLEM: ICD-10-CM

## 2022-03-15 DIAGNOSIS — R26.9 ABNORMALITY OF GAIT: ICD-10-CM

## 2022-03-15 DIAGNOSIS — S92.001D CLOSED DISPLACED FRACTURE OF RIGHT CALCANEUS WITH ROUTINE HEALING, UNSPECIFIED PORTION OF CALCANEUS, SUBSEQUENT ENCOUNTER: ICD-10-CM

## 2022-03-15 DIAGNOSIS — M25.671 STIFFNESS OF RIGHT ANKLE JOINT: Primary | ICD-10-CM

## 2022-03-15 PROCEDURE — 97530 THERAPEUTIC ACTIVITIES: CPT | Performed by: PHYSICAL THERAPIST

## 2022-03-15 PROCEDURE — 97112 NEUROMUSCULAR REEDUCATION: CPT | Performed by: PHYSICAL THERAPIST

## 2022-03-15 RX ORDER — SUMATRIPTAN 100 MG/1
TABLET, FILM COATED ORAL
Qty: 9 TABLET | Refills: 5 | Status: SHIPPED | OUTPATIENT
Start: 2022-03-15

## 2022-03-15 NOTE — PROGRESS NOTES
Daily Note     Today's date: 3/15/2022  Patient name: Trini Goodman  : 1965  MRN: [id number]  Referring provider: Shirl Nissen, MD  Dx:   Encounter Diagnosis     ICD-10-CM    1  Stiffness of right ankle joint  M25 671    2  Closed displaced fracture of right calcaneus with routine healing, unspecified portion of calcaneus, subsequent encounter  S92 001D    3  Abnormality of gait  R26 9    4  Balance problem  R26 89        Start Time: 1800  Stop Time: 1845  Total time in clinic (min): 45 minutes    Subjective: Patient reports that today is the second day she has been weaning from her boot and that she feels very good overall  Says she does not have any pain really and that it feels good to be walking again  Objective: See treatment diary below      Assessment: Tolerated treatment well  Patient demonstrated fatigue post treatment and would benefit from continued PT  This was the first session that Phil was out of her boot and she did very well  She did have some fatigue and mild soreness come on through session which subsided with a seated rest break  I educated her on return to walking and weight bearing out of the boot and how she will have to build the endurance back up since she has been immobilized for so long  Practiced gait training which she did well with  Will continue to progress as able  Plan: Continue per plan of care  Progress treatment as tolerated         Diagnosis: R calcaneal fracture (21) with non-operative healing   Precautions: return to weight bearing protocol attached to chart-- allowed to fully weight bear now   Primary Goals: ankle ROM, strength, gait, be able to take care of her grandchildren   *asterisks by exercise = given for HEP   Manuals 2/22 3/1 3/8 3/10 3/15   R ankle PROM LCB LCB LCB LCB    Ankle isometrics                                There Ex        bike Upright bike 5 mins Upright bike 5 mins Upright bike 5 mins Upright bike 5 mins Upright bike 5 mins   Ankle ABC's*        Ankle pumps*        Gastroc stretch* 10x10" with strap    Standing 20" 4x R   Soleus stretch 10x10" with strap       SLR 3-way        Ankle TB 4-way GTB 30xea   GTB 30xea    Bridges from p-ball        TRX squat  2x10  2x10     Leg press     Heel raise: DL concentric, SL R eccentric, 15# 10x   sidestepping  RTB 4 laps at mirror RTB 4 laps at mirror     TKE   YTB 10x 5" YTB 2x10 5"    Seated HR/TR 30x ea (focus on weight going through R LE) 10# on R knee, 30x 15# R knee to fatigue  15# R knee to fatigue     Neuro Re-Ed        Seated BAPS     20x ea   Wobble board   2'/2' 2'/2' 2'/2'   Tandem walk   Attempted and painful for R knee  2 laps at mirror   Tandem stance     1' ea on foam                                                                    Re-evaluation           Ther Act          weight bearing on biodex 50%   100% BW fwd/rev, lat      Step ups  4" 20x fwd/lat with UE support 4" 20x fwd/lat with UE support 4" 20x fwd/lat with UE support 4" 30x fwd/lat with no UE support   gait training with hurdles    5 mins     4 laps at mirror focus on gait mechanics    Modalities             Ice PRN

## 2022-03-17 ENCOUNTER — OFFICE VISIT (OUTPATIENT)
Dept: PHYSICAL THERAPY | Facility: CLINIC | Age: 57
End: 2022-03-17
Payer: MEDICARE

## 2022-03-17 DIAGNOSIS — S92.001D CLOSED DISPLACED FRACTURE OF RIGHT CALCANEUS WITH ROUTINE HEALING, UNSPECIFIED PORTION OF CALCANEUS, SUBSEQUENT ENCOUNTER: ICD-10-CM

## 2022-03-17 DIAGNOSIS — R26.9 ABNORMALITY OF GAIT: ICD-10-CM

## 2022-03-17 DIAGNOSIS — M25.671 STIFFNESS OF RIGHT ANKLE JOINT: Primary | ICD-10-CM

## 2022-03-17 DIAGNOSIS — R26.89 BALANCE PROBLEM: ICD-10-CM

## 2022-03-17 PROCEDURE — 97110 THERAPEUTIC EXERCISES: CPT | Performed by: PHYSICAL THERAPIST

## 2022-03-17 PROCEDURE — 97112 NEUROMUSCULAR REEDUCATION: CPT | Performed by: PHYSICAL THERAPIST

## 2022-03-18 NOTE — PROGRESS NOTES
Daily Note     Today's date: 3/18/2022; treatment performed on 3/17/2022  Patient name: Patricia Diego  : 1965  MRN: [id number]  Referring provider: Kelsey Hendricks MD  Dx:   Encounter Diagnosis     ICD-10-CM    1  Stiffness of right ankle joint  M25 671    2  Closed displaced fracture of right calcaneus with routine healing, unspecified portion of calcaneus, subsequent encounter  S92 001D    3  Abnormality of gait  R26 9    4  Balance problem  R26 89                   Subjective: Pt notes slight increase in heel pain 2/2 increased weight bearing activities  She brought her SPC to aide in long distance ambulation  She reported and increased in edema post last treatment session that she managed with ice, compression and elevation  Objective: See treatment diary below      Assessment: Tolerated treatment good  Patient demonstrated improved stability and ability to maintain balance during wobble board activities, but remains limited with single limb stance tolerance during all other activities  Plan: Continue as per plan of care        Diagnosis: R calcaneal fracture (21) with non-operative healing   Precautions: return to weight bearing protocol attached to chart-- allowed to fully weight bear now   Primary Goals: ankle ROM, strength, gait, be able to take care of her grandchildren   *asterisks by exercise = given for HEP   Manuals 3/17 3/1 3/8 3/10 3/15   R ankle PROM AR LCB LCB LCB    Ankle isometrics                                There Ex        bike Upright bike 5 mins Upright bike 5 mins Upright bike 5 mins Upright bike 5 mins Upright bike 5 mins   Ankle ABC's*        Ankle pumps*        Gastroc stretch* Standing 20"x4 R    Standing 20" 4x R   Soleus stretch        SLR 3-way        Ankle TB 4-way    GTB 30xea    Bridges from p-ball        TRX squat  2x10  2x10     Leg press Heel raise; DL concentric, SL R eccentric, 15# 10x    Heel raise: DL concentric, SL R eccentric, 15# 10x sidestepping  RTB 4 laps at mirror RTB 4 laps at mirror     TKE   YTB 10x 5" YTB 2x10 5"    Seated HR/TR  10# on R knee, 30x 15# R knee to fatigue  15# R knee to fatigue     Neuro Re-Ed        Seated BAPS 20x ea    20x ea   Wobble board 2'/2'  2'/2' 2'/2' 2'/2'   Tandem walk 2 laps at mirror  Attempted and painful for R knee  2 laps at mirror   Tandem stance 1' ea on foam    1' ea on foam                                                                    Re-evaluation           Ther Act          weight bearing on biodex   100% BW fwd/rev, lat      Step ups 4" 30x fwd/lat w no UE support 4" 20x fwd/lat with UE support 4" 20x fwd/lat with UE support 4" 20x fwd/lat with UE support 4" 30x fwd/lat with no UE support   gait training with hurdles  4 laps at mirror focus on gait mechanics  5 mins     4 laps at mirror focus on gait mechanics    Modalities             Ice PRN

## 2022-03-19 DIAGNOSIS — K21.9 GERD (GASTROESOPHAGEAL REFLUX DISEASE): ICD-10-CM

## 2022-03-19 DIAGNOSIS — E03.9 HYPOTHYROIDISM: ICD-10-CM

## 2022-03-21 RX ORDER — OMEPRAZOLE 20 MG/1
CAPSULE, DELAYED RELEASE ORAL
Qty: 30 CAPSULE | Refills: 0 | Status: SHIPPED | OUTPATIENT
Start: 2022-03-21 | End: 2022-04-14 | Stop reason: SDUPTHER

## 2022-03-21 RX ORDER — LEVOTHYROXINE SODIUM 0.1 MG/1
TABLET ORAL
Qty: 30 TABLET | Refills: 0 | Status: SHIPPED | OUTPATIENT
Start: 2022-03-21 | End: 2022-04-14 | Stop reason: SDUPTHER

## 2022-03-22 ENCOUNTER — OFFICE VISIT (OUTPATIENT)
Dept: PHYSICAL THERAPY | Facility: CLINIC | Age: 57
End: 2022-03-22
Payer: MEDICARE

## 2022-03-22 ENCOUNTER — APPOINTMENT (OUTPATIENT)
Dept: RADIOLOGY | Facility: AMBULARY SURGERY CENTER | Age: 57
End: 2022-03-22
Attending: ORTHOPAEDIC SURGERY
Payer: MEDICARE

## 2022-03-22 ENCOUNTER — OFFICE VISIT (OUTPATIENT)
Dept: OBGYN CLINIC | Facility: CLINIC | Age: 57
End: 2022-03-22
Payer: MEDICARE

## 2022-03-22 VITALS — WEIGHT: 135 LBS | HEIGHT: 59 IN | BODY MASS INDEX: 27.21 KG/M2

## 2022-03-22 DIAGNOSIS — R26.89 BALANCE PROBLEM: ICD-10-CM

## 2022-03-22 DIAGNOSIS — S92.001D CLOSED DISPLACED FRACTURE OF RIGHT CALCANEUS WITH ROUTINE HEALING, UNSPECIFIED PORTION OF CALCANEUS, SUBSEQUENT ENCOUNTER: ICD-10-CM

## 2022-03-22 DIAGNOSIS — M25.671 STIFFNESS OF RIGHT ANKLE JOINT: Primary | ICD-10-CM

## 2022-03-22 DIAGNOSIS — S92.001A CLOSED DISPLACED FRACTURE OF RIGHT CALCANEUS, UNSPECIFIED PORTION OF CALCANEUS, INITIAL ENCOUNTER: ICD-10-CM

## 2022-03-22 DIAGNOSIS — S92.344A CLOSED NONDISPLACED FRACTURE OF FOURTH METATARSAL BONE OF RIGHT FOOT, INITIAL ENCOUNTER: ICD-10-CM

## 2022-03-22 DIAGNOSIS — S92.344A CLOSED NONDISPLACED FRACTURE OF FOURTH METATARSAL BONE OF RIGHT FOOT, INITIAL ENCOUNTER: Primary | ICD-10-CM

## 2022-03-22 DIAGNOSIS — R26.9 ABNORMALITY OF GAIT: ICD-10-CM

## 2022-03-22 PROCEDURE — 97140 MANUAL THERAPY 1/> REGIONS: CPT | Performed by: PHYSICAL THERAPIST

## 2022-03-22 PROCEDURE — 97112 NEUROMUSCULAR REEDUCATION: CPT | Performed by: PHYSICAL THERAPIST

## 2022-03-22 PROCEDURE — 99213 OFFICE O/P EST LOW 20 MIN: CPT | Performed by: ORTHOPAEDIC SURGERY

## 2022-03-22 PROCEDURE — 73630 X-RAY EXAM OF FOOT: CPT

## 2022-03-22 NOTE — PROGRESS NOTES
DAE Macario  Attending, Orthopaedic Surgery  [facility] [address] [address] [facility]      ORTHOPAEDIC FOOT AND ANKLE CLINIC VISIT     Assessment:     Encounter Diagnoses   Name Primary? 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  · No further restrictions from an orthopaedic standpoint  · May DC physical therapy when ready  · May DC Vit D and Calcium  · If her ST arthritis becomes painful for her, she would be a candidate for injections or arthrodesis as indicated  · Return to clinic PRN        History of Present Illness:   Chief Complaint:   Chief Complaint   Patient presents with    Right Himanshu Gaming is a 61 y o  female who is being seen in follow-up for Right calcaneus fracture with 4th MT fracture  When we last saw she we recommended return to ADLs  Pain has continued to improve  Residual pain is localized at heel and lateral foot with minimal radiating and described as sharp and severe        Pain/symptom timing:  Worse during the day when active  Pain/symptom context:  Worse with activites and work  Pain/symptom modifying factors:  Rest makes better, activities make worse  Pain/symptom associated signs/symptoms: none    Prior treatment   · NSAIDsYes   · Injections No   · Bracing/Orthotics Yes    · Physical Therapy Yes     Orthopedic Surgical History:   See below    Past Medical, Surgical and Social History:  Past Medical History:  has a past medical history of Bilateral breast cysts (2019 r), Cervical spinal stenosis (), Liver enzyme elevation, and Migraines  Problem List: does not have any pertinent problems on file  Past Surgical History:  has a past surgical history that includes Ovarian cyst surgery and  section  Family History: family history includes Bipolar disorder in her sister; Cervical cancer in her maternal grandmother; Dementia in her father; Hypertension in her daughter; No Known Problems in her mother; Parkinsonism in her father  Social History:  reports that she quit smoking about 24 years ago  She has never used smokeless tobacco  She reports that she does not drink alcohol and does not use drugs  Current Medications: has a current medication list which includes the following prescription(s): dexamethasone, erenumab-aooe, gabapentin, ketorolac, levothyroxine, omeprazole, prochlorperazine, sumatriptan, sumatriptan succinate, topiramate, aspirin, and ondansetron  Allergies: has No Known Allergies  Review of Systems:  General- denies fever/chills  HEENT- denies hearing loss or sore throat  Eyes- denies eye pain or visual disturbances, denies red eyes  Respiratory- denies cough or SOB  Cardio- denies chest pain or palpitations  GI- denies abdominal pain  Endocrine- denies urinary frequency  Urinary- denies pain with urination  Musculoskeletal- Negative except noted above  Skin- denies rashes or wounds  Neurological- denies dizziness or headache  Psychiatric- denies anxiety or difficulty concentrating    Physical Exam:   Ht 4' 11" (1 499 m)   Wt 61 2 kg (135 lb)   BMI 27 27 kg/m²   General/Constitutional: No apparent distress: well-nourished and well developed    Eyes: normal ocular motion  Lymphatic: No appreciable lymphadenopathy  Respiratory: Non-labored breathing  Vascular: No edema, swelling or tenderness, except as noted in detailed exam   Integumentary: No impressive skin lesions present, except as noted in detailed exam   Neuro: No ataxia or tremors noted  Psych: Normal mood and affect, oriented to person, place and time  Appropriate affect  Musculoskeletal: Normal, except as noted in detailed exam and in HPI  Examination    Right    Gait               Normal   Musculoskeletal Mild TTP at sinus tarsi    Skin Normal       Nails Normal    Range of Motion  20 degrees dorsiflexion, 30 degrees plantarflexion  Subtalar motion: 20i, 15e    Stability Stable    Muscle Strength 5/5 tibialis anterior  5/5 gastrocnemius-soleus  5/5 posterior tibialis  5/5 peroneal/eversion strength  5/5 EHL  5/5 FHL    Neurologic Normal    Sensation  Intact to light touch throughout sural, saphenous, superficial peroneal, deep peroneal and medial/lateral plantar nerve distributions  [place] 5 07 filament (10g) testing deferred  Cardiovascular Brisk capillary refill < 2 seconds,intact DP and PT pulses    Special Tests None      Imaging Studies:     3 views of the Right foot were obtained, reviewed and interpreted independently which demonstrate healed 4th MT fracture and healed calcaneus fracture without any collapse or subsidence    Reviewed by me personally  Dory Cross Lachman, MD  Foot & Ankle Surgery   Department of [address] [place] [address]      I personally performed the service  Dory Cross Lachman, MD

## 2022-03-22 NOTE — PROGRESS NOTES
Daily Note     Today's date: 3/22/2022  Patient name: Shade Moreno  : 1965  MRN: [id number]  Referring provider: Daved Cockayne, MD  Dx:   Encounter Diagnosis     ICD-10-CM    1  Stiffness of right ankle joint  M25 671    2  Closed displaced fracture of right calcaneus with routine healing, unspecified portion of calcaneus, subsequent encounter  S92 001D    3  Abnormality of gait  R26 9    4  Balance problem  R26 89        Start Time: 1400  Stop Time: 1445  Total time in clinic (min): 45 minutes    Subjective: Patient reports that she was cleared by the doctor today and is now able to drive which she is happy about  She feels she is able to walk more, but does get sore after prolonged periods for standing and walking  Objective: See treatment diary below      Assessment: Tolerated treatment well  Patient demonstrated fatigue post treatment, exhibited good technique with therapeutic exercises and would benefit from continued PT  Session focused on balance/proprioception and weight bearing exercises  Her endurance is improving and she was able to tolerate entire session without a seated rest break  Instructed her in a great toe extension stretch to help with push-off during gait  She will add with along with SLS to HEP  Will progress as able  Plan: Continue per plan of care  Progress treatment as tolerated         Diagnosis: R calcaneal fracture (21) with non-operative healing   Precautions: return to weight bearing protocol attached to chart-- allowed to fully weight bear now   Primary Goals: ankle ROM, strength, gait, be able to take care of her grandchildren   *asterisks by exercise = given for HEP   Manuals 3/17 3/22 3/8 3/10 3/15   R ankle PROM AR  LCB LCB    Ankle isometrics        Great toe extension stretch  LCB                      There Ex        bike Upright bike 5 mins Upright bike 5 mins Upright bike 5 mins Upright bike 5 mins Upright bike 5 mins   Ankle ABC's*        Ankle pumps*        Gastroc stretch* Standing 20"x4 R    Standing 20" 4x R   Soleus stretch        Plantar fascia and great toe extension stretch  10" 10x      Anterior ankle stretch  20" 4x       SLR 3-way        Ankle TB 4-way    GTB 30xea    Bridges from p-ball        TRX squat   2x10     Leg press Heel raise; DL concentric, SL R eccentric, 15# 10x Heel raise; DL concentric, SL R eccentric, 15# 30x   Heel raise: DL concentric, SL R eccentric, 15# 10x   sidestepping  X-walks OTB 2 laps RTB 4 laps at mirror     TKE   YTB 10x 5" YTB 2x10 5"    Seated HR/TR   15# R knee to fatigue  15# R knee to fatigue     Neuro Re-Ed        Seated BAPS 20x ea    20x ea   Wobble board 2'/2' 2'/2' 2'/2' 2'/2' 2'/2'   Tandem walk 2 laps at mirror 1 lap at [facility] Attempted and painful for R knee  2 laps at [facility]   Tandem stance 1' ea on foam    1' ea on foam   SLS  20" 3x on R                                                               Re-evaluation          Ther Act         weight bearing on biodex        Step ups 4" 30x fwd/lat w no UE support 6" 30x fwd/lat no UE support 4" 20x fwd/lat with UE support 4" 20x fwd/lat with UE support 4" 30x fwd/lat with no UE support   gait training with hurdles  4 laps at mirror focus on gait mechanics      4 laps at mirror focus on gait mechanics    Modalities             Ice PRN

## 2022-03-24 ENCOUNTER — OFFICE VISIT (OUTPATIENT)
Dept: PHYSICAL THERAPY | Facility: CLINIC | Age: 57
End: 2022-03-24
Payer: MEDICARE

## 2022-03-24 DIAGNOSIS — S92.001D CLOSED DISPLACED FRACTURE OF RIGHT CALCANEUS WITH ROUTINE HEALING, UNSPECIFIED PORTION OF CALCANEUS, SUBSEQUENT ENCOUNTER: ICD-10-CM

## 2022-03-24 DIAGNOSIS — M25.671 STIFFNESS OF RIGHT ANKLE JOINT: Primary | ICD-10-CM

## 2022-03-24 DIAGNOSIS — R26.9 ABNORMALITY OF GAIT: ICD-10-CM

## 2022-03-24 DIAGNOSIS — R26.89 BALANCE PROBLEM: ICD-10-CM

## 2022-03-24 PROCEDURE — 97530 THERAPEUTIC ACTIVITIES: CPT | Performed by: PHYSICAL THERAPIST

## 2022-03-24 PROCEDURE — 97140 MANUAL THERAPY 1/> REGIONS: CPT | Performed by: PHYSICAL THERAPIST

## 2022-03-24 PROCEDURE — 97112 NEUROMUSCULAR REEDUCATION: CPT | Performed by: PHYSICAL THERAPIST

## 2022-03-24 NOTE — PROGRESS NOTES
Daily Note     Today's date: 3/24/2022  Patient name: Elmo Bansal  : 1965  MRN: [id number]  Referring provider: Nicolasa Monzon MD  Dx:   Encounter Diagnosis     ICD-10-CM    1  Stiffness of right ankle joint  M25 671    2  Closed displaced fracture of right calcaneus with routine healing, unspecified portion of calcaneus, subsequent encounter  S92 001D    3  Abnormality of gait  R26 9    4  Balance problem  R26 89        Start Time: 1805  Stop Time:   Total time in clinic (min): 45 minutes    Subjective: Patient reports that she is still having some toe pain but has been trying to do her stretches and keep it moving  She had some pain when she was on her feet for a while yesterday shopping  Objective: See treatment diary below      Assessment: Tolerated treatment well  Patient exhibited good technique with therapeutic exercises and would benefit from continued PT  Session focused on great toe mobility and extension exercises  She tolerated these well  Remains challenged with balance exercises  Will continue working on gait training  Plan: Continue per plan of care  Progress treatment as tolerated         Diagnosis: R calcaneal fracture (21) with non-operative healing   Precautions: return to weight bearing protocol attached to chart-- allowed to fully weight bear now   Primary Goals: ankle ROM, strength, gait, be able to take care of her grandchildren   *asterisks by exercise = given for HEP   Manuals 3/17 3/22 3/24 3/10 3/15   R ankle PROM AR   LCB    Ankle isometrics        Great toe extension stretch  LCB      Mid foot and great toe mobilizations   LCB             There Ex        bike Upright bike 5 mins Upright bike 5 mins Upright bike 5 mins Upright bike 5 mins Upright bike 5 mins   Ankle ABC's*        Ankle pumps*        Gastroc stretch* Standing 20"x4 R  Standing 20"x4 R  Standing 20" 4x R   Soleus stretch        Plantar fascia and great toe extension stretch  10" 10x 10" 10x     Anterior ankle stretch  20" 4x  20" 4x      SLR 3-way        Ankle TB 4-way    GTB 30xea    Bridges from p-ball        TRX squat        Leg press Heel raise; DL concentric, SL R eccentric, 15# 10x Heel raise; DL concentric, SL R eccentric, 15# 30x   Heel raise: DL concentric, SL R eccentric, 15# 10x   sidestepping  X-walks OTB 2 laps      TKE    YTB 2x10 5"    lunges   R leg behind, focus on great toe extension 2x20     Seated HR/TR   biodex for weight shift visual 30x standing 15# R knee to fatigue     Neuro Re-Ed        Seated BAPS 20x ea    20x ea   Wobble board 2'/2' 2'/2' 2'/2' 2'/2' 2'/2'   Tandem walk 2 laps at mirror 1 lap at [facility]   2 laps at [facility]   Tandem stance 1' ea on foam    1' ea on foam   SLS  20" 3x on R                                                               Re-evaluation          Ther Act         weight bearing on biodex        Step ups 4" 30x fwd/lat w no UE support 6" 30x fwd/lat no UE support  4" 20x fwd/lat with UE support 4" 30x fwd/lat with no UE support   gait training with hurdles  4 laps at mirror focus on gait mechanics  5 laps at mirror focus on gait mechanics    4 laps at mirror focus on gait mechanics    Modalities             Ice PRN

## 2022-03-28 ENCOUNTER — APPOINTMENT (OUTPATIENT)
Dept: PHYSICAL THERAPY | Facility: CLINIC | Age: 57
End: 2022-03-28
Payer: MEDICARE

## 2022-03-29 ENCOUNTER — OFFICE VISIT (OUTPATIENT)
Dept: PHYSICAL THERAPY | Facility: CLINIC | Age: 57
End: 2022-03-29
Payer: MEDICARE

## 2022-03-29 ENCOUNTER — APPOINTMENT (OUTPATIENT)
Dept: PHYSICAL THERAPY | Facility: CLINIC | Age: 57
End: 2022-03-29
Payer: MEDICARE

## 2022-03-29 DIAGNOSIS — R26.9 ABNORMALITY OF GAIT: ICD-10-CM

## 2022-03-29 DIAGNOSIS — M25.671 STIFFNESS OF RIGHT ANKLE JOINT: Primary | ICD-10-CM

## 2022-03-29 DIAGNOSIS — R26.89 BALANCE PROBLEM: ICD-10-CM

## 2022-03-29 DIAGNOSIS — S92.001D CLOSED DISPLACED FRACTURE OF RIGHT CALCANEUS WITH ROUTINE HEALING, UNSPECIFIED PORTION OF CALCANEUS, SUBSEQUENT ENCOUNTER: ICD-10-CM

## 2022-03-29 PROCEDURE — 97140 MANUAL THERAPY 1/> REGIONS: CPT | Performed by: PHYSICAL THERAPIST

## 2022-03-29 PROCEDURE — 97112 NEUROMUSCULAR REEDUCATION: CPT | Performed by: PHYSICAL THERAPIST

## 2022-03-29 NOTE — PROGRESS NOTES
Daily Note     Today's date: 3/29/2022  Patient name: Radha Burns  : 1965  MRN: [id number]  Referring provider: Yulia Caceres MD  Dx:   Encounter Diagnosis     ICD-10-CM    1  Stiffness of right ankle joint  M25 671    2  Closed displaced fracture of right calcaneus with routine healing, unspecified portion of calcaneus, subsequent encounter  S92 001D    3  Abnormality of gait  R26 9    4  Balance problem  R26 89        Start Time:   Stop Time:   Total time in clinic (min): 45 minutes    Subjective: Patient reports that her foot has been feeling really good and she has been able to go shopping at the grocery store with no pain like she used to have  Objective: See treatment diary below      Assessment: Tolerated treatment well  Patient demonstrated fatigue post treatment and would benefit from continued PT  Session focused on ankle strength and stability, as well as weight shifting onto the R side with heel raises  Her balance is still limited, but improved overall  She was very challenged by the tandem stance with rebounder standing on foam  Will continue to progress as tolerated  Plan: Continue per plan of care  Progress treatment as tolerated         Diagnosis: R calcaneal fracture (21) with non-operative healing   Precautions: return to weight bearing protocol attached to chart-- allowed to fully weight bear now   Primary Goals: ankle ROM, strength, gait, be able to take care of her grandchildren   *asterisks by exercise = given for HEP   Manuals 3/17 3/22 3/24 3/29 3/15   R ankle PROM AR       Ankle isometrics        Great toe extension stretch  LCB  LCB    Mid foot and great toe mobilizations   LCB LCB            There Ex        bike Upright bike 5 mins Upright bike 5 mins Upright bike 5 mins Elliptical 5 mins Upright bike 5 mins   Ankle ABC's*        Ankle pumps*        Gastroc stretch* Standing 20"x4 R  Standing 20"x4 R  Standing 20" 4x R   Soleus stretch Plantar fascia and great toe extension stretch  10" 10x 10" 10x     Anterior ankle stretch  20" 4x  20" 4x      SLR 3-way        Ankle TB 4-way        Bridges from p-ball        TRX squat        Leg press Heel raise; DL concentric, SL R eccentric, 15# 10x Heel raise; DL concentric, SL R eccentric, 15# 30x  SL R eccentric, 15# 30x Heel raise: DL concentric, SL R eccentric, 15# 10x   sidestepping  X-walks OTB 2 laps      TKE        lunges   R leg behind, focus on great toe extension 2x20 R leg behind, focus on great toe extension 2x10    Seated HR/TR   biodex for weight shift visual 30x standing biodex for weight shift visual 20x standing    Neuro Re-Ed        Seated BAPS 20x ea    20x ea   Wobble board 2'/2' 2'/2' 2'/2' 2'/2' 2'/2'   Tandem walk 2 laps at mirror 1 lap at [facility]  3 laps on foam at [facility] 2 laps at [facility]   Tandem stance 1' ea on foam    1' ea on foam   SLS  20" 3x on R      rebounder    Tandem on foam 20x red                                                     Re-evaluation          Ther Act         weight bearing on biodex        Step ups 4" 30x fwd/lat w no UE support 6" 30x fwd/lat no UE support  6" + foam fwd/lat 10x 4" 30x fwd/lat with no UE support   gait training with hurdles  4 laps at mirror focus on gait mechanics  5 laps at mirror focus on gait mechanics 5 laps at mirror focus on gait mechanics  4 laps at mirror focus on gait mechanics    Modalities             Ice PRN

## 2022-03-31 ENCOUNTER — OFFICE VISIT (OUTPATIENT)
Dept: PHYSICAL THERAPY | Facility: CLINIC | Age: 57
End: 2022-03-31
Payer: MEDICARE

## 2022-03-31 DIAGNOSIS — R26.89 BALANCE PROBLEM: ICD-10-CM

## 2022-03-31 DIAGNOSIS — R26.9 ABNORMALITY OF GAIT: ICD-10-CM

## 2022-03-31 DIAGNOSIS — S92.001D CLOSED DISPLACED FRACTURE OF RIGHT CALCANEUS WITH ROUTINE HEALING, UNSPECIFIED PORTION OF CALCANEUS, SUBSEQUENT ENCOUNTER: ICD-10-CM

## 2022-03-31 DIAGNOSIS — M25.671 STIFFNESS OF RIGHT ANKLE JOINT: Primary | ICD-10-CM

## 2022-03-31 PROCEDURE — 97112 NEUROMUSCULAR REEDUCATION: CPT | Performed by: PHYSICAL THERAPIST

## 2022-03-31 NOTE — PROGRESS NOTES
Daily Note     Today's date: 3/31/2022  Patient name: Trini Goodman  : 1965  MRN: [id number]  Referring provider: Shirl Nissen, MD  Dx:   Encounter Diagnosis     ICD-10-CM    1  Stiffness of right ankle joint  M25 671    2  Closed displaced fracture of right calcaneus with routine healing, unspecified portion of calcaneus, subsequent encounter  S92 001D    3  Abnormality of gait  R26 9    4  Balance problem  R26 89        Start Time: 1330  Stop Time: 1415  Total time in clinic (min): 45 minutes    Subjective: Patient reports that her ankle felt good following last session  Says she had a migraine yesterday and is still feeling a little bit off today  Objective: See treatment diary below      Assessment: Tolerated treatment well  Patient demonstrated fatigue post treatment, exhibited good technique with therapeutic exercises and would benefit from continued PT  Session focused on closed chain strengthening, balance and proprioception  Her gait mechanics overall have significantly improved, however she still walks with slightly antalgic gait  Remains very challenged by balance exercises, particularly the rebounder  Will continue to progress as able  Plan: Continue per plan of care  Progress treatment as tolerated         Diagnosis: R calcaneal fracture (21) with non-operative healing   Precautions: return to weight bearing protocol attached to chart-- allowed to fully weight bear now   Primary Goals: ankle ROM, strength, gait, be able to take care of her grandchildren   *asterisks by exercise = given for HEP   Manuals 3/17 3/22 3/24 3/29 3/31   R ankle PROM AR       Ankle isometrics        Great toe extension stretch  LCB  LCB    Mid foot and great toe mobilizations   LCB LCB            There Ex        bike Upright bike 5 mins Upright bike 5 mins Upright bike 5 mins Elliptical 5 mins Upright bike 5 mins   Ankle ABC's*        Ankle pumps*        Gastroc stretch* Standing 20"x4 R Standing 20"x4 R     Soleus stretch        Plantar fascia and great toe extension stretch  10" 10x 10" 10x     Anterior ankle stretch  20" 4x  20" 4x      SLR 3-way        Ankle TB 4-way        Bridges from p-ball        TRX squat        Leg press Heel raise; DL concentric, SL R eccentric, 15# 10x Heel raise; DL concentric, SL R eccentric, 15# 30x  SL R eccentric, 15# 30x SL R 15# 3x10   sidestepping  X-walks OTB 2 laps   GTB 1 lap   TKE        lunges   R leg behind, focus on great toe extension 2x20 R leg behind, focus on great toe extension 2x10 R leg behind, focus on great toe extension 2x10   Seated HR/TR   biodex for weight shift visual 30x standing biodex for weight shift visual 20x standing biodex for weight shift visual 20x standing   Neuro Re-Ed        Seated BAPS 20x ea       Wobble board 2'/2' 2'/2' 2'/2' 2'/2' 2'/2'   Tandem walk 2 laps at mirror 1 lap at [facility]  3 laps on foam at [facility] 3 laps on foam at [facility]   Tandem stance 1' ea on foam       SLS  20" 3x on R      rebounder    Tandem on foam 20x red Tandem on foam 20x red    SLS 20x red with cont TT                                                    Re-evaluation          Ther Act         weight bearing on biodex        Step ups 4" 30x fwd/lat w no UE support 6" 30x fwd/lat no UE support  6" + foam fwd/lat 10x 6" + foam fwd/lat 2x10   gait training with hurdles  4 laps at mirror focus on gait mechanics  5 laps at mirror focus on gait mechanics 5 laps at mirror focus on gait mechanics 4 laps at mirror focus on gait mechanics   Modalities            Ice PRN

## 2022-04-04 ENCOUNTER — TELEPHONE (OUTPATIENT)
Dept: OBGYN CLINIC | Facility: HOSPITAL | Age: 57
End: 2022-04-04

## 2022-04-04 NOTE — TELEPHONE ENCOUNTER
Patient asking if you still want a Dexascan? Will need RX if you do   Thanks
Patient made aware and verbalized understanding
She should reach out to her PCP for this order as they would be the ones to start her on a bisphosphonate or Prolia if necessary 
f/u w/pmd

## 2022-04-05 ENCOUNTER — APPOINTMENT (OUTPATIENT)
Dept: PHYSICAL THERAPY | Facility: CLINIC | Age: 57
End: 2022-04-05
Payer: MEDICARE

## 2022-04-07 ENCOUNTER — EVALUATION (OUTPATIENT)
Dept: PHYSICAL THERAPY | Facility: CLINIC | Age: 57
End: 2022-04-07
Payer: MEDICARE

## 2022-04-07 DIAGNOSIS — M25.671 STIFFNESS OF RIGHT ANKLE JOINT: Primary | ICD-10-CM

## 2022-04-07 DIAGNOSIS — R26.9 ABNORMALITY OF GAIT: ICD-10-CM

## 2022-04-07 DIAGNOSIS — R26.89 BALANCE PROBLEM: ICD-10-CM

## 2022-04-07 DIAGNOSIS — S92.001D CLOSED DISPLACED FRACTURE OF RIGHT CALCANEUS WITH ROUTINE HEALING, UNSPECIFIED PORTION OF CALCANEUS, SUBSEQUENT ENCOUNTER: ICD-10-CM

## 2022-04-07 PROCEDURE — 97112 NEUROMUSCULAR REEDUCATION: CPT | Performed by: PHYSICAL THERAPIST

## 2022-04-07 PROCEDURE — 97110 THERAPEUTIC EXERCISES: CPT | Performed by: PHYSICAL THERAPIST

## 2022-04-07 NOTE — PROGRESS NOTES
PT Re-Evaluation     Today's date: 2022  Patient name: Chrissie Primrose  : 1965  MRN: [id number]  Referring provider: Cristine Armenta MD  Dx:   Encounter Diagnosis     ICD-10-CM    1  Stiffness of right ankle joint  M25 671    2  Closed displaced fracture of right calcaneus with routine healing, unspecified portion of calcaneus, subsequent encounter  S92 001D    3  Abnormality of gait  R26 9    4  Balance problem  R26 89        Start Time:   Stop Time: 1900  Total time in clinic (min): 45 minutes    Assessment  Assessment details: Chrissie Primrose has been compliant with attending PT and home exercise program since initial eval  Volodymyr Kruger has made improvements in subjective and objective data since initial eval but continues to have limitations compared to prior level of function  Her ROM is equal to her unaffected LE, however strength is decreased, primarily in the ankle plantarflexors  Gait remains mildly antalgic, with decreased stance time and push-off on the R LE  Volodymyr Kruger continues to have deficits in the above listed impairments and would benefit from additional skilled PT for a few more weeks to address these deficits to return to prior level of function      Impairments: abnormal coordination, abnormal gait, activity intolerance, impaired balance, impaired physical strength, lacks appropriate home exercise program and weight-bearing intolerance    Symptom irritability: lowUnderstanding of Dx/Px/POC: good   Prognosis: good    Goals  Patient will be independent with home exercise program  (met)  Patient will be able to manage symptoms independently  (progressing)    (STG) Impairment Goals 4-6 weeks  - Improve ankle AROM to equal to the unaffected lower extremity (met)  - Increase hip strength to 5/5 throughout (progressing)  - Increase ankle strength to 5/5 throughout (progressing)  - Patient will be able to walk with no gait abnormalities or pain (progressing)    (LTG) Functional Goals 6-8 weeks  - Return to Prior Level of Function (mostly met)  - Increase Functional Status Measure (FOTO) to: predicted outcome (met)  - Patient will be independent with HEP (met)  - Patient will be able to squat without increased pain/compensation/difficulty (met)  - Patient will be able to perform sit to stand without increased pain/compensation/difficulty  (met)  - Patient will be able to ascend and descend stairs without increased pain/compensation/difficulty  (met)  - Patient will be able play with her grandchildren without limitation (met)      Plan  Patient would benefit from: skilled physical therapy  Referral necessary: No  Planned modality interventions: cryotherapy and thermotherapy: hydrocollator packs  Planned therapy interventions: activity modification, balance/weight bearing training, body mechanics training, flexibility, functional ROM exercises, gait training, home exercise program, therapeutic exercise, therapeutic activities, stretching, strengthening, postural training, patient education, neuromuscular re-education, manual therapy and joint mobilization  Frequency: 2x week  Duration in weeks: 4  Treatment plan discussed with: patient        Subjective Evaluation    History of Present Illness  Mechanism of injury: WORK/SCHOOL: Patient is on disability due to her migraines  HOME LIFE: Patient lives with her mother and nephew  HOBBIES/EXERCISE: Patient enjoys watching her granddaughters (4 and 8) which keeps her busy  PLOF: Patient reports that both of her ankles would give out (R>L) when she would go up and down the stairs which she did have some PT for in the past      HISTORY OF CURRENT INJURY: Patient reports that she was helping her sister on 11/30/21 when she misjudged her last 2 steps and came down on her heel  She was unable to walk on it  She was in a cast for a few weeks and recently transitioned into a boot for about 2 weeks now  She is still NWB, but has another appointment on 1/25   She reports having no pain at this time and that it "feels really good"  She has been crawling up the stairs and coming down on her bottom to get upstairs  PAIN LOCATION/DESCRIPTORS: when she did have pain it was medial foot near the heel but she has not has pain recently  AGGRAVATING FACTORS: nothing she can think of   EASES: not using ice, heat or any pain meds   DAY PATTERN: sleeping through the night well  IMAGING: Pt reports that Dr Papa Humphries told her it is well healing   PATIENT GOALS: Patient would like to be able to return to walking without pain and normally  Re-assessment (2/8/22): Patient reports that since beginning PT, she feels like her foot moves a lot better and it feels a lot better  Says that she is able to get in and out of the tub at her daughters house now  Swelling has decreased but it is still swelling at times  She is still NWB at this time but reports that she keeps it moving throughout the day to improve her motion  More recently, she had some pain at the dorsum of her foot near her toes and lateral foot for unknown reason  She thinks maybe because she has been moving it more  Re-assessment (3/3/22): Patient reports that now that she is able to weight-bear that she is able to go up and down the stairs, cook, clean and do her ADL's  She cannot go down her basement stairs because they are narrow  She has now transitioned from her walker to a [facility] and is doing well with this  She has some restlessness at night in her foot, but says it is not painful  Still has some swelling after PT sessions  Overall she feels about 75% back to normal      Re-assessment (4/7/22): Patient reports that her foot feels pretty much back to normal and that she does not have much pain at all  Says that she has been walking on it more  She is able to go up and down the steps with no issues  She is no longer using the cane   She feels that her foot is still a little swollen at the ankle but that it does not swell up when she walks for longer periods like it used to  Overall she feels about 80% back to normal  She feels that she still walks with a limp a little bit  Pain  Current pain ratin  At best pain ratin  At worst pain ratin  Quality: just uncomfortable at times  Progression: improved    Social Support  Steps to enter house: yes  Stairs in house: yes   Lives in: multiple-level home  Lives with: adult children    Employment status: not working  Patient Goals  Patient goal: return to PLOF and be able to play with her grandchildren- met        Objective     Observations     Right Ankle/Foot   Positive for atrophy  Negative for effusion and trophic changes  Tenderness     Right Ankle/Foot   No tenderness       Active Range of Motion   Left Ankle/Foot   Normal active range of motion  Dorsiflexion (ke): 10 degrees   Plantar flexion: 35 degrees   Inversion: 20 degrees   Eversion: 10 degrees     Right Ankle/Foot   Normal active range of motion  Dorsiflexion (ke): 10 degrees   Plantar flexion: 48 degrees   Inversion: 20 degrees   Eversion: 11 degrees     Strength/Myotome Testing     Left Ankle/Foot   Normal strength    Right Ankle/Foot   Dorsiflexion: 5  Plantar flexion: 3- (unable to perform full range against gravity)  Inversion: 4+  Eversion: 4+    Additional Strength Details  Knee strength 5/5 [place]   Hip strength 4+/5 [place]                 Diagnosis: R calcaneal fracture (21) with non-operative healing   Precautions: return to weight bearing protocol attached to chart-- allowed to fully weight bear now   Primary Goals: ankle ROM, strength, gait, be able to take care of her grandchildren   *asterisks by exercise = given for HEP   Manuals 4/7 3/22 3/24 3/29 3/31   R ankle PROM        Ankle isometrics        Great toe extension stretch  LCB  LCB    Mid foot and great toe mobilizations   LCB LCB            There Ex        bike TM 5 mins Upright bike 5 mins Upright bike 5 mins Elliptical 5 mins Upright bike 5 mins   Ankle ABC's*        Ankle pumps*        Gastroc stretch*   Standing 20"x4 R     Soleus stretch        Plantar fascia and great toe extension stretch  10" 10x 10" 10x     Anterior ankle stretch  20" 4x  20" 4x      SLR 3-way        Ankle TB 4-way        Bridges from p-ball        TRX squat        Leg press SL R 20# 3x10 Heel raise; DL concentric, SL R eccentric, 15# 30x  SL R eccentric, 15# 30x SL R 15# 3x10   sidestepping  X-walks OTB 2 laps   GTB 1 lap   Heel walking/toe walking 3 laps ea       lunges R leg in front on foam, 20x from 4" step  R leg behind, focus on great toe extension 2x20 R leg behind, focus on great toe extension 2x10 R leg behind, focus on great toe extension 2x10   Seated HR/TR Standing DL concentric, SL eccentric, to fatigue  biodex for weight shift visual 30x standing biodex for weight shift visual 20x standing biodex for weight shift visual 20x standing   Neuro Re-Ed        Seated BAPS        Wobble board 2' A/P SL R 2'/2' 2'/2' 2'/2' 2'/2'   Tandem walk  1 lap at mirror  3 laps on foam at mirror 3 laps on foam at [facility]   Tandem stance        SLS  20" 3x on R      rebounder SLS kickstand 20x red   Tandem on foam 20x red Tandem on foam 20x red    SLS 20x red with cont TT                                                    Re-evaluation LCB        Ther Act        weight bearing on biodex        Step ups  6" 30x fwd/lat no UE support  6" + foam fwd/lat 10x 6" + foam fwd/lat 2x10   gait training with hurdles   5 laps at mirror focus on gait mechanics 5 laps at mirror focus on gait mechanics 4 laps at mirror focus on gait mechanics   Modalities           Ice PRN

## 2022-04-09 ENCOUNTER — HOSPITAL ENCOUNTER (OUTPATIENT)
Dept: MAMMOGRAPHY | Facility: HOSPITAL | Age: 57
Discharge: HOME | End: 2022-04-09
Payer: MEDICARE

## 2022-04-09 DIAGNOSIS — Z12.31 BREAST CANCER SCREENING BY MAMMOGRAM: ICD-10-CM

## 2022-04-09 DIAGNOSIS — Z12.31 ENCOUNTER FOR SCREENING MAMMOGRAM FOR MALIGNANT NEOPLASM OF BREAST: ICD-10-CM

## 2022-04-09 PROCEDURE — 77067 SCR MAMMO BI INCL CAD: CPT

## 2022-04-09 PROCEDURE — 77063 BREAST TOMOSYNTHESIS BI: CPT

## 2022-04-12 ENCOUNTER — OFFICE VISIT (OUTPATIENT)
Dept: PHYSICAL THERAPY | Facility: CLINIC | Age: 57
End: 2022-04-12
Payer: MEDICARE

## 2022-04-12 DIAGNOSIS — R26.89 BALANCE PROBLEM: ICD-10-CM

## 2022-04-12 DIAGNOSIS — R26.9 ABNORMALITY OF GAIT: ICD-10-CM

## 2022-04-12 DIAGNOSIS — S92.001D CLOSED DISPLACED FRACTURE OF RIGHT CALCANEUS WITH ROUTINE HEALING, UNSPECIFIED PORTION OF CALCANEUS, SUBSEQUENT ENCOUNTER: ICD-10-CM

## 2022-04-12 DIAGNOSIS — M25.671 STIFFNESS OF RIGHT ANKLE JOINT: Primary | ICD-10-CM

## 2022-04-12 PROCEDURE — 97530 THERAPEUTIC ACTIVITIES: CPT | Performed by: PHYSICAL THERAPIST

## 2022-04-12 PROCEDURE — 97110 THERAPEUTIC EXERCISES: CPT | Performed by: PHYSICAL THERAPIST

## 2022-04-12 PROCEDURE — 97112 NEUROMUSCULAR REEDUCATION: CPT | Performed by: PHYSICAL THERAPIST

## 2022-04-12 NOTE — PROGRESS NOTES
Daily Note     Today's date: 2022  Patient name: Lacey Capellan  : 1965  MRN: [id number]  Referring provider: Sarah Caldera MD  Dx:   Encounter Diagnosis     ICD-10-CM    1  Stiffness of right ankle joint  M25 671    2  Closed displaced fracture of right calcaneus with routine healing, unspecified portion of calcaneus, subsequent encounter  S92 001D    3  Abnormality of gait  R26 9    4  Balance problem  R26 89        Start Time: 1000  Stop Time: 1045  Total time in clinic (min): 45 minutes    Subjective: Patient reports that her ankle felt good following last session  Says she had a migraine yesterday and is still feeling a little bit off today  Objective: See treatment diary below      Assessment: Tolerated treatment well  Patient demonstrated fatigue post treatment, exhibited good technique with therapeutic exercises and would benefit from continued PT  Session focused on closed chain strengthening, balance and proprioception  She was able to demonstrate significant improvements in strength and endurance of her R calf muscles today, with more even weight vs favoring the L side  Will continue to progress as able  Plan: Continue per plan of care  Progress treatment as tolerated         Diagnosis: R calcaneal fracture (21) with non-operative healing   Precautions: return to weight bearing protocol attached to chart-- allowed to fully weight bear now   Primary Goals: ankle ROM, strength, gait, be able to take care of her grandchildren   *asterisks by exercise = given for HEP   Manuals 4/12 3/22 3/24 3/29 3/31   R ankle PROM        Ankle isometrics        Great toe extension stretch  LCB  LCB    Mid foot and great toe mobilizations   LCB LCB            There Ex        bike Elliptical 5 mins Upright bike 5 mins Upright bike 5 mins Elliptical 5 mins Upright bike 5 mins   Ankle ABC's*        Ankle pumps*        Gastroc stretch*   Standing 20"x4 R     Soleus stretch        Plantar fascia and great toe extension stretch  10" 10x 10" 10x     Anterior ankle stretch  20" 4x  20" 4x      SLR 3-way        Ankle TB 4-way        Bridges from p-ball        TRX squat        Leg press SL R 15# 4x10    DL concentric, SL eccentric, 45# 2x10 Heel raise; DL concentric, SL R eccentric, 15# 30x  SL R eccentric, 15# 30x SL R 15# 3x10   sidestepping  X-walks OTB 2 laps   GTB 1 lap   TKE        lunges   R leg behind, focus on great toe extension 2x20 R leg behind, focus on great toe extension 2x10 R leg behind, focus on great toe extension 2x10   Seated HR/TR   biodex for weight shift visual 30x standing biodex for weight shift visual 20x standing biodex for weight shift visual 20x standing   Neuro Re-Ed        Seated BAPS        [employer] board  2'/2' 2'/2' 2'/2' 2'/2'   Tandem walk  1 lap at mirror  3 laps on foam at mirror 3 laps on foam at [place] [place]   Tandem stance        SLS  20" 3x on R      rebounder    Tandem on foam 20x red Tandem on foam 20x red    SLS 20x red with cont TT                                                    Re-evaluation         Ther Act        weight bearing on biodex HR 30x at biodex for even weight bearing       Step ups 8" + foam fwd/lat 20x ea 6" 30x fwd/lat no UE support  6" + foam fwd/lat 10x 6" + foam fwd/lat 2x10   Resisted walking with ericka Hurdles, 12# 7 laps    Tandem on foam, 12# 4 laps       gait training with hurdles   5 laps at mirror focus on gait mechanics 5 laps at mirror focus on gait mechanics 4 laps at mirror focus on gait mechanics   Modalities            Ice PRN

## 2022-04-14 ENCOUNTER — OFFICE VISIT (OUTPATIENT)
Dept: PHYSICAL THERAPY | Facility: CLINIC | Age: 57
End: 2022-04-14
Payer: MEDICARE

## 2022-04-14 ENCOUNTER — OFFICE VISIT (OUTPATIENT)
Dept: FAMILY MEDICINE CLINIC | Facility: CLINIC | Age: 57
End: 2022-04-14
Payer: MEDICARE

## 2022-04-14 VITALS
HEIGHT: 59 IN | SYSTOLIC BLOOD PRESSURE: 120 MMHG | TEMPERATURE: 98.4 F | HEART RATE: 90 BPM | WEIGHT: 133 LBS | BODY MASS INDEX: 26.81 KG/M2 | DIASTOLIC BLOOD PRESSURE: 80 MMHG

## 2022-04-14 DIAGNOSIS — E03.9 HYPOTHYROIDISM, UNSPECIFIED TYPE: ICD-10-CM

## 2022-04-14 DIAGNOSIS — G43.809 OTHER MIGRAINE WITHOUT STATUS MIGRAINOSUS, NOT INTRACTABLE: ICD-10-CM

## 2022-04-14 DIAGNOSIS — E03.9 HYPOTHYROIDISM: ICD-10-CM

## 2022-04-14 DIAGNOSIS — S92.001D CLOSED DISPLACED FRACTURE OF RIGHT CALCANEUS WITH ROUTINE HEALING, UNSPECIFIED PORTION OF CALCANEUS, SUBSEQUENT ENCOUNTER: ICD-10-CM

## 2022-04-14 DIAGNOSIS — M25.671 STIFFNESS OF RIGHT ANKLE JOINT: Primary | ICD-10-CM

## 2022-04-14 DIAGNOSIS — N64.4 BREAST TENDERNESS: ICD-10-CM

## 2022-04-14 DIAGNOSIS — Z78.0 ASYMPTOMATIC POSTMENOPAUSAL ESTROGEN DEFICIENCY: ICD-10-CM

## 2022-04-14 DIAGNOSIS — S92.344A CLOSED NONDISPLACED FRACTURE OF FOURTH METATARSAL BONE OF RIGHT FOOT, INITIAL ENCOUNTER: Primary | ICD-10-CM

## 2022-04-14 DIAGNOSIS — R26.89 BALANCE PROBLEM: ICD-10-CM

## 2022-04-14 DIAGNOSIS — R26.9 ABNORMALITY OF GAIT: ICD-10-CM

## 2022-04-14 DIAGNOSIS — K21.9 GERD (GASTROESOPHAGEAL REFLUX DISEASE): ICD-10-CM

## 2022-04-14 PROCEDURE — 97110 THERAPEUTIC EXERCISES: CPT | Performed by: PHYSICAL THERAPIST

## 2022-04-14 PROCEDURE — 99214 OFFICE O/P EST MOD 30 MIN: CPT | Performed by: FAMILY MEDICINE

## 2022-04-14 PROCEDURE — 97112 NEUROMUSCULAR REEDUCATION: CPT | Performed by: PHYSICAL THERAPIST

## 2022-04-14 RX ORDER — TOPIRAMATE 50 MG/1
100 TABLET, FILM COATED ORAL 2 TIMES DAILY
Qty: 180 TABLET | Refills: 3 | Status: SHIPPED | OUTPATIENT
Start: 2022-04-14

## 2022-04-14 RX ORDER — LEVOTHYROXINE SODIUM 0.1 MG/1
TABLET ORAL
Qty: 90 TABLET | Refills: 1 | Status: SHIPPED | OUTPATIENT
Start: 2022-04-14 | End: 2022-04-15

## 2022-04-14 RX ORDER — LEVOTHYROXINE SODIUM 0.1 MG/1
100 TABLET ORAL DAILY
Qty: 30 TABLET | Refills: 3 | Status: SHIPPED | OUTPATIENT
Start: 2022-04-14 | End: 2022-04-14

## 2022-04-14 RX ORDER — OMEPRAZOLE 20 MG/1
20 CAPSULE, DELAYED RELEASE ORAL DAILY
Qty: 30 CAPSULE | Refills: 3 | Status: SHIPPED | OUTPATIENT
Start: 2022-04-14

## 2022-04-14 NOTE — PROGRESS NOTES
Daily Note     Today's date: 2022  Patient name: Jazmyn Bunn  : 1965  MRN: [id number]  Referring provider: Starla Hardy MD  Dx:   Encounter Diagnosis     ICD-10-CM    1  Stiffness of right ankle joint  M25 671    2  Closed displaced fracture of right calcaneus with routine healing, unspecified portion of calcaneus, subsequent encounter  S92 001D    3  Abnormality of gait  R26 9    4  Balance problem  R26 89        Start Time:   Stop Time:   Total time in clinic (min): 45 minutes    Subjective: Patient reports that she has some increased swelling in her foot today since she was on her feet a lot playing with her grandkids  Says that she had some mild soreness following last session  Objective: See treatment diary below      Assessment: Tolerated treatment well  Patient demonstrated fatigue post treatment, exhibited good technique with therapeutic exercises and would benefit from continued PT  Patient was able to progress weight used for calf strengthening today on the leg press while maintaining appropriate form  She remains unable to maintain equal weight bearing during DL heel raise against gravity  Balance is improving as well, but she still remains challenged with current exercises  Plan: Continue per plan of care  Progress treatment as tolerated         Diagnosis: R calcaneal fracture (21) with non-operative healing   Precautions: return to weight bearing protocol attached to chart-- allowed to fully weight bear now   Primary Goals: ankle ROM, strength, gait, be able to take care of her grandchildren   *asterisks by exercise = given for HEP   Manuals 4/12 4/14 3/24 3/29 3/31   R ankle PROM        Ankle isometrics        Great toe extension stretch    LCB    Mid foot and great toe mobilizations   LCB LCB            There Ex        bike Elliptical 5 mins Elliptical 5 mins Upright bike 5 mins Elliptical 5 mins Upright bike 5 mins   Ankle ABC's*        Ankle pumps* Gastroc stretch*   Standing 20"x4 R     Soleus stretch        Plantar fascia and great toe extension stretch   10" 10x     Anterior ankle stretch   20" 4x      SLR 3-way        Ankle TB 4-way        Bridges from p-ball        TRX squat        Leg press SL R 15# 4x10    DL concentric, SL eccentric, 45# 2x10 SL R 15# 3x10    DL concentric, SL eccentric, 45# 3x10  SL R eccentric, 15# 30x SL R 15# 3x10   sidestepping     GTB 1 lap   TKE        lunges  R leg in front, on foam, 2x10 from 4" box R leg behind, focus on great toe extension 2x20 R leg behind, focus on great toe extension 2x10 R leg behind, focus on great toe extension 2x10   Seated HR/TR  biodex for weight shift visual 20x standing biodex for weight shift visual 30x standing biodex for weight shift visual 20x standing biodex for weight shift visual 20x standing   Neuro Re-Ed        Seated BAPS        Wobble board  2 mins A/P SL R 2'/2' 2'/2' 2'/2'   Tandem walk    3 laps on foam at mirror 3 laps on foam at [facility]   Tandem stance        SLS        rebounder  Tandem on foam 20x red    SLS 20x red with cont TT  Tandem on foam 20x red Tandem on foam 20x red    SLS 20x red with cont TT                                                    Re-evaluation         Ther Act        weight bearing on biodex HR 30x at biodex for even weight bearing       Step ups 8" + foam fwd/lat 20x ea 8" + foam fwd/lat 20x ea  6" + foam fwd/lat 10x 6" + foam fwd/lat 2x10   Resisted walking with ericka Hurdles, 12# 7 laps    Tandem on foam, 12# 4 laps Hurdles, 15# 7 laps    Tandem on foam, 10# 4 laps      gait training with hurdles   5 laps at mirror focus on gait mechanics 5 laps at mirror focus on gait mechanics 4 laps at mirror focus on gait mechanics   Modalities            Ice PRN

## 2022-04-15 ENCOUNTER — HOSPITAL ENCOUNTER (OUTPATIENT)
Dept: RADIOLOGY | Facility: HOSPITAL | Age: 57
Discharge: HOME | End: 2022-04-15
Payer: MEDICARE

## 2022-04-15 ENCOUNTER — APPOINTMENT (OUTPATIENT)
Dept: LAB | Facility: CLINIC | Age: 57
End: 2022-04-15
Payer: MEDICARE

## 2022-04-15 DIAGNOSIS — E03.9 HYPOTHYROIDISM, UNSPECIFIED TYPE: Primary | ICD-10-CM

## 2022-04-15 DIAGNOSIS — N64.4 BREAST TENDERNESS: ICD-10-CM

## 2022-04-15 DIAGNOSIS — E03.9 HYPOTHYROIDISM, UNSPECIFIED TYPE: ICD-10-CM

## 2022-04-15 LAB — TSH SERPL DL<=0.05 MIU/L-ACNC: 0.22 UIU/ML (ref 0.45–4.5)

## 2022-04-15 PROCEDURE — 76642 ULTRASOUND BREAST LIMITED: CPT

## 2022-04-15 PROCEDURE — 84443 ASSAY THYROID STIM HORMONE: CPT

## 2022-04-15 PROCEDURE — 36415 COLL VENOUS BLD VENIPUNCTURE: CPT

## 2022-04-15 RX ORDER — LEVOTHYROXINE SODIUM 0.07 MG/1
75 TABLET ORAL
Qty: 30 TABLET | Refills: 5 | Status: SHIPPED | OUTPATIENT
Start: 2022-04-15

## 2022-04-21 ENCOUNTER — OFFICE VISIT (OUTPATIENT)
Dept: PHYSICAL THERAPY | Facility: CLINIC | Age: 57
End: 2022-04-21
Payer: MEDICARE

## 2022-04-21 DIAGNOSIS — S92.001D CLOSED DISPLACED FRACTURE OF RIGHT CALCANEUS WITH ROUTINE HEALING, UNSPECIFIED PORTION OF CALCANEUS, SUBSEQUENT ENCOUNTER: ICD-10-CM

## 2022-04-21 DIAGNOSIS — M25.671 STIFFNESS OF RIGHT ANKLE JOINT: Primary | ICD-10-CM

## 2022-04-21 DIAGNOSIS — R26.89 BALANCE PROBLEM: ICD-10-CM

## 2022-04-21 DIAGNOSIS — R26.9 ABNORMALITY OF GAIT: ICD-10-CM

## 2022-04-21 PROCEDURE — 97112 NEUROMUSCULAR REEDUCATION: CPT | Performed by: PHYSICAL THERAPIST

## 2022-04-21 PROCEDURE — 97110 THERAPEUTIC EXERCISES: CPT | Performed by: PHYSICAL THERAPIST

## 2022-04-21 NOTE — PROGRESS NOTES
Daily Note     Today's date: 2022  Patient name: Keenan White  : 1965  MRN: [id number]  Referring provider: Ondina Morris MD  Dx:   Encounter Diagnosis     ICD-10-CM    1  Stiffness of right ankle joint  M25 671    2  Closed displaced fracture of right calcaneus with routine healing, unspecified portion of calcaneus, subsequent encounter  S92 001D    3  Abnormality of gait  R26 9    4  Balance problem  R26 89        Start Time:   Stop Time:   Total time in clinic (min): 45 minutes    Subjective: Patient reports that she missed her last appointment due to a severe migraine  Says that her ankle and foot has been feeling really good  Objective: See treatment diary below      Assessment: Tolerated treatment well  Patient demonstrated fatigue post treatment, exhibited good technique with therapeutic exercises and would benefit from continued PT  Patient was able to demonstrate significant improvements once again in plantarflexion strength and endurance today  Increased weight on the leg press was used with good form  Her balance is also continually improving  Will plan to discharge to [facility] next week  She is doing very well overall  Plan: Continue per plan of care  Progress treatment as tolerated         Diagnosis: R calcaneal fracture (21) with non-operative healing   Precautions: return to weight bearing protocol attached to chart-- allowed to fully weight bear now   Primary Goals: ankle ROM, strength, gait, be able to take care of her grandchildren   *asterisks by exercise = given for [facility]   Manuals 4/12 4/14 4/21 3/29 3/31   R ankle PROM        Ankle isometrics        Great toe extension stretch    LCB    Mid foot and great toe mobilizations    LCB            There Ex        bike Elliptical 5 mins Elliptical 5 mins Elliptical 5 mins Elliptical 5 mins Upright bike 5 mins   Ankle ABC's*        Ankle pumps*        Gastroc stretch*        Soleus stretch        Plantar fascia and great toe extension stretch        Anterior ankle stretch        SLR 3-way        Ankle TB 4-way        Bridges from p-ball        TRX squat        Leg press SL R 15# 4x10    DL concentric, SL eccentric, 45# 2x10 SL R 15# 3x10    DL concentric, SL eccentric, 45# 3x10 SL R 20# 3 rounds to fatigue    DL concentric, SL eccentric, 55# 3 rounds to fatigue SL R eccentric, 15# 30x SL R 15# 3x10   sidestepping     GTB 1 lap   TKE        lunges  R leg in front, on foam, 2x10 from 4" box  R leg behind, focus on great toe extension 2x10 R leg behind, focus on great toe extension 2x10   Seated HR/TR  biodex for weight shift visual 20x standing  biodex for weight shift visual 20x standing biodex for weight shift visual 20x standing   Neuro Re-Ed        Seated BAPS        Wobble board  2 mins A/P SL R  2'/2' 2'/2'   Tandem walk    3 laps on foam at mirror 3 laps on foam at mirror   Tandem stance        SLS        rebounder  Tandem on foam 20x red    SLS 20x red with cont TT Tandem on foam 20x red    SLS 20x red with cont TT Tandem on foam 20x red Tandem on foam 20x red    SLS 20x red with cont TT                                                    Re-evaluation         Ther Act        weight bearing on biodex HR 30x at biodex for even weight bearing  HR 30x at biodex for even weight bearing     Step ups 8" + foam fwd/lat 20x ea 8" + foam fwd/lat 20x ea  6" + foam fwd/lat 10x 6" + foam fwd/lat 2x10   Resisted walking with ericka Hurdles, 12# 7 laps    Tandem on foam, 12# 4 laps Hurdles, 15# 7 laps    Tandem on foam, 10# 4 laps Hurdles, 15# 7 laps    Tandem on foam, 15# 4 laps     gait training with hurdles    5 laps at mirror focus on gait mechanics 4 laps at mirror focus on gait mechanics   Modalities            Ice PRN

## 2022-04-24 NOTE — PROGRESS NOTES
Patient ID: Mayelin Anna is a 61 y o  female  HPI: 61 y  o female presents for follow up hypothyroidism, s/p righ t4 th toe fracture , GERD, and she complains of breast tenderness of the left breast generalized after a normal mammogram   Her migraines have been under good control       ,     SUBJECTIVE    Family History   Problem Relation Age of Onset    Parkinsonism Father     Dementia Father     Hypertension Daughter     Cervical cancer Maternal Grandmother     Bipolar disorder Sister     No Known Problems Mother      Social History     Socioeconomic History    Marital status:       Spouse name: Not on file    Number of children: Not on file    Years of education: Not on file    Highest education level: Not on file   Occupational History    Not on file   Tobacco Use    Smoking status: Former Smoker     Quit date:      Years since quittin 3    Smokeless tobacco: Never Used   Vaping Use    Vaping Use: Never used   Substance and Sexual Activity    Alcohol use: No    Drug use: No    Sexual activity: Not Currently   Other Topics Concern    Not on file   Social History Narrative    Not on file     Social Determinants of Health     Financial Resource Strain: Not on file   Food Insecurity: Not on file   Transportation Needs: Not on file   Physical Activity: Not on file   Stress: Not on file   Social Connections: Not on file   Intimate Partner Violence: Not on file   Housing Stability: Not on file     Past Medical History:   Diagnosis Date    Bilateral breast cysts 2019 r    right breast cysts; dense breast tissue bilaterally    Cervical spinal stenosis 2015    Liver enzyme elevation     Migraines      Past Surgical History:   Procedure Laterality Date     SECTION      two    OVARIAN CYST SURGERY       No Known Allergies    Current Outpatient Medications:     dexamethasone (DECADRON) 2 mg tablet, Take 1 tablet (2 mg total) by mouth 2 (two) times a day with meals, Disp: 60 tablet, Rfl: 0    Erenumab-aooe 140 MG/ML SOAJ, Inject 140 mg under the skin every 30 (thirty) days, Disp: 1 mL, Rfl: 11    gabapentin (NEURONTIN) 300 mg capsule, Take 1 capsule (300 mg total) by mouth daily at bedtime, Disp: 30 capsule, Rfl: 3    ketorolac (TORADOL) 10 mg tablet, Take 1 tablet (10 mg total) by mouth daily as needed for severe pain Max 1/day, 3/week, 10/month  Do not use with other OTC pain meds, Disp: 10 tablet, Rfl: 0    omeprazole (PriLOSEC) 20 mg delayed release capsule, Take 1 capsule (20 mg total) by mouth daily, Disp: 30 capsule, Rfl: 3    ondansetron (ZOFRAN) 4 mg tablet, Take 1 tablet (4 mg total) by mouth every 8 (eight) hours as needed for nausea or vomiting, Disp: 20 tablet, Rfl: 0    prochlorperazine (COMPAZINE) 10 mg tablet, Take 1 tablet (10 mg total) by mouth every 6 (six) hours as needed for nausea or vomiting, Disp: 12 tablet, Rfl: 3    SUMAtriptan (IMITREX) 100 mg tablet, TAKE 1/2 TO 1 TABLET BY MOUTH AT START OF HEADACHE, MAY REPEAT ONCE AFTER 2 HOURS, Disp: 9 tablet, Rfl: 5    SUMAtriptan Succinate 6 MG/0 5ML SOAJ, inject 1 injection for severe headache  may repeat once after 2 hours   max 2 per week, Disp: 4 mL, Rfl: 6    topiramate (TOPAMAX) 50 MG tablet, Take 2 tablets (100 mg total) by mouth 2 (two) times a day, Disp: 180 tablet, Rfl: 3    aspirin (ECOTRIN LOW STRENGTH) 81 mg EC tablet, Take 1 tablet (81 mg total) by mouth 2 (two) times a day, Disp: 84 tablet, Rfl: 0    levothyroxine (Synthroid) 75 mcg tablet, Take 1 tablet (75 mcg total) by mouth daily in the early morning, Disp: 30 tablet, Rfl: 5    Review of Systems  Constitutional:     Denies fever, chills ,fatigue ,weakness ,weight loss, weight gain     ENT: Denies earache ,loss of hearing ,nosebleed, nasal discharge,nasal congestion ,sore throat ,hoarseness  Pulmonary: Denies shortness of breath ,cough  ,dyspnea on exertion, orthopnea  ,PND   Cardiovascular:  Denies bradycardia , tachycardia ,palpations, lower extremity edema leg, claudication  Breast:  Denies new or changing breast lumps ,nipple discharge ,nipple changes  Abdomen:  Denies abdominal pain , anorexia , indigestion, nausea, vomiting, constipation, diarrhea  Musculoskeletal: Denies myalgias, arthralgias, joint swelling, joint stiffness , limb pain, limb swelling  Gu: denies dysuria, polyuriaL eft breast tenderness  Skin: Denies skin rash, skin lesion, skin wound, itching, dry skin  Neuro: Denies headache, numbness, tingling, confusion, loss of consciousness, dizziness, vertigo  Psychiatric: Denies feelings of depression, suicidal ideation, anxiety, sleep disturbances    OBJECTIVE  /80   Pulse 90   Temp 98 4 °F (36 9 °C)   Ht 4' 11" (1 499 m)   Wt 60 3 kg (133 lb)   BMI 26 86 kg/m²   Constitutional:   NAD, well appearing and well nourished      ENT:   Conjunctiva and lids: no injection, edema, or discharge     Pupils and iris: LUIS bilaterally    External inspection of ears and nose: normal without deformities or discharge  Otoscopic exam: Canals patent without erythema  Nasal mucosa, septum and turbinates: Normal or edema or discharge         Oropharynx:  Moist mucosa, normal tongue and tonsils without lesions  No erythema        Pulmonary:Respiratory effort normal rate and rhythm, no increased work of breathing   Auscultation of lungs:  Clear bilaterally with no adventitious breath sounds       Cardiovascular: regular rate and rhythm, S1 and S2, no murmur, no edema and/or varicosities of LE      Abdomen: Soft and non-distended     Positive bowel sounds      No heptomegaly or splenomegaly      Gu: no suprapubic tenderness or CVA+ left breast generliazed tenderness : no urethral discharge  Lymphatic:  No anterior or posterior cervical lymphadenopathy         Musculoskeletal:  Gait and station: Normal gait      Digits and nails normal without clubbing or cyanosis       Inspection/palpation of joints, bones, and muscles: No joint tenderness, swelling, full active and passive range of motion       Skin: Normal skin turgor and no rashes      Neuro     Normal reflexes       Psych:   alert and oriented to person, place and time     normal mood and affect       Assessment/Plan:Diagnoses and all orders for this visit:    Closed nondisplaced fracture of fourth metatarsal bone of right foot, initial encounter  -     DXA bone density spine hip and pelvis; Future    Hypothyroidism, unspecified type  Comments:  Continue with thyroid meds   Orders:  -     TSH, 3rd generation; Future    GERD (gastroesophageal reflux disease)  Comments:  Continue PPI therapy  Orders:  -     omeprazole (PriLOSEC) 20 mg delayed release capsule; Take 1 capsule (20 mg total) by mouth daily    Other migraine without status migrainosus, not intractable  Comments:  Continue wiht rescue meds for migraines  Orders:  -     topiramate (TOPAMAX) 50 MG tablet; Take 2 tablets (100 mg total) by mouth 2 (two) times a day    Breast tenderness  Comments:  Await ultrasound results  Orders:  -     US breast left limited (diagnostic); Future    Asymptomatic postmenopausal estrogen deficiency  -     DXA bone density spine hip and pelvis; Future    Other orders  -     Discontinue: levothyroxine 100 mcg tablet; Take 1 tablet (100 mcg total) by mouth daily        Reviewed with patient plan to treat with above plan      Patient instructed to call in 72 hours if not feeling better or if symptoms worsen

## 2022-04-26 ENCOUNTER — EVALUATION (OUTPATIENT)
Dept: PHYSICAL THERAPY | Facility: CLINIC | Age: 57
End: 2022-04-26
Payer: MEDICARE

## 2022-04-26 DIAGNOSIS — R26.9 ABNORMALITY OF GAIT: ICD-10-CM

## 2022-04-26 DIAGNOSIS — R26.89 BALANCE PROBLEM: ICD-10-CM

## 2022-04-26 DIAGNOSIS — S92.001D CLOSED DISPLACED FRACTURE OF RIGHT CALCANEUS WITH ROUTINE HEALING, UNSPECIFIED PORTION OF CALCANEUS, SUBSEQUENT ENCOUNTER: Primary | ICD-10-CM

## 2022-04-26 DIAGNOSIS — M25.671 STIFFNESS OF RIGHT ANKLE JOINT: ICD-10-CM

## 2022-04-26 PROCEDURE — 97110 THERAPEUTIC EXERCISES: CPT | Performed by: PHYSICAL THERAPIST

## 2022-04-26 PROCEDURE — 97140 MANUAL THERAPY 1/> REGIONS: CPT | Performed by: PHYSICAL THERAPIST

## 2022-04-26 PROCEDURE — 97112 NEUROMUSCULAR REEDUCATION: CPT | Performed by: PHYSICAL THERAPIST

## 2022-04-26 NOTE — PROGRESS NOTES
PT Discharge    Today's date: 2022  Patient name: Virgilio Millan  : 1965  MRN: [id number]  Referring provider: Scarlett Lopez MD  Dx:   Encounter Diagnosis     ICD-10-CM    1  Closed displaced fracture of right calcaneus with routine healing, unspecified portion of calcaneus, subsequent encounter  S92 001D    2  Stiffness of right ankle joint  M25 671    3  Abnormality of gait  R26 9    4  Balance problem  R26 89        Start Time: 1800  Stop Time: 1845  Total time in clinic (min): 45 minutes    Assessment  Assessment details: Virgilio Millan has been compliant with attending physical therapy and completing home exercise program since initial evaluation  Dejangertrude Waddell has made improvements in subjective and objective data since initial evalulation and has achieved most all goals  Patient reports having returned to their prior level of function  Patient provided with updated Home Exercise Program, all questions answered, and verbalized understanding, agreeing to plan of care   Thus it was mutually decided to discontinue this episode of care and transition to Home Exercise Program        Understanding of Dx/Px/POC: good   Prognosis: good    Goals  Patient will be independent with home exercise program  (met)  Patient will be able to manage symptoms independently  (met)    (STG) Impairment Goals 4-6 weeks  - Improve ankle AROM to equal to the unaffected lower extremity (met)  - Increase hip strength to 5/5 throughout (mostly met)  - Increase ankle strength to 5/5 throughout (mostly met)  - Patient will be able to walk with no gait abnormalities or pain (met)    (LTG) Functional Goals 6-8 weeks  - Return to Prior Level of Function (mostly met)  - Increase Functional Status Measure (FOTO) to: predicted outcome (met)  - Patient will be independent with HEP (met)  - Patient will be able to squat without increased pain/compensation/difficulty (met)  - Patient will be able to perform sit to stand without increased pain/compensation/difficulty  (met)  - Patient will be able to ascend and descend stairs without increased pain/compensation/difficulty  (met)  - Patient will be able play with her grandchildren without limitation (met)      Plan  Treatment plan discussed with: patient        Subjective Evaluation    History of Present Illness  Mechanism of injury: WORK/SCHOOL: Patient is on disability due to her migraines  HOME LIFE: Patient lives with her mother and nephew  HOBBIES/EXERCISE: Patient enjoys watching her granddaughters (4 and 8) which keeps her busy  PLOF: Patient reports that both of her ankles would give out (R>L) when she would go up and down the stairs which she did have some PT for in the past      HISTORY OF CURRENT INJURY: Patient reports that she was helping her sister on 11/30/21 when she misjudged her last 2 steps and came down on her heel  She was unable to walk on it  She was in a cast for a few weeks and recently transitioned into a boot for about 2 weeks now  She is still NWB, but has another appointment on 1/25  She reports having no pain at this time and that it "feels really good"  She has been crawling up the stairs and coming down on her bottom to get upstairs  PAIN LOCATION/DESCRIPTORS: when she did have pain it was medial foot near the heel but she has not has pain recently  AGGRAVATING FACTORS: nothing she can think of   EASES: not using ice, heat or any pain meds   DAY PATTERN: sleeping through the night well  IMAGING: Pt reports that Dr Morena Arias told her it is well healing   PATIENT GOALS: Patient would like to be able to return to walking without pain and normally  Re-assessment (2/8/22): Patient reports that since beginning PT, she feels like her foot moves a lot better and it feels a lot better  Says that she is able to get in and out of the tub at her daughters house now  Swelling has decreased but it is still swelling at times   She is still NWB at this time but reports that she keeps it moving throughout the day to improve her motion  More recently, she had some pain at the dorsum of her foot near her toes and lateral foot for unknown reason  She thinks maybe because she has been moving it more  Re-assessment (3/3/22): Patient reports that now that she is able to weight-bear that she is able to go up and down the stairs, cook, clean and do her ADL's  She cannot go down her basement stairs because they are narrow  She has now transitioned from her walker to a [facility] and is doing well with this  She has some restlessness at night in her foot, but says it is not painful  Still has some swelling after PT sessions  Overall she feels about 75% back to normal      Re-assessment (22): Patient reports that her foot feels pretty much back to normal and that she does not have much pain at all  Says that she has been walking on it more  She is able to go up and down the steps with no issues  She is no longer using the cane  She feels that her foot is still a little swollen at the ankle but that it does not swell up when she walks for longer periods like it used to  Overall she feels about 80% back to normal  She feels that she still walks with a limp a little bit  Discharge (22): Patient reports that she was able to go for a walk with her granddaughter today for 1/2 mile and says that her foot felt good, but had some mild swelling at her lateral ankle  She notes that there are no activities that bother her at this time  She is careful on the stairs, but overall are going well  Overall she feels about 95% back to normal and feels ready to be discharged at this time  She only feels that she has a bit of a mild strength deficit in her calf  Pain  Current pain ratin  At best pain ratin  At worst pain ratin  Quality: just uncomfortable at times    Progression: improved    Social Support  Steps to enter house: yes  Stairs in house: yes   Lives in: multiple-level home  Lives with: adult children    Employment status: not working  Patient Goals  Patient goal: return to PLOF and be able to play with her grandchildren- met        Objective     Observations     Right Ankle/Foot   Positive for atrophy  Negative for effusion and trophic changes  Tenderness     Right Ankle/Foot   No tenderness       Active Range of Motion   Left Ankle/Foot   Normal active range of motion  Dorsiflexion (ke): 10 degrees   Plantar flexion: 35 degrees   Inversion: 20 degrees   Eversion: 10 degrees     Right Ankle/Foot   Normal active range of motion  Dorsiflexion (ke): 10 degrees   Plantar flexion: 48 degrees   Inversion: 20 degrees   Eversion: 11 degrees     Strength/Myotome Testing     Left Ankle/Foot   Normal strength    Right Ankle/Foot   Dorsiflexion: 5  Plantar flexion: 4+  Inversion: 5  Eversion: 5    Additional Strength Details  Knee strength 5/5 preston   Hip strength 5/5 preston aside from L hip abductors                 Diagnosis: R calcaneal fracture (11/30/21) with non-operative healing   Precautions: return to weight bearing protocol attached to chart-- allowed to fully weight bear now   Primary Goals: ankle ROM, strength, gait, be able to take care of her grandchildren   *asterisks by exercise = given for HEP   Manuals 4/12 4/14 4/21 4/26 3/31   R ankle PROM        Ankle isometrics        Great toe extension stretch        Mid foot and great toe mobilizations                There Ex        bike Elliptical 5 mins Elliptical 5 mins Elliptical 5 mins Elliptical 5 mins Upright bike 5 mins   Ankle ABC's*        Ankle pumps*        Gastroc stretch*        Soleus stretch        Plantar fascia and great toe extension stretch        Anterior ankle stretch        SLR 3-way        Ankle TB 4-way        Bridges from p-ball        TRX squat        Leg press SL R 15# 4x10    DL concentric, SL eccentric, 45# 2x10 SL R 15# 3x10    DL concentric, SL eccentric, 45# 3x10 SL R 20# 3 rounds to fatigue    DL concentric, SL eccentric, 55# 3 rounds to fatigue SL R 20# 2x30     DL concentric, SL eccentric, 70# 2x30 SL R 15# 3x10   sidestepping     GTB 1 lap   TKE        lunges  R leg in front, on foam, 2x10 from 4" box  R leg in front, on foam, 2x10 from 4" box R leg behind, focus on great toe extension 2x10   Seated HR/TR  biodex for weight shift visual 20x standing   biodex for weight shift visual 20x standing   Neuro Re-Ed        Seated BAPS        Wobble board  2 mins A/P SL R  2 mins A/P SL R 2'/2'   Tandem walk     3 laps on foam at mirror   Tandem stance        SLS        rebounder  Tandem on foam 20x red    SLS 20x red with cont TT Tandem on foam 20x red    SLS 20x red with cont TT Tandem on foam 20x red    SLS 20x red with cont TT Tandem on foam 20x red    SLS 20x red with cont TT                                                    Re-evaluation    Discharge     Ther Act        weight bearing on biodex HR 30x at biodex for even weight bearing  HR 30x at biodex for even weight bearing HR 30x at biodex for even weight bearing    Step ups 8" + foam fwd/lat 20x ea 8" + foam fwd/lat 20x ea   6" + foam fwd/lat 2x10   Resisted walking with ericka Hurdles, 12# 7 laps    Tandem on foam, 12# 4 laps Hurdles, 15# 7 laps    Tandem on foam, 10# 4 laps Hurdles, 15# 7 laps    Tandem on foam, 15# 4 laps     gait training with hurdles     4 laps at mirror focus on gait mechanics   Modalities           Ice PRN

## 2022-04-28 ENCOUNTER — APPOINTMENT (OUTPATIENT)
Dept: PHYSICAL THERAPY | Facility: CLINIC | Age: 57
End: 2022-04-28
Payer: MEDICARE

## 2022-05-10 DIAGNOSIS — G43.709 CHRONIC MIGRAINE WITHOUT AURA WITHOUT STATUS MIGRAINOSUS, NOT INTRACTABLE: ICD-10-CM

## 2022-05-10 RX ORDER — CEFUROXIME AXETIL 250 MG/1
TABLET ORAL
Qty: 3 ML | Refills: 0 | Status: SHIPPED | OUTPATIENT
Start: 2022-05-10

## 2022-05-16 ENCOUNTER — HOSPITAL ENCOUNTER (OUTPATIENT)
Dept: RADIOLOGY | Facility: MEDICAL CENTER | Age: 57
Discharge: HOME | End: 2022-05-16
Payer: MEDICARE

## 2022-05-16 DIAGNOSIS — Z13.820 SCREENING FOR OSTEOPOROSIS: ICD-10-CM

## 2022-05-16 DIAGNOSIS — S92.344A CLOSED NONDISPLACED FRACTURE OF FOURTH METATARSAL BONE OF RIGHT FOOT, INITIAL ENCOUNTER: ICD-10-CM

## 2022-05-16 DIAGNOSIS — M81.0 OSTEOPOROSIS, UNSPECIFIED OSTEOPOROSIS TYPE, UNSPECIFIED PATHOLOGICAL FRACTURE PRESENCE: Primary | ICD-10-CM

## 2022-05-16 DIAGNOSIS — Z78.0 ASYMPTOMATIC POSTMENOPAUSAL ESTROGEN DEFICIENCY: ICD-10-CM

## 2022-05-16 PROCEDURE — 77080 DXA BONE DENSITY AXIAL: CPT

## 2022-05-16 RX ORDER — IBANDRONATE SODIUM 150 MG/1
150 TABLET, FILM COATED ORAL
Qty: 1 TABLET | Refills: 3 | Status: SHIPPED | OUTPATIENT
Start: 2022-05-16 | End: 2022-06-14

## 2022-05-23 ENCOUNTER — OFFICE VISIT (OUTPATIENT)
Dept: NEUROLOGY | Facility: CLINIC | Age: 57
End: 2022-05-23
Payer: MEDICARE

## 2022-05-23 VITALS
DIASTOLIC BLOOD PRESSURE: 79 MMHG | WEIGHT: 136.9 LBS | HEIGHT: 59 IN | TEMPERATURE: 98.2 F | BODY MASS INDEX: 27.6 KG/M2 | HEART RATE: 74 BPM | SYSTOLIC BLOOD PRESSURE: 122 MMHG

## 2022-05-23 DIAGNOSIS — G43.709 CHRONIC MIGRAINE WITHOUT AURA WITHOUT STATUS MIGRAINOSUS, NOT INTRACTABLE: Primary | ICD-10-CM

## 2022-05-23 DIAGNOSIS — F32.5 MAJOR DEPRESSIVE DISORDER WITH SINGLE EPISODE, IN FULL REMISSION (HCC): ICD-10-CM

## 2022-05-23 DIAGNOSIS — R11.0 NAUSEA: ICD-10-CM

## 2022-05-23 PROCEDURE — 99215 OFFICE O/P EST HI 40 MIN: CPT | Performed by: PHYSICIAN ASSISTANT

## 2022-05-23 RX ORDER — ONDANSETRON 4 MG/1
4 TABLET, FILM COATED ORAL EVERY 8 HOURS PRN
Qty: 20 TABLET | Refills: 3 | Status: SHIPPED | OUTPATIENT
Start: 2022-05-23

## 2022-05-23 RX ORDER — RIZATRIPTAN BENZOATE 10 MG/1
10 TABLET, ORALLY DISINTEGRATING ORAL ONCE AS NEEDED
Qty: 9 TABLET | Refills: 3 | Status: SHIPPED | OUTPATIENT
Start: 2022-05-23

## 2022-05-23 NOTE — PROGRESS NOTES
Assessment/Plan:      Tiffany Herbert is a very pleasant 56 y  o  female with a past medical history that includes hypothyroidism, migraines, GERD, depression, Cervical spinal stenosis, chronic neck pain, slightly elevated liver enzymes referred here for evaluation of headache  Dr Pina Velazquez initial evaluation 5/14/2020; Follow up 8/20/2020, 3/1/2021, 6/1/2021  [facility] 5/25/2022     Chronic migraine without aura without status migrainosus, not intractable  Tiffany reports a long history of headaches and migraines have been difficult to control   She reports pain is often left-sided, worse when on the right side   Starts typically the back of the neck and radiates unilateral side of the head the frontal/I region   She denies aura, reports typical associated migrainous features as well as some autonomic features   She denies any new worsening and did have slight improvement on aimovig but not enough   She denies any new or concerning symptoms  - as of 5/14/2020: 5 days a week migraines   - as of 8/20/2020:  Physical therapy has helped somewhat   migraines 3-4 times per week, in July 2-3 really bad migraines that lasted 3-4 days   Already on venlafaxine 150 mg, topiramate 100 mg BID, emgality was too expensive, trial of ajovy      - as of 3/1/2021: She continues to have uncontrolled migraines with milder migraines 4 days per week really bad migraines 3 times a month   Continue topiramate 100 mg b i d , venlafaxine 150 mg, all CGRPs are too expensive rec'd [facility] program    - as of 6/1/2021: She reports migraines on average still 3 days per week, some weeks better than others  On topiramate 100 mg BID (and not sure if helping - discussed could consider very gradual wean down to 50 mg BID see if changes migraines or possible side effects) and venlafaxine 150 mg through PCP  Trying to get emgality through [facility], but has not heard back yet  In past 3 months 3 bad migraines ones   Typically migraines improve with sumatriptan PO or Inj, back up prochlorperazine  We discussed options and she would not like to add medications at this time  - as of 5/25/2022:  Patient was unable to obtain Aimovig or Emgality due to cost of the medications        Workup:  - Neurologic assessment reveals normal neurological exam   - noncontrast head CT 06/30/2018:  No acute pathology, Tiny cavum septum pellucidum, a normal variant    - MRI Brain 2015 - normal per patient-she will try to obtain MRI brain on CD for my review     Preventative:  - we discussed headache hygiene and lifestyle factors that may improve headaches  - she is already on through other providers topiramate 100 mg b i d , venlafaxine 150 mg daily  - continue gabapentin 300 mg nightly  Discussed proper use, possible side effects and risks   - ***  - past/failed: aimovig 70 mg, botox, venlafaxine, amitriptyline would be contraindicated due interaction with venlafaxine, propranolol the remote past, topiramate, gabapentin, botox for 4 sessions without improvement   - future options: could consider increasing gabapentin, verapamil, 140 mg of aimovig, ajovy, consider [facility] referral - mentioned to patient 6/1/21 as a future option if needed since I am not a fellowship trained headache specialist      Abortive:  - discussed not taking over-the-counter or prescription pain medications more than 3 days per week to prevent medication overuse/rebound headache  - sumatriptan 100 mg p o  And 6mg/0 5mL inj   Discussed proper use, possible side effects and risks  -     prochlorperazine (COMPAZINE) 10 mg tablet; Take 1 tablet (10 mg total) by mouth every 6 (six) hours as needed for nausea or vomiting  Discussed proper use, possible side effects and risks     - past/failed:  OTC meds, sumatriptan by mouth does not work as well as injection  - future options:  ubrelvy, Alternative Triptan, metoclopramide, Toradol IM or p o , could consider trial for 5 days of Depakote or dexamethasone 4 prolonged migraine, reyvow           Patient instructions      ***Go to [employer] and fill out [employer] patient assistance program      Headache/migraine treatment:   Abortive medications (for immediate treatment of a headache): It is ok to take ibuprofen, acetaminophen or naproxen (Advil, Tylenol,  Aleve, Excedrin) if they help your headaches you should limit these to No more than 3 times a week to avoid medication overuse/rebound headaches       Continue sumatriptan but no more than 3 days per week, dont take shot and pill together      Alternative is prochlorperazine/Compazine 10 mg - this is technically a nausea medication but can be used for migraines as well        Prescription preventive medications for headaches/migraines   (to take every day to help prevent headaches - not to take at the time of headache):  [x]???? topiramate  And venlafaxine through PCP  - if you want could consider weaning down topiramate to 50 mg in am and 100 mg in pm for 1 month and if no worsening try 50 mg in am and in pm for 1 month   - if at any point you find migraines generally are worse, go back up      Continue gabapentin 300 mg nightly for now           Keep out of direct sunlight  Prior to administration, allow to come to room temperature for 30 minutes  Do not warm using a heat source (eg, microwave or hot water)  Do not shake  Administer in abdomen (avoiding 2 inches around the navel), thigh, upper arm, or buttocks avoiding areas of skin that are tender, bruised, red or hard  Deliver entire contents of single-use prefilled pen or syringe         *Typically these types of medications take time untill you see the benefit, although some may see improvement in days, often it may take weeks, especially if the medication is being titrated up to a beneficial level   Please contact us if there are any concerns or questions regarding the medication          Lifestyle Recommendations:  [x]???? SLEEP - Maintain a regular sleep schedule: Adults need at least 7-8 hours of uninterrupted a night  Maintain good sleep hygiene:  Going to bed and waking up at consistent times, avoiding excessive daytime naps, avoiding caffeinated beverages in the evening, avoid excessive stimulation in the evening and generally using bed primarily for sleeping   One hour before bedtime would recommend turning lights down lower, decreasing your activity (may read quietly, listen to music at a low volume)  When you get into bed, should eliminate all technology (no texting, emailing, playing with your phone, iPad or tablet in bed)  [x]???? HYDRATION - Maintain good hydration   Drink  2L of fluid a day (4 typical small water bottles)  [x]???? DIET - Maintain good nutrition  In particular don't skip meals and try and eat healthy balanced meals regularly  [x]???? TRIGGERS - Look for other triggers and avoid them: Limit caffeine to 1-2 cups a day or less  Avoid dietary triggers that you have noticed bring on your headaches (this could include aged cheese, peanuts, MSG, aspartame and nitrates)  [x]???? EXERCISE - physical exercise as we all know is good for you in many ways, and not only is good for your heart, but also is beneficial for your mental health, cognitive health and  chronic pain/headaches  I would encourage at the least 5 days of physical exercise weekly for at least 30 minutes       Education and Follow-up  [x]???? Please call with any questions or concerns  Of course if any new concerning symptoms go to the emergency department  [x]???? Follow up in ***           CC: We had the pleasure of evaluating Tiffany NEENA Herbert in neurological consultation today  [place] a  right handed female who presents today for evaluation of headaches       History obtained from patient as well as available medical record review    History of Present Illness:   Interval History as of 5/23/2022  - has not started injection due to cost  Migraines are every week but vary in duration shortest time is 1 day up to 4 days, averaging 2 days but can have multiple migraines in a week  Therefore she has migraines 3-5 days a week  Average pain is a 10/10  If catches immediately will be a 5-6/10    Patient is having vomiting again with her migraines and her neck pain is worse with her migraines     Preventative: Through PCP: topiramate 100 mg BID, venlafaxine 150 mg daily - she is not sure if this is helping   -Gabapentin 300 mg p o  q h s  added 11/05/2020 By Rakesh Goodwin   - unable to obtain injections due to cost      Abortive:   Sumatriptan injection - helps (gets 2 needles every 8-9 days)  Sumatriptan 100 mg PO (9 pills a month)  Zofran      Interval history as of 6/1/2021  - saw eye doctor and got new glasses and then saw ophthalmology - cataracts and needs laser iridotomy - both being done in June and July      Headaches and migraines   On average 3 migraines per week         Denies bothersome side effects             Interval history as of 03/01/2021:   - She saw [facility] 11/5/20, see EMR  - eye doctor apmt end of this month      Headaches and migraines  4 headaches per week that improve with injection if caught early, worse 3 times a month     Preventive:  Venlafaxine 150mg , topiramate 100 mg BID  - was not able to start ajovy due to price   -Gabapentin 300 mg p o  q h s  added 11/05/2020     Abortive:   Sumatriptan injection - helps   Prochlorperazine - helped a little     Interval history as of 08/20/2020:    -she has seen physical therapy - has seen some improvement   -labs not done yet - but seeing PCP soon  -has  MRI brain on CD for my review - forgot it today         Headaches and migraines - 3-4 times per week   Topiramate 100 mg b i d    Venlafaxine 150 mg  -switched from plan for emgality to ajovy since more affordable with her insurance - but never got this either      Sumatriptan injection - helps   Prochlorperazine - helped a little     Headaches started at what age? 11years old  How often do the headaches occur?   - as of 5/14/2020: 5 days a week migraines   - as of 8/20/2020: 3-4 times per week, in July 2-3 really bad migraines that lasted 3-4 days    - as of 5/23/2022: 3-5 days a week will have migraines  What time of the day do the headaches start?  A lot wakes up with them or mid-afternoon  How long do the headaches last? Over 4 hours up to 3 days  Are you ever headache free? Yes     Aura? without aura     Last eye exam: 2 years ago, normal except needing glasses         Where is your headache located and pain quality?   - Right or left side, worse when on right  - start back of neck   - worse on right side comes up the back of the head and goes to frontal/eye region on that side  - pulsating, pounding, sharp stabbing, pressure     What is the intensity of pain? Average: 7-8/10, worst 10/10  Associated symptoms:   [x]???? Nausea       [x]? ??? Vomiting        []???? Diarrhea  [x]???? Insomnia    [x]? ??? Stiff or sore neck   [x]???? Problems with concentration  [x]???? Photophobia     [x]? ? ??Phonophobia      [x]???? Osmophobia  []???? Blurred vision   [x]???? Prefer quiet, dark room  [x]???? Light-headed or dizzy     [x]? ??? Tinnitus   []???? Hands or feet tingle or feel numb/paresthesias       []???? Red ear      []???? Ptosis      []???? Facial droop  [x]? ??? Lacrimation - tears  [x]???? Nasal congestion   []???? Flushing of face  []???? Change in pupil size     Things that make the headache worse? No specific movements, any movement      Headache triggers:  unknown     Have you seen someone else for headaches or pain? Yes, neurology, pain management  Have you had trigger point injection performed and how often? No  Have you had Botox injection performed and how often? Yes, 4 sessions and did not help   Have you had epidural injections or transforaminal injections performed? Yes  Are you current pregnant or planning on getting pregnant? No - no regular periods since early 402  Have you ever had any Brain imaging? yes - MRI Brain 2015 - normal     What medications do you take or have you taken for your headaches?    ABORTIVE:    OTC medications have been ineffective      Sumatriptan 100 mg - works well unless vomiting   Sumatriptan 6mg/0 5mL inj - works well      Has had zofran for nausea before     Past:  advil migraine  ED cocktails - help - compazine, benadryl   Prednisone      PREVENTIVE:      Venlafaxine 150 mg - 5 5 years   Topiramate 100 mg BID - more than 5 years     Past:  aimovig 70 mg- last took in January - was decreasing N/V but not huge decrease in migraines  Amitriptyline would be contraindicated due to interaction with venlafaxine  Inderal - proproanolol         Alternative therapies used in the past for headaches? PT for chronic neck pain in the past - last 4 years ago - helped      LIFESTYLE  Sleep   - averages: 930-730   Problems falling asleep?:   No  Problems staying asleep?:  Yes, 2   - snores  - sometimes naps, not usually  - sometimes falls asleep watching TV  - never had a sleep study     Physical activity: none     Water: 2-4 bottles per day  Caffeine: 1-2 cups per day     Mood: - depression after  and granddaughter passing away      The following portions of the patient's history were reviewed and updated as appropriate: allergies, current medications, past family history, past medical history, past social history, past surgical history and problem list      Pertinent family history:  Family history of headaches:  migraine headaches in mother, grandmother and daughter, granddaughter  Any family history of aneurysms - No     Pertinent social history:  Work: no  [address] [employer]  Lives with mother     Illicit Drugs: denies  Alcohol/tobacco: Denies tobacco use, alcohol intake: rarely

## 2022-05-23 NOTE — PATIENT INSTRUCTIONS
We will send in application for Aimovig to [address] [place] [address]  If you don't hear back within a few weeks, please call us     Headache/migraine treatment:   Abortive medications (for immediate treatment of a headache): It is ok to take ibuprofen, acetaminophen or naproxen (Advil, Tylenol,  Aleve, Excedrin) if they help your headaches you should limit these to No more than 3 times a week to avoid medication overuse/rebound headaches  Continue sumatriptan injections at onset of migraine  If this is effective, this is first choice    Alternative is rizatriptan MLT which dissolves on your tongue  May repeat in 2 hours  Limit of 3 a week or 9 a month  IF this fails you can use the injection if needed    For your nausea/vomiting use Zofran as needed  ll        Prescription preventive medications for headaches/migraines   (to take every day to help prevent headaches - not to take at the time of headache):  [x] topiramate    - if you want could consider weaning down topiramate to 50 mg in am and 100 mg in pm for 1 month and if no worsening try 50 mg in am and in pm for 1 month   - if at any point you find migraines generally are worse, go back up       [x] trial of Aimovig if gets approved from [facility]    Aimovig injections every 30 days     Keep out of direct sunlight  Prior to administration, allow to come to room temperature for 30 minutes  Do not warm using a heat source (eg, microwave or hot water)  Do not shake  Administer in abdomen (avoiding 2 inches around the navel), thigh, upper arm, or buttocks avoiding areas of skin that are tender, bruised, red or hard  Deliver entire contents of single-use prefilled pen or syringe  *Typically these types of medications take time untill you see the benefit, although some may see improvement in days, often it may take weeks, especially if the medication is being titrated up to a beneficial level   Please contact us if there are any concerns or questions regarding the medication  Lifestyle Recommendations:  [x] SLEEP - Maintain a regular sleep schedule: Adults need at least 7-8 hours of uninterrupted a night  Maintain good sleep hygiene:  Going to bed and waking up at consistent times, avoiding excessive daytime naps, avoiding caffeinated beverages in the evening, avoid excessive stimulation in the evening and generally using bed primarily for sleeping  One hour before bedtime would recommend turning lights down lower, decreasing your activity (may read quietly, listen to music at a low volume)  When you get into bed, should eliminate all technology (no texting, emailing, playing with your phone, iPad or tablet in bed)  [x] HYDRATION - Maintain good hydration  Drink  2L of fluid a day (4 typical small water bottles)  [x] DIET - Maintain good nutrition  In particular don't skip meals and try and eat healthy balanced meals regularly  [x] TRIGGERS - Look for other triggers and avoid them: Limit caffeine to 1-2 cups a day or less  Avoid dietary triggers that you have noticed bring on your headaches (this could include aged cheese, peanuts, MSG, aspartame and nitrates)  [x] EXERCISE - physical exercise as we all know is good for you in many ways, and not only is good for your heart, but also is beneficial for your mental health, cognitive health and  chronic pain/headaches  I would encourage at the least 5 days of physical exercise weekly for at least 30 minutes  Education and Follow-up  [x] Please call with any questions or concerns  Of course if any new concerning symptoms go to the emergency department    [x] Follow up in 4-6 months with Will Oliver or Dr Jennifer Rosenberg

## 2022-05-23 NOTE — PROGRESS NOTES
[address] [facility] Consult  PATIENT:  Kizzy Murphy  MRN:  [id number]  :  1965  DATE OF SERVICE:  2022  REFERRED BY: No ref  provider found  PMD: Joe Coleman DO    Assessment/Plan:   Lucas Aase I Souders is a very pleasant 56 y  o  female with a past medical history that includes hypothyroidism, migraines, GERD, depression, Cervical spinal stenosis, chronic neck pain, slightly elevated liver enzymes referred here for evaluation of headache  Dr Rolo Barnes initial evaluation 2020; Follow up 2020, 3/1/2021, 2021  [facility] 2022     Chronic migraine without aura without status migrainosus, not intractable  Tiffany reports a long history of headaches and migraines have been difficult to control   She reports pain is often left-sided, worse when on the right side   Starts typically the back of the neck and radiates unilateral side of the head the frontal/I region   She denies aura, reports typical associated migrainous features as well as some autonomic features   She denies any new worsening and did have slight improvement on aimovig but not enough   She denies any new or concerning symptoms  - as of 2020: 5 days a week migraines   - as of 2020:  Physical therapy has helped somewhat   migraines 3-4 times per week, in July 2-3 really bad migraines that lasted 3-4 days   Already on venlafaxine 150 mg, topiramate 100 mg BID, emgality was too expensive, trial of ajovy      - as of 3/1/2021: She continues to have uncontrolled migraines with milder migraines 4 days per week really bad migraines 3 times a month   Continue topiramate 100 mg b i d , venlafaxine 150 mg, all CGRPs are too expensive rec'd [facility] program    - as of 2021: She reports migraines on average still 3 days per week, some weeks better than others   On topiramate 100 mg BID (and not sure if helping - discussed could consider very gradual wean down to 50 mg BID see if changes migraines or possible side effects) and venlafaxine 150 mg through PCP  Trying to get emgality through [facility], but has not heard back yet  In past 3 months 3 bad migraines ones  Typically migraines improve with sumatriptan PO or Inj, back up prochlorperazine  We discussed options and she would not like to add medications at this time  - as of 5/25/2022:  Patient was unable to obtain Aimovig or Emgality due to cost of the medications  Otherwise has been the same  NO health changes since last seen     Workup:  - Neurologic assessment reveals normal neurological exam   - noncontrast head CT 06/30/2018:  No acute pathology, Tiny cavum septum pellucidum, a normal variant    - MRI Brain 2015 - normal per patient-she will try to obtain MRI brain on CD for my review     Preventative:  - we discussed headache hygiene and lifestyle factors that may improve headaches  - she is already on through other providers topiramate 100 mg b i d ,       - past/failed: aimovig 70 mg, botox, venlafaxine, amitriptyline would be contraindicated due interaction with venlafaxine, propranolol the remote past, topiramate, gabapentin, botox for 4 sessions without improvement   - future options: could consider increasing gabapentin, verapamil, 140 mg of aimovig, ajovy, consider [facility] referral - mentioned to patient 6/1/21 as a future option if needed since I am not a fellowship trained headache specialist      Abortive: It is ok to take ibuprofen, acetaminophen or naproxen (Advil, Tylenol,  Aleve, Excedrin) if they help your headaches you should limit these to No more than 3 times a week to avoid medication overuse/rebound headaches  Continue sumatriptan injections at onset of migraine  If this is effective, this is first choice    Alternative is rizatriptan MLT which dissolves on your tongue  May repeat in 2 hours  Limit of 3 a week or 9 a month    IF this fails you can use the injection if needed    For your nausea/vomiting use Zofran as needed    - past/failed:  OTC meds, sumatriptan by mouth does not work as well as injection, prochlorperazine  - future options:  ubrelvy, Alternative Triptan, metoclopramide, Toradol IM or p o , could consider trial for 5 days of Depakote or dexamethasone 4 prolonged migraine, reyvow           Patient instructions    [facility]  If you don't hear back within a few weeks, please call us     Headache/migraine treatment:   Abortive medications (for immediate treatment of a headache): It is ok to take ibuprofen, acetaminophen or naproxen (Advil, Tylenol,  Aleve, Excedrin) if they help your headaches you should limit these to No more than 3 times a week to avoid medication overuse/rebound headaches  Continue sumatriptan injections at onset of migraine  If this is effective, this is first choice    Alternative is rizatriptan MLT which dissolves on your tongue  May repeat in 2 hours  Limit of 3 a week or 9 a month  IF this fails you can use the injection if needed    For your nausea/vomiting use Zofran as needed  ll        Prescription preventive medications for headaches/migraines   (to take every day to help prevent headaches - not to take at the time of headache):  [x] topiramate    - if you want could consider weaning down topiramate to 50 mg in am and 100 mg in pm for 1 month and if no worsening try 50 mg in am and in pm for 1 month   - if at any point you find migraines generally are worse, go back up       [x] trial of Aimovig if gets approved from [facility]    Aimovig injections every 30 days     Keep out of direct sunlight  Prior to administration, allow to come to room temperature for 30 minutes  Do not warm using a heat source (eg, microwave or hot water)  Do not shake  Administer in abdomen (avoiding 2 inches around the navel), thigh, upper arm, or buttocks avoiding areas of skin that are tender, bruised, red or hard   Deliver entire contents of single-use prefilled pen or syringe  *Typically these types of medications take time untill you see the benefit, although some may see improvement in days, often it may take weeks, especially if the medication is being titrated up to a beneficial level  Please contact us if there are any concerns or questions regarding the medication  Lifestyle Recommendations:  [x] SLEEP - Maintain a regular sleep schedule: Adults need at least 7-8 hours of uninterrupted a night  Maintain good sleep hygiene:  Going to bed and waking up at consistent times, avoiding excessive daytime naps, avoiding caffeinated beverages in the evening, avoid excessive stimulation in the evening and generally using bed primarily for sleeping  One hour before bedtime would recommend turning lights down lower, decreasing your activity (may read quietly, listen to music at a low volume)  When you get into bed, should eliminate all technology (no texting, emailing, playing with your phone, iPad or tablet in bed)  [x] HYDRATION - Maintain good hydration  Drink  2L of fluid a day (4 typical small water bottles)  [x] DIET - Maintain good nutrition  In particular don't skip meals and try and eat healthy balanced meals regularly  [x] TRIGGERS - Look for other triggers and avoid them: Limit caffeine to 1-2 cups a day or less  Avoid dietary triggers that you have noticed bring on your headaches (this could include aged cheese, peanuts, MSG, aspartame and nitrates)  [x] EXERCISE - physical exercise as we all know is good for you in many ways, and not only is good for your heart, but also is beneficial for your mental health, cognitive health and  chronic pain/headaches  I would encourage at the least 5 days of physical exercise weekly for at least 30 minutes  Education and Follow-up  [x] Please call with any questions or concerns  Of course if any new concerning symptoms go to the emergency department    [x] Follow up in 4-6 months with Margarita or Dr Kajal Serra        CC: We had the pleasure of evaluating Tiffany Herbert in neurological consultation today  Maritza Wang a  right handed female who presents today for evaluation of headaches       History obtained from patient as well as available medical record review  History of Present Illness:   Interval History as of 5/23/2022  - has not started injection due to cost  Migraines are every week but vary in duration shortest time is 1 day up to 4 days, averaging 2 days but can have multiple migraines in a week  Therefore she has migraines 3-5 days a week  Average pain is a 10/10  If catches immediately will be a 5-6/10    Patient is having vomiting again with her migraines and her neck pain is worse with her migraines     Preventative:    Through PCP: topiramate 100 mg BID,    - unable to obtain injections due to cost      Abortive:   Sumatriptan injection - helps (gets 2 needles every 8-9 days)  Sumatriptan 100 mg PO (9 pills a month)  Zofran      Interval history as of 6/1/2021  - saw eye doctor and got new glasses and then saw ophthalmology - cataracts and needs laser iridotomy - both being done in June and July      Headaches and migraines   On average 3 migraines per week         Denies bothersome side effects             Interval history as of 03/01/2021:   - She saw WILMAN Pham 11/5/20, see EMR  - eye doctor apmt end of this month      Headaches and migraines  4 headaches per week that improve with injection if caught early, worse 3 times a month     Preventive:  Venlafaxine 150mg , topiramate 100 mg BID  - was not able to start ajovy due to price   -Gabapentin 300 mg p o  q h s  added 11/05/2020     Abortive:   Sumatriptan injection - helps   Prochlorperazine - helped a little     Interval history as of 08/20/2020:    -she has seen physical therapy - has seen some improvement   -labs not done yet - but seeing PCP soon  -has  MRI brain on CD for my review - forgot it today         Headaches and migraines - 3-4 times per week   Topiramate 100 mg b i d  Venlafaxine 150 mg  -switched from plan for emgality to ajovy since more affordable with her insurance - but never got this either      Sumatriptan injection - helps   Prochlorperazine - helped a little     Headaches started at what age? 11years old  How often do the headaches occur?   - as of 5/14/2020: 5 days a week migraines   - as of 8/20/2020: 3-4 times per week, in July 2-3 really bad migraines that lasted 3-4 days    - as of 5/23/2022: 3-5 days a week will have migraines  What time of the day do the headaches start?  A lot wakes up with them or mid-afternoon  How long do the headaches last? Over 4 hours up to 3 days  Are you ever headache free? Yes     Aura? without aura     Last eye exam: 2 years ago, normal except needing glasses         Where is your headache located and pain quality?   - Right or left side, worse when on right  - start back of neck   - worse on right side comes up the back of the head and goes to frontal/eye region on that side  - pulsating, pounding, sharp stabbing, pressure     What is the intensity of pain? Average: 7-8/10, worst 10/10  Associated symptoms:   [x]???? Nausea       [x]? ??? Vomiting        []???? Diarrhea  [x]???? Insomnia    [x]? ??? Stiff or sore neck   [x]???? Problems with concentration  [x]???? Photophobia     [x]? ? ??Phonophobia      [x]???? Osmophobia  []???? Blurred vision   [x]???? Prefer quiet, dark room  [x]???? Light-headed or dizzy     [x]? ??? Tinnitus   []???? Hands or feet tingle or feel numb/paresthesias       []???? Red ear      []???? Ptosis      []???? Facial droop  [x]? ??? Lacrimation - tears  [x]???? Nasal congestion   []???? Flushing of face  []???? Change in pupil size     Things that make the headache worse? No specific movements, any movement      Headache triggers:  unknown     Have you seen someone else for headaches or pain? Yes, neurology, pain management  Have you had trigger point injection performed and how often? No  Have you had Botox injection performed and how often? Yes, 4 sessions and did not help   Have you had epidural injections or transforaminal injections performed? Yes  Are you current pregnant or planning on getting pregnant? No - no regular periods since early 402  Have you ever had any Brain imaging? yes - MRI Brain 2015 - normal     What medications do you take or have you taken for your headaches?    ABORTIVE:    OTC medications have been ineffective      Sumatriptan 100 mg - works well unless vomiting   Sumatriptan 6mg/0 5mL inj - works well      Has had zofran for nausea before     Past:  advil migraine  ED cocktails - help - compazine, benadryl   Prednisone      PREVENTIVE:      Venlafaxine 150 mg - 5 5 years   Topiramate 100 mg BID - more than 5 years     Past:  aimovig 70 mg- last took in January - was decreasing N/V but not huge decrease in migraines  Amitriptyline would be contraindicated due to interaction with venlafaxine  Inderal - proproanolol         Alternative therapies used in the past for headaches? PT for chronic neck pain in the past - last 4 years ago - helped      LIFESTYLE  Sleep   - averages: 930-730   Problems falling asleep?:   No  Problems staying asleep?:  Yes, 2   - snores  - sometimes naps, not usually  - sometimes falls asleep watching TV  - never had a sleep study     Physical activity: none     Water: 2-4 bottles per day  Caffeine: 1-2 cups per day     Mood: - depression after  and granddaughter passing away      The following portions of the patient's history were reviewed and updated as appropriate: allergies, current medications, past family history, past medical history, past social history, past surgical history and problem list      Pertinent family history:  Family history of headaches:  migraine headaches in mother, grandmother and daughter, granddaughter  Any family history of aneurysms Mariela Sotomayor     Pertinent social history:  Work: no  [employer]  Lives with mother     Illicit Drugs: denies  Alcohol/tobacco: Denies tobacco use, alcohol intake: rarely         Past Medical History:     Past Medical History:   Diagnosis Date    Bilateral breast cysts June 2019 r    right breast cysts; dense breast tissue bilaterally    Cervical spinal stenosis 2015    Liver enzyme elevation     Migraines     Osteoporosis        Patient Active Problem List   Diagnosis    Hypothyroidism    GERD (gastroesophageal reflux disease)    Depression    Chronic migraine without aura without status migrainosus, not intractable    Major depressive disorder with single episode, in full remission ([facility] )    Closed displaced fracture of right calcaneus    Closed nondisplaced fracture of fourth metatarsal bone of right foot       Medications:      Current Outpatient Medications   Medication Sig Dispense Refill    Erenumab-aooe 140 MG/ML SOAJ Inject 140 mg under the skin every 30 (thirty) days 3 mL 4    ibandronate (BONIVA) 150 MG tablet Take 1 tablet (150 mg total) by mouth every 30 (thirty) days 1 tablet 3    ketorolac (TORADOL) 10 mg tablet Take 1 tablet (10 mg total) by mouth daily as needed for severe pain Max 1/day, 3/week, 10/month  Do not use with other OTC pain meds 10 tablet 0    levothyroxine (Synthroid) 75 mcg tablet Take 1 tablet (75 mcg total) by mouth daily in the early morning 30 tablet 5    omeprazole (PriLOSEC) 20 mg delayed release capsule Take 1 capsule (20 mg total) by mouth daily 30 capsule 3    ondansetron (ZOFRAN) 4 mg tablet Take 1 tablet (4 mg total) by mouth every 8 (eight) hours as needed for nausea or vomiting 20 tablet 3    rizatriptan (Maxalt-MLT) 10 MG disintegrating tablet Take 1 tablet (10 mg total) by mouth once as needed for migraine May repeat in 2 hours if needed   Limit of 3 a week or 9 a month 9 tablet 3    SUMAtriptan (IMITREX) 100 mg tablet TAKE 1/2 TO 1 TABLET BY MOUTH AT START OF HEADACHE, MAY REPEAT ONCE AFTER 2 HOURS 9 tablet 5    SUMAtriptan Succinate 6 MG/0 5ML SOAJ INJECT ONCE FOR SEVERE HEADACHE  MAY REPEAT AFTER 2 HORUS  MAX 2 INJECTIONS PER WEEK 3 mL 0    topiramate (TOPAMAX) 50 MG tablet Take 2 tablets (100 mg total) by mouth 2 (two) times a day 180 tablet 3    dexamethasone (DECADRON) 2 mg tablet Take 1 tablet (2 mg total) by mouth 2 (two) times a day with meals (Patient not taking: Reported on 2022) 60 tablet 0    Erenumab-aooe 140 MG/ML SOAJ Inject 140 mg under the skin every 30 (thirty) days (Patient not taking: Reported on 2022) 1 mL 11    prochlorperazine (COMPAZINE) 10 mg tablet Take 1 tablet (10 mg total) by mouth every 6 (six) hours as needed for nausea or vomiting (Patient not taking: Reported on 2022) 12 tablet 3     No current facility-administered medications for this visit  Allergies:    No Known Allergies    Family History:     Family History   Problem Relation Age of Onset    Parkinsonism Father     Dementia Father     Hypertension Daughter     Cervical cancer Maternal Grandmother     Bipolar disorder Sister     No Known Problems Mother        Social History:       Social History     Socioeconomic History    Marital status:       Spouse name: Not on file    Number of children: Not on file    Years of education: Not on file    Highest education level: Not on file   Occupational History    Not on file   Tobacco Use    Smoking status: Former Smoker     Quit date:      Years since quittin 4    Smokeless tobacco: Never Used   Vaping Use    Vaping Use: Never used   Substance and Sexual Activity    Alcohol use: No    Drug use: No    Sexual activity: Not Currently   Other Topics Concern    Not on file   Social History Narrative    Not on file     Social Determinants of Health     Financial Resource Strain: Not on file   Food Insecurity: Not on file   Transportation Needs: Not on file   Physical Activity: Not on file   Stress: Not on file   Social Connections: Not on file   Intimate Partner Violence: Not on file   Housing Stability: Not on file      I have reviewed the patient's medical, social and surgical history as well as medications in detail and updated the computerized patient record  Objective:     Vitals:    05/23/22 0808   BP: 122/79   Pulse: 74   Temp: 98 2 °F (36 8 °C)     CONSTITUTIONAL: Well developed, well nourished, well groomed  No dysmorphic features  Eyes:  EOM normal      Neck:  Normal ROM, neck supple  HEENT:  Normocephalic atraumatic  Chest:  Respirations regular and unlabored  Psychiatric:  Normal behavior and appropriate affect      MENTAL STATUS  Orientation: Alert and oriented x 3  Fund of knowledge: Intact  MOTOR (Upper and lower extremities)   Bulk/tone/abnormal movement: Normal muscle bulk and tone  COORDINATION   Station/Gait: Normal baseline gait      Pertinent lab results:   8/26/20 CMP with chloride 110, alk-phos 168   CBC unremarkable  TSH normal      Imaging:   - noncontrast head CT 06/30/2018:  No acute pathology, Tiny cavum septum pellucidum, a normal variant    - MRI Brain 2015 - normal          Review of Systems:     Constitutional: Negative  HENT: Negative  Eyes: Negative  Respiratory: Negative  Cardiovascular: Negative  Gastrointestinal: Negative  Endocrine: Negative  Genitourinary: Negative  Musculoskeletal: Negative  Skin: Negative  Allergic/Immunologic: Negative  Neurological: Positive for headaches  Hematological: Negative  Psychiatric/Behavioral: Negative    I personally reviewed and updated the ROS that was entered by the medical assistant         I have spent 25 minutes with Patient  today in which greater than 50% of this time was spent in counseling/coordination of care regarding Risks and benefits of tx options, Intructions for management, Patient and family education, Importance of tx compliance, Risk factor reductions and and 20 minutes of non-face to face time        Author:  Larry De La Torre PA-C 5/23/2022 3:30 PM

## 2022-05-27 ENCOUNTER — TELEPHONE (OUTPATIENT)
Dept: NEUROLOGY | Facility: CLINIC | Age: 57
End: 2022-05-27

## 2022-05-27 NOTE — TELEPHONE ENCOUNTER
Received vm form PAP counselor Asher Tubbs from [address] [place] [address] regarding Ronanonel Javier  Asking us to return her call at [phone]  Can you follow up on this?

## 2022-05-27 NOTE — TELEPHONE ENCOUNTER
A fax came in from the [employer] that patient's application is missing information  MSW will look into this on 5/31

## 2022-05-27 NOTE — TELEPHONE ENCOUNTER
MSW phoned patient and lvm to learn if she still interested in applying for [employer]    MSW phoned [employer] at [phone]  And spoke with [address]  She asked for address and zip code to be verified  Provided information and she shared that she cannot disclose any info about patient as they have a different address on file  MSW informed that if they have any questions to call back [phone]

## 2022-05-27 NOTE — TELEPHONE ENCOUNTER
Izaiah Rai MSW, is already addressing this task  Date of encounter is 2/15/22  MSW will close this task

## 2022-05-31 NOTE — TELEPHONE ENCOUNTER
MSW phoned patient and lvm to learn if she is still interested in applying for [employer] as office received fax  Please call back

## 2022-06-01 NOTE — TELEPHONE ENCOUNTER
MSW received vm from pt requesting a call back  She informed that she provided to [employer] application and she was informed that it will be provided to   MSW informed pt that yeimy was not received yet but MSW will reach out to [facility]      MSW had communication with Jono Barkley, she faxed application will send copy to MSW to verify if application is completed  Application was received   MSW will follow up with [facility]

## 2022-06-01 NOTE — TELEPHONE ENCOUNTER
MSW faxed [employer] application for assistance with Umesh Jo to   [phone]  Copy of application was placed in the to be scan bin  Nursing,    Could you please start a PA for Aimovig as [employer] needs proof that we obtained one  Once determination is received  Could you please fax response to [employer] to [phone]?     Thank you

## 2022-06-01 NOTE — TELEPHONE ENCOUNTER
BEATRICE phoned [employer] at [phone] and spoke with [facility] who informed that she is unable to provide any further information as they have a different home address  If new application is sent via fax they will update patient's application

## 2022-06-02 ENCOUNTER — APPOINTMENT (OUTPATIENT)
Dept: LAB | Facility: CLINIC | Age: 57
End: 2022-06-02
Payer: MEDICARE

## 2022-06-02 DIAGNOSIS — E03.9 HYPOTHYROIDISM, UNSPECIFIED TYPE: ICD-10-CM

## 2022-06-02 LAB — TSH SERPL DL<=0.05 MIU/L-ACNC: 1.26 UIU/ML (ref 0.45–4.5)

## 2022-06-02 PROCEDURE — 84443 ASSAY THYROID STIM HORMONE: CPT

## 2022-06-02 PROCEDURE — 36415 COLL VENOUS BLD VENIPUNCTURE: CPT

## 2022-06-02 NOTE — TELEPHONE ENCOUNTER
MSW phoned patient and lvm to learn if she can provide old address in order for [employer] to disclose updates about the account  MSW faxed application yesterday to [employer] and they should be editing the home address  Also nursing team will be working on PA and will submit to [facility]  Please call back

## 2022-06-03 ENCOUNTER — TELEPHONE (OUTPATIENT)
Dept: NEUROLOGY | Facility: CLINIC | Age: 57
End: 2022-06-03

## 2022-06-03 NOTE — TELEPHONE ENCOUNTER
MSW faxed the [employer] application again to [phone]  Successful notice received  MSW will follow-up with [employer] on 6/6 to confirm receipt of the fax and will follow-up with the Clinical Team to see if the prior auth for [employer] has been completed yet

## 2022-06-03 NOTE — TELEPHONE ENCOUNTER
Per [phone]  AIMOVIG INJ 140MG/ML is approved through 12/31/2022     Approval letter printed and faxed to [employer] to [phone] and placed in clerical bin to be scanned

## 2022-06-03 NOTE — TELEPHONE ENCOUNTER
MSW phoned [address] at [phone] and spoke Lois Camargo who informed that they have not received application yet  However she explained that it takes a few days in order for them to upload the application into the system  Lois Camargo asked from what number if was faxed from but MSW was unable to locate number  Once fax number is found then MSW can call them back  MSW phoned [employer] as she was able to located the  fax number for Harvinder located in the hallway [phone] (outbound)  This was provided to Alex Briseno but he was not able to locate the fax  He requested for application to be sent to [employer] again  MSW received incoming call from patient  Her old address was:  [address] [address]  She was made aware that [employer] application was faxed on Wednesday and will be refaxed today as [employer] informed that they have not received it yet  Patient was made aware that the  team will continue to follow up with this task

## 2022-06-03 NOTE — TELEPHONE ENCOUNTER
[employer] requires WILMAN Alfonso [id number]  [address]  Group [id number]  ID [id number]    PA initiated on CMM    (Key: [id number])    Awaiting determination

## 2022-06-03 NOTE — TELEPHONE ENCOUNTER
AIM[employer] INJ 140MG/ML is approved through 12/31/2022  Approval letter faxed to [employer] to [phone]

## 2022-06-06 ENCOUNTER — ESTABLISHED COMPREHENSIVE EXAM (OUTPATIENT)
Dept: URBAN - METROPOLITAN AREA CLINIC 6 | Facility: CLINIC | Age: 57
End: 2022-06-06

## 2022-06-06 DIAGNOSIS — H40.023: ICD-10-CM

## 2022-06-06 DIAGNOSIS — H40.033: ICD-10-CM

## 2022-06-06 PROCEDURE — 76514 ECHO EXAM OF EYE THICKNESS: CPT

## 2022-06-06 PROCEDURE — 92020 GONIOSCOPY: CPT

## 2022-06-06 PROCEDURE — 92014 COMPRE OPH EXAM EST PT 1/>: CPT

## 2022-06-06 ASSESSMENT — PACHYMETRY
OD_CT_UM: 531
OS_CT_UM: 532

## 2022-06-06 ASSESSMENT — VISUAL ACUITY
OU_CC: J1
OD_CC: 20/25
OS_CC: 20/20-1

## 2022-06-06 ASSESSMENT — TONOMETRY
OD_IOP_MMHG: 27
OS_IOP_MMHG: 24

## 2022-06-06 NOTE — TELEPHONE ENCOUNTER
MSW phoned the [employer] at [phone] this date and spoke with Maryuri Vásquez  She stated that she does not see the application/auth approval letter in their system yet, but that it can take 24-48 hours to appear  MSW will follow-up with the [employer] on 6/7

## 2022-06-07 NOTE — TELEPHONE ENCOUNTER
MSW phoned the [employer] at [phone] and spoke with [facility] who advised that the application is still pending  [facility] stated that all they have on file is the script  MSW advised that this writer had re-faxed the entire script on 6/3  [facility] was able to locate the fax from 6/3, but stated that they have not began processing same yet  [facility] stated that they are currently processing faxes from 5/31 (they have recently received a high volume of faxes)  [facility] suggested that this writer callback on Friday 6/10 or Monday 6/13 to check back in  MSW phoned patient at [phone] to let her know that the application was received by the [place] [address] but that a determination is still pending  MSW advised that the social work team will update her as able

## 2022-06-10 NOTE — TELEPHONE ENCOUNTER
MSW phoned the [employer] at [phone] and spoke with Tessa Potter - she advised that answers to 2 questions were omitted from patient's application - if she applied for the [employer] and Medicaid  MSW asked this writer could conference patient into the call to provide verbal responses and Tessa Potter stated that would be acceptable  MSW conferenced patient into the call at [phone] - she answered that she did not apply for the [employer] and has not recently applied for Medicaid  Tessa Potter updated patient's application to reflect these answers  Tessa Potter advised that they never received the prior auth approval letter and asked for same to be refaxed to [phone]  Tessa Potter stated that the prior auth letter is all they need to make a determination about patient's eligibility  Tessa Potter stated that this writer can call back on Monday 6/13  MSW refaxed the prior auth approval letter to [phone]  Successful fax notice received

## 2022-06-13 NOTE — TELEPHONE ENCOUNTER
DEBORAH responded that she would enter an electronic script  MSW followed-up but new rx was not entered, likely because it does appear that an electronic script was sent on 5/23  MSW called back to the [facility] at [phone] and spoke with Brooks Kaiser  She was able to locate the 5/23 electronic script, and had someone from the pharmacy review same  Brooks Kaiser stated that they can accept the 5/23 script and that the application is still "in process" and they should have a decision about patient's eligibility in 24-48 hours  MSW will follow-up with [facility]

## 2022-06-13 NOTE — TELEPHONE ENCOUNTER
MSW phoned the [employer] at [phone] and spoke with Saul Resendez thoroughly reviewed the application and questioned if the two questions that the [employer] rep asked about if they were related to applying for the low income subsidy and and Medicaid  MSW said yes, and that patient said "no" to both questions  Dipti stated that the rep on 6/10 did not "properly document" their call with patient  Dipti also stated that patient's rx ID# number on her application  MSW conference patient into the call at [phone]  Patient provided her rx ID# as #[id number]  [address] Agustin also asked her again if she applied for Medicaid or the [employer] - to which patient answered "no" again  Dipti stated that now have patient's section of the application complete  Dipti then reviewed the script portion of the application - she advised that the [employer] never received an electronic script for [employer] and the (it was indicated on the paper application that an electronic application would be sent)  118 Jose Agustin stated that either the prescription can be sent electronically or the paper application can be updated as a paper copy, but that if we did so, we would need to initial after every change was made (ie  If we unchecked the electronic rx box and next to the dosage/frequency/dispense amount/refills/ICD code)  Dipti also suggested circling [employer]  [address] Agustin stated that once they have a script on file for patient, that a determination on her eligibility can be made within 1-2 business days  MSW will ask Nicholette Favorite, how she would like to proceed - updating paper script or sending an electronic script

## 2022-06-14 DIAGNOSIS — M81.0 OSTEOPOROSIS, UNSPECIFIED OSTEOPOROSIS TYPE, UNSPECIFIED PATHOLOGICAL FRACTURE PRESENCE: ICD-10-CM

## 2022-06-14 RX ORDER — IBANDRONATE SODIUM 150 MG/1
TABLET, FILM COATED ORAL
Qty: 3 TABLET | Refills: 0 | Status: SHIPPED | OUTPATIENT
Start: 2022-06-14

## 2022-06-14 NOTE — TELEPHONE ENCOUNTER
MSW phoned [address] [place] [address] at [phone] and spoke with [facility]  Patient has been approved with aimovig effective today through Dec 2022  No copay  They faxed a notification to Neurology  They also contacted the patient and lvm  New Rx requested because the RX that they received has a coversheet from [address] [place] [address] and pt is treated at [facility]       New RX can be sent electronically to:    [address] [facility] [address], [address]

## 2022-06-14 NOTE — TELEPHONE ENCOUNTER
Thanks! I did originally send an electronic version  Was going to send another one this am if that one wasn't acceptable    Appreciate all your work!!!

## 2022-06-15 NOTE — TELEPHONE ENCOUNTER
[facility] at [phone] and spoke with Elton   Who informed that they already spoke with patient regarding approval and delivery of [employer]  The one will be shipped today and patient will get it tomorrow, pt is aware

## 2022-06-17 NOTE — TELEPHONE ENCOUNTER
MSW phoned patient who confirmed that she received Raj Pour in the mail  MSW remains available for any questions or future social needs

## 2022-06-28 ENCOUNTER — ESTABLISHED COMPREHENSIVE EXAM (OUTPATIENT)
Dept: URBAN - METROPOLITAN AREA CLINIC 6 | Facility: CLINIC | Age: 57
End: 2022-06-28

## 2022-06-28 DIAGNOSIS — H40.023: ICD-10-CM

## 2022-06-28 DIAGNOSIS — H40.033: ICD-10-CM

## 2022-06-28 PROCEDURE — 92012 INTRM OPH EXAM EST PATIENT: CPT

## 2022-06-28 ASSESSMENT — VISUAL ACUITY
OS_CC: 20/25-1
OD_CC: 20/25-2

## 2022-06-28 ASSESSMENT — TONOMETRY
OD_IOP_MMHG: 15
OS_IOP_MMHG: 12

## 2022-07-22 ENCOUNTER — APPOINTMENT (OUTPATIENT)
Dept: LAB | Facility: CLINIC | Age: 57
End: 2022-07-22
Payer: MEDICARE

## 2022-07-22 DIAGNOSIS — E03.9 HYPOTHYROIDISM, UNSPECIFIED TYPE: Primary | ICD-10-CM

## 2022-07-22 LAB — TSH SERPL DL<=0.05 MIU/L-ACNC: 1.41 UIU/ML (ref 0.45–4.5)

## 2022-07-22 PROCEDURE — 84443 ASSAY THYROID STIM HORMONE: CPT

## 2022-07-22 PROCEDURE — 36415 COLL VENOUS BLD VENIPUNCTURE: CPT

## 2022-08-17 DIAGNOSIS — K21.9 GERD (GASTROESOPHAGEAL REFLUX DISEASE): ICD-10-CM

## 2022-08-17 RX ORDER — OMEPRAZOLE 20 MG/1
CAPSULE, DELAYED RELEASE ORAL
Qty: 30 CAPSULE | Refills: 3 | Status: SHIPPED | OUTPATIENT
Start: 2022-08-17

## 2022-09-13 ENCOUNTER — APPOINTMENT (OUTPATIENT)
Dept: ULTRASOUND IMAGING | Facility: HOSPITAL | Age: 57
End: 2022-09-13
Payer: MEDICARE

## 2022-09-13 ENCOUNTER — APPOINTMENT (EMERGENCY)
Dept: CT IMAGING | Facility: HOSPITAL | Age: 57
End: 2022-09-13
Payer: MEDICARE

## 2022-09-13 ENCOUNTER — HOSPITAL ENCOUNTER (EMERGENCY)
Facility: HOSPITAL | Age: 57
Discharge: HOME | End: 2022-09-13
Attending: EMERGENCY MEDICINE
Payer: MEDICARE

## 2022-09-13 VITALS
HEART RATE: 87 BPM | DIASTOLIC BLOOD PRESSURE: 82 MMHG | RESPIRATION RATE: 18 BRPM | OXYGEN SATURATION: 95 % | SYSTOLIC BLOOD PRESSURE: 159 MMHG | TEMPERATURE: 98.4 F

## 2022-09-13 DIAGNOSIS — N39.0 UTI (URINARY TRACT INFECTION): ICD-10-CM

## 2022-09-13 DIAGNOSIS — R10.9 ABDOMINAL PAIN: Primary | ICD-10-CM

## 2022-09-13 LAB
ALBUMIN SERPL BCP-MCNC: 4.4 G/DL (ref 3.5–5)
ALP SERPL-CCNC: 187 U/L (ref 34–104)
ALT SERPL W P-5'-P-CCNC: 28 U/L (ref 7–52)
ANION GAP SERPL CALCULATED.3IONS-SCNC: 8 MMOL/L (ref 4–13)
AST SERPL W P-5'-P-CCNC: 21 U/L (ref 13–39)
BACTERIA UR QL AUTO: ABNORMAL /HPF
BASOPHILS # BLD AUTO: 0.06 THOUSANDS/ΜL (ref 0–0.1)
BASOPHILS NFR BLD AUTO: 1 % (ref 0–1)
BILIRUB SERPL-MCNC: 0.29 MG/DL (ref 0.2–1)
BILIRUB UR QL STRIP: NEGATIVE
BUN SERPL-MCNC: 18 MG/DL (ref 5–25)
CALCIUM SERPL-MCNC: 9.4 MG/DL (ref 8.4–10.2)
CHLORIDE SERPL-SCNC: 105 MMOL/L (ref 96–108)
CLARITY UR: CLEAR
CO2 SERPL-SCNC: 25 MMOL/L (ref 21–32)
COLOR UR: ABNORMAL
CREAT SERPL-MCNC: 0.76 MG/DL (ref 0.6–1.3)
EOSINOPHIL # BLD AUTO: 0.29 THOUSAND/ΜL (ref 0–0.61)
EOSINOPHIL NFR BLD AUTO: 3 % (ref 0–6)
ERYTHROCYTE [DISTWIDTH] IN BLOOD BY AUTOMATED COUNT: 13.1 % (ref 11.6–15.1)
GFR SERPL CREATININE-BSD FRML MDRD: 86 ML/MIN/1.73SQ M
GLUCOSE SERPL-MCNC: 88 MG/DL (ref 65–140)
GLUCOSE UR STRIP-MCNC: NEGATIVE MG/DL
HCT VFR BLD AUTO: 41.9 % (ref 34.8–46.1)
HGB BLD-MCNC: 13.8 G/DL (ref 11.5–15.4)
HGB UR QL STRIP.AUTO: ABNORMAL
IMM GRANULOCYTES # BLD AUTO: 0.04 THOUSAND/UL (ref 0–0.2)
IMM GRANULOCYTES NFR BLD AUTO: 1 % (ref 0–2)
KETONES UR STRIP-MCNC: NEGATIVE MG/DL
LEUKOCYTE ESTERASE UR QL STRIP: ABNORMAL
LIPASE SERPL-CCNC: 23 U/L (ref 11–82)
LYMPHOCYTES # BLD AUTO: 3.08 THOUSANDS/ΜL (ref 0.6–4.47)
LYMPHOCYTES NFR BLD AUTO: 35 % (ref 14–44)
MCH RBC QN AUTO: 29.1 PG (ref 26.8–34.3)
MCHC RBC AUTO-ENTMCNC: 32.9 G/DL (ref 31.4–37.4)
MCV RBC AUTO: 88 FL (ref 82–98)
MONOCYTES # BLD AUTO: 0.6 THOUSAND/ΜL (ref 0.17–1.22)
MONOCYTES NFR BLD AUTO: 7 % (ref 4–12)
MUCOUS THREADS UR QL AUTO: ABNORMAL
NEUTROPHILS # BLD AUTO: 4.72 THOUSANDS/ΜL (ref 1.85–7.62)
NEUTS SEG NFR BLD AUTO: 53 % (ref 43–75)
NITRITE UR QL STRIP: NEGATIVE
NON-SQ EPI CELLS URNS QL MICRO: ABNORMAL /HPF
NRBC BLD AUTO-RTO: 0 /100 WBCS
PH UR STRIP.AUTO: 5 [PH]
PLATELET # BLD AUTO: 303 THOUSANDS/UL (ref 149–390)
PMV BLD AUTO: 9.8 FL (ref 8.9–12.7)
POTASSIUM SERPL-SCNC: 3.6 MMOL/L (ref 3.5–5.3)
PROT SERPL-MCNC: 7.3 G/DL (ref 6.4–8.4)
PROT UR STRIP-MCNC: NEGATIVE MG/DL
RBC # BLD AUTO: 4.75 MILLION/UL (ref 3.81–5.12)
RBC #/AREA URNS AUTO: ABNORMAL /HPF
SODIUM SERPL-SCNC: 138 MMOL/L (ref 135–147)
SP GR UR STRIP.AUTO: 1.02 (ref 1–1.03)
UROBILINOGEN UR STRIP-ACNC: <2 MG/DL
WBC # BLD AUTO: 8.79 THOUSAND/UL (ref 4.31–10.16)
WBC #/AREA URNS AUTO: ABNORMAL /HPF

## 2022-09-13 PROCEDURE — G1004 CDSM NDSC: HCPCS

## 2022-09-13 PROCEDURE — 80053 COMPREHEN METABOLIC PANEL: CPT | Performed by: EMERGENCY MEDICINE

## 2022-09-13 PROCEDURE — 81001 URINALYSIS AUTO W/SCOPE: CPT | Performed by: EMERGENCY MEDICINE

## 2022-09-13 PROCEDURE — 83690 ASSAY OF LIPASE: CPT | Performed by: EMERGENCY MEDICINE

## 2022-09-13 PROCEDURE — 96365 THER/PROPH/DIAG IV INF INIT: CPT

## 2022-09-13 PROCEDURE — 74177 CT ABD & PELVIS W/CONTRAST: CPT

## 2022-09-13 PROCEDURE — 99284 EMERGENCY DEPT VISIT MOD MDM: CPT

## 2022-09-13 PROCEDURE — 99285 EMERGENCY DEPT VISIT HI MDM: CPT | Performed by: EMERGENCY MEDICINE

## 2022-09-13 PROCEDURE — 76830 TRANSVAGINAL US NON-OB: CPT

## 2022-09-13 PROCEDURE — 36415 COLL VENOUS BLD VENIPUNCTURE: CPT

## 2022-09-13 PROCEDURE — 96375 TX/PRO/DX INJ NEW DRUG ADDON: CPT

## 2022-09-13 PROCEDURE — 76856 US EXAM PELVIC COMPLETE: CPT

## 2022-09-13 PROCEDURE — 85025 COMPLETE CBC W/AUTO DIFF WBC: CPT | Performed by: EMERGENCY MEDICINE

## 2022-09-13 RX ORDER — CEFTRIAXONE 1 G/50ML
1000 INJECTION, SOLUTION INTRAVENOUS ONCE
Status: COMPLETED | OUTPATIENT
Start: 2022-09-13 | End: 2022-09-13

## 2022-09-13 RX ORDER — CEFPODOXIME PROXETIL 200 MG/1
200 TABLET, FILM COATED ORAL 2 TIMES DAILY
Qty: 10 TABLET | Refills: 0 | Status: SHIPPED | OUTPATIENT
Start: 2022-09-13 | End: 2022-09-18

## 2022-09-13 RX ORDER — MORPHINE SULFATE 4 MG/ML
4 INJECTION, SOLUTION INTRAMUSCULAR; INTRAVENOUS ONCE
Status: COMPLETED | OUTPATIENT
Start: 2022-09-13 | End: 2022-09-13

## 2022-09-13 RX ADMIN — MORPHINE SULFATE 4 MG: 4 INJECTION INTRAVENOUS at 20:44

## 2022-09-13 RX ADMIN — IOHEXOL 100 ML: 350 INJECTION, SOLUTION INTRAVENOUS at 20:36

## 2022-09-13 RX ADMIN — CEFTRIAXONE 1000 MG: 1 INJECTION, SOLUTION INTRAVENOUS at 22:11

## 2022-09-14 ENCOUNTER — TELEPHONE (OUTPATIENT)
Dept: OBGYN CLINIC | Facility: CLINIC | Age: 57
End: 2022-09-14

## 2022-09-14 NOTE — TELEPHONE ENCOUNTER
Pt states she does not have any concerns or issues at this time  Pt states the pain she was having she was told was from the start of a UTI and she was treated and prescribed medication for that in ED  Pt states on the ultrasound they told her she should f/u with GYN for the findings  Pt would be NP to office  Soonest available Apt offered and scheduled  Pt aware if any questions or concerns prior to apt to call

## 2022-09-14 NOTE — ED PROVIDER NOTES
History  Chief Complaint   Patient presents with    Abdominal Pain     Pt c/o RLQ abd pain that radiates into her back  Denies urinary issues  Pain has increased throughout day with no relief      Patient is a 58-year-old female with PMH left-sided ovarian cyst presents with right lower quadrant abdominal pain for 1 day  Describes it as burning sensation  Nonradiating pain  No dysuria hematuria  No nausea vomiting  No fevers or chills  No bloody stools  History provided by:  Patient   used: No    Abdominal Pain  Pain location:  RLQ  Pain severity:  Mild  Duration:  1 day  Associated symptoms: no chest pain, no chills, no cough, no dysuria, no fever and no nausea        Prior to Admission Medications   Prescriptions Last Dose Informant Patient Reported? Taking? Erenumab-aooe 140 MG/ML SOAJ   No No   Sig: Inject 140 mg under the skin every 30 (thirty) days   SUMAtriptan (IMITREX) 100 mg tablet  Self No No   Sig: TAKE 1/2 TO 1 TABLET BY MOUTH AT START OF HEADACHE, MAY REPEAT ONCE AFTER 2 HOURS   SUMAtriptan Succinate 6 MG/0 5ML SOAJ   No No   Sig: INJECT ONCE FOR SEVERE HEADACHE  MAY REPEAT AFTER 2 HORUS  MAX 2 INJECTIONS PER WEEK   dexamethasone (DECADRON) 2 mg tablet  Self No No   Sig: Take 1 tablet (2 mg total) by mouth 2 (two) times a day with meals   Patient not taking: Reported on 5/23/2022   ibandronate (BONIVA) 150 MG tablet   No No   Sig: TAKE 1 TABLET(150 MG) BY MOUTH EVERY 30 DAYS   ketorolac (TORADOL) 10 mg tablet  Self No No   Sig: Take 1 tablet (10 mg total) by mouth daily as needed for severe pain Max 1/day, 3/week, 10/month   Do not use with other OTC pain meds   levothyroxine (Synthroid) 75 mcg tablet   No No   Sig: Take 1 tablet (75 mcg total) by mouth daily in the early morning   omeprazole (PriLOSEC) 20 mg delayed release capsule   No No   Sig: TAKE 1 CAPSULE(20 MG) BY MOUTH DAILY   ondansetron (ZOFRAN) 4 mg tablet   No No   Sig: Take 1 tablet (4 mg total) by mouth every 8 (eight) hours as needed for nausea or vomiting   prochlorperazine (COMPAZINE) 10 mg tablet  Self No No   Sig: Take 1 tablet (10 mg total) by mouth every 6 (six) hours as needed for nausea or vomiting   Patient not taking: Reported on 2022   rizatriptan (Maxalt-MLT) 10 MG disintegrating tablet   No No   Sig: Take 1 tablet (10 mg total) by mouth once as needed for migraine May repeat in 2 hours if needed  Limit of 3 a week or 9 a month   topiramate (TOPAMAX) 50 MG tablet   No No   Sig: Take 2 tablets (100 mg total) by mouth 2 (two) times a day      Facility-Administered Medications: None       Past Medical History:   Diagnosis Date    Bilateral breast cysts 2019 r    right breast cysts; dense breast tissue bilaterally    Cervical spinal stenosis 2015    Liver enzyme elevation     Migraines     Osteoporosis        Past Surgical History:   Procedure Laterality Date     SECTION      two    OVARIAN CYST SURGERY         Family History   Problem Relation Age of Onset    Parkinsonism Father    Carlos Portage Dementia Father     Hypertension Daughter     Cervical cancer Maternal Grandmother     Bipolar disorder Sister     No Known Problems Mother      I have reviewed and agree with the history as documented  E-Cigarette/Vaping    E-Cigarette Use Never User      E-Cigarette/Vaping Substances    Nicotine No     THC No     CBD No     Flavoring No     Other No     Unknown No      Social History     Tobacco Use    Smoking status: Former Smoker     Quit date:      Years since quittin     Smokeless tobacco: Never Used   Vaping Use    Vaping Use: Never used   Substance Use Topics    Alcohol use: No    Drug use: No       Review of Systems   Constitutional: Negative for activity change, appetite change, chills, diaphoresis and fever  HENT: Negative for congestion, drooling, ear discharge, ear pain, facial swelling and nosebleeds      Eyes: Negative for photophobia, discharge and itching  Respiratory: Negative for apnea, cough and wheezing  Cardiovascular: Negative for chest pain, palpitations and leg swelling  Gastrointestinal: Positive for abdominal pain  Negative for blood in stool and nausea  Endocrine: Negative for cold intolerance, heat intolerance, polydipsia, polyphagia and polyuria  Genitourinary: Negative for difficulty urinating, dysuria, enuresis and flank pain  Musculoskeletal: Negative for arthralgias, back pain and gait problem  Allergic/Immunologic: Negative for environmental allergies  Neurological: Negative for dizziness and headaches  Hematological: Negative for adenopathy  Psychiatric/Behavioral: Negative for agitation  Physical Exam  Physical Exam  Constitutional:       Appearance: She is well-developed  HENT:      Head: Normocephalic and atraumatic  Eyes:      Extraocular Movements: Extraocular movements intact  Pupils: Pupils are equal, round, and reactive to light  Cardiovascular:      Rate and Rhythm: Normal rate  Heart sounds: Normal heart sounds  Pulmonary:      Effort: Pulmonary effort is normal       Breath sounds: Normal breath sounds  Abdominal:      General: Abdomen is flat  Bowel sounds are normal       Palpations: Abdomen is soft  Tenderness: There is abdominal tenderness in the right lower quadrant  Skin:     General: Skin is warm  Capillary Refill: Capillary refill takes less than 2 seconds  Neurological:      General: No focal deficit present  Mental Status: She is alert     Psychiatric:         Mood and Affect: Mood normal          Vital Signs  ED Triage Vitals   Temperature Pulse Respirations Blood Pressure SpO2   09/13/22 1814 09/13/22 1814 09/13/22 1814 09/13/22 1814 09/13/22 1814   98 4 °F (36 9 °C) 85 16 169/100 96 %      Temp src Heart Rate Source Patient Position - Orthostatic VS BP Location FiO2 (%)   -- 09/13/22 2047 09/13/22 2047 09/13/22 2047 --    Monitor Lying Right arm Pain Score       09/13/22 1814       8           Vitals:    09/13/22 1814 09/13/22 2047   BP: 169/100 (!) 188/85   Pulse: 85 92   Patient Position - Orthostatic VS:  Lying         Visual Acuity      ED Medications  Medications   morphine injection 4 mg (4 mg Intravenous Given 9/13/22 2044)   iohexol (OMNIPAQUE) 350 MG/ML injection (SINGLE-DOSE) 100 mL (100 mL Intravenous Given 9/13/22 2036)   cefTRIAXone (ROCEPHIN) IVPB (premix in dextrose) 1,000 mg 50 mL (1,000 mg Intravenous New Bag 9/13/22 2211)       Diagnostic Studies  Results Reviewed     Procedure Component Value Units Date/Time    Urinalysis with microscopic [[id number]]  (Abnormal) Collected: 09/13/22 2046    Lab Status: Final result Specimen: Urine, Clean Catch Updated: 09/13/22 2059     Color, UA Light Yellow     Clarity, UA Clear     Specific Gravity, UA 1 025     pH, UA 5 0     Leukocytes, UA Small     Nitrite, UA Negative     Protein, UA Negative mg/dl      Glucose, UA Negative mg/dl      Ketones, UA Negative mg/dl      Urobilinogen, UA <2 0 mg/dl      Bilirubin, UA Negative     Occult Blood, UA Moderate     RBC, UA 4-10 /hpf      WBC, UA 2-4 /hpf      Epithelial Cells Occasional /hpf      Bacteria, UA Occasional /hpf      MUCUS THREADS Occasional    Comprehensive metabolic panel [[id number]]  (Abnormal) Collected: 09/13/22 1817    Lab Status: Final result Specimen: Blood from Arm, Right Updated: 09/13/22 1901     Sodium 138 mmol/L      Potassium 3 6 mmol/L      Chloride 105 mmol/L      CO2 25 mmol/L      ANION GAP 8 mmol/L      BUN 18 mg/dL      Creatinine 0 76 mg/dL      Glucose 88 mg/dL      Calcium 9 4 mg/dL      AST 21 U/L      ALT 28 U/L      Alkaline Phosphatase 187 U/L      Total Protein 7 3 g/dL      Albumin 4 4 g/dL      Total Bilirubin 0 29 mg/dL      eGFR 86 ml/min/1 73sq m     Narrative:      Ji guidelines for Chronic Kidney Disease (CKD):     Stage 1 with normal or high GFR (GFR > 90 mL/min/1 73 square meters)    Stage 2 Mild CKD (GFR = 60-89 mL/min/1 73 square meters)    Stage 3A Moderate CKD (GFR = 45-59 mL/min/1 73 square meters)    Stage 3B Moderate CKD (GFR = 30-44 mL/min/1 73 square meters)    Stage 4 Severe CKD (GFR = 15-29 mL/min/1 73 square meters)    Stage 5 End Stage CKD (GFR <15 mL/min/1 73 square meters)  Note: GFR calculation is accurate only with a steady state creatinine    Lipase [[id number]]  (Normal) Collected: 09/13/22 1817    Lab Status: Final result Specimen: Blood from Arm, Right Updated: 09/13/22 1901     Lipase 23 u/L     CBC and differential [[id number]] Collected: 09/13/22 1817    Lab Status: Final result Specimen: Blood from Arm, Right Updated: 09/13/22 1844     WBC 8 79 Thousand/uL      RBC 4 75 Million/uL      Hemoglobin 13 8 g/dL      Hematocrit 41 9 %      MCV 88 fL      MCH 29 1 pg      MCHC 32 9 g/dL      RDW 13 1 %      MPV 9 8 fL      Platelets 402 Thousands/uL      nRBC 0 /100 WBCs      Neutrophils Relative 53 %      Immat GRANS % 1 %      Lymphocytes Relative 35 %      Monocytes Relative 7 %      Eosinophils Relative 3 %      Basophils Relative 1 %      Neutrophils Absolute 4 72 Thousands/µL      Immature Grans Absolute 0 04 Thousand/uL      Lymphocytes Absolute 3 08 Thousands/µL      Monocytes Absolute 0 60 Thousand/µL      Eosinophils Absolute 0 29 Thousand/µL      Basophils Absolute 0 06 Thousands/µL                  US pelvis complete w transvaginal   Final Result by Court Chamorro MD (09/13 2234)       1  No evidence of torsion  Left ovary not visualized sonographically, however is not enlarged on CT  2   Endometrial fluid is present, differential includes cervical stenosis and neoplasia, recommend nonemergent follow-up with gynecology                   Workstation performed: [id number]         CT abdomen pelvis with contrast   Final Result by Court Chamorro MD (09/13 2115)      No acute abnormality            Workstation performed: [id number] Procedures  Procedures         ED Course        patient presented with right lower quadrant abdominal pain  Her CT scan ruled out appendicitis  Labs are reassuring  Ultrasound did show incidental findings of possible cervical neoplasia which was discussed with the patient  Patient instructed to follow-up with OBGYN within next few days  OBGYN referral provided  Patient's abdomen is soft nontender now  She denies any abdominal pain right now  She does have what looks like an early UTI  Will start the patient on antibiotics and recommend outpatient follow-up  Full DC instructions discussed and patient knows when to seek immediate medical attention  Patient has proper follow-up  All the results discussed with the patient and patient knows that they may require further workup  Limitation of the ED workup discussed  All questions and concerns from patient or family addressed  Understanding of the instructions verbalized  MDM  Number of Diagnoses or Management Options      Disposition  Final diagnoses:   Abdominal pain   UTI (urinary tract infection)     Time reflects when diagnosis was documented in both MDM as applicable and the Disposition within this note     Time User Action Codes Description Comment    9/13/2022 10:43 PM Nicolasa Watson Add [R10 9] Abdominal pain     9/13/2022 10:44 PM Nicolasa Watson Add [N39 0] UTI (urinary tract infection)       ED Disposition     ED Disposition   Discharge    Condition   Stable    Date/Time   Tue Sep 13, 2022 10:43 PM    Comment   Risa Herbert discharge to home/self care                 Follow-up Information     Follow up With Specialties Details Why Contact Info Additional [address] [facility] Schedule an appointment as soon as possible for a visit in 1 day  [address] [facility], [address] [place], [place], [address] [facility]          Patient's Medications   Discharge Prescriptions    CEFPODOXIME (VANTIN) 200 MG TABLET    Take 1 tablet (200 mg total) by mouth 2 (two) times a day for 5 days       Start Date: 9/13/2022 End Date: 9/18/2022       Order Dose: 200 mg       Quantity: 10 tablet    Refills: 0       No discharge procedures on file      PDMP Review     None          ED Provider  Electronically Signed by           Zenaida Dawson DO  09/13/22 9685

## 2022-10-09 ENCOUNTER — HOSPITAL ENCOUNTER (EMERGENCY)
Facility: HOSPITAL | Age: 57
Discharge: HOME | End: 2022-10-09
Attending: EMERGENCY MEDICINE
Payer: MEDICARE

## 2022-10-09 VITALS
RESPIRATION RATE: 16 BRPM | TEMPERATURE: 98.4 F | DIASTOLIC BLOOD PRESSURE: 98 MMHG | OXYGEN SATURATION: 97 % | HEART RATE: 104 BPM | SYSTOLIC BLOOD PRESSURE: 169 MMHG

## 2022-10-09 DIAGNOSIS — S61.210A LACERATION OF RIGHT INDEX FINGER: Primary | ICD-10-CM

## 2022-10-09 PROCEDURE — 99282 EMERGENCY DEPT VISIT SF MDM: CPT | Performed by: PHYSICIAN ASSISTANT

## 2022-10-09 PROCEDURE — 12001 RPR S/N/AX/GEN/TRNK 2.5CM/<: CPT | Performed by: PHYSICIAN ASSISTANT

## 2022-10-09 PROCEDURE — 99282 EMERGENCY DEPT VISIT SF MDM: CPT

## 2022-10-09 RX ORDER — LIDOCAINE HYDROCHLORIDE 10 MG/ML
2 INJECTION, SOLUTION EPIDURAL; INFILTRATION; INTRACAUDAL; PERINEURAL ONCE
Status: COMPLETED | OUTPATIENT
Start: 2022-10-09 | End: 2022-10-09

## 2022-10-09 RX ADMIN — LIDOCAINE HYDROCHLORIDE 2 ML: 10 INJECTION, SOLUTION EPIDURAL; INFILTRATION; INTRACAUDAL; PERINEURAL at 16:48

## 2022-10-09 NOTE — ED PROVIDER NOTES
History  Chief Complaint   Patient presents with   • Finger Laceration     Pt presents to the ED with finger lac to the right hand 2nd digit  Onset 30 minutes ago while making potatoes, on a mandolin  Unknown last tetanus  Denies blood thinners     50-year-old female presents to emergency room for laceration her right index finger  Patient states she was using a mandolin to cut potatoes when she accidentally cut the tip of her finger off  Patient applied pressure dressing to the attempt to control the bleeding, however it is still oozing  Patient is not on any blood thinners  She denies other injuries  Denies decreased strength or sensation of the finger  Patient is right-hand dominant  Last tetanus shot was 2017 - upon chart review  History provided by:  Patient  Finger Laceration  Associated symptoms: no fever and no rash        Prior to Admission Medications   Prescriptions Last Dose Informant Patient Reported? Taking? Erenumab-aooe 140 MG/ML SOAJ   No No   Sig: Inject 140 mg under the skin every 30 (thirty) days   SUMAtriptan (IMITREX) 100 mg tablet  Self No No   Sig: TAKE 1/2 TO 1 TABLET BY MOUTH AT START OF HEADACHE, MAY REPEAT ONCE AFTER 2 HOURS   SUMAtriptan Succinate 6 MG/0 5ML SOAJ   No No   Sig: INJECT ONCE FOR SEVERE HEADACHE  MAY REPEAT AFTER 2 HORUS  MAX 2 INJECTIONS PER WEEK   dexamethasone (DECADRON) 2 mg tablet  Self No No   Sig: Take 1 tablet (2 mg total) by mouth 2 (two) times a day with meals   Patient not taking: Reported on 5/23/2022   ibandronate (BONIVA) 150 MG tablet   No No   Sig: TAKE 1 TABLET(150 MG) BY MOUTH EVERY 30 DAYS   ketorolac (TORADOL) 10 mg tablet  Self No No   Sig: Take 1 tablet (10 mg total) by mouth daily as needed for severe pain Max 1/day, 3/week, 10/month   Do not use with other OTC pain meds   levothyroxine (Synthroid) 75 mcg tablet   No No   Sig: Take 1 tablet (75 mcg total) by mouth daily in the early morning   omeprazole (PriLOSEC) 20 mg delayed release capsule   No No   Sig: TAKE 1 CAPSULE(20 MG) BY MOUTH DAILY   ondansetron (ZOFRAN) 4 mg tablet   No No   Sig: Take 1 tablet (4 mg total) by mouth every 8 (eight) hours as needed for nausea or vomiting   prochlorperazine (COMPAZINE) 10 mg tablet  Self No No   Sig: Take 1 tablet (10 mg total) by mouth every 6 (six) hours as needed for nausea or vomiting   Patient not taking: Reported on 2022   rizatriptan (Maxalt-MLT) 10 MG disintegrating tablet   No No   Sig: Take 1 tablet (10 mg total) by mouth once as needed for migraine May repeat in 2 hours if needed  Limit of 3 a week or 9 a month   topiramate (TOPAMAX) 50 MG tablet   No No   Sig: Take 2 tablets (100 mg total) by mouth 2 (two) times a day      Facility-Administered Medications: None       Past Medical History:   Diagnosis Date   • Bilateral breast cysts 2019 r    right breast cysts; dense breast tissue bilaterally   • Cervical spinal stenosis    • Liver enzyme elevation    • Migraines    • Osteoporosis        Past Surgical History:   Procedure Laterality Date   •  SECTION      two   • OVARIAN CYST SURGERY         Family History   Problem Relation Age of Onset   • Parkinsonism Father    • Dementia Father    • Hypertension Daughter    • Cervical cancer Maternal Grandmother    • Bipolar disorder Sister    • No Known Problems Mother      I have reviewed and agree with the history as documented  E-Cigarette/Vaping   • E-Cigarette Use Never User      E-Cigarette/Vaping Substances   • Nicotine No    • THC No    • CBD No    • Flavoring No    • Other No    • Unknown No      Social History     Tobacco Use   • Smoking status: Former Smoker     Quit date:      Years since quittin 7   • Smokeless tobacco: Never Used   Vaping Use   • Vaping Use: Never used   Substance Use Topics   • Alcohol use: No   • Drug use: No       Review of Systems   Constitutional: Negative for chills and fever  Musculoskeletal: Negative for joint swelling     Skin: Positive for wound  Negative for rash  Neurological: Negative for weakness and numbness  Physical Exam  Physical Exam  Vitals and nursing note reviewed  Constitutional:       Appearance: Normal appearance  She is well-developed  HENT:      Head: Atraumatic  Eyes:      Conjunctiva/sclera: Conjunctivae normal    Cardiovascular:      Pulses: Normal pulses  Musculoskeletal:      Right hand: Laceration and tenderness present  No bony tenderness  Normal range of motion  Normal capillary refill  Normal pulse  Hands:    Skin:     General: Skin is warm and dry  Capillary Refill: Capillary refill takes less than 2 seconds  Findings: Laceration present  Neurological:      Mental Status: She is alert  Psychiatric:         Mood and Affect: Mood normal          Vital Signs  ED Triage Vitals [10/09/22 1554]   Temperature Pulse Respirations Blood Pressure SpO2   98 4 °F (36 9 °C) 104 16 169/98 97 %      Temp Source Heart Rate Source Patient Position - Orthostatic VS BP Location FiO2 (%)   Oral Monitor Sitting Right arm --      Pain Score       7           Vitals:    10/09/22 1554   BP: 169/98   Pulse: 104   Patient Position - Orthostatic VS: Sitting         Visual Acuity      ED Medications  Medications   lidocaine (PF) (XYLOCAINE-MPF) 1 % injection 2 mL (2 mL Infiltration Given 10/9/22 1648)       Diagnostic Studies  Results Reviewed     None                 No orders to display              Procedures  Laceration repair    Date/Time: 10/9/2022 4:49 PM  Performed by: Maged Elmore PA-C  Authorized by: Maged Elmore PA-C   Consent: Verbal consent obtained    Consent given by: patient  Patient understanding: patient states understanding of the procedure being performed  Patient identity confirmed: verbally with patient  Body area: upper extremity  Location details: right index finger  Laceration length: 1 cm  Foreign bodies: no foreign bodies  Tendon involvement: none  Nerve involvement: none  Anesthesia: local infiltration    Anesthesia:  Local Anesthetic: lidocaine 1% without epinephrine  Anesthetic total: 1 5 mL      Procedure Details:  Dressing: Surgicel, surgical foam, 2 x 2 gauze, gauze roll, Coban pressure dressing applied  Patient tolerance: patient tolerated the procedure well with no immediate complications               ED Course  ED Course as of 10/09/22 1708   [place] Oct 09, 2022   1658 No additional bleeding since application of dressing, will continue to observe   1707 No additional bleeding, return precautions given, adjustable metal finger splint provided for protection                                             MDM  Number of Diagnoses or Management Options  Laceration of right index finger: established and improving  Patient Progress  Patient progress: improved      Disposition  Final diagnoses:   Laceration of right index finger     Time reflects when diagnosis was documented in both MDM as applicable and the Disposition within this note     Time User Action Codes Description Comment    10/9/2022  4:52 PM Scot Richards Add [O11 210A] Laceration of right index finger       ED Disposition     ED Disposition   Discharge    Condition   Stable    Date/Time   Sun Oct 9, 2022  4:52 PM    Comment   Adeline Herbert discharge to home/self care  Follow-up Information     Follow up With Specialties Details Why Contact Info Additional [address] [facility], DO Family Medicine In 3 days For wound re-check [address] [facility]  [address] [facility] [address] [facility] Emergency Department Emergency Medicine  If symptoms worsen [address] Emergency Department, [address], [place], [place], [address]          Patient's Medications   Discharge Prescriptions    No medications on file       No discharge procedures on file      PDMP Review None          ED Provider  Electronically Signed by           Maicol Dowd PA-C  10/09/22 6321

## 2022-10-10 ENCOUNTER — TELEPHONE (OUTPATIENT)
Dept: FAMILY MEDICINE CLINIC | Facility: CLINIC | Age: 57
End: 2022-10-10

## 2022-10-10 NOTE — TELEPHONE ENCOUNTER
Patient called asking for ER f/u with you for finger laceration by the end of the week  Please advise where we may put her

## 2022-10-13 ENCOUNTER — OFFICE VISIT (OUTPATIENT)
Dept: FAMILY MEDICINE CLINIC | Facility: CLINIC | Age: 57
End: 2022-10-13
Payer: MEDICARE

## 2022-10-13 VITALS
HEART RATE: 109 BPM | TEMPERATURE: 98.1 F | DIASTOLIC BLOOD PRESSURE: 88 MMHG | SYSTOLIC BLOOD PRESSURE: 120 MMHG | OXYGEN SATURATION: 98 % | HEIGHT: 59 IN | WEIGHT: 140.6 LBS | BODY MASS INDEX: 28.35 KG/M2

## 2022-10-13 DIAGNOSIS — M79.89 FINGER SWELLING: Primary | ICD-10-CM

## 2022-10-13 DIAGNOSIS — L08.9 FINGER INFECTION: ICD-10-CM

## 2022-10-13 DIAGNOSIS — M79.89 FINGER SWELLING: ICD-10-CM

## 2022-10-13 PROCEDURE — 99214 OFFICE O/P EST MOD 30 MIN: CPT | Performed by: FAMILY MEDICINE

## 2022-10-13 RX ORDER — CEPHALEXIN 500 MG/1
500 CAPSULE ORAL EVERY 8 HOURS SCHEDULED
Qty: 30 CAPSULE | Refills: 0 | Status: SHIPPED | OUTPATIENT
Start: 2022-10-13 | End: 2022-10-23

## 2022-10-13 RX ORDER — LATANOPROST 50 UG/ML
SOLUTION/ DROPS OPHTHALMIC
COMMUNITY
Start: 2022-09-26

## 2022-10-13 RX ORDER — MELOXICAM 15 MG/1
TABLET ORAL
Qty: 90 TABLET | Refills: 3 | Status: SHIPPED | OUTPATIENT
Start: 2022-10-13

## 2022-10-13 RX ORDER — MELOXICAM 15 MG/1
15 TABLET ORAL DAILY
Qty: 30 TABLET | Refills: 5 | Status: SHIPPED | OUTPATIENT
Start: 2022-10-13 | End: 2022-10-13

## 2022-10-14 ENCOUNTER — TELEPHONE (OUTPATIENT)
Dept: FAMILY MEDICINE CLINIC | Facility: CLINIC | Age: 57
End: 2022-10-14

## 2022-10-14 NOTE — TELEPHONE ENCOUNTER
Patient called to let you know that her finger is no longer red or swollen   She states it is much better today

## 2022-10-17 NOTE — PROGRESS NOTES
Patient ID: Patsi Leventhal is a 61 y o  female  HPI: 61 y  o female presents for evaluaiton of her right index finger tip when she accidentally cut it off with a mandolin  She went to the ER and a pressure dressing was applied and she presents today to have dressing removed and site checked  Her finger is red and swollen- she has a ring on same finger which may be adding to congestion and swelling  My partner, Dr Michel Concepcion with the patient's verbal permission removed her ring with a ring remover  It took soaking the tip of the finger for approx an hour on and off to removed the gauze from the site by intermittently soaking and gently removing the gauze  Ring was successfully removed and there was less redness noted  A non stick gauze was applied after area was carefully flushed with sterile saline  Another pressure dressing was applied  SUBJECTIVE    Family History   Problem Relation Age of Onset   • Parkinsonism Father    • Dementia Father    • Hypertension Daughter    • Cervical cancer Maternal Grandmother    • Bipolar disorder Sister    • No Known Problems Mother      Social History     Socioeconomic History   • Marital status:       Spouse name: Not on file   • Number of children: Not on file   • Years of education: Not on file   • Highest education level: Not on file   Occupational History   • Not on file   Tobacco Use   • Smoking status: Former Smoker     Quit date:      Years since quittin 8   • Smokeless tobacco: Never Used   Vaping Use   • Vaping Use: Never used   Substance and Sexual Activity   • Alcohol use: No   • Drug use: No   • Sexual activity: Not Currently   Other Topics Concern   • Not on file   Social History Narrative   • Not on file     Social Determinants of Health     Financial Resource Strain: Not on file   Food Insecurity: Not on file   Transportation Needs: Not on file   Physical Activity: Not on file   Stress: Not on file   Social Connections: Not on file   Intimate Partner Violence: Not on file   Housing Stability: Not on file     Past Medical History:   Diagnosis Date   • Bilateral breast cysts 2019 r    right breast cysts; dense breast tissue bilaterally   • Cervical spinal stenosis    • Liver enzyme elevation    • Migraines    • Osteoporosis      Past Surgical History:   Procedure Laterality Date   •  SECTION      two   • OVARIAN CYST SURGERY       No Known Allergies    Current Outpatient Medications:   •  cephalexin (KEFLEX) 500 mg capsule, Take 1 capsule (500 mg total) by mouth every 8 (eight) hours for 10 days, Disp: 30 capsule, Rfl: 0  •  latanoprost (XALATAN) 0 005 % ophthalmic solution, INSTILL 1 DROP BOTH EYES EVERY NIGHT AT BEDTIME, Disp: , Rfl:   •  levothyroxine (Synthroid) 75 mcg tablet, Take 1 tablet (75 mcg total) by mouth daily in the early morning, Disp: 30 tablet, Rfl: 5  •  omeprazole (PriLOSEC) 20 mg delayed release capsule, TAKE 1 CAPSULE(20 MG) BY MOUTH DAILY, Disp: 30 capsule, Rfl: 3  •  ondansetron (ZOFRAN) 4 mg tablet, Take 1 tablet (4 mg total) by mouth every 8 (eight) hours as needed for nausea or vomiting, Disp: 20 tablet, Rfl: 3  •  rizatriptan (Maxalt-MLT) 10 MG disintegrating tablet, Take 1 tablet (10 mg total) by mouth once as needed for migraine May repeat in 2 hours if needed  Limit of 3 a week or 9 a month, Disp: 9 tablet, Rfl: 3  •  SUMAtriptan Succinate 6 MG/0 5ML SOAJ, INJECT ONCE FOR SEVERE HEADACHE  MAY REPEAT AFTER 2 HORUS   MAX 2 INJECTIONS PER WEEK, Disp: 3 mL, Rfl: 0  •  topiramate (TOPAMAX) 50 MG tablet, Take 2 tablets (100 mg total) by mouth 2 (two) times a day (Patient taking differently: Take 50 mg by mouth daily), Disp: 180 tablet, Rfl: 3  •  Erenumab-aooe 140 MG/ML SOAJ, Inject 140 mg under the skin every 30 (thirty) days (Patient not taking: Reported on 10/13/2022), Disp: 1 mL, Rfl: 11  •  ibandronate (BONIVA) 150 MG tablet, TAKE 1 TABLET(150 MG) BY MOUTH EVERY 30 DAYS (Patient not taking: Reported on 10/13/2022), Disp: 3 tablet, Rfl: 0  •  ketorolac (TORADOL) 10 mg tablet, Take 1 tablet (10 mg total) by mouth daily as needed for severe pain Max 1/day, 3/week, 10/month   Do not use with other OTC pain meds (Patient not taking: Reported on 10/13/2022), Disp: 10 tablet, Rfl: 0  •  meloxicam (MOBIC) 15 mg tablet, TAKE 1 TABLET(15 MG) BY MOUTH DAILY, Disp: 90 tablet, Rfl: 3  •  SUMAtriptan (IMITREX) 100 mg tablet, TAKE 1/2 TO 1 TABLET BY MOUTH AT START OF HEADACHE, MAY REPEAT ONCE AFTER 2 HOURS (Patient not taking: Reported on 10/13/2022), Disp: 9 tablet, Rfl: 5    Review of Systems  Constitutional:     Denies fever, chills ,fatigue ,weakness ,weight loss, weight gain     ENT: Denies earache ,loss of hearing ,nosebleed, nasal discharge,nasal congestion ,sore throat ,hoarseness  Pulmonary: Denies shortness of breath ,cough  ,dyspnea on exertion, orthopnea  ,PND   Cardiovascular:  Denies bradycardia , tachycardia  ,palpations, lower extremity edema leg, claudication  Breast:  Denies new or changing breast lumps ,nipple discharge ,nipple changes  Abdomen:  Denies abdominal pain , anorexia , indigestion, nausea, vomiting, constipation, diarrhea  Musculoskeletal: Right index finger tip macerated with some oozing blood , no sign of purulent drainage, finger itself erythematous and edematous after ring removed,  Gu: denies dysuria, polyuria  Skin: Denies skin rash, skin lesion, skin wound, itching, dry skin  Neuro: Denies headache, numbness, tingling, confusion, loss of consciousness, dizziness, vertigo  Psychiatric: Denies feelings of depression, suicidal ideation, anxiety, sleep disturbances    OBJECTIVE  /88   Pulse (!) 109   Temp 98 1 °F (36 7 °C)   Ht 4' 11" (1 499 m)   Wt 63 8 kg (140 lb 9 6 oz)   SpO2 98%   BMI 28 40 kg/m²   Constitutional:   NAD, well appearing and well nourished      ENT:   Conjunctiva and lids: no injection, edema, or discharge     Pupils and iris: LUIS bilaterally External inspection of ears and nose: normal without deformities or discharge  Otoscopic exam: Canals patent without erythema  Nasal mucosa, septum and turbinates: Normal or edema or discharge         Oropharynx:  Moist mucosa, normal tongue and tonsils without lesions  No erythema        Pulmonary:Respiratory effort normal rate and rhythm, no increased work of breathing  Auscultation of lungs:  Clear bilaterally with no adventitious breath sounds       Cardiovascular: regular rate and rhythm, S1 and S2, no murmur, no edema and/or varicosities of LE      Abdomen: Soft and non-distended     Positive bowel sounds      No heptomegaly or splenomegaly      Gu: no suprapubic tenderness or CVA tenderness, no urethral discharge  Lymphatic:  No anterior or posterior cervical lymphadenopathy         Musculoskeletal:  Gait and station: Normal gait      Digits and nails normal without clubbing or cyanosis       Inspection/palpation of joints, bones, and muscles:  Right index finger tip was macerated, red and oozing blood, no purulence;  full active  range of motionof index finger        Skin: Normal skin turgor and no rashes      Neuro:    Normal sensation  Of right index finger   Psych:   alert and oriented to person, place and time     normal mood and affect       Assessment/Plan:Diagnoses and all orders for this visit:    Finger swelling  Comments:  R ing removed and   Orders:  -     Discontinue: meloxicam (Mobic) 15 mg tablet; Take 1 tablet (15 mg total) by mouth daily    Finger infection  Comments:  If redness extends, or swelling extends , pt to call ASAP  Nonstick cling and a pressure dressing applied  Pt to clean with sterile saline daily  Orders:  -     cephalexin (KEFLEX) 500 mg capsule;  Take 1 capsule (500 mg total) by mouth every 8 (eight) hours for 10 days    Other orders  -     latanoprost (XALATAN) 0 005 % ophthalmic solution; INSTILL 1 DROP BOTH EYES EVERY NIGHT AT BEDTIME      I spent from 11:15 to 12:15 removing the gauze and gently lifting the adhered gauze off the site; Reviewed with patient plan to treat with above plan  Pt up to date with her jessica      Patient instructed to call in 72 hours if not feeling better or if symptoms worsen

## 2022-10-21 DIAGNOSIS — E03.9 HYPOTHYROIDISM, UNSPECIFIED TYPE: ICD-10-CM

## 2022-10-22 DIAGNOSIS — E03.9 HYPOTHYROIDISM, UNSPECIFIED TYPE: ICD-10-CM

## 2022-10-22 RX ORDER — LEVOTHYROXINE SODIUM 0.07 MG/1
TABLET ORAL
Qty: 30 TABLET | Refills: 5 | Status: SHIPPED | OUTPATIENT
Start: 2022-10-22 | End: 2022-10-22

## 2022-10-22 RX ORDER — LEVOTHYROXINE SODIUM 0.07 MG/1
TABLET ORAL
Qty: 90 TABLET | Refills: 0 | Status: SHIPPED | OUTPATIENT
Start: 2022-10-22

## 2022-10-27 ENCOUNTER — FOLLOW UP (OUTPATIENT)
Dept: URBAN - METROPOLITAN AREA CLINIC 6 | Facility: CLINIC | Age: 57
End: 2022-10-27

## 2022-10-27 DIAGNOSIS — H40.243: ICD-10-CM

## 2022-10-27 DIAGNOSIS — H40.033: ICD-10-CM

## 2022-10-27 PROCEDURE — 92202 OPSCPY EXTND ON/MAC DRAW: CPT

## 2022-10-27 PROCEDURE — 92133 CPTRZD OPH DX IMG PST SGM ON: CPT

## 2022-10-27 PROCEDURE — 92014 COMPRE OPH EXAM EST PT 1/>: CPT

## 2022-10-27 ASSESSMENT — TONOMETRY
OD_IOP_MMHG: 21
OS_IOP_MMHG: 11
OS_IOP_MMHG: 18
OD_IOP_MMHG: 11

## 2022-10-27 ASSESSMENT — VISUAL ACUITY
OD_CC: 20/25
OS_CC: 20/25

## 2022-11-03 NOTE — TELEPHONE ENCOUNTER
Refilled, and if this doesn't work, recommend decadron   Will need to discuss preventatives more at next visit Detail Level: Detailed

## 2022-11-21 ENCOUNTER — TELEPHONE (OUTPATIENT)
Dept: OBGYN CLINIC | Facility: CLINIC | Age: 57
End: 2022-11-21

## 2022-11-21 NOTE — TELEPHONE ENCOUNTER
Pt sadieom - has appt at 1130 today and has to cancel, car won't start   Would like a call back to r/s appt

## 2023-01-02 DIAGNOSIS — G43.709 CHRONIC MIGRAINE WITHOUT AURA WITHOUT STATUS MIGRAINOSUS, NOT INTRACTABLE: ICD-10-CM

## 2023-01-03 RX ORDER — RIZATRIPTAN BENZOATE 10 MG/1
TABLET, ORALLY DISINTEGRATING ORAL
Qty: 9 TABLET | Refills: 3 | Status: SHIPPED | OUTPATIENT
Start: 2023-01-03

## 2023-01-08 DIAGNOSIS — E03.9 HYPOTHYROIDISM, UNSPECIFIED TYPE: ICD-10-CM

## 2023-01-08 DIAGNOSIS — K21.9 GERD (GASTROESOPHAGEAL REFLUX DISEASE): ICD-10-CM

## 2023-01-08 DIAGNOSIS — G43.709 CHRONIC MIGRAINE WITHOUT AURA WITHOUT STATUS MIGRAINOSUS, NOT INTRACTABLE: ICD-10-CM

## 2023-01-09 ENCOUNTER — TELEPHONE (OUTPATIENT)
Dept: NEUROLOGY | Facility: CLINIC | Age: 58
End: 2023-01-09

## 2023-01-09 RX ORDER — OMEPRAZOLE 20 MG/1
20 CAPSULE, DELAYED RELEASE ORAL DAILY
Qty: 30 CAPSULE | Refills: 0 | Status: SHIPPED | OUTPATIENT
Start: 2023-01-09 | End: 2023-01-13

## 2023-01-09 RX ORDER — LEVOTHYROXINE SODIUM 0.07 MG/1
75 TABLET ORAL
Qty: 90 TABLET | Refills: 0 | Status: SHIPPED | OUTPATIENT
Start: 2023-01-09

## 2023-01-09 NOTE — TELEPHONE ENCOUNTER
Received fax from [facility] [place] Suresh PA is about to   Key [id number]    Per enc 6/3/22-PA  22    Called pt and advised of the above  States that Eloy Suresh is working well  Prior Aimovig, >15 migraines per month  Now, 8-10 per month  And the intensity decreased  PA initiated on CMM  This medication or product was previously approved on [id number] from 2023 to 2023  I don't see documentation that reauth was initiated  Called PA dept [phone], spoke w/Lacey and advised of the above  States that the previous approval carried out to the following year   New PA is not required at this time

## 2023-01-11 RX ORDER — SUMATRIPTAN 100 MG/1
TABLET, FILM COATED ORAL
Qty: 9 TABLET | Refills: 0 | OUTPATIENT
Start: 2023-01-11

## 2023-01-11 NOTE — TELEPHONE ENCOUNTER
Please let patient know that she cannot have both rizatriptan and sumatriptan tablets    I just refilled the rizatriptan on 1/3/23

## 2023-01-11 NOTE — TELEPHONE ENCOUNTER
Per last office note-Continue sumatriptan injections at onset of migraine  If this is effective, this is first choice  Alternative is rizatriptan MLT which dissolves on your tongue  May repeat in 2 hours  Limit of 3 a week or 9 a month    IF this fails you can use the injection if needed  sumatriptan by mouth does not work as well as injection    Rizatriptan was recently sent to pharm on 1/3    Please advise

## 2023-01-13 DIAGNOSIS — K21.9 GERD (GASTROESOPHAGEAL REFLUX DISEASE): ICD-10-CM

## 2023-01-13 RX ORDER — OMEPRAZOLE 20 MG/1
CAPSULE, DELAYED RELEASE ORAL
Qty: 90 CAPSULE | Refills: 3 | Status: SHIPPED | OUTPATIENT
Start: 2023-01-13

## 2023-02-08 ENCOUNTER — OFFICE VISIT (OUTPATIENT)
Dept: NEUROLOGY | Facility: CLINIC | Age: 58
End: 2023-02-08

## 2023-02-08 VITALS
HEART RATE: 87 BPM | BODY MASS INDEX: 29.71 KG/M2 | TEMPERATURE: 97.4 F | SYSTOLIC BLOOD PRESSURE: 167 MMHG | WEIGHT: 147.1 LBS | DIASTOLIC BLOOD PRESSURE: 81 MMHG

## 2023-02-08 DIAGNOSIS — M54.2 CERVICALGIA: ICD-10-CM

## 2023-02-08 DIAGNOSIS — G43.709 CHRONIC MIGRAINE WITHOUT AURA WITHOUT STATUS MIGRAINOSUS, NOT INTRACTABLE: Primary | ICD-10-CM

## 2023-02-08 DIAGNOSIS — G43.019 INTRACTABLE MIGRAINE WITHOUT AURA AND WITHOUT STATUS MIGRAINOSUS: ICD-10-CM

## 2023-02-08 RX ORDER — DIVALPROEX SODIUM 250 MG/1
TABLET, EXTENDED RELEASE ORAL
Qty: 30 TABLET | Refills: 0 | Status: SHIPPED | OUTPATIENT
Start: 2023-02-08

## 2023-02-08 RX ORDER — DEXAMETHASONE 1 MG
1 TABLET ORAL DAILY PRN
Qty: 10 TABLET | Refills: 2 | Status: SHIPPED | OUTPATIENT
Start: 2023-02-08

## 2023-02-08 RX ORDER — CYCLOBENZAPRINE HCL 5 MG
5 TABLET ORAL
Qty: 30 TABLET | Refills: 3 | Status: SHIPPED | OUTPATIENT
Start: 2023-02-08

## 2023-02-08 NOTE — PATIENT INSTRUCTIONS
Headache/migraine treatment:   Abortive medications (for immediate treatment of a headache): It is ok to take ibuprofen, acetaminophen or naproxen (Advil, Tylenol,  Aleve, Excedrin) if they help your headaches you should limit these to No more than 3 times a week to avoid medication overuse/rebound headaches  Continue sumatriptan injections at onset of migraine  If this is effective, this is first choice    Alternative is rizatriptan MLT which dissolves on your tongue  May repeat in 2 hours  Limit of 3 a week or 9 a month  IF this fails you can use the injection if needed    For your nausea/vomiting use Zofran as needed  On nights of your severe headaches, or if hasn't fully gone away with the rescue medications, take Depakote 250 mg at bedtime  If you wake up with a migraines the next day, take your rescue medication and take Dexamethasone 1 mg in the morning and then the Depakote at bedtime        Prescription preventive medications for headaches/migraines   (to take every day to help prevent headaches - not to take at the time of headache):  [x] Start Cyclobenzaprine 5 mg at bedtime      [x] Continue Aimovig every 30 days     Keep out of direct sunlight  Prior to administration, allow to come to room temperature for 30 minutes  Do not warm using a heat source (eg, microwave or hot water)  Do not shake  Administer in abdomen (avoiding 2 inches around the navel), thigh, upper arm, or buttocks avoiding areas of skin that are tender, bruised, red or hard  Deliver entire contents of single-use prefilled pen or syringe  *Typically these types of medications take time untill you see the benefit, although some may see improvement in days, often it may take weeks, especially if the medication is being titrated up to a beneficial level  Please contact us if there are any concerns or questions regarding the medication           Lifestyle Recommendations:  [x] SLEEP - Maintain a regular sleep schedule: Adults need at least 7-8 hours of uninterrupted a night  Maintain good sleep hygiene:  Going to bed and waking up at consistent times, avoiding excessive daytime naps, avoiding caffeinated beverages in the evening, avoid excessive stimulation in the evening and generally using bed primarily for sleeping  One hour before bedtime would recommend turning lights down lower, decreasing your activity (may read quietly, listen to music at a low volume)  When you get into bed, should eliminate all technology (no texting, emailing, playing with your phone, iPad or tablet in bed)  [x] HYDRATION - Maintain good hydration  Drink  2L of fluid a day (4 typical small water bottles)  [x] DIET - Maintain good nutrition  In particular don't skip meals and try and eat healthy balanced meals regularly  [x] TRIGGERS - Look for other triggers and avoid them: Limit caffeine to 1-2 cups a day or less  Avoid dietary triggers that you have noticed bring on your headaches (this could include aged cheese, peanuts, MSG, aspartame and nitrates)  [x] EXERCISE - physical exercise as we all know is good for you in many ways, and not only is good for your heart, but also is beneficial for your mental health, cognitive health and  chronic pain/headaches  I would encourage at the least 5 days of physical exercise weekly for at least 30 minutes  Education and Follow-up  [x] Please call with any questions or concerns  Of course if any new concerning symptoms go to the emergency department    [x] Follow up in 4-6 months with Rodman Dubin or Dr Julianne Carcamo

## 2023-02-08 NOTE — PROGRESS NOTES
Patient ID: Idris Quiñonez is a 60 y o  female  Assessment/Plan:    No problem-specific Assessment & Plan notes found for this encounter  {Assess/PlanSmartLinks:37781}       Subjective:    HPI    Pt thinks headaches have been better, she has about 10 a month  She does not get sick as much  Did have a bad migraine a couple days ago where she did get sick and it lasted 2 days        {[facility] Neurology HPI texts:39715}    {Common ambulatory SmartLinks:16387}         Objective:    Blood pressure 167/81, pulse 87, temperature (!) 97 4 °F (36 3 °C), temperature source Temporal, weight 66 7 kg (147 lb 1 6 oz)  Physical Exam    Neurological Exam      ROS:    Review of Systems   Constitutional: Negative  Negative for appetite change and fever  HENT: Negative  Negative for hearing loss, tinnitus, trouble swallowing and voice change  Eyes: Negative  Negative for photophobia, pain and visual disturbance  Respiratory: Negative  Negative for shortness of breath  Cardiovascular: Negative  Negative for palpitations  Gastrointestinal: Negative  Negative for nausea and vomiting  Endocrine: Negative  Negative for cold intolerance  Genitourinary: Negative  Negative for dysuria, frequency and urgency  Musculoskeletal: Negative  Negative for gait problem, myalgias and neck pain  Skin: Negative  Negative for rash  Allergic/Immunologic: Negative  Neurological: Positive for headaches  Negative for dizziness, tremors, seizures, syncope, facial asymmetry, speech difficulty, weakness, light-headedness and numbness  Hematological: Negative  Does not bruise/bleed easily  Psychiatric/Behavioral: Negative  Negative for confusion, hallucinations and sleep disturbance

## 2023-02-13 ENCOUNTER — IOP CHECK (OUTPATIENT)
Dept: URBAN - METROPOLITAN AREA CLINIC 6 | Facility: CLINIC | Age: 58
End: 2023-02-13

## 2023-02-13 DIAGNOSIS — H40.033: ICD-10-CM

## 2023-02-13 DIAGNOSIS — H40.243: ICD-10-CM

## 2023-02-13 PROCEDURE — 92020 GONIOSCOPY: CPT

## 2023-02-13 PROCEDURE — 92012 INTRM OPH EXAM EST PATIENT: CPT

## 2023-02-13 ASSESSMENT — VISUAL ACUITY
OD_CC: 20/25+2
OS_CC: 20/30

## 2023-02-13 ASSESSMENT — TONOMETRY
OD_IOP_MMHG: 13
OS_IOP_MMHG: 19

## 2023-02-21 ENCOUNTER — OFFICE VISIT (OUTPATIENT)
Dept: OBGYN CLINIC | Facility: CLINIC | Age: 58
End: 2023-02-21

## 2023-02-21 VITALS
WEIGHT: 144 LBS | DIASTOLIC BLOOD PRESSURE: 62 MMHG | BODY MASS INDEX: 29.03 KG/M2 | SYSTOLIC BLOOD PRESSURE: 138 MMHG | HEIGHT: 59 IN

## 2023-02-21 DIAGNOSIS — K66.8 PERITONEAL CYST: Primary | ICD-10-CM

## 2023-02-21 NOTE — PROGRESS NOTES
[facility]  ED follow up- New patient  Rocio Michael MD  23    Assessment/Plan:  Lisa Escalera is a 61 yo P2 postmenopausal female presenting as follow up form ED visit for LLQ pain in 2022 for u/s findings  U/S findings reviewed in depth and overall reassured patient given findings of a subcentimeter peritoneal inclusion cyst and a trace amount of endometrial fluid  Given patient anxious about family history will plan interval pelvic imaging to ensure no change in size of inclusion cyst in 6 months  I reviewed with patient etiology of cervical cancer and that pap screening is the most important way to prevent and advised she return for an annual examination to get up to date on all of her screening  All questions and concerns answered to the nest of my ability  Subjective:      Patient ID: Suleiman Shell is a 60 y o  female  HPI  Lisa Escalera is a 61 yo  postmenopausal female presenting as a new patient follow up form ED visit in 2022- patient presented initially for left sided abdominal pain that has since improved and completed CT and pelvic imaging showing a small peritoneal inclusion cyst and trace endometrial fluid and normal endometrium of 1 3 mm for postmenopausal female  She reports an occasional twinge on her left side but feels it is more associated with gas  She denies any vaginal bleeding since going through menopause by 35  She reports a history of an ovarian cystectomy after her last delivery and a history of  section  Denies any history of abnormal pap smear- record not able of review on EMR  She has a family history significant for her maternal Grandmother passing away from cervical cancer  No bladder or bowel  complaints      The following portions of the patient's history were reviewed and updated as appropriate: allergies, current medications, past family history, past medical history, past social history, past surgical history and problem list     Review of Systems    Per HPI    Objective:    /62 (BP Location: Left arm, Patient Position: Sitting, Cuff Size: Standard)   Ht 4' 11" (1 499 m)   Wt 65 3 kg (144 lb)   BMI 29 08 kg/m²      Physical Exam  Vitals reviewed  Constitutional:       General: She is not in acute distress  Appearance: Normal appearance  She is well-developed  She is not ill-appearing or diaphoretic  HENT:      Head: Normocephalic and atraumatic  Cardiovascular:      Rate and Rhythm: Normal rate and regular rhythm  Heart sounds: Normal heart sounds  No murmur heard  No friction rub  No gallop  Pulmonary:      Effort: Pulmonary effort is normal  No respiratory distress  Breath sounds: Normal breath sounds  No wheezing or rales  Abdominal:      Palpations: Abdomen is soft  Tenderness: There is no abdominal tenderness  There is no guarding or rebound  Comments: Midline incision   Genitourinary:     Vagina: Normal    Musculoskeletal:      Cervical back: Normal range of motion and neck supple  Right lower leg: No edema  Left lower leg: No edema  Skin:     General: Skin is warm and dry  Neurological:      Mental Status: She is alert and oriented to person, place, and time     Psychiatric:         Mood and Affect: Mood normal          Behavior: Behavior normal

## 2023-03-09 DIAGNOSIS — G43.019 INTRACTABLE MIGRAINE WITHOUT AURA AND WITHOUT STATUS MIGRAINOSUS: ICD-10-CM

## 2023-03-09 RX ORDER — DIVALPROEX SODIUM 250 MG/1
TABLET, EXTENDED RELEASE ORAL
Qty: 30 TABLET | Refills: 0 | Status: SHIPPED | OUTPATIENT
Start: 2023-03-09

## 2023-03-13 ENCOUNTER — TELEPHONE (OUTPATIENT)
Dept: FAMILY MEDICINE CLINIC | Facility: CLINIC | Age: 58
End: 2023-03-13

## 2023-03-13 ENCOUNTER — TELEPHONE (OUTPATIENT)
Dept: NEUROLOGY | Facility: CLINIC | Age: 58
End: 2023-03-13

## 2023-03-13 DIAGNOSIS — Z12.31 BREAST CANCER SCREENING BY MAMMOGRAM: Primary | ICD-10-CM

## 2023-03-13 NOTE — TELEPHONE ENCOUNTER
Patient dropped off form for [employer]  She filled out her part  Said she would like a call to know when it was faxed over

## 2023-03-16 NOTE — TELEPHONE ENCOUNTER
Attempted to fax multiple times but fax failed    Faxed electronically through [facility]

## 2023-03-16 NOTE — TELEPHONE ENCOUNTER
received -Hello, this is [employer]  I see Kevin Calderon  i was up there on Monday and dropped off papers for [employer] about my [employer]  I was wondering if they were filled out and faxed it back to [address] [place] [address]  If you could call me back, I'd greatly appreciate it at your convenience  [address] [facility],[address] [place] [address], [phone]  Thank you   -------------------------------------------------  Signed amgen form faxed to [employer]  Placed at  to be scanned into chart     Called pt and made her aware that it was faxed today  She is asking for a call once we hear back from [address] [place] [address]     [phone] to leave detailed message

## 2023-03-21 NOTE — TELEPHONE ENCOUNTER
received -Hello, this is [facility]  [phone]  I'm calling because I have an application for amgen that we faxed and they let me a message  I called them back and they're telling me now for me to get approved, they need a prior authorization for insurance from the office faxed to them  So if you can get back to me at your convenience, I greatly appreciate it  guanakito rodríguez [phone]  This is for my amovig prescription  Thank you   --------------------------------------------  We also received fax form [employer] today asking for PA determination       Faxed PA determination from [employer] dated 1/9/23 to [phone]    Called pt and made her aware that PA determination was faxed to [facility]

## 2023-04-12 DIAGNOSIS — G43.709 CHRONIC MIGRAINE WITHOUT AURA WITHOUT STATUS MIGRAINOSUS, NOT INTRACTABLE: ICD-10-CM

## 2023-04-12 NOTE — TELEPHONE ENCOUNTER
VM received at 16:19:    Patient calling in for refill of her Sumatriptan injections. Please send to [facility] in [place]   CB# [phone]    LOV with Betzaida Morales 2/8/23, next appt is 7/26/23

## 2023-04-13 RX ORDER — CEFUROXIME AXETIL 250 MG/1
TABLET ORAL
Qty: 3 ML | Refills: 6 | Status: SHIPPED | OUTPATIENT
Start: 2023-04-13 | End: 2024-12-04

## 2023-05-05 ENCOUNTER — RA CDI HCC (OUTPATIENT)
Dept: OTHER | Facility: HOSPITAL | Age: 58
End: 2023-05-05

## 2023-05-12 ENCOUNTER — OFFICE VISIT (OUTPATIENT)
Dept: FAMILY MEDICINE CLINIC | Facility: CLINIC | Age: 58
End: 2023-05-12

## 2023-05-12 VITALS
SYSTOLIC BLOOD PRESSURE: 116 MMHG | DIASTOLIC BLOOD PRESSURE: 74 MMHG | WEIGHT: 143.2 LBS | TEMPERATURE: 98.2 F | HEIGHT: 59 IN | BODY MASS INDEX: 28.87 KG/M2 | HEART RATE: 96 BPM | OXYGEN SATURATION: 96 %

## 2023-05-12 DIAGNOSIS — E78.00 HYPERCHOLESTEROLEMIA: ICD-10-CM

## 2023-05-12 DIAGNOSIS — Z00.00 MEDICARE ANNUAL WELLNESS VISIT, SUBSEQUENT: Primary | ICD-10-CM

## 2023-05-12 DIAGNOSIS — G43.709 CHRONIC MIGRAINE WITHOUT AURA WITHOUT STATUS MIGRAINOSUS, NOT INTRACTABLE: ICD-10-CM

## 2023-05-12 DIAGNOSIS — R53.83 OTHER FATIGUE: ICD-10-CM

## 2023-05-12 DIAGNOSIS — M48.02 CERVICAL SPINAL STENOSIS: ICD-10-CM

## 2023-05-12 DIAGNOSIS — E03.9 HYPOTHYROIDISM, UNSPECIFIED TYPE: ICD-10-CM

## 2023-05-12 RX ORDER — PREDNISONE 10 MG/1
TABLET ORAL
Qty: 26 TABLET | Refills: 0 | Status: SHIPPED | OUTPATIENT
Start: 2023-05-12

## 2023-05-12 RX ORDER — BACLOFEN 10 MG/1
10 TABLET ORAL
Qty: 10 TABLET | Refills: 0 | Status: SHIPPED | OUTPATIENT
Start: 2023-05-12

## 2023-05-12 NOTE — PATIENT INSTRUCTIONS
Medicare Preventive Visit Patient Instructions  Thank you for completing your Welcome to Medicare Visit or Medicare Annual Wellness Visit today  Your next wellness visit will be due in one year (5/12/2024)  The screening/preventive services that you may require over the next 5-10 years are detailed below  Some tests may not apply to you based off risk factors and/or age  Screening tests ordered at today's visit but not completed yet may show as past due  Also, please note that scanned in results may not display below  Preventive Screenings:  Service Recommendations Previous Testing/Comments   Colorectal Cancer Screening  * Colonoscopy    * Fecal Occult Blood Test (FOBT)/Fecal Immunochemical Test (FIT)  * Fecal DNA/Cologuard Test  * Flexible Sigmoidoscopy Age: 39-70 years old   Colonoscopy: every 10 years (may be performed more frequently if at higher risk)  OR  FOBT/FIT: every 1 year  OR  Cologuard: every 3 years  OR  Sigmoidoscopy: every 5 years  Screening may be recommended earlier than age 39 if at higher risk for colorectal cancer  Also, an individualized decision between you and your healthcare provider will decide whether screening between the ages of 74-80 would be appropriate  Colonoscopy: 01/31/2017  FOBT/FIT: Not on file  Cologuard: Not on file  Sigmoidoscopy: Not on file    Screening Current     Breast Cancer Screening Age: 36 years old  Frequency: every 1-2 years  Not required if history of left and right mastectomy Mammogram: 04/09/2022    Screening Current   Cervical Cancer Screening Between the ages of 21-29, pap smear recommended once every 3 years  Between the ages of 33-67, can perform pap smear with HPV co-testing every 5 years     Recommendations may differ for women with a history of total hysterectomy, cervical cancer, or abnormal pap smears in past  Pap Smear: 10/14/2019        Hepatitis C Screening Once for adults born between 1945 and 1965  More frequently in patients at high risk for Hepatitis C Hep C Antibody: Not on file    Screening Current   Diabetes Screening 1-2 times per year if you're at risk for diabetes or have pre-diabetes Fasting glucose: 103 mg/dL (4/14/2021)  A1C: No results in last 5 years (No results in last 5 years)  Screening Current   Cholesterol Screening Once every 5 years if you don't have a lipid disorder  May order more often based on risk factors  Lipid panel: 10/19/2021    Screening Current     Other Preventive Screenings Covered by Medicare:  1  Abdominal Aortic Aneurysm (AAA) Screening: covered once if your at risk  You're considered to be at risk if you have a family history of AAA  2  Lung Cancer Screening: covers low dose CT scan once per year if you meet all of the following conditions: (1) Age 50-69; (2) No signs or symptoms of lung cancer; (3) Current smoker or have quit smoking within the last 15 years; (4) You have a tobacco smoking history of at least 20 pack years (packs per day multiplied by number of years you smoked); (5) You get a written order from a healthcare provider  3  Glaucoma Screening: covered annually if you're considered high risk: (1) You have diabetes OR (2) Family history of glaucoma OR (3)  aged 48 and older OR (3)  American aged 72 and older  3  Osteoporosis Screening: covered every 2 years if you meet one of the following conditions: (1) You're estrogen deficient and at risk for osteoporosis based off medical history and other findings; (2) Have a vertebral abnormality; (3) On glucocorticoid therapy for more than 3 months; (4) Have primary hyperparathyroidism; (5) On osteoporosis medications and need to assess response to drug therapy  · Last bone density test (DXA Scan): 05/16/2022   5  HIV Screening: covered annually if you're between the age of 15-65  Also covered annually if you are younger than 13 and older than 72 with risk factors for HIV infection   For pregnant patients, it is covered up to 3 times per pregnancy  Immunizations:  Immunization Recommendations   Influenza Vaccine Annual influenza vaccination during flu season is recommended for all persons aged >= 6 months who do not have contraindications   Pneumococcal Vaccine   * Pneumococcal conjugate vaccine = PCV13 (Prevnar 13), PCV15 (Vaxneuvance), PCV20 (Prevnar 20)  * Pneumococcal polysaccharide vaccine = PPSV23 (Pneumovax) Adults 25-60 years old: 1-3 doses may be recommended based on certain risk factors  Adults 72 years old: 1-2 doses may be recommended based off what pneumonia vaccine you previously received   Hepatitis B Vaccine 3 dose series if at intermediate or high risk (ex: diabetes, end stage renal disease, liver disease)   Tetanus (Td) Vaccine - COST NOT COVERED BY MEDICARE PART B Following completion of primary series, a booster dose should be given every 10 years to maintain immunity against tetanus  Td may also be given as tetanus wound prophylaxis  Tdap Vaccine - COST NOT COVERED BY MEDICARE PART B Recommended at least once for all adults  For pregnant patients, recommended with each pregnancy  Shingles Vaccine (Shingrix) - COST NOT COVERED BY MEDICARE PART B  2 shot series recommended in those aged 48 and above     Health Maintenance Due:      Topic Date Due   • HIV Screening  Never done   • Breast Cancer Screening: Mammogram  04/09/2023   • Hepatitis C Screening  05/12/2023 (Originally 1965)   • Cervical Cancer Screening  10/14/2024   • Colorectal Cancer Screening  01/31/2027     Immunizations Due:      Topic Date Due   • COVID-19 Vaccine (3 - Booster for Amaro Peter series) 07/10/2021     Advance Directives   What are advance directives? Advance directives are legal documents that state your wishes and plans for medical care  These plans are made ahead of time in case you lose your ability to make decisions for yourself   Advance directives can apply to any medical decision, such as the treatments you want, and if you want to donate organs  What are the types of advance directives? There are many types of advance directives, and each state has rules about how to use them  You may choose a combination of any of the following:  · Living will: This is a written record of the treatment you want  You can also choose which treatments you do not want, which to limit, and which to stop at a certain time  This includes surgery, medicine, IV fluid, and tube feedings  · Durable power of  for healthcare [facility] [place]): This is a written record that states who you want to make healthcare choices for you when you are unable to make them for yourself  This person, called a proxy, is usually a family member or a friend  You may choose more than 1 proxy  · Do not resuscitate (DNR) order:  A DNR order is used in case your heart stops beating or you stop breathing  It is a request not to have certain forms of treatment, such as CPR  A DNR order may be included in other types of advance directives  · Medical directive: This covers the care that you want if you are in a coma, near death, or unable to make decisions for yourself  You can list the treatments you want for each condition  Treatment may include pain medicine, surgery, blood transfusions, dialysis, IV or tube feedings, and a ventilator (breathing machine)  · Values history: This document has questions about your views, beliefs, and how you feel and think about life  This information can help others choose the care that you would choose  Why are advance directives important? An advance directive helps you control your care  Although spoken wishes may be used, it is better to have your wishes written down  Spoken wishes can be misunderstood, or not followed  Treatments may be given even if you do not want them  An advance directive may make it easier for your family to make difficult choices about your care     Urinary Incontinence   Urinary incontinence (UI)  is when you lose control of your bladder  UI develops because your bladder cannot store or empty urine properly  The 3 most common types of UI are stress incontinence, urge incontinence, or both  Medicines:   · May be given to help strengthen your bladder control  Report any side effects of medication to your healthcare provider  Do pelvic muscle exercises often:  Your pelvic muscles help you stop urinating  Squeeze these muscles tight for 5 seconds, then relax for 5 seconds  Gradually work up to squeezing for 10 seconds  Do 3 sets of 15 repetitions a day, or as directed  This will help strengthen your pelvic muscles and improve bladder control  Train your bladder:  Go to the bathroom at set times, such as every 2 hours, even if you do not feel the urge to go  You can also try to hold your urine when you feel the urge to go  For example, hold your urine for 5 minutes when you feel the urge to go  As that becomes easier, hold your urine for 10 minutes  Self-care:   · Keep a UI record  Write down how often you leak urine and how much you leak  Make a note of what you were doing when you leaked urine  · Drink liquids as directed  You may need to limit the amount of liquid you drink to help control your urine leakage  Do not drink any liquid right before you go to bed  Limit or do not have drinks that contain caffeine or alcohol  · Prevent constipation  Eat a variety of high-fiber foods  Good examples are high-fiber cereals, beans, vegetables, and whole-grain breads  Walking is the best way to trigger your intestines to have a bowel movement  · Exercise regularly and maintain a healthy weight  Weight loss and exercise will decrease pressure on your bladder and help you control your leakage  · Use a catheter as directed  to help empty your bladder  A catheter is a tiny, plastic tube that is put into your bladder to drain your urine  · Go to behavior therapy as directed    Behavior therapy may be used to help you learn to control your urge to urinate  Weight Management   Why it is important to manage your weight:  Being overweight increases your risk of health conditions such as heart disease, high blood pressure, type 2 diabetes, and certain types of cancer  It can also increase your risk for osteoarthritis, sleep apnea, and other respiratory problems  Aim for a slow, steady weight loss  Even a small amount of weight loss can lower your risk of health problems  How to lose weight safely:  A safe and healthy way to lose weight is to eat fewer calories and get regular exercise  You can lose up about 1 pound a week by decreasing the number of calories you eat by 500 calories each day  Healthy meal plan for weight management:  A healthy meal plan includes a variety of foods, contains fewer calories, and helps you stay healthy  A healthy meal plan includes the following:  · Eat whole-grain foods more often  A healthy meal plan should contain fiber  Fiber is the part of grains, fruits, and vegetables that is not broken down by your body  Whole-grain foods are healthy and provide extra fiber in your diet  Some examples of whole-grain foods are whole-wheat breads and pastas, oatmeal, brown rice, and bulgur  · Eat a variety of vegetables every day  Include dark, leafy greens such as spinach, kale, lulu greens, and mustard greens  Eat yellow and orange vegetables such as carrots, sweet potatoes, and winter squash  · Eat a variety of fruits every day  Choose fresh or canned fruit (canned in its own juice or light syrup) instead of juice  Fruit juice has very little or no fiber  · Eat low-fat dairy foods  Drink fat-free (skim) milk or 1% milk  Eat fat-free yogurt and low-fat cottage cheese  Try low-fat cheeses such as mozzarella and other reduced-fat cheeses  · Choose meat and other protein foods that are low in fat  Choose beans or other legumes such as split peas or lentils   Choose fish, skinless poultry (chicken or turkey), or lean cuts of red meat (beef or pork)  Before you cook meat or poultry, cut off any visible fat  · Use less fat and oil  Try baking foods instead of frying them  Add less fat, such as margarine, sour cream, regular salad dressing and mayonnaise to foods  Eat fewer high-fat foods  Some examples of high-fat foods include french fries, doughnuts, ice cream, and cakes  · Eat fewer sweets  Limit foods and drinks that are high in sugar  This includes candy, cookies, regular soda, and sweetened drinks  Exercise:  Exercise at least 30 minutes per day on most days of the week  Some examples of exercise include walking, biking, dancing, and swimming  You can also fit in more physical activity by taking the stairs instead of the elevator or parking farther away from stores  Ask your healthcare provider about the best exercise plan for you  © Copyright [employer] 2018 Information is for End User's use only and may not be sold, redistributed or otherwise used for commercial purposes   All illustrations and images included in CareNotes® are the copyrighted property of [employer]  or [address] [place] [address]

## 2023-05-12 NOTE — PROGRESS NOTES
Assessment and Plan:     Problem List Items Addressed This Visit    None  BMI Counseling: Body mass index is 28 92 kg/m²  The BMI is above normal  Nutrition recommendations include reducing portion sizes  Preventive health issues were discussed with patient, and age appropriate screening tests were ordered as noted in patient's After Visit Summary  Personalized health advice and appropriate referrals for health education or preventive services given if needed, as noted in patient's After Visit Summary  History of Present Illness:     Patient presents for a Medicare Wellness Visit  She is here to review her cholesterol, for hypothyroidism follow-up, for chronic migraine follow-up, chronic spinal stenosis of the cervical spine and complains of some fatigue  HPI   Patient Care Team:  Rishi Benavides DO as PCP - General (Family Medicine)     Review of Systems:     Review of Systems   Constitutional: Positive for fatigue  Negative for appetite change, chills and fever  HENT: Negative for ear pain, facial swelling, rhinorrhea, sinus pain, sore throat and trouble swallowing  Eyes: Negative for discharge and redness  Respiratory: Negative for chest tightness, shortness of breath and wheezing  Cardiovascular: Negative for chest pain and palpitations  Gastrointestinal: Negative for abdominal pain, diarrhea, nausea and vomiting  Endocrine: Negative for polyuria  Genitourinary: Negative for dysuria and urgency  Musculoskeletal: Positive for arthralgias  Negative for back pain  Chronic cervical spine pain   Skin: Negative for rash  Neurological: Negative for dizziness, weakness and headaches  Positive infrequent migraine headaches   Hematological: Negative for adenopathy  Psychiatric/Behavioral: Negative for behavioral problems, confusion and sleep disturbance  All other systems reviewed and are negative         Problem List:     Patient Active Problem List   Diagnosis • Hypothyroidism   • GERD (gastroesophageal reflux disease)   • Depression   • Chronic migraine without aura without status migrainosus, not intractable   • Major depressive disorder with single episode, in full remission ([facility] [address] )   • Closed displaced fracture of right calcaneus   • Closed nondisplaced fracture of fourth metatarsal bone of right foot   • Cervicalgia      Past Medical and Surgical History:     Past Medical History:   Diagnosis Date   • Bilateral breast cysts 2019 r    right breast cysts; dense breast tissue bilaterally   • Cervical spinal stenosis    • Liver enzyme elevation    • Migraines    • Osteoporosis      Past Surgical History:   Procedure Laterality Date   •  SECTION      two   • OVARIAN CYST SURGERY        Family History:     Family History   Problem Relation Age of Onset   • Parkinsonism Father    • Dementia Father    • Hypertension Daughter    • Cervical cancer Maternal Grandmother    • Bipolar disorder Sister    • No Known Problems Mother       Social History:     Social History     Socioeconomic History   • Marital status:       Spouse name: None   • Number of children: None   • Years of education: None   • Highest education level: None   Occupational History   • None   Tobacco Use   • Smoking status: Former     Types: Cigarettes     Quit date:      Years since quittin 3     Passive exposure: Past   • Smokeless tobacco: Never   Vaping Use   • Vaping Use: Never used   Substance and Sexual Activity   • Alcohol use: No   • Drug use: No   • Sexual activity: Not Currently   Other Topics Concern   • None   Social History Narrative   • None     Social Determinants of Health     Financial Resource Strain: Not on file   Food Insecurity: Not on file   Transportation Needs: Not on file   Physical Activity: Not on file   Stress: Not on file   Social Connections: Not on file   Intimate Partner Violence: Not on file   Housing Stability: Not on file      Medications and Allergies:     Current Outpatient Medications   Medication Sig Dispense Refill   • cyclobenzaprine (FLEXERIL) 5 mg tablet Take 1 tablet (5 mg total) by mouth daily at bedtime 30 tablet 3   • dexamethasone (DECADRON) 1 mg tablet Take 1 tablet (1 mg total) by mouth daily as needed (for migraine on day 2) 10 tablet 2   • divalproex sodium (DEPAKOTE ER) 250 mg 24 hr tablet TAKE 1 TABLET AT BEDTIME ON THE NIGHTS OF YOUR MIGRAINES 30 tablet 0   • latanoprost (XALATAN) 0 005 % ophthalmic solution INSTILL 1 DROP BOTH EYES EVERY NIGHT AT BEDTIME     • levothyroxine 75 mcg tablet TAKE 1 TABLET(75 MCG) BY MOUTH DAILY EARLY MORNING 90 tablet 0   • omeprazole (PriLOSEC) 20 mg delayed release capsule TAKE 1 CAPSULE(20 MG) BY MOUTH DAILY 90 capsule 3   • ondansetron (ZOFRAN) 4 mg tablet Take 1 tablet (4 mg total) by mouth every 8 (eight) hours as needed for nausea or vomiting 20 tablet 3   • rizatriptan (MAXALT-MLT) 10 mg disintegrating tablet DISSOLVE 1 TABLET(10 MG) ON THE TONGUE 1 TIME AS NEEDED FOR MIGRAINE  MAY REPEAT IN 2 HOURS AS NEEDED  LIMIT OF 3 A WEEK OR 9 A MONTH 9 tablet 3   • SUMAtriptan Succinate 6 MG/0 5ML SOAJ INJECT ONCE FOR SEVERE HEADACHE  MAY REPEAT AFTER 2 HORUS  MAX 2 INJECTIONS PER WEEK 3 mL 6   • Erenumab-aooe 140 MG/ML SOAJ Inject 140 mg under the skin every 30 (thirty) days (Patient not taking: Reported on 2/21/2023) 1 mL 11   • ibandronate (BONIVA) 150 MG tablet TAKE 1 TABLET(150 MG) BY MOUTH EVERY 30 DAYS (Patient not taking: Reported on 2/21/2023) 3 tablet 0   • ketorolac (TORADOL) 10 mg tablet Take 1 tablet (10 mg total) by mouth daily as needed for severe pain Max 1/day, 3/week, 10/month   Do not use with other OTC pain meds (Patient not taking: Reported on 10/13/2022) 10 tablet 0   • meloxicam (MOBIC) 15 mg tablet TAKE 1 TABLET(15 MG) BY MOUTH DAILY (Patient not taking: Reported on 2/8/2023) 90 tablet 3   • SUMAtriptan (IMITREX) 100 mg tablet TAKE 1/2 TO 1 TABLET BY MOUTH AT START OF HEADACHE, MAY REPEAT ONCE AFTER 2 HOURS (Patient not taking: Reported on 10/13/2022) 9 tablet 5   • topiramate (TOPAMAX) 50 MG tablet Take 2 tablets (100 mg total) by mouth 2 (two) times a day (Patient not taking: Reported on 2/8/2023) 180 tablet 3     No current facility-administered medications for this visit  No Known Allergies   Immunizations:     Immunization History   Administered Date(s) Administered   • COVID-19 PFIZER VACCINE 0 3 ML IM 04/24/2021, 05/15/2021   • INFLUENZA 09/30/2019   • Influenza, recombinant, quadrivalent,injectable, preservative free 10/23/2020   • Influenza, seasonal, injectable, preservative free 09/30/2019   • Tdap 06/02/2010, 03/21/2017      Health Maintenance:         Topic Date Due   • HIV Screening  Never done   • Breast Cancer Screening: Mammogram  04/09/2023   • Hepatitis C Screening  05/12/2023 (Originally 1965)   • Cervical Cancer Screening  10/14/2024   • Colorectal Cancer Screening  01/31/2027         Topic Date Due   • COVID-19 Vaccine (3 - Booster for Amaro Peter series) 07/10/2021      Medicare Screening Tests and Risk Assessments:     Amanda Downing is here for her Subsequent Wellness visit  Last Medicare Wellness visit information reviewed, patient interviewed, no change since last AWV  Health Risk Assessment:   Patient rates overall health as good  Patient feels that their physical health rating is same  Patient is very satisfied with their life  Eyesight was rated as same  Hearing was rated as same  Patient feels that their emotional and mental health rating is same  Patients states they are never, rarely angry  Patient states they are sometimes unusually tired/fatigued  Pain experienced in the last 7 days has been some  Patient's pain rating has been 4/10  Patient states that she has experienced weight loss or gain in last 6 months  Depression Screening:   PHQ-9 Score: 4      Fall Risk Screening:    In the past year, patient has experienced: no history of falling in past year Urinary Incontinence Screening:   Patient has leaked urine accidently in the last six months  Home Safety:  Patient does not have trouble with stairs inside or outside of their home  Patient has working smoke alarms and has working carbon monoxide detector  Home safety hazards include: none  Nutrition:   Current diet is No Added Salt and Limited junk food  Medications:   Patient is currently taking over-the-counter supplements  OTC medications include: see medication list  Patient is able to manage medications  Activities of Daily Living (ADLs)/Instrumental Activities of Daily Living (IADLs):   Walk and transfer into and out of bed and chair?: Yes  Dress and groom yourself?: Yes    Bathe or shower yourself?: Yes    Feed yourself? Yes  Do your laundry/housekeeping?: Yes  Manage your money, pay your bills and track your expenses?: Yes  Make your own meals?: Yes    Do your own shopping?: Yes    Previous Hospitalizations:   Any hospitalizations or ED visits within the last 12 months?: No      Advance Care Planning:   Living will: No    Durable POA for healthcare:  Yes    Advanced directive: Yes    Advanced directive counseling given: Yes    Five wishes given: Yes    Patient declined ACP directive: No    End of Life Decisions reviewed with patient: Yes    Provider agrees with end of life decisions: Yes      Cognitive Screening:   Provider or family/friend/caregiver concerned regarding cognition?: No    PREVENTIVE SCREENINGS      Cardiovascular Screening:    General: Screening Current      Diabetes Screening:     General: Screening Current      Colorectal Cancer Screening:     General: Screening Current      Breast Cancer Screening:     General: Screening Current      Osteoporosis Screening:    General: Screening Not Indicated and History Osteoporosis      Abdominal Aortic Aneurysm (AAA) Screening:        General: Screening Current and Screening Not Indicated      Lung Cancer Screening:     General: "Screening Not Indicated      Hepatitis C Screening:    General: Screening Current    Screening, Brief Intervention, and Referral to Treatment (SBIRT)    Screening    Typical number of drinks in a week: 0    Single Item Drug Screening:  How often have you used an illegal drug (including marijuana) or a prescription medication for non-medical reasons in the past year? never    Single Item Drug Screen Score: 0  Interpretation: Negative screen for possible drug use disorder    No results found  Physical Exam:     /74   Pulse 96   Temp 98 2 °F (36 8 °C)   Ht 4' 11\" (1 499 m)   Wt 65 kg (143 lb 3 2 oz)   SpO2 96%   BMI 28 92 kg/m²     Physical Exam  Vitals and nursing note reviewed  Constitutional:       General: She is not in acute distress  Appearance: Normal appearance  She is not ill-appearing or diaphoretic  HENT:      Head: Normocephalic and atraumatic  Right Ear: Tympanic membrane, ear canal and external ear normal       Left Ear: Tympanic membrane, ear canal and external ear normal       Nose: Nose normal  No congestion or rhinorrhea  Mouth/Throat:      Mouth: Mucous membranes are moist       Pharynx: Oropharynx is clear  No posterior oropharyngeal erythema  Eyes:      General:         Right eye: No discharge  Left eye: No discharge  Extraocular Movements: Extraocular movements intact  Conjunctiva/sclera: Conjunctivae normal       Pupils: Pupils are equal, round, and reactive to light  Neck:      Vascular: No carotid bruit  Cardiovascular:      Rate and Rhythm: Normal rate and regular rhythm  Pulses: Normal pulses  Heart sounds: Normal heart sounds  No murmur heard  Pulmonary:      Effort: Pulmonary effort is normal  No respiratory distress  Breath sounds: Normal breath sounds  No wheezing or rhonchi  Abdominal:      General: Abdomen is flat  Bowel sounds are normal  There is no distension  Palpations: There is no mass        " Tenderness: There is no abdominal tenderness  Musculoskeletal:         General: No swelling or deformity  Normal range of motion  Cervical back: Normal range of motion and neck supple  No rigidity  Right lower leg: No edema  Left lower leg: No edema  Lymphadenopathy:      Cervical: No cervical adenopathy  Skin:     General: Skin is warm and dry  Capillary Refill: Capillary refill takes less than 2 seconds  Coloration: Skin is not jaundiced  Findings: No bruising, erythema or rash  Neurological:      General: No focal deficit present  Mental Status: She is alert and oriented to person, place, and time  Cranial Nerves: No cranial nerve deficit  Sensory: No sensory deficit  Gait: Gait normal       Deep Tendon Reflexes: Reflexes normal    Psychiatric:         Mood and Affect: Mood normal          Behavior: Behavior normal          Thought Content:  Thought content normal          Judgment: Judgment normal           Steph Vallejo, DO

## 2023-05-15 ENCOUNTER — TELEPHONE (OUTPATIENT)
Dept: FAMILY MEDICINE CLINIC | Facility: CLINIC | Age: 58
End: 2023-05-15

## 2023-05-15 ENCOUNTER — APPOINTMENT (OUTPATIENT)
Dept: LAB | Facility: CLINIC | Age: 58
End: 2023-05-15

## 2023-05-15 DIAGNOSIS — E78.00 HYPERCHOLESTEROLEMIA: ICD-10-CM

## 2023-05-15 DIAGNOSIS — R53.83 OTHER FATIGUE: ICD-10-CM

## 2023-05-15 DIAGNOSIS — E03.9 HYPOTHYROIDISM, UNSPECIFIED TYPE: ICD-10-CM

## 2023-05-15 LAB
ALBUMIN SERPL BCP-MCNC: 4 G/DL (ref 3.5–5)
ALP SERPL-CCNC: 195 U/L (ref 46–116)
ALT SERPL W P-5'-P-CCNC: 53 U/L (ref 12–78)
ANION GAP SERPL CALCULATED.3IONS-SCNC: 3 MMOL/L (ref 4–13)
AST SERPL W P-5'-P-CCNC: 38 U/L (ref 5–45)
BILIRUB SERPL-MCNC: 0.36 MG/DL (ref 0.2–1)
BUN SERPL-MCNC: 18 MG/DL (ref 5–25)
CALCIUM SERPL-MCNC: 9.5 MG/DL (ref 8.3–10.1)
CHLORIDE SERPL-SCNC: 108 MMOL/L (ref 96–108)
CHOLEST SERPL-MCNC: 243 MG/DL
CO2 SERPL-SCNC: 24 MMOL/L (ref 21–32)
CREAT SERPL-MCNC: 0.83 MG/DL (ref 0.6–1.3)
GFR SERPL CREATININE-BSD FRML MDRD: 77 ML/MIN/1.73SQ M
GLUCOSE P FAST SERPL-MCNC: 131 MG/DL (ref 65–99)
HDLC SERPL-MCNC: 80 MG/DL
LDLC SERPL CALC-MCNC: 152 MG/DL (ref 0–100)
NONHDLC SERPL-MCNC: 163 MG/DL
POTASSIUM SERPL-SCNC: 4.2 MMOL/L (ref 3.5–5.3)
PROT SERPL-MCNC: 7.8 G/DL (ref 6.4–8.4)
SODIUM SERPL-SCNC: 135 MMOL/L (ref 135–147)
TRIGL SERPL-MCNC: 55 MG/DL
TSH SERPL DL<=0.05 MIU/L-ACNC: 0.52 UIU/ML (ref 0.45–4.5)

## 2023-05-15 NOTE — TELEPHONE ENCOUNTER
Patient reports that she is sleeping better and the pain in her neck has gotten much better, since starting the medication you prescribed

## 2023-05-23 DIAGNOSIS — G43.709 CHRONIC MIGRAINE WITHOUT AURA WITHOUT STATUS MIGRAINOSUS, NOT INTRACTABLE: ICD-10-CM

## 2023-05-23 RX ORDER — RIZATRIPTAN BENZOATE 10 MG/1
TABLET, ORALLY DISINTEGRATING ORAL
Qty: 9 TABLET | Refills: 3 | Status: SHIPPED | OUTPATIENT
Start: 2023-05-23

## 2023-05-26 ENCOUNTER — HOSPITAL ENCOUNTER (OUTPATIENT)
Dept: MAMMOGRAPHY | Facility: HOSPITAL | Age: 58
Discharge: HOME | End: 2023-05-26

## 2023-05-26 VITALS — BODY MASS INDEX: 28.43 KG/M2 | HEIGHT: 59 IN | WEIGHT: 141 LBS

## 2023-05-26 DIAGNOSIS — Z12.31 BREAST CANCER SCREENING BY MAMMOGRAM: ICD-10-CM

## 2023-05-30 DIAGNOSIS — M48.02 CERVICAL SPINAL STENOSIS: ICD-10-CM

## 2023-05-30 RX ORDER — BACLOFEN 10 MG/1
10 TABLET ORAL
Qty: 10 TABLET | Refills: 0 | Status: SHIPPED | OUTPATIENT
Start: 2023-05-30 | End: 2023-06-07

## 2023-05-30 RX ORDER — PREDNISONE 10 MG/1
TABLET ORAL
Qty: 26 TABLET | Refills: 0 | Status: SHIPPED | OUTPATIENT
Start: 2023-05-30

## 2023-06-04 ENCOUNTER — HOSPITAL ENCOUNTER (EMERGENCY)
Facility: HOSPITAL | Age: 58
Discharge: HOME | End: 2023-06-04
Attending: EMERGENCY MEDICINE
Payer: MEDICARE

## 2023-06-04 ENCOUNTER — APPOINTMENT (EMERGENCY)
Dept: CT IMAGING | Facility: HOSPITAL | Age: 58
End: 2023-06-04
Payer: MEDICARE

## 2023-06-04 VITALS
SYSTOLIC BLOOD PRESSURE: 165 MMHG | RESPIRATION RATE: 16 BRPM | OXYGEN SATURATION: 97 % | TEMPERATURE: 98 F | HEART RATE: 108 BPM | DIASTOLIC BLOOD PRESSURE: 102 MMHG

## 2023-06-04 DIAGNOSIS — G43.909 MIGRAINE HEADACHE: Primary | ICD-10-CM

## 2023-06-04 PROCEDURE — 96375 TX/PRO/DX INJ NEW DRUG ADDON: CPT

## 2023-06-04 PROCEDURE — 96361 HYDRATE IV INFUSION ADD-ON: CPT

## 2023-06-04 PROCEDURE — 96374 THER/PROPH/DIAG INJ IV PUSH: CPT

## 2023-06-04 PROCEDURE — 99284 EMERGENCY DEPT VISIT MOD MDM: CPT

## 2023-06-04 PROCEDURE — G1004 CDSM NDSC: HCPCS

## 2023-06-04 PROCEDURE — 70450 CT HEAD/BRAIN W/O DYE: CPT

## 2023-06-04 RX ORDER — DIPHENHYDRAMINE HYDROCHLORIDE 50 MG/ML
25 INJECTION INTRAMUSCULAR; INTRAVENOUS ONCE
Status: COMPLETED | OUTPATIENT
Start: 2023-06-04 | End: 2023-06-04

## 2023-06-04 RX ORDER — METOCLOPRAMIDE HYDROCHLORIDE 5 MG/ML
10 INJECTION INTRAMUSCULAR; INTRAVENOUS ONCE
Status: COMPLETED | OUTPATIENT
Start: 2023-06-04 | End: 2023-06-04

## 2023-06-04 RX ORDER — KETOROLAC TROMETHAMINE 30 MG/ML
15 INJECTION, SOLUTION INTRAMUSCULAR; INTRAVENOUS ONCE
Status: COMPLETED | OUTPATIENT
Start: 2023-06-04 | End: 2023-06-04

## 2023-06-04 RX ADMIN — KETOROLAC TROMETHAMINE 15 MG: 30 INJECTION, SOLUTION INTRAMUSCULAR at 10:09

## 2023-06-04 RX ADMIN — DIPHENHYDRAMINE HYDROCHLORIDE 25 MG: 50 INJECTION, SOLUTION INTRAMUSCULAR; INTRAVENOUS at 10:09

## 2023-06-04 RX ADMIN — SODIUM CHLORIDE 1000 ML: 0.9 INJECTION, SOLUTION INTRAVENOUS at 10:10

## 2023-06-04 RX ADMIN — METOCLOPRAMIDE 10 MG: 5 INJECTION, SOLUTION INTRAMUSCULAR; INTRAVENOUS at 10:09

## 2023-06-04 NOTE — ED PROVIDER NOTES
History  Chief Complaint   Patient presents with   • Headache     Pt c/o migraine x1 week, on/off, vomiting yesterday     Patient is a 50-year-old female with a history of migraines who presents with headache  Patient states that her headache started gradually on 5/27/2023  She states that she was driving up to [facility] when she gradually started having neck pain that radiated into her head  She has had intermittent headaches since that time  She states that she has taken her triptan with relief, but the headache has been coming back  She describes it as mostly right-sided with some photophobia  She denies visual changes, nausea, vomiting, fever, chills or other complaints  She states that her medications typically result in complete resolution of her headaches, but they continue to come back  This prompted her visit to the ED today  She denies any speech difficulty, ambulatory dysfunction, weakness, numbness, tingling or other neurologic deficits  History provided by:  Patient  Headache  Pain location:  Frontal  Radiates to:  Does not radiate  Severity currently:  9/10  Timing:  Intermittent  Chronicity:  New  Similar to prior headaches: yes    Worsened by:  Light  Ineffective treatments:  Prescription medications  Associated symptoms: photophobia    Associated symptoms: no abdominal pain, no back pain, no blurred vision, no cough, no diarrhea, no dizziness, no eye pain, no fever, no focal weakness, no loss of balance, no nausea, no neck pain, no neck stiffness, no paresthesias, no seizures, no sore throat, no visual change and no vomiting        Prior to Admission Medications   Prescriptions Last Dose Informant Patient Reported? Taking?    Erenumab-aooe 140 MG/ML SOAJ  Self No No   Sig: Inject 140 mg under the skin every 30 (thirty) days   Patient not taking: Reported on 2/21/2023   SUMAtriptan (IMITREX) 100 mg tablet  Self No No   Sig: TAKE 1/2 TO 1 TABLET BY MOUTH AT START OF HEADACHE, MAY REPEAT ONCE AFTER 2 HOURS   Patient not taking: Reported on 10/13/2022   SUMAtriptan Succinate 6 MG/0 5ML SOAJ  Self No No   Sig: INJECT ONCE FOR SEVERE HEADACHE  MAY REPEAT AFTER 2 HORUS  MAX 2 INJECTIONS PER WEEK   baclofen 10 mg tablet   No No   Sig: Take 1 tablet (10 mg total) by mouth daily at bedtime   cyclobenzaprine (FLEXERIL) 5 mg tablet  Self No No   Sig: Take 1 tablet (5 mg total) by mouth daily at bedtime   dexamethasone (DECADRON) 1 mg tablet  Self No No   Sig: Take 1 tablet (1 mg total) by mouth daily as needed (for migraine on day 2)   divalproex sodium (DEPAKOTE ER) 250 mg 24 hr tablet  Self No No   Sig: TAKE 1 TABLET AT BEDTIME ON THE NIGHTS OF YOUR MIGRAINES   ibandronate (BONIVA) 150 MG tablet  Self No No   Sig: TAKE 1 TABLET(150 MG) BY MOUTH EVERY 30 DAYS   Patient not taking: Reported on 2/21/2023   ketorolac (TORADOL) 10 mg tablet  Self No No   Sig: Take 1 tablet (10 mg total) by mouth daily as needed for severe pain Max 1/day, 3/week, 10/month  Do not use with other OTC pain meds   Patient not taking: Reported on 10/13/2022   latanoprost (XALATAN) 0 005 % ophthalmic solution  Self Yes No   Sig: INSTILL 1 DROP BOTH EYES EVERY NIGHT AT BEDTIME   levothyroxine 75 mcg tablet  Self No No   Sig: TAKE 1 TABLET(75 MCG) BY MOUTH DAILY EARLY MORNING   meloxicam (MOBIC) 15 mg tablet  Self No No   Sig: TAKE 1 TABLET(15 MG) BY MOUTH DAILY   Patient not taking: Reported on 2/8/2023   omeprazole (PriLOSEC) 20 mg delayed release capsule  Self No No   Sig: TAKE 1 CAPSULE(20 MG) BY MOUTH DAILY   ondansetron (ZOFRAN) 4 mg tablet  Self No No   Sig: Take 1 tablet (4 mg total) by mouth every 8 (eight) hours as needed for nausea or vomiting   predniSONE 10 mg tablet   No No   Sig: 3 tabs po bid x2 days, then 2 tabs po bid x2 days, then 1 tab bid x2 days, then 1 daily until done  rizatriptan (MAXALT-MLT) 10 mg disintegrating tablet   No No   Sig: DISSOLVE 1 TABLET(10 MG) ON THE TONGUE 1 TIME AS NEEDED FOR MIGRAINE  MAY REPEAT IN 2 HOURS AS NEEDED  LIMIT OF 3 A WEEK OR 9 A MONTH   topiramate (TOPAMAX) 50 MG tablet  Self No No   Sig: Take 2 tablets (100 mg total) by mouth 2 (two) times a day   Patient not taking: Reported on 2023      Facility-Administered Medications: None       Past Medical History:   Diagnosis Date   • Bilateral breast cysts 2019 r    right breast cysts; dense breast tissue bilaterally   • Cervical spinal stenosis    • Liver enzyme elevation    • Migraines    • Osteoporosis        Past Surgical History:   Procedure Laterality Date   •  SECTION      two   • OVARIAN CYST SURGERY         Family History   Problem Relation Age of Onset   • Parkinsonism Father    • Dementia Father    • Hypertension Daughter    • Cervical cancer Maternal Grandmother    • Bipolar disorder Sister    • No Known Problems Mother      I have reviewed and agree with the history as documented  E-Cigarette/Vaping   • E-Cigarette Use Never User      E-Cigarette/Vaping Substances   • Nicotine No    • THC No    • CBD No    • Flavoring No    • Other No    • Unknown No      Social History     Tobacco Use   • Smoking status: Former     Types: Cigarettes     Quit date:      Years since quittin 4     Passive exposure: Past   • Smokeless tobacco: Never   Vaping Use   • Vaping Use: Never used   Substance Use Topics   • Alcohol use: No   • Drug use: No       Review of Systems   Constitutional: Negative for chills, diaphoresis and fever  HENT: Negative for nosebleeds, sore throat and trouble swallowing  Eyes: Positive for photophobia  Negative for blurred vision, pain and visual disturbance  Respiratory: Negative for cough, chest tightness and shortness of breath  Cardiovascular: Negative for chest pain, palpitations and leg swelling  Gastrointestinal: Negative for abdominal pain, constipation, diarrhea, nausea and vomiting  Endocrine: Negative for polydipsia and polyuria     Genitourinary: Negative for difficulty urinating, dysuria, hematuria, pelvic pain, vaginal bleeding and vaginal discharge  Musculoskeletal: Negative for back pain, neck pain and neck stiffness  Skin: Negative for pallor and rash  Neurological: Positive for headaches  Negative for dizziness, focal weakness, seizures, light-headedness, paresthesias and loss of balance  All other systems reviewed and are negative  Physical Exam  Physical Exam  Vitals and nursing note reviewed  Constitutional:       General: She is not in acute distress  Appearance: She is well-developed  HENT:      Head: Normocephalic and atraumatic  Eyes:      Pupils: Pupils are equal, round, and reactive to light  Comments: No photophobia   Cardiovascular:      Rate and Rhythm: Normal rate and regular rhythm  Pulses: Normal pulses  Heart sounds: Normal heart sounds  Pulmonary:      Effort: Pulmonary effort is normal  No respiratory distress  Breath sounds: Normal breath sounds  Abdominal:      General: There is no distension  Palpations: Abdomen is soft  Abdomen is not rigid  Tenderness: There is no abdominal tenderness  There is no guarding or rebound  Musculoskeletal:         General: No tenderness  Normal range of motion  Cervical back: Normal range of motion and neck supple  Lymphadenopathy:      Cervical: No cervical adenopathy  Skin:     General: Skin is warm and dry  Capillary Refill: Capillary refill takes less than 2 seconds  Neurological:      Mental Status: She is alert and oriented to person, place, and time  Cranial Nerves: No cranial nerve deficit  Sensory: No sensory deficit  Motor: Motor function is intact  Comments: Speech fluent  Visual fields intact    Cerebellar exam normal          Vital Signs  ED Triage Vitals [06/04/23 0934]   Temperature Pulse Respirations Blood Pressure SpO2   98 °F (36 7 °C) (!) 108 16 (!) 165/102 97 %      Temp Source Heart Rate Source "Patient Position - Orthostatic VS BP Location FiO2 (%)   Oral Monitor Sitting -- --      Pain Score       9           Vitals:    06/04/23 0934   BP: (!) 165/102   Pulse: (!) 108   Patient Position - Orthostatic VS: Sitting         Visual Acuity  Visual Acuity    Flowsheet Row Most Recent Value   L Pupil Size (mm) 2   R Pupil Size (mm) 2          ED Medications  Medications   ketorolac (TORADOL) injection 15 mg (15 mg Intravenous Given 6/4/23 1009)   metoclopramide (REGLAN) injection 10 mg (10 mg Intravenous Given 6/4/23 1009)   diphenhydrAMINE (BENADRYL) injection 25 mg (25 mg Intravenous Given 6/4/23 1009)   sodium chloride 0 9 % bolus 1,000 mL (0 mL Intravenous Stopped 6/4/23 1110)       Diagnostic Studies  Results Reviewed     None                 CT head without contrast   Final Result by Klaus Abarca MD (06/04 1039)      No acute intracranial abnormality  Workstation performed: [id number]                    Procedures  Procedures         ED Course  ED Course as of 06/05/23 0917   Elijah Li Jun 04, 2023   1133 Reassessed patient who states that her headache has completely resolved  She is comfortable with discharge and outpatient follow-up  Medical Decision Making  Patient presents with headache  Headache was not acute or maximal in onset  Headache similar to previous headaches  Do not suspect SAH, temporal arteritis, meningitis, encephalitis, CO poisoning, acute angle closure glaucoma, dural venous sinus thrombosis as cause of headache  Do not feel that further imaging or workup (including LP) are warranted at this time  Patient was treated and had complete resolution of headache in the ED   Patient will follow up with PCP and return to ED if symptoms worsen or persist      Portions of the above record have been created with voice recognition software   Occasional wrong word or \"sound alike\" substitutions may have occurred due to the inherent " limitations of voice recognition software   Read the chart carefully and recognize, using context, where substitutions may have occurred  Migraine headache:     Details: Headache consistent with previous migraines, however it is lasted longer than typical   Patient very concerned and we discussed the risk and benefits to imaging  She would prefer to proceed with CT head  It was unremarkable and patient was treated symptomatically for migraine headache  She had complete resolution of headache and is comfortable with discharge  She will follow-up with her PCP and continue her home medications  Amount and/or Complexity of Data Reviewed  External Data Reviewed: notes  Radiology: ordered and independent interpretation performed  Risk  Prescription drug management  Disposition  Final diagnoses:   Migraine headache     Time reflects when diagnosis was documented in both MDM as applicable and the Disposition within this note     Time User Action Codes Description Comment    6/4/2023 11:32 AM aYmilet Fiore Add [G43 909] Migraine headache       ED Disposition     ED Disposition   Discharge    Condition   Stable    Date/Time   Sun Jun 4, 2023 11:32 AM    Comment   Noble Herbert discharge to home/self care  Follow-up Information     Follow up With Specialties Details Why DO [facility] Schedule an appointment as soon as possible for a visit  Return to ED sooner if symptoms recur  [address]    [address] [facility]            Discharge Medication List as of 6/4/2023 11:33 AM      CONTINUE these medications which have NOT CHANGED    Details   baclofen 10 mg tablet Take 1 tablet (10 mg total) by mouth daily at bedtime, Starting Tue 5/30/2023, Normal      cyclobenzaprine (FLEXERIL) 5 mg tablet Take 1 tablet (5 mg total) by mouth daily at bedtime, Starting Wed 2/8/2023, Normal      dexamethasone (DECADRON) 1 mg tablet Take 1 tablet (1 mg total) by mouth daily as needed (for migraine on day 2), Starting Wed 2/8/2023, Normal      divalproex sodium (DEPAKOTE ER) 250 mg 24 hr tablet TAKE 1 TABLET AT BEDTIME ON THE NIGHTS OF YOUR MIGRAINES, Normal      Erenumab-aooe 140 MG/ML SOAJ Inject 140 mg under the skin every 30 (thirty) days, Starting Tue 6/14/2022, Normal      ibandronate (BONIVA) 150 MG tablet TAKE 1 TABLET(150 MG) BY MOUTH EVERY 30 DAYS, Normal      ketorolac (TORADOL) 10 mg tablet Take 1 tablet (10 mg total) by mouth daily as needed for severe pain Max 1/day, 3/week, 10/month  Do not use with other OTC pain meds, Starting Wed 2/23/2022, Normal      latanoprost (XALATAN) 0 005 % ophthalmic solution INSTILL 1 DROP BOTH EYES EVERY NIGHT AT BEDTIME, Historical Med      levothyroxine 75 mcg tablet TAKE 1 TABLET(75 MCG) BY MOUTH DAILY EARLY MORNING, Normal      meloxicam (MOBIC) 15 mg tablet TAKE 1 TABLET(15 MG) BY MOUTH DAILY, Normal      omeprazole (PriLOSEC) 20 mg delayed release capsule TAKE 1 CAPSULE(20 MG) BY MOUTH DAILY, Normal      ondansetron (ZOFRAN) 4 mg tablet Take 1 tablet (4 mg total) by mouth every 8 (eight) hours as needed for nausea or vomiting, Starting Mon 5/23/2022, Normal      predniSONE 10 mg tablet 3 tabs po bid x2 days, then 2 tabs po bid x2 days, then 1 tab bid x2 days, then 1 daily until done , Normal      rizatriptan (MAXALT-MLT) 10 mg disintegrating tablet DISSOLVE 1 TABLET(10 MG) ON THE TONGUE 1 TIME AS NEEDED FOR MIGRAINE  MAY REPEAT IN 2 HOURS AS NEEDED  LIMIT OF 3 A WEEK OR 9 A MONTH, Normal      SUMAtriptan (IMITREX) 100 mg tablet TAKE 1/2 TO 1 TABLET BY MOUTH AT START OF HEADACHE, MAY REPEAT ONCE AFTER 2 HOURS, Normal      SUMAtriptan Succinate 6 MG/0 5ML SOAJ INJECT ONCE FOR SEVERE HEADACHE  MAY REPEAT AFTER 2 HORUS   MAX 2 INJECTIONS PER WEEK, Normal      topiramate (TOPAMAX) 50 MG tablet Take 2 tablets (100 mg total) by mouth 2 (two) times a day, Starting Thu 4/14/2022, Normal             No discharge procedures on file      PDMP Review     None          ED Provider  Electronically Signed by           Jane Parsons DO  06/05/23 7160

## 2023-06-07 DIAGNOSIS — M48.02 CERVICAL SPINAL STENOSIS: ICD-10-CM

## 2023-06-07 RX ORDER — BACLOFEN 10 MG/1
TABLET ORAL
Qty: 10 TABLET | Refills: 0 | Status: SHIPPED | OUTPATIENT
Start: 2023-06-07 | End: 2023-06-14

## 2023-06-14 DIAGNOSIS — M48.02 CERVICAL SPINAL STENOSIS: ICD-10-CM

## 2023-06-14 RX ORDER — BACLOFEN 10 MG/1
TABLET ORAL
Qty: 10 TABLET | Refills: 0 | Status: SHIPPED | OUTPATIENT
Start: 2023-06-14

## 2023-06-28 DIAGNOSIS — M48.02 CERVICAL SPINAL STENOSIS: ICD-10-CM

## 2023-06-28 RX ORDER — BACLOFEN 10 MG/1
TABLET ORAL
Qty: 10 TABLET | Refills: 0 | Status: SHIPPED | OUTPATIENT
Start: 2023-06-28 | End: 2023-07-05

## 2023-07-05 DIAGNOSIS — M48.02 CERVICAL SPINAL STENOSIS: ICD-10-CM

## 2023-07-05 RX ORDER — BACLOFEN 10 MG/1
TABLET ORAL
Qty: 10 TABLET | Refills: 0 | Status: SHIPPED | OUTPATIENT
Start: 2023-07-05

## 2023-07-14 DIAGNOSIS — M48.02 CERVICAL SPINAL STENOSIS: ICD-10-CM

## 2023-07-14 RX ORDER — BACLOFEN 10 MG/1
TABLET ORAL
Qty: 10 TABLET | Refills: 0 | OUTPATIENT
Start: 2023-07-14

## 2023-07-15 DIAGNOSIS — E03.9 HYPOTHYROIDISM, UNSPECIFIED TYPE: ICD-10-CM

## 2023-07-17 RX ORDER — LEVOTHYROXINE SODIUM 0.07 MG/1
TABLET ORAL
Qty: 90 TABLET | Refills: 0 | Status: SHIPPED | OUTPATIENT
Start: 2023-07-17

## 2023-07-21 DIAGNOSIS — M48.02 CERVICAL SPINAL STENOSIS: ICD-10-CM

## 2023-07-21 RX ORDER — BACLOFEN 10 MG/1
TABLET ORAL
Qty: 10 TABLET | Refills: 0 | Status: SHIPPED | OUTPATIENT
Start: 2023-07-21

## 2023-07-26 ENCOUNTER — OFFICE VISIT (OUTPATIENT)
Dept: NEUROLOGY | Facility: CLINIC | Age: 58
End: 2023-07-26
Payer: MEDICARE

## 2023-07-26 VITALS
TEMPERATURE: 97.5 F | HEART RATE: 75 BPM | DIASTOLIC BLOOD PRESSURE: 101 MMHG | SYSTOLIC BLOOD PRESSURE: 159 MMHG | BODY MASS INDEX: 26.61 KG/M2 | HEIGHT: 59 IN | WEIGHT: 132 LBS

## 2023-07-26 DIAGNOSIS — G43.709 CHRONIC MIGRAINE WITHOUT AURA WITHOUT STATUS MIGRAINOSUS, NOT INTRACTABLE: Primary | ICD-10-CM

## 2023-07-26 DIAGNOSIS — R06.83 SNORES: ICD-10-CM

## 2023-07-26 DIAGNOSIS — G93.2 IIH (IDIOPATHIC INTRACRANIAL HYPERTENSION): ICD-10-CM

## 2023-07-26 DIAGNOSIS — G47.39 OTHER SLEEP APNEA: ICD-10-CM

## 2023-07-26 DIAGNOSIS — R53.83 FEELING TIRED: ICD-10-CM

## 2023-07-26 PROCEDURE — 99215 OFFICE O/P EST HI 40 MIN: CPT | Performed by: PHYSICIAN ASSISTANT

## 2023-07-26 RX ORDER — ACETAZOLAMIDE 125 MG/1
TABLET ORAL
Qty: 120 TABLET | Refills: 2 | Status: SHIPPED | OUTPATIENT
Start: 2023-07-26

## 2023-07-26 NOTE — PATIENT INSTRUCTIONS
Headache/migraine treatment:   Abortive medications (for immediate treatment of a headache): It is ok to take ibuprofen, acetaminophen or naproxen (Advil, Tylenol,  Aleve, Excedrin) if they help your headaches you should limit these to No more than 3 times a week to avoid medication overuse/rebound headaches. Continue sumatriptan injections at onset of migraine. If this is effective, this is first choice    Alternative is rizatriptan MLT which dissolves on your tongue. May repeat in 2 hours. Limit of 3 a week or 9 a month. IF this fails you can use the injection if needed    For your nausea/vomiting use Zofran as needed. On nights of your severe headaches, or if hasn't fully gone away with the rescue medications, take Depakote 250 mg at bedtime. If you wake up with a migraines the next day, take your rescue medication and take Dexamethasone 1 mg in the morning and then the Depakote at bedtime        Prescription preventive medications for headaches/migraines   (to take every day to help prevent headaches - not to take at the time of headache):  [x] start acetazolamide 1 tab twice a day (second dose late afternoon/evening) for 7 days then increase to 2 tabs twice a day  [x] Cyclobenzaprine 5 mg at bedtime as needed    [x] Continue Aimovig every 30 days     Keep out of direct sunlight. Prior to administration, allow to come to room temperature for 30 minutes. Do not warm using a heat source (eg, microwave or hot water). Do not shake. Administer in abdomen (avoiding 2 inches around the navel), thigh, upper arm, or buttocks avoiding areas of skin that are tender, bruised, red or hard. Deliver entire contents of single-use prefilled pen or syringe. *Typically these types of medications take time untill you see the benefit, although some may see improvement in days, often it may take weeks, especially if the medication is being titrated up to a beneficial level.  Please contact us if there are any concerns or questions regarding the medication. I have ordered a sleep study and referral to sleep medicine    I am placing a referral to the sleep medicine specialist team to further evaluate your sleep issues. Please call [phone] to schedule and I recommend you see one of the following providers:    April Aguila PA-C       Lifestyle Recommendations:  [x] SLEEP - Maintain a regular sleep schedule: Adults need at least 7-8 hours of uninterrupted a night. Maintain good sleep hygiene:  Going to bed and waking up at consistent times, avoiding excessive daytime naps, avoiding caffeinated beverages in the evening, avoid excessive stimulation in the evening and generally using bed primarily for sleeping. One hour before bedtime would recommend turning lights down lower, decreasing your activity (may read quietly, listen to music at a low volume). When you get into bed, should eliminate all technology (no texting, emailing, playing with your phone, iPad or tablet in bed). [x] HYDRATION - Maintain good hydration. Drink  2L of fluid a day (4 typical small water bottles)  [x] DIET - Maintain good nutrition. In particular don't skip meals and try and eat healthy balanced meals regularly. [x] TRIGGERS - Look for other triggers and avoid them: Limit caffeine to 1-2 cups a day or less. Avoid dietary triggers that you have noticed bring on your headaches (this could include aged cheese, peanuts, MSG, aspartame and nitrates). [x] EXERCISE - physical exercise as we all know is good for you in many ways, and not only is good for your heart, but also is beneficial for your mental health, cognitive health and  chronic pain/headaches. I would encourage at the least 5 days of physical exercise weekly for at least 30 minutes. Education and Follow-up  [x] Please call with any questions or concerns.  Of course if any new concerning symptoms go to the emergency department.   [x] Follow up in 4-6 months with Sarika Palmer or Dr Bob Montoya

## 2023-07-26 NOTE — PROGRESS NOTES
[facility] Consult  PATIENT:  Alicia Clemons  MRN:  [id number]  :  1965  DATE OF SERVICE:  2023  REFERRED BY: No ref. provider found  PMD: Cleopatra Garcia DO    Assessment/Plan:   Kimberli Herbert is a very pleasant 58 y. o. female with a past medical history that includes hypothyroidism, migraines, GERD, depression, Cervical spinal stenosis, chronic neck pain, slightly elevated liver enzymes referred here for evaluation of headache. Dr Corazon Adames initial evaluation 2020       Chronic migraine without aura without status migrainosus, not intractable  Tiffany reports a long history of headaches and migraines have been difficult to control.  She reports pain is often left-sided, worse when on the right side.  Starts typically the back of the neck and radiates unilateral side of the head the frontal/I region.  She denies aura, reports typical associated migrainous features as well as some autonomic features.  She denies any new worsening and did have slight improvement on aimovig but not enough.  She denies any new or concerning symptoms. - as of 2020: 5 days a week migraines   - as of 2020:  Physical therapy has helped somewhat.  migraines 3-4 times per week, in July 2-3 really bad migraines that lasted 3-4 days.  Already on venlafaxine 150 mg, topiramate 100 mg BID, emgality was too expensive, trial of ajovy.     - as of 3/1/2021: She continues to have uncontrolled migraines with milder migraines 4 days per week really bad migraines 3 times a month.  Continue topiramate 100 mg b.i.d., venlafaxine 150 mg, all CGRPs are too expensive rec'd emgality [facility] program.   - as of 2021: She reports migraines on average still 3 days per week, some weeks better than others.  On topiramate 100 mg BID (and not sure if helping - discussed could consider very gradual wean down to 50 mg BID see if changes migraines or possible side effects) and venlafaxine 150 mg through PCP. Trying to get emgality through [facility], but has not heard back yet. In past 3 months 3 bad migraines ones. Typically migraines improve with sumatriptan PO or Inj, back up prochlorperazine. We discussed options and she would not like to add medications at this time. - as of 5/25/2022:  Patient was unable to obtain Aimovig or Emgality due to cost of the medications. Alisia Iron has been the same.  NO health changes since last seen  - as of 2/8/2023:  Since taking Aimovig has decreased to 8-10 a month. As of 7/6/2023: no headache today.           Workup:  - Neurologic assessment reveals normal neurological exam.  - noncontrast head CT 06/30/2018:  No acute pathology, Tiny cavum septum pellucidum, a normal variant.   - MRI Brain 2015 - normal per patient-she will try to obtain MRI brain on CD for my review, per review today does have slight partially empty sella with slight sclera flattening with slightly tortuous optic nerves     Preventative:  - we discussed headache hygiene and lifestyle factors that may improve headaches  - aimovig 140 mg every 30 days  - cyclobenzaprine 5 mg at bedtime        - past/failed: botox, venlafaxine, amitriptyline would be contraindicated due interaction with venlafaxine, propranolol the remote past, topiramate, gabapentin, botox for 4 sessions without improvement   - future options: could consider increasing gabapentin, verapamil, 140 mg of aimovig, ajovy, consider [facility] referral - mentioned to patient 6/1/21 as a future option if needed since I am not a fellowship trained headache specialist      Abortive:   It is ok to take ibuprofen, acetaminophen or naproxen (Advil, Tylenol,  Aleve, Excedrin) if they help your headaches you should limit these to No more than 3 times a week to avoid medication overuse/rebound headaches.      Take rizatriptan MLT which dissolves on your tongue.  May repeat in 2 hours.  Limit of 3 a week or 9 a month.  If this fails you can use the injection if needed     Continue sumatriptan injections at onset of migraine.  If this is effective, this is first choice   For your nausea/vomiting use Zofran as needed    #Severe migraines taking Depakote 250 mg at bedtime     - past/failed:  OTC meds, sumatriptan by mouth does not work as well as injection, prochlorperazine  - future options:  ubrelvy, Alternative Triptan, metoclopramide, Toradol IM or p.o., could consider trial for 5 days of Depakote or dexamethasone 4 prolonged migraine, reyvow           Patient instructions      Headache/migraine treatment:   Abortive medications (for immediate treatment of a headache): It is ok to take ibuprofen, acetaminophen or naproxen (Advil, Tylenol,  Aleve, Excedrin) if they help your headaches you should limit these to No more than 3 times a week to avoid medication overuse/rebound headaches.      Continue sumatriptan injections at onset of migraine.  If this is effective, this is first choice    Alternative is rizatriptan MLT which dissolves on your tongue.  May repeat in 2 hours.  Limit of 3 a week or 9 a month.  IF this fails you can use the injection if needed     For your nausea/vomiting use Zofran as needed.     On the nights of severe migraines, or if doesn't resolve, take Depakote 250 mg at bedtime.     If you wake up day 2 take rescue medications again and then take Decadron 1 mg in the am.        Prescription preventive medications for headaches/migraines   (to take every day to help prevent headaches - not to take at the time of headache):    [x] start acetazolamide 1 tab twice a day (second dose late afternoon/evening) for 7 days then increase to 2 tabs twice a day  [x]? Aimovig injections every 30 days  [x]cyclobenzaprine 5 mg at bedtime       Keep out of direct sunlight. Prior to administration, allow to come to room temperature for 30 minutes. Do not warm using a heat source (eg, microwave or hot water). Do not shake.  Administer in abdomen (avoiding 2 inches around the navel), thigh, upper arm, or buttocks avoiding areas of skin that are tender, bruised, red or hard. Deliver entire contents of single-use prefilled pen or syringe.        *Typically these types of medications take time untill you see the benefit, although some may see improvement in days, often it may take weeks, especially if the medication is being titrated up to a beneficial level. Please contact us if there are any concerns or questions regarding the medication.         Lifestyle Recommendations:  [x]????? SLEEP - Maintain a regular sleep schedule: Adults need at least 7-8 hours of uninterrupted a night. Maintain good sleep hygiene:  Going to bed and waking up at consistent times, avoiding excessive daytime naps, avoiding caffeinated beverages in the evening, avoid excessive stimulation in the evening and generally using bed primarily for sleeping.  One hour before bedtime would recommend turning lights down lower, decreasing your activity (may read quietly, listen to music at a low volume). When you get into bed, should eliminate all technology (no texting, emailing, playing with your phone, iPad or tablet in bed). [x]????? HYDRATION - Maintain good hydration.  Drink  2L of fluid a day (4 typical small water bottles)  [x]????? DIET - Maintain good nutrition. In particular don't skip meals and try and eat healthy balanced meals regularly. [x]????? TRIGGERS - Look for other triggers and avoid them: Limit caffeine to 1-2 cups a day or less. Avoid dietary triggers that you have noticed bring on your headaches (this could include aged cheese, peanuts, MSG, aspartame and nitrates). [x]????? EXERCISE - physical exercise as we all know is good for you in many ways, and not only is good for your heart, but also is beneficial for your mental health, cognitive health and  chronic pain/headaches.  I would encourage at the least 5 days of physical exercise weekly for at least 30 minutes.      Education and Follow-up  [x]????? Please call with any questions or concerns. Of course if any new concerning symptoms go to the emergency department. [x]????? Follow up in 3-4 months with Gertrudis Salinas or Dr Magdalena Aranda        CC: We had the pleasure of evaluating Tiffany Herbert in neurological consultation today. Charlotte Jeison a  right handed female who presents today for evaluation of headaches.      History obtained from patient as well as available medical record review. History of Present Illness:   Interval history as of 7/26/2023  Patient had to get re-approved for her Aimovig. Didn't have a dose for 3+ months!!! Therefore her migraines did worsen to 4-5 days a week (if not more). She did have to go to there ER on 6/4/2023 (had a migraine for over 5 weeks at that time). Did restart in June with her injections. When she was getting the injections on time she was having 2 a week but would respond to her rizatriptan within an hour. However, when was delayed and not receiving the [place] went on for weeks at one point    Average pain is a 5-6/10, no longer a 10/10    Interval history as of 2/8/2023  Patient states that she has improved. Gets about 7 but intensity is about a 7/10  If she takes the rizatriptan right away usually starts to go away with 30-60 minutes. Occasionally has one that lasts more than a day.   Is doing well with the AImovig.       Interval History as of 5/23/2022  - has not started injection due to cost  Migraines are every week but vary in duration shortest time is 1 day up to 4 days, averaging 2 days but can have multiple migraines in a week.  Therefore she has migraines 3-5 days a week.     Average pain is a 10/10.  If catches immediately will be a 5-6/10    Patient is having vomiting again with her migraines and her neck pain is worse with her migraines      Preventative:   Through PCP: topiramate 100 mg BID,    - unable to obtain injections due to cost      Abortive:   Sumatriptan injection - helps (gets 2 needles every 8-9 days)  Sumatriptan 100 mg PO (9 pills a month)  Zofran        Interval history as of 6/1/2021  - saw eye doctor and got new glasses and then saw ophthalmology - cataracts and needs laser iridotomy - both being done in June and July      Headaches and migraines   On average 3 migraines per week         Denies bothersome side effects          Interval history as of 03/01/2021:   - She saw WILMAN Pham 11/5/20, see EMR  - eye doctor apmt end of this month      Headaches and migraines  4 headaches per week that improve with injection if caught early, worse 3 times a month     Preventive:  Venlafaxine 150mg , topiramate 100 mg BID  - was not able to start ajovy due to price   -Gabapentin 300 mg p.o. q.h.s. added 11/05/2020     Abortive:   Sumatriptan injection - helps   Prochlorperazine - helped a little     Interval history as of 08/20/2020:    -she has seen physical therapy - has seen some improvement   -labs not done yet - but seeing PCP soon  -has  MRI brain on CD for my review - forgot it today         Headaches and migraines - 3-4 times per week   Topiramate 100 mg b.i.d.   Venlafaxine 150 mg  -switched from plan for emgality to ajovy since more affordable with her insurance - but never got this either      Sumatriptan injection - helps   Prochlorperazine - helped a little     Headaches started at what age? 11years old  How often do the headaches occur?   - as of 5/14/2020: 5 days a week migraines   - as of 8/20/2020: 3-4 times per week, in July 2-3 really bad migraines that lasted 3-4 days    - as of 5/23/2022: 3-5 days a week will have migraines  - as of 2/8/2023:  8-10 a month, can last just a few hours but then some a few days  -As of 7/26/2023:2 a week when injecting aimovig and responsive to abortive medications, when was off her aimovig continuous for over 5 weeks and 4-5 a week prior to that one long stretch  What time of the day do the headaches start?  A lot wakes up with them or mid-afternoon  How long do the headaches last? Over 4 hours up to 3 days  Are you ever headache free? Yes     Aura? without aura     Last eye exam: 2 years ago, normal except needing glasses         Where is your headache located and pain quality?   - Right or left side, worse when on right  - start back of neck   - worse on right side comes up the back of the head and goes to frontal/eye region on that side  - pulsating, pounding, sharp stabbing, pressure     What is the intensity of pain? Average: 7-8/10, worst 10/10  Associated symptoms:   [x]?????? Nausea       [x]?????? Vomiting        []?????? Diarrhea  [x]?????? Insomnia    [x]?????? Stiff or sore neck   [x]?????? Problems with concentration  [x]?????? Photophobia     [x]???? ?? Phonophobia      [x]?????? Osmophobia  []?????? Blurred vision   [x]?????? Prefer quiet, dark room  [x]?????? Light-headed or dizzy     [x]?????? Tinnitus   []?????? Hands or feet tingle or feel numb/paresthesias       []?????? Red ear      []?????? Ptosis      []?????? Facial droop  [x]?????? Lacrimation - tears  [x]?????? Nasal congestion   []?????? Flushing of face  []?????? Change in pupil size     Things that make the headache worse? No specific movements, any movement      Headache triggers:  unknown     Have you seen someone else for headaches or pain? Yes, neurology, pain management  Have you had trigger point injection performed and how often? No  Have you had Botox injection performed and how often? Yes, 4 sessions and did not help   Have you had epidural injections or transforaminal injections performed? Yes  Are you current pregnant or planning on getting pregnant? No - no regular periods since early 402  Have you ever had any Brain imaging? yes - MRI Brain 2015 - normal     What medications do you take or have you taken for your headaches?    ABORTIVE:    OTC medications have been ineffective      Sumatriptan 100 mg - works well unless vomiting   Sumatriptan 6mg/0.5mL inj - works well      Has had zofran for nausea before     Past:  advil migraine  ED cocktails - help - compazine, benadryl   Prednisone      PREVENTIVE:      Venlafaxine 150 mg - 5.5 years   Topiramate 100 mg BID - more than 5 years     Past:  aimovig 70 mg- last took in January - was decreasing N/V but not huge decrease in migraines  Amitriptyline would be contraindicated due to interaction with venlafaxine  Inderal - proproanolol           Alternative therapies used in the past for headaches? PT for chronic neck pain in the past - last 4 years ago - helped      LIFESTYLE  Sleep   - averages: 930-730   Problems falling asleep?:   No  Problems staying asleep?:  Yes, 2   - snores  - sometimes naps, not usually  - sometimes falls asleep watching TV  - never had a sleep study     Physical activity: none     Water: 2-4 bottles per day  Caffeine: 1-2 cups per day     Mood: - depression after  and granddaughter passing away      The following portions of the patient's history were reviewed and updated as appropriate: allergies, current medications, past family history, past medical history, past social history, past surgical history and problem list.     Pertinent family history:  Family history of headaches:  migraine headaches in mother, grandmother and daughter, granddaughter  Any family history of aneurysms - No     Pertinent social history:  Work: no  [address]   Lives with mother     Illicit Drugs: denies  Alcohol/tobacco: Denies tobacco use, alcohol intake: rarely            Past Medical History:     Past Medical History:   Diagnosis Date   • Bilateral breast cysts June 2019 r    right breast cysts; dense breast tissue bilaterally   • Cervical spinal stenosis 2015   • Liver enzyme elevation    • Migraines    • Osteoporosis        Patient Active Problem List   Diagnosis   • Hypothyroidism   • GERD (gastroesophageal reflux disease)   • Depression   • Chronic migraine without aura without status migrainosus, not intractable   • Major depressive disorder with single episode, in full remission ([address])   • Closed displaced fracture of right calcaneus   • Closed nondisplaced fracture of fourth metatarsal bone of right foot   • Cervicalgia   • Snores   • Feeling tired   • IIH (idiopathic intracranial hypertension)   • Other sleep apnea       Medications:      Current Outpatient Medications   Medication Sig Dispense Refill   • acetaZOLAMIDE (DIAMOX) 125 mg tablet 1 tab twice a day (second dose late afternoon/evening) for 7 days then increase to 2 tabs twice a day 120 tablet 2   • baclofen 10 mg tablet TAKE 1 TABLET(10 MG) BY MOUTH DAILY AT BEDTIME (Patient taking differently: Take 10 mg by mouth if needed) 10 tablet 0   • cyclobenzaprine (FLEXERIL) 5 mg tablet Take 1 tablet (5 mg total) by mouth daily at bedtime (Patient taking differently: Take 5 mg by mouth if needed) 30 tablet 3   • dexamethasone (DECADRON) 1 mg tablet Take 1 tablet (1 mg total) by mouth daily as needed (for migraine on day 2) 10 tablet 2   • divalproex sodium (DEPAKOTE ER) 250 mg 24 hr tablet TAKE 1 TABLET AT BEDTIME ON THE NIGHTS OF YOUR MIGRAINES 30 tablet 0   • Erenumab-aooe 140 MG/ML SOAJ Inject 140 mg under the skin every 30 (thirty) days 1 mL 11   • ketorolac (TORADOL) 10 mg tablet Take 1 tablet (10 mg total) by mouth daily as needed for severe pain Max 1/day, 3/week, 10/month.  Do not use with other OTC pain meds 10 tablet 0   • latanoprost (XALATAN) 0.005 % ophthalmic solution INSTILL 1 DROP BOTH EYES EVERY NIGHT AT BEDTIME     • levothyroxine 75 mcg tablet TAKE 1 TABLET(75 MCG) BY MOUTH DAILY EARLY MORNING 90 tablet 0   • omeprazole (PriLOSEC) 20 mg delayed release capsule TAKE 1 CAPSULE(20 MG) BY MOUTH DAILY 90 capsule 3   • ondansetron (ZOFRAN) 4 mg tablet Take 1 tablet (4 mg total) by mouth every 8 (eight) hours as needed for nausea or vomiting 20 tablet 3   • rizatriptan (MAXALT-MLT) 10 mg disintegrating tablet DISSOLVE 1 TABLET(10 MG) ON THE TONGUE 1 TIME AS NEEDED FOR MIGRAINE. MAY REPEAT IN 2 HOURS AS NEEDED. LIMIT OF 3 A WEEK OR 9 A MONTH 9 tablet 3   • SUMAtriptan Succinate 6 MG/0.5ML SOAJ INJECT ONCE FOR SEVERE HEADACHE. MAY REPEAT AFTER 2 HORUS. MAX 2 INJECTIONS PER WEEK 3 mL 6   • ibandronate (BONIVA) 150 MG tablet TAKE 1 TABLET(150 MG) BY MOUTH EVERY 30 DAYS (Patient not taking: Reported on 2023) 3 tablet 0   • meloxicam (MOBIC) 15 mg tablet TAKE 1 TABLET(15 MG) BY MOUTH DAILY (Patient not taking: Reported on 2023) 90 tablet 3   • predniSONE 10 mg tablet 3 tabs po bid x2 days, then 2 tabs po bid x2 days, then 1 tab bid x2 days, then 1 daily until done. (Patient not taking: Reported on 2023) 26 tablet 0     No current facility-administered medications for this visit. Allergies:    No Known Allergies    Family History:     Family History   Problem Relation Age of Onset   • Parkinsonism Father    • Dementia Father    • Hypertension Daughter    • Cervical cancer Maternal Grandmother    • Bipolar disorder Sister    • No Known Problems Mother        Social History:       Social History     Socioeconomic History   • Marital status:       Spouse name: Not on file   • Number of children: Not on file   • Years of education: Not on file   • Highest education level: Not on file   Occupational History   • Not on file   Tobacco Use   • Smoking status: Former     Types: Cigarettes     Quit date: 0     Years since quittin.5     Passive exposure: Past   • Smokeless tobacco: Never   Vaping Use   • Vaping Use: Never used   Substance and Sexual Activity   • Alcohol use: No   • Drug use: No   • Sexual activity: Not Currently   Other Topics Concern   • Not on file   Social History Narrative   • Not on file     Social Determinants of Health     Financial Resource Strain: Low Risk  (2023)    Overall Financial Resource Strain (CARDIA)    • Difficulty of Paying Living Expenses: Not hard at all   Food Insecurity: Not on file   Transportation Needs: No Transportation Needs (5/12/2023)    PRAPARE - Transportation    • Lack of Transportation (Medical): No    • Lack of Transportation (Non-Medical): No   Physical Activity: Not on file   Stress: Not on file   Social Connections: Not on file   Intimate Partner Violence: Not on file   Housing Stability: Not on file      I have reviewed the patient's medical, social and surgical history as well as medications in detail and updated the computerized patient record. Objective:     Vitals:    07/26/23 0730   BP: (!) 159/101   BP Location: Left arm   Patient Position: Sitting   Cuff Size: Standard   Pulse: 75   Temp: 97.5 °F (36.4 °C)   TempSrc: Tympanic   Weight: 59.9 kg (132 lb)   Height: 4' 11" (1.499 m)       CONSTITUTIONAL: Well developed, well nourished, well groomed. No dysmorphic features. Eyes:  EOM normal      Neck:  Normal ROM, neck supple. HEENT:  Normocephalic atraumatic. Chest:  Respirations regular and unlabored. Psychiatric:  Normal behavior and appropriate affect      MENTAL STATUS  Orientation: Alert and oriented x 3  Fund of knowledge: Intact. MOTOR (Upper and lower extremities)   Bulk/tone/abnormal movement: Normal muscle bulk and tone. COORDINATION   Station/Gait: Normal baseline gait.     Pertinent lab results:   5/13/2022:  TSH 0.517, CMP glucose 131, alk phos 195    8/26/20 CMP with chloride 110, alk-phos 168   CBC unremarkable  TSH normal      Imaging:   - noncontrast head CT 06/30/2018:  No acute pathology, Tiny cavum septum pellucidum, a normal variant.   - MRI Brain 2015 - normal--upon further review does have partially empty sella with slightly torturous optic nerves       Review of Systems:     Constitutional: Negative for appetite change and fever. HENT: Negative. Negative for hearing loss, tinnitus, trouble swallowing and voice change. Eyes: Negative.   Negative for photophobia and pain. Respiratory: Negative. Negative for shortness of breath. Cardiovascular: Negative. Negative for palpitations. Gastrointestinal: Negative. Negative for nausea and vomiting. Endocrine: Negative. Negative for cold intolerance. Genitourinary: Negative. Negative for dysuria, frequency and urgency. Musculoskeletal: Negative. Negative for myalgias and neck pain. Skin: Negative. Negative for rash. Neurological: Positive for headaches. Negative for dizziness, tremors, seizures, syncope, facial asymmetry, speech difficulty, weakness, light-headedness and numbness. Hematological: Negative. Does not bruise/bleed easily. Psychiatric/Behavioral: Negative. Negative for confusion, hallucinations and sleep disturbance. All other systems reviewed and are negative.     I personally reviewed and updated the ROS that was entered by the medical assistant        I have spent a total time of 58 minutes on 07/26/23 in caring for this patient including Diagnostic results, Prognosis, Risks and benefits of tx options, Instructions for management, Patient and family education, Risk factor reductions, Impressions, Counseling / Coordination of care, Documenting in the medical record, Reviewing / ordering tests, medicine, procedures   and Obtaining or reviewing history  .         Author:  Mehnaz Mujica PA-C 7/26/2023 3:14 PM

## 2023-07-26 NOTE — PROGRESS NOTES
Review of Systems   Constitutional: Negative for appetite change and fever. HENT: Negative. Negative for hearing loss, tinnitus, trouble swallowing and voice change. Eyes: Negative. Negative for photophobia and pain. Respiratory: Negative. Negative for shortness of breath. Cardiovascular: Negative. Negative for palpitations. Gastrointestinal: Negative. Negative for nausea and vomiting. Endocrine: Negative. Negative for cold intolerance. Genitourinary: Negative. Negative for dysuria, frequency and urgency. Musculoskeletal: Negative. Negative for myalgias and neck pain. Skin: Negative. Negative for rash. Neurological: Positive for headaches. Negative for dizziness, tremors, seizures, syncope, facial asymmetry, speech difficulty, weakness, light-headedness and numbness. Hematological: Negative. Does not bruise/bleed easily. Psychiatric/Behavioral: Negative. Negative for confusion, hallucinations and sleep disturbance. All other systems reviewed and are negative.

## 2023-08-03 ENCOUNTER — TELEPHONE (OUTPATIENT)
Dept: NEUROLOGY | Facility: CLINIC | Age: 58
End: 2023-08-03

## 2023-08-03 DIAGNOSIS — G93.2 IIH (IDIOPATHIC INTRACRANIAL HYPERTENSION): ICD-10-CM

## 2023-08-03 DIAGNOSIS — R06.83 SNORES: ICD-10-CM

## 2023-08-03 DIAGNOSIS — R53.83 FEELING TIRED: ICD-10-CM

## 2023-08-03 NOTE — TELEPHONE ENCOUNTER
Patient called she cant do the dosages ordered for Diamox by [facility]. It is costing her $80 for a month. Even with insurance  totaling for a month.  Patient said she cant take it unless there is another medication at a lower cost.

## 2023-08-04 NOTE — TELEPHONE ENCOUNTER
[facility] has better prices for it, depending on the pharmacy. From what I see can get at some pharmacies for approximately $20 a month. Can you help her with this?   Thanks

## 2023-08-10 ENCOUNTER — PATIENT MESSAGE (OUTPATIENT)
Dept: NEUROLOGY | Facility: CLINIC | Age: 58
End: 2023-08-10

## 2023-08-10 RX ORDER — ACETAZOLAMIDE 125 MG/1
TABLET ORAL
Qty: 120 TABLET | Refills: 2 | Status: SHIPPED | OUTPATIENT
Start: 2023-08-10

## 2023-08-10 NOTE — ADDENDUM NOTE
Addended by: Korey Morataya on: 8/10/2023 10:26 AM     Modules accepted: Orders
Addended by: Yunior Chou on: 8/10/2023 11:29 AM     Modules accepted: Orders
No

## 2023-08-10 NOTE — TELEPHONE ENCOUNTER
Coleman Tenorio  to [id number] [facility] Team 5 (supporting Kaylen Eubanks)    MARILIN DENT [facility]      8/10/23 10:02 AM  I called last week about a prescription that you just ordered for me at my last appointment. The price is just too much for me. I wanted to know if there was anything cheaper or just not get this prescription? Also do I still get blood work or only if I take that medication? Thanks     Aleks and advised pt of of the below. She verbalized clear understanding. Pt is agreeable to use [facility]. Requesting script be sent to [facility] in [address] [place] [address] ([phone]). Called [facility] in [address] [place] [address], spoke to Nawaf and states that they don't have pharmacy in that location, but the [facility] does. Pt made aware and agreeable to send her diamox script to [facility] in [facility]. Rx entered.  Pls review sign off    thanks

## 2023-08-17 ENCOUNTER — IOP CHECK (OUTPATIENT)
Dept: URBAN - METROPOLITAN AREA CLINIC 6 | Facility: CLINIC | Age: 58
End: 2023-08-17

## 2023-08-17 DIAGNOSIS — H40.243: ICD-10-CM

## 2023-08-17 DIAGNOSIS — H40.033: ICD-10-CM

## 2023-08-17 PROCEDURE — 92012 INTRM OPH EXAM EST PATIENT: CPT

## 2023-08-17 PROCEDURE — 92083 EXTENDED VISUAL FIELD XM: CPT

## 2023-08-17 ASSESSMENT — TONOMETRY
OD_IOP_MMHG: 17
OS_IOP_MMHG: 18

## 2023-08-17 ASSESSMENT — VISUAL ACUITY
OD_CC: 20/25-1
OU_CC: J1
OS_CC: 20/25-1

## 2023-08-21 ENCOUNTER — HOSPITAL ENCOUNTER (OUTPATIENT)
Dept: ULTRASOUND IMAGING | Facility: HOSPITAL | Age: 58
Discharge: HOME | End: 2023-08-21
Attending: STUDENT IN AN ORGANIZED HEALTH CARE EDUCATION/TRAINING PROGRAM
Payer: MEDICARE

## 2023-08-21 ENCOUNTER — TELEPHONE (OUTPATIENT)
Dept: SLEEP CENTER | Facility: CLINIC | Age: 58
End: 2023-08-21

## 2023-08-21 DIAGNOSIS — K66.8 PERITONEAL CYST: ICD-10-CM

## 2023-08-21 PROCEDURE — 76830 TRANSVAGINAL US NON-OB: CPT

## 2023-08-21 PROCEDURE — 76856 US EXAM PELVIC COMPLETE: CPT

## 2023-08-21 NOTE — TELEPHONE ENCOUNTER
----- Message from Rukhsana Real MD sent at 8/17/2023  9:04 PM EDT -----  approved  ----- Message -----  From: Kat Rm  Sent: 4/99/7654  10:13 AM EDT  To: Sleep Medicine [facility]ARDO Provider    This DIAGNOSTIC sleep study needs approval.     If approved please sign and return to clerical pool. If denied please include reasons why. Also provide alternative testing if warranted. Please sign and return to clerical pool.

## 2023-08-21 NOTE — TELEPHONE ENCOUNTER
----- Message from Meryle Alberta, MD sent at 8/17/2023  9:04 PM EDT -----  approved  ----- Message -----  From: Kat Rm  Sent: 3/26/1719  10:13 AM EDT  To: Sleep Medicine Gris Jansen Provider    This DIAGNOSTIC sleep study needs approval.     If approved please sign and return to clerical pool. If denied please include reasons why. Also provide alternative testing if warranted. Please sign and return to clerical pool.

## 2023-08-28 ENCOUNTER — TELEPHONE (OUTPATIENT)
Dept: OBGYN CLINIC | Facility: CLINIC | Age: 58
End: 2023-08-28

## 2023-08-28 NOTE — TELEPHONE ENCOUNTER
Placed call to patient, ultrasound results have not been read by the radiologist at this time. Patient aware we will reach out once the report is read and provider reviews.

## 2023-09-06 ENCOUNTER — LAB (OUTPATIENT)
Dept: LAB | Facility: CLINIC | Age: 58
End: 2023-09-06
Payer: MEDICARE

## 2023-09-06 DIAGNOSIS — G93.2 IIH (IDIOPATHIC INTRACRANIAL HYPERTENSION): ICD-10-CM

## 2023-09-06 DIAGNOSIS — R06.83 SNORES: ICD-10-CM

## 2023-09-06 DIAGNOSIS — R53.83 FEELING TIRED: ICD-10-CM

## 2023-09-06 LAB
ALBUMIN SERPL BCP-MCNC: 4.2 G/DL (ref 3.5–5)
ALP SERPL-CCNC: 153 U/L (ref 34–104)
ALT SERPL W P-5'-P-CCNC: 15 U/L (ref 7–52)
ANION GAP SERPL CALCULATED.3IONS-SCNC: 9 MMOL/L
AST SERPL W P-5'-P-CCNC: 19 U/L (ref 13–39)
BASOPHILS # BLD AUTO: 0.08 THOUSANDS/ÂΜL (ref 0–0.1)
BASOPHILS NFR BLD AUTO: 1 % (ref 0–1)
BILIRUB SERPL-MCNC: 0.49 MG/DL (ref 0.2–1)
BUN SERPL-MCNC: 19 MG/DL (ref 5–25)
CALCIUM SERPL-MCNC: 9 MG/DL (ref 8.4–10.2)
CHLORIDE SERPL-SCNC: 109 MMOL/L (ref 96–108)
CO2 SERPL-SCNC: 21 MMOL/L (ref 21–32)
CREAT SERPL-MCNC: 0.86 MG/DL (ref 0.6–1.3)
EOSINOPHIL # BLD AUTO: 0.28 THOUSAND/ÂΜL (ref 0–0.61)
EOSINOPHIL NFR BLD AUTO: 4 % (ref 0–6)
ERYTHROCYTE [DISTWIDTH] IN BLOOD BY AUTOMATED COUNT: 13.5 % (ref 11.6–15.1)
GFR SERPL CREATININE-BSD FRML MDRD: 74 ML/MIN/1.73SQ M
GLUCOSE P FAST SERPL-MCNC: 85 MG/DL (ref 65–99)
HCT VFR BLD AUTO: 44.3 % (ref 34.8–46.1)
HGB BLD-MCNC: 14.7 G/DL (ref 11.5–15.4)
IMM GRANULOCYTES # BLD AUTO: 0.02 THOUSAND/UL (ref 0–0.2)
IMM GRANULOCYTES NFR BLD AUTO: 0 % (ref 0–2)
LYMPHOCYTES # BLD AUTO: 2.9 THOUSANDS/ÂΜL (ref 0.6–4.47)
LYMPHOCYTES NFR BLD AUTO: 41 % (ref 14–44)
MCH RBC QN AUTO: 29.3 PG (ref 26.8–34.3)
MCHC RBC AUTO-ENTMCNC: 33.2 G/DL (ref 31.4–37.4)
MCV RBC AUTO: 88 FL (ref 82–98)
MONOCYTES # BLD AUTO: 0.56 THOUSAND/ÂΜL (ref 0.17–1.22)
MONOCYTES NFR BLD AUTO: 8 % (ref 4–12)
NEUTROPHILS # BLD AUTO: 3.23 THOUSANDS/ÂΜL (ref 1.85–7.62)
NEUTS SEG NFR BLD AUTO: 46 % (ref 43–75)
NRBC BLD AUTO-RTO: 0 /100 WBCS
PLATELET # BLD AUTO: 312 THOUSANDS/UL (ref 149–390)
PMV BLD AUTO: 10.4 FL (ref 8.9–12.7)
POTASSIUM SERPL-SCNC: 3.4 MMOL/L (ref 3.5–5.3)
PROT SERPL-MCNC: 6.8 G/DL (ref 6.4–8.4)
RBC # BLD AUTO: 5.02 MILLION/UL (ref 3.81–5.12)
SODIUM SERPL-SCNC: 139 MMOL/L (ref 135–147)
WBC # BLD AUTO: 7.07 THOUSAND/UL (ref 4.31–10.16)

## 2023-09-06 PROCEDURE — 36415 COLL VENOUS BLD VENIPUNCTURE: CPT

## 2023-09-06 PROCEDURE — 80053 COMPREHEN METABOLIC PANEL: CPT

## 2023-09-06 PROCEDURE — 85025 COMPLETE CBC W/AUTO DIFF WBC: CPT

## 2023-09-08 ENCOUNTER — TELEPHONE (OUTPATIENT)
Dept: NEUROLOGY | Facility: CLINIC | Age: 58
End: 2023-09-08

## 2023-09-08 NOTE — TELEPHONE ENCOUNTER
----- Message from Sara Tian PA-C sent at 9/6/2023  4:20 PM EDT -----  Please call the patient regarding her abnormal result. Please have her increase her oral potassium with green leafy vegetables, avocados, citrus fruits and bananas. Her level is only slightly low. This should help increase it. In addition, who is she following for her elevated alkaline phosphatase as this has been elevated for over 3 years? She needs to follow back with that person.   The level has improved but it is still 153

## 2023-09-08 NOTE — TELEPHONE ENCOUNTER
Called [phone] and left a detailed message on pt's answering machine of all of the below and for a call back if any questions or concerns.

## 2023-09-10 ENCOUNTER — APPOINTMENT (EMERGENCY)
Dept: CT IMAGING | Facility: HOSPITAL | Age: 58
End: 2023-09-10
Payer: MEDICARE

## 2023-09-10 ENCOUNTER — HOSPITAL ENCOUNTER (EMERGENCY)
Facility: HOSPITAL | Age: 58
Discharge: HOME | End: 2023-09-10
Attending: EMERGENCY MEDICINE
Payer: MEDICARE

## 2023-09-10 VITALS
RESPIRATION RATE: 16 BRPM | OXYGEN SATURATION: 95 % | TEMPERATURE: 98 F | HEART RATE: 69 BPM | DIASTOLIC BLOOD PRESSURE: 79 MMHG | SYSTOLIC BLOOD PRESSURE: 141 MMHG

## 2023-09-10 DIAGNOSIS — K29.70 GASTRITIS: Primary | ICD-10-CM

## 2023-09-10 DIAGNOSIS — R11.0 NAUSEA: ICD-10-CM

## 2023-09-10 LAB
ALBUMIN SERPL BCP-MCNC: 4.3 G/DL (ref 3.5–5)
ALP SERPL-CCNC: 144 U/L (ref 34–104)
ALT SERPL W P-5'-P-CCNC: 16 U/L (ref 7–52)
AMORPH URATE CRY URNS QL MICRO: ABNORMAL
ANION GAP SERPL CALCULATED.3IONS-SCNC: 7 MMOL/L
APTT PPP: 24 SECONDS (ref 23–37)
AST SERPL W P-5'-P-CCNC: 17 U/L (ref 13–39)
BACTERIA UR QL AUTO: ABNORMAL /HPF
BASOPHILS # BLD AUTO: 0.06 THOUSANDS/ÂΜL (ref 0–0.1)
BASOPHILS NFR BLD AUTO: 1 % (ref 0–1)
BILIRUB SERPL-MCNC: 0.25 MG/DL (ref 0.2–1)
BILIRUB UR QL STRIP: NEGATIVE
BUN SERPL-MCNC: 16 MG/DL (ref 5–25)
CALCIUM SERPL-MCNC: 9 MG/DL (ref 8.4–10.2)
CARDIAC TROPONIN I PNL SERPL HS: <2 NG/L
CARDIAC TROPONIN I PNL SERPL HS: <2 NG/L
CHLORIDE SERPL-SCNC: 112 MMOL/L (ref 96–108)
CLARITY UR: ABNORMAL
CO2 SERPL-SCNC: 20 MMOL/L (ref 21–32)
COLOR UR: ABNORMAL
CREAT SERPL-MCNC: 0.87 MG/DL (ref 0.6–1.3)
EOSINOPHIL # BLD AUTO: 0.32 THOUSAND/ÂΜL (ref 0–0.61)
EOSINOPHIL NFR BLD AUTO: 4 % (ref 0–6)
ERYTHROCYTE [DISTWIDTH] IN BLOOD BY AUTOMATED COUNT: 13.9 % (ref 11.6–15.1)
GFR SERPL CREATININE-BSD FRML MDRD: 73 ML/MIN/1.73SQ M
GLUCOSE SERPL-MCNC: 97 MG/DL (ref 65–140)
GLUCOSE UR STRIP-MCNC: NEGATIVE MG/DL
HCT VFR BLD AUTO: 42.2 % (ref 34.8–46.1)
HGB BLD-MCNC: 14 G/DL (ref 11.5–15.4)
HGB UR QL STRIP.AUTO: ABNORMAL
HOLD SPECIMEN: NORMAL
IMM GRANULOCYTES # BLD AUTO: 0.02 THOUSAND/UL (ref 0–0.2)
IMM GRANULOCYTES NFR BLD AUTO: 0 % (ref 0–2)
INR PPP: 1.06 (ref 0.84–1.19)
KETONES UR STRIP-MCNC: NEGATIVE MG/DL
LACTATE SERPL-SCNC: 0.7 MMOL/L (ref 0.5–2)
LEUKOCYTE ESTERASE UR QL STRIP: NEGATIVE
LIPASE SERPL-CCNC: 28 U/L (ref 11–82)
LYMPHOCYTES # BLD AUTO: 2.78 THOUSANDS/ÂΜL (ref 0.6–4.47)
LYMPHOCYTES NFR BLD AUTO: 37 % (ref 14–44)
MCH RBC QN AUTO: 29 PG (ref 26.8–34.3)
MCHC RBC AUTO-ENTMCNC: 33.2 G/DL (ref 31.4–37.4)
MCV RBC AUTO: 88 FL (ref 82–98)
MONOCYTES # BLD AUTO: 0.46 THOUSAND/ÂΜL (ref 0.17–1.22)
MONOCYTES NFR BLD AUTO: 6 % (ref 4–12)
NEUTROPHILS # BLD AUTO: 3.89 THOUSANDS/ÂΜL (ref 1.85–7.62)
NEUTS SEG NFR BLD AUTO: 52 % (ref 43–75)
NITRITE UR QL STRIP: NEGATIVE
NON-SQ EPI CELLS URNS QL MICRO: ABNORMAL /HPF
NRBC BLD AUTO-RTO: 0 /100 WBCS
PH UR STRIP.AUTO: 7 [PH]
PLATELET # BLD AUTO: 300 THOUSANDS/UL (ref 149–390)
PMV BLD AUTO: 9.5 FL (ref 8.9–12.7)
POTASSIUM SERPL-SCNC: 3.6 MMOL/L (ref 3.5–5.3)
PROT SERPL-MCNC: 6.9 G/DL (ref 6.4–8.4)
PROT UR STRIP-MCNC: NEGATIVE MG/DL
PROTHROMBIN TIME: 14.4 SECONDS (ref 11.6–14.5)
RBC # BLD AUTO: 4.82 MILLION/UL (ref 3.81–5.12)
RBC #/AREA URNS AUTO: ABNORMAL /HPF
SODIUM SERPL-SCNC: 139 MMOL/L (ref 135–147)
SP GR UR STRIP.AUTO: 1.02 (ref 1–1.03)
UROBILINOGEN UR STRIP-ACNC: <2 MG/DL
WBC # BLD AUTO: 7.53 THOUSAND/UL (ref 4.31–10.16)
WBC #/AREA URNS AUTO: ABNORMAL /HPF

## 2023-09-10 PROCEDURE — 85610 PROTHROMBIN TIME: CPT | Performed by: PHYSICIAN ASSISTANT

## 2023-09-10 PROCEDURE — G1004 CDSM NDSC: HCPCS

## 2023-09-10 PROCEDURE — 85730 THROMBOPLASTIN TIME PARTIAL: CPT | Performed by: PHYSICIAN ASSISTANT

## 2023-09-10 PROCEDURE — 80053 COMPREHEN METABOLIC PANEL: CPT | Performed by: EMERGENCY MEDICINE

## 2023-09-10 PROCEDURE — 93005 ELECTROCARDIOGRAM TRACING: CPT

## 2023-09-10 PROCEDURE — 74177 CT ABD & PELVIS W/CONTRAST: CPT

## 2023-09-10 PROCEDURE — 96374 THER/PROPH/DIAG INJ IV PUSH: CPT

## 2023-09-10 PROCEDURE — 85025 COMPLETE CBC W/AUTO DIFF WBC: CPT | Performed by: EMERGENCY MEDICINE

## 2023-09-10 PROCEDURE — 81001 URINALYSIS AUTO W/SCOPE: CPT | Performed by: EMERGENCY MEDICINE

## 2023-09-10 PROCEDURE — 99285 EMERGENCY DEPT VISIT HI MDM: CPT | Performed by: PHYSICIAN ASSISTANT

## 2023-09-10 PROCEDURE — 83605 ASSAY OF LACTIC ACID: CPT | Performed by: PHYSICIAN ASSISTANT

## 2023-09-10 PROCEDURE — 99284 EMERGENCY DEPT VISIT MOD MDM: CPT

## 2023-09-10 PROCEDURE — 36415 COLL VENOUS BLD VENIPUNCTURE: CPT | Performed by: EMERGENCY MEDICINE

## 2023-09-10 PROCEDURE — 83690 ASSAY OF LIPASE: CPT | Performed by: EMERGENCY MEDICINE

## 2023-09-10 PROCEDURE — 84484 ASSAY OF TROPONIN QUANT: CPT | Performed by: PHYSICIAN ASSISTANT

## 2023-09-10 PROCEDURE — 96375 TX/PRO/DX INJ NEW DRUG ADDON: CPT

## 2023-09-10 RX ORDER — FAMOTIDINE 10 MG/ML
20 INJECTION, SOLUTION INTRAVENOUS ONCE
Status: COMPLETED | OUTPATIENT
Start: 2023-09-10 | End: 2023-09-10

## 2023-09-10 RX ORDER — ONDANSETRON 4 MG/1
4 TABLET, FILM COATED ORAL EVERY 8 HOURS PRN
Qty: 6 TABLET | Refills: 0 | Status: SHIPPED | OUTPATIENT
Start: 2023-09-10 | End: 2023-09-12

## 2023-09-10 RX ORDER — MAGNESIUM HYDROXIDE/ALUMINUM HYDROXICE/SIMETHICONE 120; 1200; 1200 MG/30ML; MG/30ML; MG/30ML
30 SUSPENSION ORAL ONCE
Status: COMPLETED | OUTPATIENT
Start: 2023-09-10 | End: 2023-09-10

## 2023-09-10 RX ORDER — HYDROMORPHONE HCL/PF 1 MG/ML
0.5 SYRINGE (ML) INJECTION ONCE
Status: COMPLETED | OUTPATIENT
Start: 2023-09-10 | End: 2023-09-10

## 2023-09-10 RX ORDER — KETOROLAC TROMETHAMINE 30 MG/ML
15 INJECTION, SOLUTION INTRAMUSCULAR; INTRAVENOUS ONCE
Status: COMPLETED | OUTPATIENT
Start: 2023-09-10 | End: 2023-09-10

## 2023-09-10 RX ORDER — SUCRALFATE 1 G/1
1 TABLET ORAL 4 TIMES DAILY
Qty: 8 TABLET | Refills: 0 | Status: SHIPPED | OUTPATIENT
Start: 2023-09-10 | End: 2023-09-12

## 2023-09-10 RX ORDER — ONDANSETRON 2 MG/ML
4 INJECTION INTRAMUSCULAR; INTRAVENOUS ONCE
Status: COMPLETED | OUTPATIENT
Start: 2023-09-10 | End: 2023-09-10

## 2023-09-10 RX ADMIN — ONDANSETRON 4 MG: 2 INJECTION INTRAMUSCULAR; INTRAVENOUS at 20:59

## 2023-09-10 RX ADMIN — FAMOTIDINE 20 MG: 10 INJECTION INTRAVENOUS at 22:37

## 2023-09-10 RX ADMIN — HYDROMORPHONE HYDROCHLORIDE 0.5 MG: 1 INJECTION, SOLUTION INTRAMUSCULAR; INTRAVENOUS; SUBCUTANEOUS at 21:02

## 2023-09-10 RX ADMIN — KETOROLAC TROMETHAMINE 15 MG: 30 INJECTION, SOLUTION INTRAMUSCULAR at 21:00

## 2023-09-10 RX ADMIN — IOHEXOL 85 ML: 350 INJECTION, SOLUTION INTRAVENOUS at 21:29

## 2023-09-10 RX ADMIN — ALUMINUM HYDROXIDE, MAGNESIUM HYDROXIDE, AND DIMETHICONE 30 ML: 200; 20; 200 SUSPENSION ORAL at 22:36

## 2023-09-11 NOTE — ED PROVIDER NOTES
History  Chief Complaint   Patient presents with   • Abdominal Pain     Patient states she has sharp, burning RUQ pain that radiates to her back, has been present for 5 days and worsening over the past two days. +nausea     History provided by:  Patient   used: No    Abdominal Pain  Associated symptoms: nausea    Associated symptoms: no chest pain, no chills, no constipation, no cough, no diarrhea, no dysuria, no fever, no shortness of breath, no sore throat and no vomiting      Patient is a 50-year-old female with a history of migraines, osteoporosis, surgical history of  section and ovarian cyst surgery that presents emerged apartment with dull aching persistent intermittent now worsening right upper quad abdominal pain symptoms with radiation to upper back and abdominal region for 5 days. Patient has associated symptoms of nausea beginning with the current ED presentation of right upper quadrant abdominal pain. Patient did state that her right upper quad abdominal pain symptoms waxed and waned, however her current right upper abdominal pain at this time is worsening and she sought evaluation emerge apartment at this time. Patient dated that a couple hours prior to her right upper quadrant abdominal pain she had eaten dinner of pasta. Patient patient denies palliative factors or provocative factors of pressure to right upper quadrant of abdomen. Patient denies noneffective treatment. Patient states that she had recent medication change to Diamox 125 mg tablets start date 8/10/2023 for her migraines. Patient denies ears, chills, vomiting, diarrhea, constipation and urinary symptoms. Patient has recent fall recent trauma. Patient denies sick contacts recent travel. Patient denies new rash or skin changes. Patient denies chest pain and shortness of breath. Prior to Admission Medications   Prescriptions Last Dose Informant Patient Reported? Taking?    Erenumab-aooe 140 MG/ML SOAJ  Self No Yes   Sig: Inject 140 mg under the skin every 30 (thirty) days   SUMAtriptan Succinate 6 MG/0.5ML SOAJ  Self No Yes   Sig: INJECT ONCE FOR SEVERE HEADACHE. MAY REPEAT AFTER 2 HORUS. MAX 2 INJECTIONS PER WEEK   acetaZOLAMIDE (DIAMOX) 125 mg tablet 9/10/2023  No Yes   Si tab twice a day (second dose late afternoon/evening) for 7 days then increase to 2 tabs twice a day   baclofen 10 mg tablet Not Taking  No No   Sig: TAKE 1 TABLET(10 MG) BY MOUTH DAILY AT BEDTIME   Patient not taking: Reported on 9/10/2023   cyclobenzaprine (FLEXERIL) 5 mg tablet Not Taking Self No No   Sig: Take 1 tablet (5 mg total) by mouth daily at bedtime   Patient not taking: Reported on 9/10/2023   dexamethasone (DECADRON) 1 mg tablet  Self No Yes   Sig: Take 1 tablet (1 mg total) by mouth daily as needed (for migraine on day 2)   divalproex sodium (DEPAKOTE ER) 250 mg 24 hr tablet  Self No Yes   Sig: TAKE 1 TABLET AT BEDTIME ON THE NIGHTS OF YOUR MIGRAINES   ketorolac (TORADOL) 10 mg tablet  Self No Yes   Sig: Take 1 tablet (10 mg total) by mouth daily as needed for severe pain Max 1/day, 3/week, 10/month. Do not use with other OTC pain meds   latanoprost (XALATAN) 0.005 % ophthalmic solution 9/10/2023 Self Yes Yes   Sig: INSTILL 1 DROP BOTH EYES EVERY NIGHT AT BEDTIME   levothyroxine 75 mcg tablet 9/10/2023  No Yes   Sig: TAKE 1 TABLET(75 MCG) BY MOUTH DAILY EARLY MORNING   omeprazole (PriLOSEC) 20 mg delayed release capsule 9/10/2023 Self No Yes   Sig: TAKE 1 CAPSULE(20 MG) BY MOUTH DAILY   rizatriptan (MAXALT-MLT) 10 mg disintegrating tablet   No Yes   Sig: DISSOLVE 1 TABLET(10 MG) ON THE TONGUE 1 TIME AS NEEDED FOR MIGRAINE. MAY REPEAT IN 2 HOURS AS NEEDED.  LIMIT OF 3 A WEEK OR 9 A MONTH      Facility-Administered Medications: None       Past Medical History:   Diagnosis Date   • Bilateral breast cysts 2019 r    right breast cysts; dense breast tissue bilaterally   • Cervical spinal stenosis    • Liver enzyme elevation    • Migraines    • Osteoporosis        Past Surgical History:   Procedure Laterality Date   •  SECTION      two   • OVARIAN CYST SURGERY         Family History   Problem Relation Age of Onset   • Parkinsonism Father    • Dementia Father    • Hypertension Daughter    • Cervical cancer Maternal Grandmother    • Bipolar disorder Sister    • No Known Problems Mother      I have reviewed and agree with the history as documented. E-Cigarette/Vaping   • E-Cigarette Use Never User      E-Cigarette/Vaping Substances   • Nicotine No    • THC No    • CBD No    • Flavoring No    • Other No    • Unknown No      Social History     Tobacco Use   • Smoking status: Former     Types: Cigarettes     Quit date:      Years since quittin.7     Passive exposure: Past   • Smokeless tobacco: Never   Vaping Use   • Vaping Use: Never used   Substance Use Topics   • Alcohol use: No   • Drug use: No       Review of Systems   Constitutional: Negative for activity change, appetite change, chills and fever. HENT: Negative for congestion, postnasal drip, rhinorrhea, sinus pressure, sinus pain, sore throat and tinnitus. Eyes: Negative for photophobia and visual disturbance. Respiratory: Negative for cough, chest tightness and shortness of breath. Cardiovascular: Negative for chest pain and palpitations. Gastrointestinal: Positive for abdominal pain and nausea. Negative for constipation, diarrhea and vomiting. Genitourinary: Negative for difficulty urinating, dysuria, flank pain, frequency and urgency. Musculoskeletal: Negative for back pain, gait problem, neck pain and neck stiffness. Skin: Negative for pallor and rash. Allergic/Immunologic: Negative for environmental allergies and food allergies. Neurological: Negative for dizziness, weakness, numbness and headaches. Psychiatric/Behavioral: Negative for confusion. All other systems reviewed and are negative.       Physical Exam  Physical Exam  Vitals and nursing note reviewed. Constitutional:       General: She is awake. Appearance: Normal appearance. She is well-developed and normal weight. She is not ill-appearing, toxic-appearing or diaphoretic. Comments: /95 (BP Location: Right arm)   Pulse 80   Temp 98 °F (36.7 °C) (Oral)   Resp 18   SpO2 99%      HENT:      Head: Normocephalic and atraumatic. Jaw: There is normal jaw occlusion. Right Ear: Hearing, tympanic membrane and external ear normal. No decreased hearing noted. No drainage, swelling or tenderness. No mastoid tenderness. Left Ear: Hearing, tympanic membrane and external ear normal. No decreased hearing noted. No drainage, swelling or tenderness. No mastoid tenderness. Nose: Nose normal.      Mouth/Throat:      Lips: Pink. Mouth: Mucous membranes are moist.      Pharynx: Oropharynx is clear. Uvula midline. Eyes:      General: Lids are normal. Vision grossly intact. Gaze aligned appropriately. Right eye: No discharge. Left eye: No discharge. Extraocular Movements: Extraocular movements intact. Conjunctiva/sclera: Conjunctivae normal.      Pupils: Pupils are equal, round, and reactive to light. Neck:      Vascular: No JVD. Trachea: Trachea and phonation normal. No tracheal tenderness or tracheal deviation. Cardiovascular:      Rate and Rhythm: Normal rate and regular rhythm. Pulses: Normal pulses. Radial pulses are 2+ on the right side and 2+ on the left side. Posterior tibial pulses are 2+ on the right side and 2+ on the left side. Heart sounds: Normal heart sounds. Pulmonary:      Effort: Pulmonary effort is normal.      Breath sounds: Normal breath sounds and air entry. No stridor. No decreased breath sounds, wheezing, rhonchi or rales. Chest:      Chest wall: No tenderness. Abdominal:      General: Abdomen is flat. Bowel sounds are normal. There is no distension. Palpations: Abdomen is soft. Abdomen is not rigid. Tenderness: There is abdominal tenderness in the right upper quadrant. There is no right CVA tenderness, left CVA tenderness, guarding or rebound. Positive signs include Zepeda's sign. Musculoskeletal:         General: Normal range of motion. Cervical back: Full passive range of motion without pain, normal range of motion and neck supple. No rigidity. No spinous process tenderness or muscular tenderness. Normal range of motion. Feet:      Right foot:      Toenail Condition: Right toenails are normal.      Left foot:      Toenail Condition: Left toenails are normal.   Lymphadenopathy:      Head:      Right side of head: No submental, submandibular, tonsillar, preauricular, posterior auricular or occipital adenopathy. Left side of head: No submental, submandibular, tonsillar, preauricular, posterior auricular or occipital adenopathy. Cervical: No cervical adenopathy. Right cervical: No superficial, deep or posterior cervical adenopathy. Left cervical: No superficial, deep or posterior cervical adenopathy. Skin:     General: Skin is warm. Capillary Refill: Capillary refill takes less than 2 seconds. Findings: No rash. Neurological:      General: No focal deficit present. Mental Status: She is alert and oriented to person, place, and time. Mental status is at baseline. GCS: GCS eye subscore is 4. GCS verbal subscore is 5. GCS motor subscore is 6. Sensory: No sensory deficit. Deep Tendon Reflexes: Reflexes are normal and symmetric. Reflex Scores:       Patellar reflexes are 2+ on the right side and 2+ on the left side. Psychiatric:         Attention and Perception: Attention normal.         Mood and Affect: Mood normal.         Speech: Speech normal.         Behavior: Behavior normal. Behavior is cooperative. Thought Content:  Thought content normal.         Judgment: Judgment normal. Vital Signs  ED Triage Vitals   Temperature Pulse Respirations Blood Pressure SpO2   09/10/23 2013 09/10/23 2013 09/10/23 2013 09/10/23 2013 09/10/23 2013   98 °F (36.7 °C) 80 18 156/95 99 %      Temp Source Heart Rate Source Patient Position - Orthostatic VS BP Location FiO2 (%)   09/10/23 2013 09/10/23 2013 09/10/23 2013 09/10/23 2013 --   Oral Monitor Sitting Right arm       Pain Score       09/10/23 2100       10 - Worst Possible Pain           Vitals:    09/10/23 2013 09/10/23 2105   BP: 156/95 141/79   Pulse: 80 69   Patient Position - Orthostatic VS: Sitting          Visual Acuity      ED Medications  Medications   HYDROmorphone (DILAUDID) injection 0.5 mg (0.5 mg Intravenous Given 9/10/23 2102)   ketorolac (TORADOL) injection 15 mg (15 mg Intravenous Given 9/10/23 2100)   ondansetron (ZOFRAN) injection 4 mg (4 mg Intravenous Given 9/10/23 2059)   iohexol (OMNIPAQUE) 350 MG/ML injection (MULTI-DOSE) 85 mL (85 mL Intravenous Given 9/10/23 2129)   Famotidine (PF) (PEPCID) injection 20 mg (20 mg Intravenous Given 9/10/23 2237)   aluminum-magnesium hydroxide-simethicone (MAALOX) oral suspension 30 mL (30 mL Oral Given 9/10/23 2236)       Diagnostic Studies  Results Reviewed     Procedure Component Value Units Date/Time    HS Troponin I 2hr [[id number]] Collected: 09/10/23 2241    Lab Status: Final result Specimen: Blood from Arm, Left Updated: 09/10/23 2313     hs TnI 2hr <2 ng/L      Delta 2hr hsTnI --    [place] draw [[id number]] Collected: 09/10/23 2047    Lab Status: Final result Specimen: Blood from Arm, Left Updated: 09/10/23 2202    Narrative: The following orders were created for panel order [place] draw.   Procedure                               Abnormality         Status                     ---------                               -----------         ------                     Andrés Zimmer Top on [facility][[id number]]                           Final result               Gold top on [facility][[id number]] Final result               Green / Black tube on [facility][[id number]]                       Final result                 Please view results for these tests on the individual orders. Lactic acid, plasma (w/reflex if result > 2.0) [[id number]]  (Normal) Collected: 09/10/23 2053    Lab Status: Final result Specimen: Blood from Arm, Left Updated: 09/10/23 2133     LACTIC ACID 0.7 mmol/L     Narrative:      Result may be elevated if tourniquet was used during collection.     HS Troponin 0hr (reflex protocol) [[id number]]  (Normal) Collected: 09/10/23 2047    Lab Status: Final result Specimen: Blood from Arm, Left Updated: 09/10/23 2118     hs TnI 0hr <2 ng/L     Urine Microscopic [[id number]]  (Abnormal) Collected: 09/10/23 2053    Lab Status: Final result Specimen: Urine, Clean Catch Updated: 09/10/23 2116     RBC, UA 2-4 /hpf      WBC, UA 1-2 /hpf      Epithelial Cells None Seen /hpf      Bacteria, UA Occasional /hpf      Amorphous Crystals, UA Occasional    Comprehensive metabolic panel [[id number]]  (Abnormal) Collected: 09/10/23 2047    Lab Status: Final result Specimen: Blood from Arm, Left Updated: 09/10/23 2110     Sodium 139 mmol/L      Potassium 3.6 mmol/L      Chloride 112 mmol/L      CO2 20 mmol/L      ANION GAP 7 mmol/L      BUN 16 mg/dL      Creatinine 0.87 mg/dL      Glucose 97 mg/dL      Calcium 9.0 mg/dL      AST 17 U/L      ALT 16 U/L      Alkaline Phosphatase 144 U/L      Total Protein 6.9 g/dL      Albumin 4.3 g/dL      Total Bilirubin 0.25 mg/dL      eGFR 73 ml/min/1.73sq m     Narrative:      [place] guidelines for Chronic Kidney Disease (CKD):   •  Stage 1 with normal or high GFR (GFR > 90 mL/min/1.73 square meters)  •  Stage 2 Mild CKD (GFR = 60-89 mL/min/1.73 square meters)  •  Stage 3A Moderate CKD (GFR = 45-59 mL/min/1.73 square meters)  •  Stage 3B Moderate CKD (GFR = 30-44 mL/min/1.73 square meters)  •  Stage 4 Severe CKD (GFR = 15-29 mL/min/1.73 square meters)  •  Stage 5 End Stage CKD (GFR <15 mL/min/1.73 square meters)  Note: GFR calculation is accurate only with a steady state creatinine    Lipase [[id number]]  (Normal) Collected: 09/10/23 2047    Lab Status: Final result Specimen: Blood from Arm, Left Updated: 09/10/23 2110     Lipase 28 u/L     Protime-INR [[id number]]  (Normal) Collected: 09/10/23 2047    Lab Status: Final result Specimen: Blood from Arm, Left Updated: 09/10/23 2107     Protime 14.4 seconds      INR 1.06    APTT [[id number]]  (Normal) Collected: 09/10/23 2047    Lab Status: Final result Specimen: Blood from Arm, Left Updated: 09/10/23 2107     PTT 24 seconds     UA w Reflex to Microscopic w Reflex to Culture [[id number]]  (Abnormal) Collected: 09/10/23 2053    Lab Status: Final result Specimen: Urine, Clean Catch Updated: 09/10/23 2102     Color, UA Light Yellow     Clarity, UA Turbid     Specific Gravity, UA 1.018     pH, UA 7.0     Leukocytes, UA Negative     Nitrite, UA Negative     Protein, UA Negative mg/dl      Glucose, UA Negative mg/dl      Ketones, UA Negative mg/dl      Urobilinogen, UA <2.0 mg/dl      Bilirubin, UA Negative     Occult Blood, UA Small    CBC and differential [[id number]] Collected: 09/10/23 2047    Lab Status: Final result Specimen: Blood from Arm, Left Updated: 09/10/23 2056     WBC 7.53 Thousand/uL      RBC 4.82 Million/uL      Hemoglobin 14.0 g/dL      Hematocrit 42.2 %      MCV 88 fL      MCH 29.0 pg      MCHC 33.2 g/dL      RDW 13.9 %      MPV 9.5 fL      Platelets 411 Thousands/uL      nRBC 0 /100 WBCs      Neutrophils Relative 52 %      Immat GRANS % 0 %      Lymphocytes Relative 37 %      Monocytes Relative 6 %      Eosinophils Relative 4 %      Basophils Relative 1 %      Neutrophils Absolute 3.89 Thousands/µL      Immature Grans Absolute 0.02 Thousand/uL      Lymphocytes Absolute 2.78 Thousands/µL      Monocytes Absolute 0.46 Thousand/µL      Eosinophils Absolute 0.32 Thousand/µL Basophils Absolute 0.06 Thousands/µL                  CT abdomen pelvis with contrast   Final Result by Olvin Villafuerte DO (09/10 2204)      No CT evidence of acute pathology. Workstation performed: [id number]                    Procedures  ECG 12 Lead Documentation Only    Date/Time: 9/10/2023 8:58 PM    Performed by: Manuel Calle PA-C  Authorized by: Manuel Calle PA-C    Indications / Diagnosis:  Weakness  ECG reviewed by me, the ED Provider: yes    Patient location:  ED  Previous ECG:     Previous ECG:  Compared to current    Comparison ECG info: When compared with ECG of November 30, 2020 no significant changes are noted. Similarity:  No change    Comparison to cardiac monitor: Yes    Interpretation:     Interpretation: normal    Rate:     ECG rate:  73    ECG rate assessment: normal    Rhythm:     Rhythm: sinus rhythm    Ectopy:     Ectopy: none    QRS:     QRS axis:  Normal    QRS intervals:  Normal  Conduction:     Conduction: normal    ST segments:     ST segments:  Normal  T waves:     T waves: normal               ED Course                               SBIRT 22yo+    Flowsheet Row Most Recent Value   Initial Alcohol Screen: US AUDIT-C     1. How often do you have a drink containing alcohol? 0 Filed at: 09/10/2023 2103   2. How many drinks containing alcohol do you have on a typical day you are drinking? 0 Filed at: 09/10/2023 2103   3a. Male UNDER 65: How often do you have five or more drinks on one occasion? 0 Filed at: 09/10/2023 2103   3b. FEMALE Any Age, or MALE 65+: How often do you have 4 or more drinks on one occassion? 0 Filed at: 09/10/2023 2103   Audit-C Score 0 Filed at: 09/10/2023 2103   NORA: How many times in the past year have you. .. Used an illegal drug or used a prescription medication for non-medical reasons?  Never Filed at: 09/10/2023 2103                    Medical Decision Making    Patient is a 41-year-old female with a history of migraines, osteoporosis, surgical history of  section and ovarian cyst surgery that presents emerged apartment with dull aching persistent intermittent now worsening right upper quad abdominal pain symptoms with radiation to upper back and abdominal region for 5 days. Patient hemodynamically stable and afebrile  No sirs  Clinical blood labs unremarkable electrolytes, normal kidney function  No elevated transaminases, slight elevation of alk phos, no elevation of T. bili, normal PT/INR, doubt hepatobiliary pathology at this time  No leukocytosis, no banding  Urinalysis unremarkable, patient has no dysuria  CT abdomen pelvis, no acute abdominal pelvic pathology  Delivered to modal pain control in the emergency department; patient demonstrates decrease in presenting abdominal pain ED symptomatology status post medication delivery  Ddx likely and not limited to biliary colic, gastritis, musculoskeletal pain, esophageal spasm, pancreatitis  Will treat for gastritis; patient was instructed to follow-up with GI for possible EGD study; patient had reported that she had gone out to eat this past week, 2 times, "I do not normally eat this way."    Prescribed Zofran and Carafate and counseled patient medication administration and side effects  Follow-up with PCP  Follow up with emergency department if symptoms persist or exacerbate  Patient demonstrates verbal understanding of all clinical laboratory and imaging findings, discharge instructions, follow-up, and verbally agrees with current treatment plan with teach back    *Due to voice recognition software, sound alike and misspelled words may be contained in the documentation*      Amount and/or Complexity of Data Reviewed  Labs: ordered. Radiology: ordered. Risk  OTC drugs. Prescription drug management.           Disposition  Final diagnoses:   Gastritis   Nausea     Time reflects when diagnosis was documented in both MDM as applicable and the Disposition within this note     Time User Action Codes Description Comment    9/10/2023 10:48 PM Odette Westfall Add [K29.70] Gastritis     9/10/2023 10:48 PM Odette Westfall Add [R11.0] Nausea       ED Disposition     ED Disposition   Discharge    Condition   Stable    Date/Time   Sun Sep 10, 2023 10:48 PM    Comment   Gm Herbert discharge to home/self care. Follow-up Information     Follow up With Specialties Details Why Contact Info Additional [address] [facility]   [address]   [address] [address] [place] [address] [address]  [address] Emergency Department Emergency Medicine   [address] Emergency Department, [place], [place], [address] [facility]) Gastroenterology   [address] [facility]  [place] [address] [place] [id number]  [address] [facility]), [address] [facility], [address] [place], [place], [place]          Discharge Medication List as of 9/10/2023 10:50 PM      START taking these medications    Details   ondansetron (ZOFRAN) 4 mg tablet Take 1 tablet (4 mg total) by mouth every 8 (eight) hours as needed for nausea or vomiting for up to 2 days, Starting Sun 9/10/2023, Until Tue 9/12/2023 at 2359, Normal      sucralfate (CARAFATE) 1 g tablet Take 1 tablet (1 g total) by mouth 4 (four) times a day for 2 days, Starting Sun 9/10/2023, Until Tue 9/12/2023, Normal         CONTINUE these medications which have NOT CHANGED    Details   acetaZOLAMIDE (DIAMOX) 125 mg tablet 1 tab twice a day (second dose late afternoon/evening) for 7 days then increase to 2 tabs twice a day, Normal      dexamethasone (DECADRON) 1 mg tablet Take 1 tablet (1 mg total) by mouth daily as needed (for migraine on day 2), Starting Wed 2/8/2023, Normal      divalproex sodium (DEPAKOTE ER) 250 mg 24 hr tablet TAKE 1 TABLET AT BEDTIME ON THE NIGHTS OF YOUR MIGRAINES, Normal      Erenumab-aooe 140 MG/ML SOAJ Inject 140 mg under the skin every 30 (thirty) days, Starting Tue 6/14/2022, Normal      ketorolac (TORADOL) 10 mg tablet Take 1 tablet (10 mg total) by mouth daily as needed for severe pain Max 1/day, 3/week, 10/month. Do not use with other OTC pain meds, Starting Wed 2/23/2022, Normal      latanoprost (XALATAN) 0.005 % ophthalmic solution INSTILL 1 DROP BOTH EYES EVERY NIGHT AT BEDTIME, Historical Med      levothyroxine 75 mcg tablet TAKE 1 TABLET(75 MCG) BY MOUTH DAILY EARLY MORNING, Normal      omeprazole (PriLOSEC) 20 mg delayed release capsule TAKE 1 CAPSULE(20 MG) BY MOUTH DAILY, Normal      rizatriptan (MAXALT-MLT) 10 mg disintegrating tablet DISSOLVE 1 TABLET(10 MG) ON THE TONGUE 1 TIME AS NEEDED FOR MIGRAINE. MAY REPEAT IN 2 HOURS AS NEEDED. LIMIT OF 3 A WEEK OR 9 A MONTH, Normal      SUMAtriptan Succinate 6 MG/0.5ML SOAJ INJECT ONCE FOR SEVERE HEADACHE. MAY REPEAT AFTER 2 HORUS. MAX 2 INJECTIONS PER WEEK, Normal      baclofen 10 mg tablet TAKE 1 TABLET(10 MG) BY MOUTH DAILY AT BEDTIME, Normal      cyclobenzaprine (FLEXERIL) 5 mg tablet Take 1 tablet (5 mg total) by mouth daily at bedtime, Starting Wed 2/8/2023, Normal             No discharge procedures on file.     PDMP Review     None          ED Provider  Electronically Signed by           Juliet Weiss PA-C  09/11/23 1574

## 2023-09-12 LAB
ATRIAL RATE: 73 BPM
P AXIS: 65 DEGREES
PR INTERVAL: 164 MS
QRS AXIS: 16 DEGREES
QRSD INTERVAL: 80 MS
QT INTERVAL: 398 MS
QTC INTERVAL: 438 MS
T WAVE AXIS: 79 DEGREES
VENTRICULAR RATE: 73 BPM

## 2023-09-12 PROCEDURE — 93010 ELECTROCARDIOGRAM REPORT: CPT | Performed by: INTERNAL MEDICINE

## 2023-10-29 DIAGNOSIS — E03.9 HYPOTHYROIDISM, UNSPECIFIED TYPE: ICD-10-CM

## 2023-10-30 ENCOUNTER — TELEPHONE (OUTPATIENT)
Dept: NEUROLOGY | Facility: CLINIC | Age: 58
End: 2023-10-30

## 2023-10-30 ENCOUNTER — OFFICE VISIT (OUTPATIENT)
Dept: NEUROLOGY | Facility: CLINIC | Age: 58
End: 2023-10-30
Payer: MEDICARE

## 2023-10-30 VITALS
HEART RATE: 83 BPM | DIASTOLIC BLOOD PRESSURE: 82 MMHG | WEIGHT: 134.2 LBS | SYSTOLIC BLOOD PRESSURE: 138 MMHG | HEIGHT: 59 IN | BODY MASS INDEX: 27.05 KG/M2

## 2023-10-30 DIAGNOSIS — G47.39 OTHER SLEEP APNEA: Primary | ICD-10-CM

## 2023-10-30 DIAGNOSIS — G43.019 INTRACTABLE MIGRAINE WITHOUT AURA AND WITHOUT STATUS MIGRAINOSUS: ICD-10-CM

## 2023-10-30 DIAGNOSIS — G43.709 CHRONIC MIGRAINE WITHOUT AURA WITHOUT STATUS MIGRAINOSUS, NOT INTRACTABLE: ICD-10-CM

## 2023-10-30 PROCEDURE — 99215 OFFICE O/P EST HI 40 MIN: CPT | Performed by: PHYSICIAN ASSISTANT

## 2023-10-30 PROCEDURE — 96372 THER/PROPH/DIAG INJ SC/IM: CPT | Performed by: PHYSICIAN ASSISTANT

## 2023-10-30 RX ORDER — KETOROLAC TROMETHAMINE 10 MG/1
10 TABLET, FILM COATED ORAL DAILY PRN
Qty: 10 TABLET | Refills: 0 | Status: SHIPPED | OUTPATIENT
Start: 2023-10-30

## 2023-10-30 RX ORDER — LEVOTHYROXINE SODIUM 0.07 MG/1
TABLET ORAL
Qty: 90 TABLET | Refills: 0 | Status: SHIPPED | OUTPATIENT
Start: 2023-10-30

## 2023-10-30 RX ORDER — DIVALPROEX SODIUM 250 MG/1
250 TABLET, EXTENDED RELEASE ORAL
Qty: 30 TABLET | Refills: 6 | Status: SHIPPED | OUTPATIENT
Start: 2023-10-30

## 2023-10-30 RX ORDER — KETOROLAC TROMETHAMINE 30 MG/ML
60 INJECTION, SOLUTION INTRAMUSCULAR; INTRAVENOUS ONCE
Status: COMPLETED | OUTPATIENT
Start: 2023-10-30 | End: 2023-10-30

## 2023-10-30 RX ORDER — RIZATRIPTAN BENZOATE 10 MG/1
10 TABLET, ORALLY DISINTEGRATING ORAL AS NEEDED
Qty: 9 TABLET | Refills: 6 | Status: SHIPPED | OUTPATIENT
Start: 2023-10-30

## 2023-10-30 RX ADMIN — KETOROLAC TROMETHAMINE 60 MG: 30 INJECTION, SOLUTION INTRAMUSCULAR; INTRAVENOUS at 08:55

## 2023-10-30 NOTE — PROGRESS NOTES
Review of Systems   Constitutional:  Negative for appetite change, fatigue and fever. HENT: Negative. Negative for hearing loss, tinnitus, trouble swallowing and voice change. Eyes:  Positive for photophobia. Negative for pain and visual disturbance. Respiratory: Negative. Negative for shortness of breath. Cardiovascular: Negative. Negative for palpitations. Gastrointestinal:  Positive for vomiting. Negative for nausea. Endocrine: Negative. Negative for cold intolerance. Genitourinary: Negative. Negative for dysuria, frequency and urgency. Musculoskeletal:  Negative for back pain, gait problem, myalgias and neck pain. Skin: Negative. Negative for rash. Allergic/Immunologic: Negative. Neurological:  Positive for headaches. Negative for dizziness, tremors, seizures, syncope, facial asymmetry, speech difficulty, weakness, light-headedness and numbness. Hematological: Negative. Does not bruise/bleed easily. Psychiatric/Behavioral: Negative. Negative for confusion, hallucinations and sleep disturbance.

## 2023-10-30 NOTE — TELEPHONE ENCOUNTER
Please auth for 200 units of botox    Botox 200 UNITS  Qty. 1  DX: G43.709, Chronic Migraine.   Sig: Inject up to 200 UNITS I.M into the various sites in the head and neck once every three months for one year with  Refills: 3    I authorize this order to be used as a verbal/written order for now and future use

## 2023-10-30 NOTE — ASSESSMENT & PLAN NOTE
Headache/migraine treatment:   Abortive medications (for immediate treatment of a headache): It is ok to take ibuprofen, acetaminophen or naproxen (Advil, Tylenol,  Aleve, Excedrin) if they help your headaches you should limit these to No more than 3 times a week to avoid medication overuse/rebound headaches. Continue sumatriptan injections at onset of migraine. If this is effective, this is first choice    Alternative is rizatriptan MLT which dissolves on your tongue. May repeat in 2 hours. Limit of 3 a week or 9 a month. IF this fails you can use the injection if needed    For your nausea/vomiting use Zofran as needed. On nights of your severe headaches, or if hasn't fully gone away with the rescue medications, take Depakote 250 mg at bedtime. If you wake up with a migraines the next day, take your rescue medication and take Dexamethasone 1 mg in the morning and then the Depakote at bedtime        Prescription preventive medications for headaches/migraines   (to take every day to help prevent headaches - not to take at the time of headache):  [x] start depakote 250 mg at bedtime  [x] Cyclobenzaprine 5 mg at bedtime as needed    [x] Continue Aimovig every 30 days     Keep out of direct sunlight. Prior to administration, allow to come to room temperature for 30 minutes. Do not warm using a heat source (eg, microwave or hot water). Do not shake. Administer in abdomen (avoiding 2 inches around the navel), thigh, upper arm, or buttocks avoiding areas of skin that are tender, bruised, red or hard. Deliver entire contents of single-use prefilled pen or syringe. [x] we will try to get approval for the botox 200 units every 91 days        *Typically these types of medications take time untill you see the benefit, although some may see improvement in days, often it may take weeks, especially if the medication is being titrated up to a beneficial level.  Please contact us if there are any concerns or questions regarding the medication.

## 2023-10-30 NOTE — PATIENT INSTRUCTIONS
Headache/migraine treatment:   Abortive medications (for immediate treatment of a headache): It is ok to take ibuprofen, acetaminophen or naproxen (Advil, Tylenol,  Aleve, Excedrin) if they help your headaches you should limit these to No more than 3 times a week to avoid medication overuse/rebound headaches. Continue sumatriptan injections at onset of migraine. If this is effective, this is first choice    Alternative is rizatriptan MLT which dissolves on your tongue. May repeat in 2 hours. Limit of 3 a week or 9 a month. IF this fails you can use the injection if needed    For your nausea/vomiting use Zofran as needed. On nights of your severe headaches, or if hasn't fully gone away with the rescue medications, take Depakote 250 mg at bedtime. If you wake up with a migraines the next day, take your rescue medication and take Dexamethasone 1 mg in the morning and then the Depakote at bedtime        Prescription preventive medications for headaches/migraines   (to take every day to help prevent headaches - not to take at the time of headache):  [x] start depakote 250 mg at bedtime  [x] Cyclobenzaprine 5 mg at bedtime as needed    [x] Continue Aimovig every 30 days     Keep out of direct sunlight. Prior to administration, allow to come to room temperature for 30 minutes. Do not warm using a heat source (eg, microwave or hot water). Do not shake. Administer in abdomen (avoiding 2 inches around the navel), thigh, upper arm, or buttocks avoiding areas of skin that are tender, bruised, red or hard. Deliver entire contents of single-use prefilled pen or syringe. [x] we will try to get approval for the botox 200 units every 91 days        *Typically these types of medications take time untill you see the benefit, although some may see improvement in days, often it may take weeks, especially if the medication is being titrated up to a beneficial level.  Please contact us if there are any concerns or questions regarding the medication. See sleep medicine as scheduled     Lifestyle Recommendations:  [x] SLEEP - Maintain a regular sleep schedule: Adults need at least 7-8 hours of uninterrupted a night. Maintain good sleep hygiene:  Going to bed and waking up at consistent times, avoiding excessive daytime naps, avoiding caffeinated beverages in the evening, avoid excessive stimulation in the evening and generally using bed primarily for sleeping. One hour before bedtime would recommend turning lights down lower, decreasing your activity (may read quietly, listen to music at a low volume). When you get into bed, should eliminate all technology (no texting, emailing, playing with your phone, iPad or tablet in bed). [x] HYDRATION - Maintain good hydration. Drink  2L of fluid a day (4 typical small water bottles)  [x] DIET - Maintain good nutrition. In particular don't skip meals and try and eat healthy balanced meals regularly. [x] TRIGGERS - Look for other triggers and avoid them: Limit caffeine to 1-2 cups a day or less. Avoid dietary triggers that you have noticed bring on your headaches (this could include aged cheese, peanuts, MSG, aspartame and nitrates). [x] EXERCISE - physical exercise as we all know is good for you in many ways, and not only is good for your heart, but also is beneficial for your mental health, cognitive health and  chronic pain/headaches. I would encourage at the least 5 days of physical exercise weekly for at least 30 minutes. Education and Follow-up  [x] Please call with any questions or concerns. Of course if any new concerning symptoms go to the emergency department.   [x] Follow up in 4 months with Barbi Josue or Dr Alejandro Horvath

## 2023-10-30 NOTE — TELEPHONE ENCOUNTER
Could you please discuss this with patient to see if she is agreeable and she has met her deductible etc.  Thanks

## 2023-10-30 NOTE — TELEPHONE ENCOUNTER
After checking pt reg. Pt has medicare A&B with [employer] that does not require authorization and the use of stock . Pt OFV complete, all documentation requirements met for the purpose of Botox, all subjected to insurance medically necessity requirements. Pt may be scheduled.

## 2023-10-30 NOTE — PROGRESS NOTES
[facility] Consult  PATIENT:  Susana Aj  MRN:  [id number]  :  1965  DATE OF SERVICE:  10/30/2023  REFERRED BY: No ref. provider found  PMD: Jameson Ghotra DO      Assessment/Plan:      Susana Aj is a very pleasant 60 y.o. female with a past medical history that includes hypothyroidism, migraines, GERD, depression, Cervical spinal stenosis, chronic neck pain, slightly elevated liver enzymes referred here for evaluation of headache. Dr Tano Spicer initial evaluation 2020        Chronic migraine without aura without status migrainosus, not intractable  Andrzej Figueroa reports a long history of headaches and migraines have been difficult to control. She reports pain is often left-sided, worse when on the right side. Starts typically the back of the neck and radiates unilateral side of the head the frontal/I region. She denies aura, reports typical associated migrainous features as well as some autonomic features. She denies any new worsening and did have slight improvement on aimovig but not enough. She denies any new or concerning symptoms. - as of 2020: 5 days a week migraines   - as of 2020:  Physical therapy has helped somewhat.  migraines 3-4 times per week, in July 2-3 really bad migraines that lasted 3-4 days. Already on venlafaxine 150 mg, topiramate 100 mg BID, emgality was too expensive, trial of ajovy. - as of 3/1/2021: She continues to have uncontrolled migraines with milder migraines 4 days per week really bad migraines 3 times a month. Continue topiramate 100 mg b.i.d., venlafaxine 150 mg, all CGRPs are too expensive rec'd emgality [facility] program.   - as of 2021: She reports migraines on average still 3 days per week, some weeks better than others.  On topiramate 100 mg BID (and not sure if helping - discussed could consider very gradual wean down to 50 mg BID see if changes migraines or possible side effects) and venlafaxine 150 mg through PCP. Trying to get emgality through [facility], but has not heard back yet. In past 3 months 3 bad migraines ones. Typically migraines improve with sumatriptan PO or Inj, back up prochlorperazine. We discussed options and she would not like to add medications at this time. - as of 5/25/2022:  Patient was unable to obtain Aimovig or Emgality due to cost of the medications. Otherwise has been the same. NO health changes since last seen  - as of 2/8/2023:  Since taking Aimovig has decreased to 8-10 a month. As of 7/6/2023: no headache today. As of 10/30/2023 headache is a 6-7/10. Friday woke up and was terrible. Did have vomiting and seeing spots. She has had 17 this month. Scheduled for her sleep study 4/14/2024        Workup:  - Neurologic assessment reveals normal neurological exam.  - noncontrast head CT 06/30/2018:  No acute pathology, Tiny cavum septum pellucidum, a normal variant.   - MRI Brain 2015 - normal per patient-she will try to obtain MRI brain on CD for my review, per review today does have slight partially empty sella with slight sclera flattening with slightly tortuous optic nerves     Preventative:  - we discussed headache hygiene and lifestyle factors that may improve headaches  - aimovig 140 mg every 30 days  - cyclobenzaprine 5 mg at bedtime as needed        - past/failed: botox, venlafaxine, amitriptyline would be contraindicated due interaction with venlafaxine, propranolol the remote past, topiramate, gabapentin, botox for 4 sessions without improvement   - future options: could consider increasing gabapentin, verapamil, 140 mg of aimovig, ajovy, consider [place] [facility] referral - mentioned to patient 6/1/21 as a future option if needed since I am not a fellowship trained headache specialist      Abortive:   It is ok to take ibuprofen, acetaminophen or naproxen (Advil, Tylenol,  Aleve, Excedrin) if they help your headaches you should limit these to No more than 3 times a week to avoid medication overuse/rebound headaches. Take rizatriptan MLT which dissolves on your tongue. May repeat in 2 hours. Limit of 3 a week or 9 a month. If this fails you can use the injection if needed     Continue sumatriptan injections at onset of migraine. If this is effective, this is first choice   For your nausea/vomiting use Zofran as needed     #Severe migraines taking decadron 1 mg in am and Depakote 250 mg at bedtime     - past/failed:  OTC meds, sumatriptan by mouth does not work as well as injection, prochlorperazine  - future options:  ubrelvy, Alternative Triptan, metoclopramide, Toradol IM or p.o., could consider trial for 5 days of Depakote or dexamethasone 4 prolonged migraine, reyvow           Patient instructions      Headache/migraine treatment:   Abortive medications (for immediate treatment of a headache): It is ok to take ibuprofen, acetaminophen or naproxen (Advil, Tylenol,  Aleve, Excedrin) if they help your headaches you should limit these to No more than 3 times a week to avoid medication overuse/rebound headaches. Continue sumatriptan injections at onset of migraine. If this is effective, this is first choice    Alternative is rizatriptan MLT which dissolves on your tongue. May repeat in 2 hours. Limit of 3 a week or 9 a month. IF this fails you can use the injection if needed     For your nausea/vomiting use Zofran as needed. On the nights of severe migraines, or if doesn't resolve, take Decadron 1 mg and depakote 250 mg at bedtime.      If you wake up day 2 take rescue medications again and then take Decadron 1 mg in the am.        Prescription preventive medications for headaches/migraines   (to take every day to help prevent headaches - not to take at the time of headache):     [x] Start depakote 250 mg at bedtime nightly  [x]Aimovig injections every 30 days  [x]cyclobenzaprine 5 mg at bedtime  [x] We will apply for Botox 200 units every 3 months      Keep out of direct sunlight. Prior to administration, allow to come to room temperature for 30 minutes. Do not warm using a heat source (eg, microwave or hot water). Do not shake. Administer in abdomen (avoiding 2 inches around the navel), thigh, upper arm, or buttocks avoiding areas of skin that are tender, bruised, red or hard. Deliver entire contents of single-use prefilled pen or syringe. *Typically these types of medications take time untill you see the benefit, although some may see improvement in days, often it may take weeks, especially if the medication is being titrated up to a beneficial level. Please contact us if there are any concerns or questions regarding the medication. Lifestyle Recommendations:  [x] SLEEP - Maintain a regular sleep schedule: Adults need at least 7-8 hours of uninterrupted a night. Maintain good sleep hygiene:  Going to bed and waking up at consistent times, avoiding excessive daytime naps, avoiding caffeinated beverages in the evening, avoid excessive stimulation in the evening and generally using bed primarily for sleeping. One hour before bedtime would recommend turning lights down lower, decreasing your activity (may read quietly, listen to music at a low volume). When you get into bed, should eliminate all technology (no texting, emailing, playing with your phone, iPad or tablet in bed). [x] HYDRATION - Maintain good hydration. Drink  2L of fluid a day (4 typical small water bottles)  [x] DIET - Maintain good nutrition. In particular don't skip meals and try and eat healthy balanced meals regularly. [x] TRIGGERS - Look for other triggers and avoid them: Limit caffeine to 1-2 cups a day or less. Avoid dietary triggers that you have noticed bring on your headaches (this could include aged cheese, peanuts, MSG, aspartame and nitrates).   [x] EXERCISE - physical exercise as we all know is good for you in many ways, and not only is good for your heart, but also is beneficial for your mental health, cognitive health and  chronic pain/headaches. I would encourage at the least 5 days of physical exercise weekly for at least 30 minutes. Education and Follow-up  [x] Please call with any questions or concerns. Of course if any new concerning symptoms go to the emergency department. [x] Follow up in 4 months with Bess Camp or Dr Danae Fox         CC:   We had the pleasure of evaluating Mae Herbert in neurological consultation today. Lida Meredith is a  right handed female who presents today for evaluation of headaches. History obtained from patient as well as available medical record review. History of Present Illness:   Interval history as of 10/30/2023  Patient states in October has had 17 headache days this month. Thinks Aug and Sept had 4 a week, but didn't bring her paper with her. Most of the time the rizatriptan was helping. Patient has been trying acetazolamide 1 tab 2 times day. Did try 2 pills 2 a week but felt was worsening her bladder. Average pain has been a 7/10 but has been vomiting with the past one      Interval history as of 7/26/2023  Patient had to get re-approved for her Aimovig. Didn't have a dose for 3+ months!!! Therefore her migraines did worsen to 4-5 days a week (if not more). She did have to go to there ER on 6/4/2023 (had a migraine for over 5 weeks at that time). Did restart in June with her injections. When she was getting the injections on time she was having 2 a week but would respond to her rizatriptan within an hour. However, when was delayed and not receiving the Kreg Furlong went on for weeks at one point     Average pain is a 5-6/10, no longer a 10/10     Interval history as of 2/8/2023  Patient states that she has improved. Gets about 7 but intensity is about a 7/10  If she takes the rizatriptan right away usually starts to go away with 30-60 minutes. Occasionally has one that lasts more than a day.   Is doing well with the AImovig. Interval History as of 5/23/2022  - has not started injection due to cost  Migraines are every week but vary in duration shortest time is 1 day up to 4 days, averaging 2 days but can have multiple migraines in a week. Therefore she has migraines 3-5 days a week. Average pain is a 10/10. If catches immediately will be a 5-6/10    Patient is having vomiting again with her migraines and her neck pain is worse with her migraines      Preventative: Through PCP: topiramate 100 mg BID,    - unable to obtain injections due to cost      Abortive:   Sumatriptan injection - helps (gets 2 needles every 8-9 days)  Sumatriptan 100 mg PO (9 pills a month)  Zofran        Interval history as of 6/1/2021  - saw eye doctor and got new glasses and then saw ophthalmology - cataracts and needs laser iridotomy - both being done in June and July      Headaches and migraines   On average 3 migraines per week         Denies bothersome side effects          Interval history as of 03/01/2021:   - She saw WILMAN Pham 11/5/20, see EMR  - eye doctor apmt end of this month      Headaches and migraines  4 headaches per week that improve with injection if caught early, worse 3 times a month     Preventive:  Venlafaxine 150mg , topiramate 100 mg BID  - was not able to start ajovy due to price   -Gabapentin 300 mg p.o. q.h.s. added 11/05/2020     Abortive:   Sumatriptan injection - helps   Prochlorperazine - helped a little     Interval history as of 08/20/2020:    -she has seen physical therapy - has seen some improvement   -labs not done yet - but seeing PCP soon  -has  MRI brain on CD for my review - forgot it today         Headaches and migraines - 3-4 times per week   Topiramate 100 mg b.i.d.   Venlafaxine 150 mg  -switched from plan for emgality to ajovy since more affordable with her insurance - but never got this either      Sumatriptan injection - helps   Prochlorperazine - helped a little Headaches started at what age? 11years old  How often do the headaches occur?   - as of 5/14/2020: 5 days a week migraines   - as of 8/20/2020: 3-4 times per week, in July 2-3 really bad migraines that lasted 3-4 days    - as of 5/23/2022: 3-5 days a week will have migraines  - as of 2/8/2023:  8-10 a month, can last just a few hours but then some a few days  -As of 7/26/2023:2 a week when injecting aimovig and responsive to abortive medications, when was off her aimovig continuous for over 5 weeks and 4-5 a week prior to that one long stretch  - as of 10/30/2023 17 this past month. Unsure how many the months before but thinks was less by a few  What time of the day do the headaches start? A lot wakes up with them or mid-afternoon  How long do the headaches last? Over 4 hours up to 3 days  Are you ever headache free? Yes     Aura? without aura     Last eye exam: 2 years ago, normal except needing glasses         Where is your headache located and pain quality? - Right or left side, worse when on right  - start back of neck   - worse on right side comes up the back of the head and goes to frontal/eye region on that side  - pulsating, pounding, sharp stabbing, pressure     What is the intensity of pain? Average: 7-8/10, worst 10/10  Associated symptoms:   [x] Nausea       [x] Vomiting        [] Diarrhea  [x] Insomnia    [x] Stiff or sore neck   [x] Problems with concentration  [x] Photophobia     [x]Phonophobia      [x] Osmophobia  [] Blurred vision   [x] Prefer quiet, dark room  [x] Light-headed or dizzy     [x] Tinnitus   [] Hands or feet tingle or feel numb/paresthesias       [] Red ear      [] Ptosis      [] Facial droop  [x] Lacrimation - tears  [x] Nasal congestion   [] Flushing of face  [] Change in pupil size     Things that make the headache worse? No specific movements, any movement      Headache triggers:  unknown     Have you seen someone else for headaches or pain?  Yes, neurology, pain management  Have you had trigger point injection performed and how often? No  Have you had Botox injection performed and how often? Yes, 4 sessions and did not help   Have you had epidural injections or transforaminal injections performed? Yes  Are you current pregnant or planning on getting pregnant? No - no regular periods since early 402  Have you ever had any Brain imaging? yes - MRI Brain 2015 - normal     What medications do you take or have you taken for your headaches? ABORTIVE:    OTC medications have been ineffective      Sumatriptan 100 mg - works well unless vomiting   Sumatriptan 6mg/0.5mL inj - works well      Has had zofran for nausea before     Past:  advil migraine  ED cocktails - help - compazine, benadryl   Prednisone      PREVENTIVE:      Venlafaxine 150 mg - 5.5 years   Topiramate 100 mg BID - more than 5 years     Past:  aimovig 70 mg- last took in January - was decreasing N/V but not huge decrease in migraines  Amitriptyline would be contraindicated due to interaction with venlafaxine  Inderal - proproanolol            Alternative therapies used in the past for headaches?  PT for chronic neck pain in the past - last 4 years ago - helped      LIFESTYLE  Sleep   - averages: 930-730   Problems falling asleep?:   No  Problems staying asleep?:  Yes, 2   - snores  - sometimes naps, not usually  - sometimes falls asleep watching TV  - never had a sleep study     Physical activity: none     Water: 2-4 bottles per day  Caffeine: 1-2 cups per day     Mood: - depression after  and granddaughter passing away      The following portions of the patient's history were reviewed and updated as appropriate: allergies, current medications, past family history, past medical history, past social history, past surgical history and problem list.     Pertinent family history:  Family history of headaches:  migraine headaches in mother, grandmother and daughter, granddaughter  Any family history of aneurysms - No     Pertinent social history:  Work: no  Education: 12th  Lives with mother     Illicit Drugs: denies  Alcohol/tobacco: Denies tobacco use, alcohol intake: rarely             Past Medical History:     Past Medical History:   Diagnosis Date   • Bilateral breast cysts June 2019 r    right breast cysts; dense breast tissue bilaterally   • Cervical spinal stenosis 2015   • Liver enzyme elevation    • Migraines    • Osteoporosis        Patient Active Problem List   Diagnosis   • Hypothyroidism   • GERD (gastroesophageal reflux disease)   • Depression   • Chronic migraine without aura without status migrainosus, not intractable   • Major depressive disorder with single episode, in full remission ([address])   • Closed displaced fracture of right calcaneus   • Closed nondisplaced fracture of fourth metatarsal bone of right foot   • Cervicalgia   • Snores   • Feeling tired   • IIH (idiopathic intracranial hypertension)   • Other sleep apnea       Medications:      Current Outpatient Medications   Medication Sig Dispense Refill   • acetaZOLAMIDE (DIAMOX) 125 mg tablet 1 tab twice a day (second dose late afternoon/evening) for 7 days then increase to 2 tabs twice a day 120 tablet 2   • baclofen 10 mg tablet TAKE 1 TABLET(10 MG) BY MOUTH DAILY AT BEDTIME 10 tablet 0   • dexamethasone (DECADRON) 1 mg tablet Take 1 tablet (1 mg total) by mouth daily as needed (for migraine on day 2) 10 tablet 2   • divalproex sodium (DEPAKOTE ER) 250 mg 24 hr tablet Take 1 tablet (250 mg total) by mouth daily at bedtime 30 tablet 6   • Erenumab-aooe 140 MG/ML SOAJ Inject 140 mg under the skin every 30 (thirty) days 1 mL 11   • ketorolac (TORADOL) 10 mg tablet Take 1 tablet (10 mg total) by mouth daily as needed for severe pain Max 1/day, 3/week, 10/month.  Do not use with other OTC pain meds 10 tablet 0   • latanoprost (XALATAN) 0.005 % ophthalmic solution INSTILL 1 DROP BOTH EYES EVERY NIGHT AT BEDTIME     • omeprazole (PriLOSEC) 20 mg delayed release capsule TAKE 1 CAPSULE(20 MG) BY MOUTH DAILY 90 capsule 3   • ondansetron (ZOFRAN) 4 mg tablet Take 1 tablet (4 mg total) by mouth every 8 (eight) hours as needed for nausea or vomiting for up to 2 days 6 tablet 0   • rizatriptan (MAXALT-MLT) 10 mg disintegrating tablet Take 1 tablet (10 mg total) by mouth as needed for migraine Take at the onset of migraine; if symptoms continue or return, may take another dose at least 2 hours after first dose. Take no more than 2 doses in a day. 9 tablet 6   • SUMAtriptan Succinate 6 MG/0.5ML SOAJ INJECT ONCE FOR SEVERE HEADACHE. MAY REPEAT AFTER 2 HORUS. MAX 2 INJECTIONS PER WEEK 3 mL 6   • cyclobenzaprine (FLEXERIL) 5 mg tablet Take 1 tablet (5 mg total) by mouth daily at bedtime (Patient not taking: Reported on 9/10/2023) 30 tablet 3   • levothyroxine 75 mcg tablet TAKE 1 TABLET(75 MCG) BY MOUTH DAILY EARLY MORNING 90 tablet 0   • sucralfate (CARAFATE) 1 g tablet Take 1 tablet (1 g total) by mouth 4 (four) times a day for 2 days (Patient not taking: Reported on 10/30/2023) 8 tablet 0     No current facility-administered medications for this visit. Allergies:    No Known Allergies    Family History:     Family History   Problem Relation Age of Onset   • Parkinsonism Father    • Dementia Father    • Hypertension Daughter    • Cervical cancer Maternal Grandmother    • Bipolar disorder Sister    • No Known Problems Mother        Social History:       Social History     Socioeconomic History   • Marital status:       Spouse name: Not on file   • Number of children: Not on file   • Years of education: Not on file   • Highest education level: Not on file   Occupational History   • Not on file   Tobacco Use   • Smoking status: Former     Types: Cigarettes     Quit date: 0     Years since quittin.8     Passive exposure: Past   • Smokeless tobacco: Never   Vaping Use   • Vaping Use: Never used   Substance and Sexual Activity   • Alcohol use: No   • Drug use: No   • Sexual activity: Not Currently   Other Topics Concern   • Not on file   Social History Narrative   • Not on file     Social Determinants of Health     Financial Resource Strain: Low Risk  (5/12/2023)    Overall Financial Resource Strain (CARDIA)    • Difficulty of Paying Living Expenses: Not hard at all   Food Insecurity: Not on file   Transportation Needs: No Transportation Needs (5/12/2023)    PRAPARE - Transportation    • Lack of Transportation (Medical): No    • Lack of Transportation (Non-Medical): No   Physical Activity: Not on file   Stress: Not on file   Social Connections: Not on file   Intimate Partner Violence: Not on file   Housing Stability: Not on file      I have reviewed the patient's medical, social and surgical history as well as medications in detail and updated the computerized patient record. Objective:     Vitals:    10/30/23 0814   BP: 138/82   BP Location: Right arm   Patient Position: Sitting   Cuff Size: Standard   Pulse: 83   Weight: 60.9 kg (134 lb 3.2 oz)   Height: 4' 11" (1.499 m)       CONSTITUTIONAL: Well developed, well nourished, well groomed. No dysmorphic features. Eyes:  EOM normal      Neck:  Normal ROM, neck supple. HEENT:  Normocephalic atraumatic. Chest:  Respirations regular and unlabored. Psychiatric:  Normal behavior and appropriate affect      MENTAL STATUS  Orientation: Alert and oriented x 3  Fund of knowledge: Intact. COORDINATION   Station/Gait: Normal baseline gait.           Pertinent lab results:   9/6/2023:  CMP k 3.4, alk phos 153, cbc unremarkable  5/15/2023:  TSH 0.517, CMP glucose 131, alk phos 195     8/26/20 CMP with chloride 110, alk-phos 168   CBC unremarkable  TSH normal      Imaging:   - noncontrast head CT 06/30/2018:  No acute pathology, Tiny cavum septum pellucidum, a normal variant.   - MRI Brain 2015 - normal--upon further review does have partially empty sella with slightly torturous optic nerves       Review of Systems:     Constitutional:  Negative for appetite change, fatigue and fever. HENT: Negative. Negative for hearing loss, tinnitus, trouble swallowing and voice change. Eyes:  Positive for photophobia. Negative for pain and visual disturbance. Respiratory: Negative. Negative for shortness of breath. Cardiovascular: Negative. Negative for palpitations. Gastrointestinal:  Positive for vomiting. Negative for nausea. Endocrine: Negative. Negative for cold intolerance. Genitourinary: Negative. Negative for dysuria, frequency and urgency. Musculoskeletal:  Negative for back pain, gait problem, myalgias and neck pain. Skin: Negative. Negative for rash. Allergic/Immunologic: Negative. Neurological:  Positive for headaches. Negative for dizziness, tremors, seizures, syncope, facial asymmetry, speech difficulty, weakness, light-headedness and numbness. Hematological: Negative. Does not bruise/bleed easily. Psychiatric/Behavioral: Negative. Negative for confusion, hallucinations and sleep disturbance. I personally reviewed and updated the ROS that was entered by the medical assistant        I have spent a total time of 42 minutes on 10/30/23 in caring for this patient including Diagnostic results, Prognosis, Risks and benefits of tx options, Instructions for management, Patient and family education, Importance of tx compliance, Risk factor reductions, Impressions, Counseling / Coordination of care, Documenting in the medical record, Reviewing / ordering tests, medicine, procedures  , and Obtaining or reviewing history  .         Author:  Consuelo Tabor PA-C 10/30/2023 12:32 PM

## 2023-11-02 ENCOUNTER — PATIENT MESSAGE (OUTPATIENT)
Dept: NEUROLOGY | Facility: CLINIC | Age: 58
End: 2023-11-02

## 2023-11-02 NOTE — TELEPHONE ENCOUNTER
Contacted pt, as pt is unaware of deductible but states she knows it is the end of the year as her deductible should be met and will then restart come the 1st of th new year as she does not know. Writer advised to contact plan directly to know and understand the amount and how it applies to Botox. As each medicare pt has a deductible of at least 190-200+ a year that is primarily met with all visits and so will result in a 0.00 OOP cost. As it is imperative that all pts are advised to know their coverage plan details for Botox. Pt to call and inform office of this information and schedule BINJ if she wishes to proceed. [facility] Hospitalist Progress Note    Date: 8/19/2021  Subjective     Patient seen and examined today. No acute events overnight. Patient currently down to 5 L of oxygen. Afebrile. Appears comfortable no distress.    Objective     Temp:  [97.7 °F (36.5 °C)-98.1 °F (36.7 °C)] 98.1 °F (36.7 °C)  Heart Rate:  [47-59] 58  Resp:  [18-20] 20  BP: (129-167)/(77-90) 129/77  SpO2 Readings from Last 1 Encounters:   08/19/21 94%         GEN: Comfortable no distress.  HEENT: Normocephalic, atraumatic.  EXT: No edema  NEURO: AAOx3. No obvious focal deficits  PSYCH: Cooperative, normal mood, normal affect.    Labs   Recent Labs   Lab 08/19/21  0502 08/18/21  0454 08/16/21  1615 08/16/21  0441 08/14/21  0458   WBC 15.7* 16.9*  --  17.9* 12.6*   HCT 42.8 40.8  --  41.5 40.6   HGB 13.1 12.5*  --  13.0 13.0    337  --  423 387   SODIUM 144 142  --  140 143   POTASSIUM 4.9 4.5  --  5.0 4.8   CHLORIDE 107 106  --  106 107   CO2 29 27  --  26 29   GLUCOSE 96 94  --  204* 113*   BUN 20 18  --  16 17   CREATININE 0.68 0.61*  --  0.58* 0.61*   CRP  --   --  0.6  --  2.0*          Imaging      US VASC LOWER EXTREMITY VENOUS DUPLEX BILATERAL   Final Result      No evidence for deep venous thrombosis in the visualized veins as detailed   above .                      Electronically Signed by: GALINA MALDONADO MD    Signed on: 8/18/2021 11:47 AM          CTA CHEST PULMONARY EMBOLISM W CONTRAST   Final Result      1.    No acute pulmonary embolism. No aortic dissection.    2.    Mild cardiomegaly. No pericardial effusion.   3.   Multifocal bilateral patchy and consolidative opacities. Commonly   reported imaging features of COVID-19 pneumonia are present.  Other   processes such as influenza pneumonia and organizing pneumonia, as can be   seen with drug toxicity and connective tissue disease, can cause a similar   imaging pattern. [[id number]]   4.   Small right pleural effusion.      Electronically Signed by: JENNIE BHAGAT MD     Signed on: 8/17/2021 3:22 PM           No results found for this or any previous visit (from the past 4464 hour(s)).      Lines     Available Intravenous Access     PICC Line / CVC Line / PIV Line / Intraosseous Line / Line / UAC Line            Peripheral IV 08/15/21 Left Antecubital 22 4 days    Peripheral IV 08/17/21 Left Antecubital 20 2 days                 I/Os     Intake/Output       08/18 0700 - 08/19 0659 08/19 0700 - 08/20 0659    P.O. 1110     Total Intake 1110     Urine 3555     Stool      Total Output 3555     Net -2445                  Active Meds     Current Facility-Administered Medications   Medication Dose Route Frequency Provider Last Rate Last Admin   • [START ON 8/20/2021] dexamethasone (DECADRON) tablet 6 mg  6 mg Oral Daily with breakfast GERHARD Burrell       • hydrALAZINE (APRESOLINE) tablet 50 mg  50 mg Oral 3 times per day Hemal Trejo DO   50 mg at 08/19/21 1444   • pneumococcal 23-valent vaccine (PNEUMOVAX 23) injection 0.5 mL  0.5 mL Intramuscular Once Diana Graham MD       • acyclovir (ZOVIRAX) tablet 400 mg  400 mg Oral BID Ephraim Arteaga MD   400 mg at 08/19/21 0946   • atenolol (TENORMIN) tablet 50 mg  50 mg Oral Daily Ephraim Arteaga MD   50 mg at 08/19/21 0946   • fenofibrate micronized (LOFIBRA) capsule 134 mg  134 mg Oral Daily Ephraim Arteaga MD   134 mg at 08/19/21 0945   • pravastatin (PRAVACHOL) tablet 40 mg  40 mg Oral Daily Ephraim Arteaga MD   40 mg at 08/19/21 0946   • sodium chloride (NORMAL SALINE) 0.9 % bolus 500 mL  500 mL Intravenous PRN Ephraim Arteaga MD       • sodium chloride 0.9 % flush bag 25 mL  25 mL Intravenous PRN Ephraim Arteaga MD       • sodium chloride (PF) 0.9 % injection 2 mL  2 mL Intracatheter 2 times per day Ephraim Arteaga MD   2 mL at 08/19/21 0947   • sodium chloride 0.9 % flush bag 25 mL  25 mL Intravenous PRN Ephraim Arteaga MD       • enoxaparin (LOVENOX) injection 40 mg  40 mg Subcutaneous Daily  Ephraim Arteaga MD   40 mg at 08/19/21 0946   • Potassium Standard Replacement Protocol   Does not apply See Admin Instructions Ephraim Arteaga MD       • Magnesium Standard Replacement Protocol   Does not apply See Admin Instructions Ephraim Arteaga MD       • ondansetron (ZOFRAN) injection 4 mg  4 mg Intravenous BID PRN Ephraim Arteaga MD       • acetaminophen (TYLENOL) tablet 650 mg  650 mg Oral Q4H PRN Ephraim Arteaga MD       • polyethylene glycol (MIRALAX) packet 17 g  17 g Oral Daily PRN Ephraim Arteaga MD       • docusate sodium-sennosides (SENOKOT S) 50-8.6 MG 2 tablet  2 tablet Oral Daily PRN Ephraim Arteaga MD       • benzonatate (TESSALON PERLES) capsule 100 mg  100 mg Oral TID PRN Ephraim Arteaga MD   100 mg at 08/13/21 1751   • famotidine (PEPCID) tablet 20 mg  20 mg Oral 2 times per day Ephraim Arteaga MD   20 mg at 08/19/21 0946   • guaiFENesin-DM) (ROBITUSSIN DM) 100-10 MG/5ML syrup 10 mL  10 mL Oral Q4H PRN Ephraim Arteaga MD   10 mL at 08/13/21 1751   • ascorbic acid (VITAMIN C) tablet 250 mg  250 mg Oral Daily Ephraim Arteaga MD   250 mg at 08/19/21 0945   • loperamide (IMODIUM) capsule 2 mg  2 mg Oral PRN Ephraim Arteaga MD   2 mg at 08/13/21 0926   • thiamine (VITAMIN B1) tablet 100 mg  100 mg Oral Daily Ephraim Arteaga MD   100 mg at 08/19/21 0946   • zinc sulfate (ZINCATE) capsule 220 mg  220 mg Oral Daily with breakfast Ephraim Arteaga MD   220 mg at 08/19/21 0946   • dextrose 50 % injection 25 g  25 g Intravenous PRN Hemal Trejo, DO       • dextrose 50 % injection 12.5 g  12.5 g Intravenous PRN Hemal Trejo DO       • glucagon (GLUCAGEN) injection 1 mg  1 mg Intramuscular PRN Hemal Trejo DO       • dextrose (GLUTOSE) 40 % gel 15 g  15 g Oral PRN Wyeth J Neil, DO       • dextrose (GLUTOSE) 40 % gel 30 g  30 g Oral PRN Hemal Trejo, DO       • insulin lispro (ADMELOG,HumaLOG) - Correction Dose   Subcutaneous TID  Hemal Trejo, DO   1  Units at 08/18/21 1815        Current Active Medications for DVT Prophylaxis (From admission, onward)         Stop     enoxaparin (LOVENOX) injection 40 mg  40 mg,   Subcutaneous,   DAILY      --                  Dietary Orders (From admission, onward)     Start     Ordered    08/13/21 0102  Cardiac Diet  DIET EFFECTIVE NOW     Question:  Diet Modifiers  Answer:  Cardiac    08/13/21 0103                   Assessment    1.  Acute hypoxic respiratory failure present on admission.  2.  COVID-19 pneumonia  3.  Essential hypertension  4.  Hyperlipidemia  5.  Morbid obesity BMI 40  6.  Former smoker  7.  Diabetes mellitus type 2.  Patient's A1c is 7.2  8. Leukocytosis, secondary to steroids.    Plan   1.    Appreciate pulmonology recommendations.  Continue to wean down O2 as able to tolerate.  Continue on current systemic steroids with Decadron.  Patient blood sugars remain controlled, stopped Metformin for now as patient did receive contrast with CT scan, keep on sliding scale for now.   Continue on acyclovir for HSV prophylaxis. On ascorbic acid, zinc, thiamine..  Keep on Lovenox subcu for DVT prophylaxis.    2.  Continue patient's blood pressure medications including atenolol and hydralazine continue hydralazine every 8 hours as BP has improved.   3.  DVT prophylaxis: Lovenox subcu       I have discussed and educated the patient regarding treatment plan. I have discussed with RN at length regarding treatment plan.       Primary Care Physician  No Pcp    Code Status    Code Status: Full Resuscitation    DO SAKINA Romero Hospitalist

## 2023-11-08 NOTE — PATIENT COMMUNICATION
received vm from 11/7 at 11:10am-Florentino, this is Janeth Davis, phone number [phone]. The reason for my call is I need to make an appointment for botox. So if you can call me back at your convenience, I'd appreciate it. Thank you.  Debora  ------------------------------------  Please assist with scheduling botox

## 2023-11-09 NOTE — TELEPHONE ENCOUNTER
Patient called back and I scheduled her New Start Botox for 12/05/23 at 09:30am in the  with YANELY.

## 2023-11-09 NOTE — TELEPHONE ENCOUNTER
Patient called transferred to [facility] to schedule her new start botox appointment.

## 2023-11-14 ENCOUNTER — OFFICE VISIT (OUTPATIENT)
Dept: FAMILY MEDICINE CLINIC | Facility: CLINIC | Age: 58
End: 2023-11-14
Payer: MEDICARE

## 2023-11-14 VITALS
HEIGHT: 59 IN | BODY MASS INDEX: 27.72 KG/M2 | DIASTOLIC BLOOD PRESSURE: 88 MMHG | HEART RATE: 82 BPM | OXYGEN SATURATION: 97 % | WEIGHT: 137.5 LBS | SYSTOLIC BLOOD PRESSURE: 130 MMHG | TEMPERATURE: 97.8 F

## 2023-11-14 DIAGNOSIS — K21.9 GASTROESOPHAGEAL REFLUX DISEASE WITHOUT ESOPHAGITIS: ICD-10-CM

## 2023-11-14 DIAGNOSIS — F41.9 ANXIETY: Primary | ICD-10-CM

## 2023-11-14 DIAGNOSIS — E03.9 HYPOTHYROIDISM, UNSPECIFIED TYPE: ICD-10-CM

## 2023-11-14 DIAGNOSIS — G43.709 CHRONIC MIGRAINE WITHOUT AURA WITHOUT STATUS MIGRAINOSUS, NOT INTRACTABLE: ICD-10-CM

## 2023-11-14 DIAGNOSIS — E78.00 HYPERCHOLESTEROLEMIA: ICD-10-CM

## 2023-11-14 DIAGNOSIS — R53.83 OTHER FATIGUE: ICD-10-CM

## 2023-11-14 PROBLEM — F32.A DEPRESSION: Status: RESOLVED | Noted: 2020-04-28 | Resolved: 2023-11-14

## 2023-11-14 PROBLEM — F32.5 MAJOR DEPRESSIVE DISORDER WITH SINGLE EPISODE, IN FULL REMISSION (HCC): Status: RESOLVED | Noted: 2021-04-13 | Resolved: 2023-11-14

## 2023-11-14 PROCEDURE — 99214 OFFICE O/P EST MOD 30 MIN: CPT | Performed by: FAMILY MEDICINE

## 2023-11-14 RX ORDER — ALPRAZOLAM 0.25 MG/1
0.25 TABLET ORAL 3 TIMES DAILY PRN
Qty: 60 TABLET | Refills: 1 | Status: SHIPPED | OUTPATIENT
Start: 2023-11-14

## 2023-11-16 ENCOUNTER — ANNUAL EXAM (OUTPATIENT)
Dept: OBGYN CLINIC | Facility: CLINIC | Age: 58
End: 2023-11-16
Payer: MEDICARE

## 2023-11-16 ENCOUNTER — TELEPHONE (OUTPATIENT)
Dept: OBGYN CLINIC | Facility: CLINIC | Age: 58
End: 2023-11-16

## 2023-11-16 VITALS
DIASTOLIC BLOOD PRESSURE: 92 MMHG | BODY MASS INDEX: 27.9 KG/M2 | SYSTOLIC BLOOD PRESSURE: 138 MMHG | HEIGHT: 59 IN | WEIGHT: 138.4 LBS

## 2023-11-16 DIAGNOSIS — R10.2 PELVIC PAIN: ICD-10-CM

## 2023-11-16 DIAGNOSIS — Z01.419 WELL WOMAN EXAM WITH ROUTINE GYNECOLOGICAL EXAM: Primary | ICD-10-CM

## 2023-11-16 DIAGNOSIS — R31.29 OTHER MICROSCOPIC HEMATURIA: ICD-10-CM

## 2023-11-16 LAB
SL AMB  POCT GLUCOSE, UA: ABNORMAL
SL AMB LEUKOCYTE ESTERASE,UA: ABNORMAL
SL AMB POCT BILIRUBIN,UA: ABNORMAL
SL AMB POCT BLOOD,UA: ABNORMAL
SL AMB POCT CLARITY,UA: CLEAR
SL AMB POCT COLOR,UA: ABNORMAL
SL AMB POCT KETONES,UA: ABNORMAL
SL AMB POCT NITRITE,UA: ABNORMAL
SL AMB POCT PH,UA: 7
SL AMB POCT SPECIFIC GRAVITY,UA: 1.01
SL AMB POCT URINE PROTEIN: ABNORMAL
SL AMB POCT UROBILINOGEN: ABNORMAL

## 2023-11-16 PROCEDURE — 81002 URINALYSIS NONAUTO W/O SCOPE: CPT | Performed by: STUDENT IN AN ORGANIZED HEALTH CARE EDUCATION/TRAINING PROGRAM

## 2023-11-16 PROCEDURE — G0145 SCR C/V CYTO,THINLAYER,RESCR: HCPCS | Performed by: STUDENT IN AN ORGANIZED HEALTH CARE EDUCATION/TRAINING PROGRAM

## 2023-11-16 PROCEDURE — G0101 CA SCREEN;PELVIC/BREAST EXAM: HCPCS | Performed by: STUDENT IN AN ORGANIZED HEALTH CARE EDUCATION/TRAINING PROGRAM

## 2023-11-16 PROCEDURE — G0476 HPV COMBO ASSAY CA SCREEN: HCPCS | Performed by: STUDENT IN AN ORGANIZED HEALTH CARE EDUCATION/TRAINING PROGRAM

## 2023-11-16 PROCEDURE — 87086 URINE CULTURE/COLONY COUNT: CPT | Performed by: STUDENT IN AN ORGANIZED HEALTH CARE EDUCATION/TRAINING PROGRAM

## 2023-11-16 NOTE — TELEPHONE ENCOUNTER
Reviewed chart, no documentation of call from office placed to patient. Called and reviewed with patient. Patient seen in office today 11/16/23 for annual exam, urine and pap in process at this time, ultrasound orders provided not scheduled or completed for review. Patient is going to listen to her voicemail again, if further assistance needed or questions/concerns will call office.

## 2023-11-16 NOTE — TELEPHONE ENCOUNTER
Patient left message on machine- returning call received from Kota, was told to ask for Radha Rebollar

## 2023-11-16 NOTE — PROGRESS NOTES
[facility]  Well Woman Annual Exam  Mireya Ariza MD  11/16/23    Assessment   60 y.o. postmenopausal female presenting for annual exam- no issues. Plan     Cervical cancer screening: Last Pap: 10/2019- pap results not visible on myChart. Pap with cotesting was completed today. The current ASCCP guidelines were reviewed. The low risk patient will receive pap smear screening every 3 years or pap with HPV co-testing every 5 years. With 10 years of normal pap testing pap smears may be discontinued at age 72. STD screening: The patient declines STD testing. Breast cancer screening: Last Mammogram: 5/2023- follows with PCP. Reviewed ACOG recommendations of yearly mammogram for breast cancer screening   Colon cancer screening: Last Colonoscopy 1/2017, Patient due in 2027  Osteoporosis- follows with PCP, previously on Boniva- not currently on anything. Reviewed risk reduction lifestyle changes for fracture. Pelvic pain and microscopic hematuria- kidney/ bladder ultrasound ordered and Urine sent for culture. I have discussed the importance of monthly self-breast exams, exercise and healthy diet as well as adequate intake of calcium and vitamin D. All questions have been answered to her satisfaction. __________________________________________________________________      Subjective     60 y.o. postmenopausal female presenting for annual exam. She is with complaint of mild right pain     GYN  Complaints: denies  Denies genital discharge, genital ulcers, and vulvar/vaginal symptoms  Menopause occurred at age late 35s. She has had no bleeding since this time.   Menopausal symptoms: none  Sexually active: No  Hx STI: denies   Hx Abnormal pap: denies    OB  [id number]   Pregnancy complications: c-sections      Complaints: reports increased frequency- incontinence  Denies hematuria, urinary hesitancy, urinary retention, and dysuria    BREAST  Complaints: denies  Denies: breast lump, changed mole, dryness, nipple discharge, pruritus, rash, skin color change, and skin lesion(s)  Occasional breast pain   Last mammogram: 2023- [facility]  Personal hx: none  Family hx: denies fhx of breast, uterine, ovarian, or colon cancers  Patient does practice breast self awareness. GENERAL  PMH reviewed/updated and is as below. Patient does follow with a PCP. Exercise: does not  Diet: well rounded  Denies domestic violence. Past Medical History:   Diagnosis Date    Bilateral breast cysts 2019 r    right breast cysts; dense breast tissue bilaterally    Cervical spinal stenosis     Liver enzyme elevation     Migraines     Osteoporosis        Past Surgical History:   Procedure Laterality Date     SECTION      two    OVARIAN CYST SURGERY           Current Outpatient Medications:     baclofen 10 mg tablet, TAKE 1 TABLET(10 MG) BY MOUTH DAILY AT BEDTIME, Disp: 10 tablet, Rfl: 0    cyclobenzaprine (FLEXERIL) 5 mg tablet, Take 1 tablet (5 mg total) by mouth daily at bedtime, Disp: 30 tablet, Rfl: 3    divalproex sodium (DEPAKOTE ER) 250 mg 24 hr tablet, Take 1 tablet (250 mg total) by mouth daily at bedtime, Disp: 30 tablet, Rfl: 6    ketorolac (TORADOL) 10 mg tablet, Take 1 tablet (10 mg total) by mouth daily as needed for severe pain Max 1/day, 3/week, 10/month. Do not use with other OTC pain meds, Disp: 10 tablet, Rfl: 0    latanoprost (XALATAN) 0.005 % ophthalmic solution, INSTILL 1 DROP BOTH EYES EVERY NIGHT AT BEDTIME, Disp: , Rfl:     levothyroxine 75 mcg tablet, TAKE 1 TABLET(75 MCG) BY MOUTH DAILY EARLY MORNING, Disp: 90 tablet, Rfl: 0    omeprazole (PriLOSEC) 20 mg delayed release capsule, TAKE 1 CAPSULE(20 MG) BY MOUTH DAILY, Disp: 90 capsule, Rfl: 3    rizatriptan (MAXALT-MLT) 10 mg disintegrating tablet, Take 1 tablet (10 mg total) by mouth as needed for migraine Take at the onset of migraine; if symptoms continue or return, may take another dose at least 2 hours after first dose.  Take no more than 2 doses in a day., Disp: 9 tablet, Rfl: 6    SUMAtriptan Succinate 6 MG/0.5ML SOAJ, INJECT ONCE FOR SEVERE HEADACHE. MAY REPEAT AFTER 2 HORUS. MAX 2 INJECTIONS PER WEEK, Disp: 3 mL, Rfl: 6    acetaZOLAMIDE (DIAMOX) 125 mg tablet, 1 tab twice a day (second dose late afternoon/evening) for 7 days then increase to 2 tabs twice a day (Patient not taking: Reported on 2023), Disp: 120 tablet, Rfl: 2    ALPRAZolam (XANAX) 0.25 mg tablet, Take 1 tablet (0.25 mg total) by mouth 3 (three) times a day as needed for anxiety (Patient not taking: Reported on 2023), Disp: 60 tablet, Rfl: 1    dexamethasone (DECADRON) 1 mg tablet, Take 1 tablet (1 mg total) by mouth daily as needed (for migraine on day 2) (Patient not taking: Reported on 2023), Disp: 10 tablet, Rfl: 2    Erenumab-aooe 140 MG/ML SOAJ, Inject 140 mg under the skin every 30 (thirty) days (Patient not taking: Reported on 2023), Disp: 1 mL, Rfl: 11    ondansetron (ZOFRAN) 4 mg tablet, Take 1 tablet (4 mg total) by mouth every 8 (eight) hours as needed for nausea or vomiting for up to 2 days, Disp: 6 tablet, Rfl: 0    sucralfate (CARAFATE) 1 g tablet, Take 1 tablet (1 g total) by mouth 4 (four) times a day for 2 days (Patient not taking: Reported on 10/30/2023), Disp: 8 tablet, Rfl: 0    No Known Allergies    Social History     Socioeconomic History    Marital status:       Spouse name: Not on file    Number of children: Not on file    Years of education: Not on file    Highest education level: Not on file   Occupational History    Not on file   Tobacco Use    Smoking status: Former     Types: Cigarettes     Quit date: 0     Years since quittin.8     Passive exposure: Past    Smokeless tobacco: Never   Vaping Use    Vaping Use: Never used   Substance and Sexual Activity    Alcohol use: No    Drug use: No    Sexual activity: Not Currently   Other Topics Concern    Not on file   Social History Narrative    Not on file     Social Determinants of Health     Financial Resource Strain: Low Risk  (5/12/2023)    Overall Financial Resource Strain (CARDIA)     Difficulty of Paying Living Expenses: Not hard at all   Food Insecurity: Not on file   Transportation Needs: No Transportation Needs (5/12/2023)    PRAPARE - Transportation     Lack of Transportation (Medical): No     Lack of Transportation (Non-Medical): No   Physical Activity: Not on file   Stress: Not on file   Social Connections: Not on file   Intimate Partner Violence: Not on file   Housing Stability: Not on file            Review of Systems  Review of Systems  Per HPI    Objective  /92 (BP Location: Left arm, Patient Position: Sitting, Cuff Size: Standard)   Ht 4' 11" (1.499 m)   Wt 62.8 kg (138 lb 6.4 oz)   BMI 27.95 kg/m²      Physical Exam:  Physical Exam  Vitals reviewed. Constitutional:       General: She is not in acute distress. Appearance: She is well-developed. She is not diaphoretic. HENT:      Head: Normocephalic and atraumatic. Cardiovascular:      Rate and Rhythm: Normal rate and regular rhythm. Heart sounds: Normal heart sounds. No murmur heard. No friction rub. No gallop. Pulmonary:      Effort: Pulmonary effort is normal. No respiratory distress. Breath sounds: Normal breath sounds. No wheezing or rales. Abdominal:      Palpations: Abdomen is soft. Tenderness: There is no abdominal tenderness. There is no guarding or rebound. Genitourinary:     Vagina: Normal.      Comments: Vulvar and vaginal atrophy noted  On speculum exam there is cervical atrophy noted with stenosis- no discharge or bleeding noted. On bimanual exam there is an anteverted and mobile uterus, no palpable adnexal masses but right adnexal tenderness was noted. Musculoskeletal:      Cervical back: Normal range of motion and neck supple. Skin:     General: Skin is warm and dry.    Neurological:      Mental Status: She is alert and oriented to person, place, and time.   Psychiatric:         Behavior: Behavior normal.       Breast inspection negative, no nipple discharge or bleeding, no masses or nodularity palpable  Rectal deferred, negative, stool guaiac negative

## 2023-11-17 LAB
HPV HR 12 DNA CVX QL NAA+PROBE: NEGATIVE
HPV16 DNA CVX QL NAA+PROBE: NEGATIVE
HPV18 DNA CVX QL NAA+PROBE: NEGATIVE

## 2023-11-17 NOTE — PROGRESS NOTES
Patient ID: Hugh Olivera is a 60 y.o. female. HPI: 60 y. o.female presents for follow-up for anxiety, hypercholesterolemia, hypothyroidism, GERD and migraine headaches. All are well controlled at this time and she denies any new complaints. SUBJECTIVE    Family History   Problem Relation Age of Onset    Parkinsonism Father     Dementia Father     Hypertension Daughter     Cervical cancer Maternal Grandmother     Bipolar disorder Sister     No Known Problems Mother      Social History     Socioeconomic History    Marital status:      Spouse name: Not on file    Number of children: Not on file    Years of education: Not on file    Highest education level: Not on file   Occupational History    Not on file   Tobacco Use    Smoking status: Former     Types: Cigarettes     Quit date: 0     Years since quittin.8     Passive exposure: Past    Smokeless tobacco: Never   Vaping Use    Vaping Use: Never used   Substance and Sexual Activity    Alcohol use: No    Drug use: No    Sexual activity: Not Currently   Other Topics Concern    Not on file   Social History Narrative    Not on file     Social Determinants of Health     Financial Resource Strain: Low Risk  (2023)    Overall Financial Resource Strain (CARDIA)     Difficulty of Paying Living Expenses: Not hard at all   Food Insecurity: Not on file   Transportation Needs: No Transportation Needs (2023)    PRAPARE - Transportation     Lack of Transportation (Medical): No     Lack of Transportation (Non-Medical):  No   Physical Activity: Not on file   Stress: Not on file   Social Connections: Not on file   Intimate Partner Violence: Not on file   Housing Stability: Not on file     Past Medical History:   Diagnosis Date    Bilateral breast cysts 2019 r    right breast cysts; dense breast tissue bilaterally    Cervical spinal stenosis 2015    Liver enzyme elevation     Migraines     Osteoporosis      Past Surgical History:   Procedure Laterality Date     SECTION      two    OVARIAN CYST SURGERY       No Known Allergies    Current Outpatient Medications:     ALPRAZolam (XANAX) 0.25 mg tablet, Take 1 tablet (0.25 mg total) by mouth 3 (three) times a day as needed for anxiety (Patient not taking: Reported on 2023), Disp: 60 tablet, Rfl: 1    baclofen 10 mg tablet, TAKE 1 TABLET(10 MG) BY MOUTH DAILY AT BEDTIME, Disp: 10 tablet, Rfl: 0    divalproex sodium (DEPAKOTE ER) 250 mg 24 hr tablet, Take 1 tablet (250 mg total) by mouth daily at bedtime, Disp: 30 tablet, Rfl: 6    Erenumab-aooe 140 MG/ML SOAJ, Inject 140 mg under the skin every 30 (thirty) days (Patient not taking: Reported on 2023), Disp: 1 mL, Rfl: 11    ketorolac (TORADOL) 10 mg tablet, Take 1 tablet (10 mg total) by mouth daily as needed for severe pain Max 1/day, 3/week, 10/month. Do not use with other OTC pain meds, Disp: 10 tablet, Rfl: 0    latanoprost (XALATAN) 0.005 % ophthalmic solution, INSTILL 1 DROP BOTH EYES EVERY NIGHT AT BEDTIME, Disp: , Rfl:     levothyroxine 75 mcg tablet, TAKE 1 TABLET(75 MCG) BY MOUTH DAILY EARLY MORNING, Disp: 90 tablet, Rfl: 0    omeprazole (PriLOSEC) 20 mg delayed release capsule, TAKE 1 CAPSULE(20 MG) BY MOUTH DAILY, Disp: 90 capsule, Rfl: 3    ondansetron (ZOFRAN) 4 mg tablet, Take 1 tablet (4 mg total) by mouth every 8 (eight) hours as needed for nausea or vomiting for up to 2 days, Disp: 6 tablet, Rfl: 0    rizatriptan (MAXALT-MLT) 10 mg disintegrating tablet, Take 1 tablet (10 mg total) by mouth as needed for migraine Take at the onset of migraine; if symptoms continue or return, may take another dose at least 2 hours after first dose. Take no more than 2 doses in a day., Disp: 9 tablet, Rfl: 6    SUMAtriptan Succinate 6 MG/0.5ML SOAJ, INJECT ONCE FOR SEVERE HEADACHE. MAY REPEAT AFTER 2 HORUS.  MAX 2 INJECTIONS PER WEEK, Disp: 3 mL, Rfl: 6    acetaZOLAMIDE (DIAMOX) 125 mg tablet, 1 tab twice a day (second dose late afternoon/evening) for 7 days then increase to 2 tabs twice a day (Patient not taking: Reported on 11/14/2023), Disp: 120 tablet, Rfl: 2    cyclobenzaprine (FLEXERIL) 5 mg tablet, Take 1 tablet (5 mg total) by mouth daily at bedtime, Disp: 30 tablet, Rfl: 3    dexamethasone (DECADRON) 1 mg tablet, Take 1 tablet (1 mg total) by mouth daily as needed (for migraine on day 2) (Patient not taking: Reported on 11/14/2023), Disp: 10 tablet, Rfl: 2    sucralfate (CARAFATE) 1 g tablet, Take 1 tablet (1 g total) by mouth 4 (four) times a day for 2 days (Patient not taking: Reported on 10/30/2023), Disp: 8 tablet, Rfl: 0    Review of Systems  Constitutional:     Denies fever, chills ,fatigue ,weakness ,weight loss, weight gain     ENT: Denies earache ,loss of hearing ,nosebleed, nasal discharge,nasal congestion ,sore throat ,hoarseness  Pulmonary: Denies shortness of breath ,cough  ,dyspnea on exertion, orthopnea  ,PND   Cardiovascular:  Denies bradycardia , tachycardia  ,palpations, lower extremity edema leg, claudication  Breast:  Denies new or changing breast lumps ,nipple discharge ,nipple changes  Abdomen:  Denies abdominal pain , anorexia , indigestion, nausea, vomiting, constipation, diarrhea  Musculoskeletal: Denies myalgias, arthralgias, joint swelling, joint stiffness , limb pain, limb swelling  Gu: denies dysuria, polyuria  Skin: Denies skin rash, skin lesion, skin wound, itching, dry skin  Neuro: Denies headache, numbness, tingling, confusion, loss of consciousness, dizziness, vertigo  Psychiatric: Denies feelings of depression, suicidal ideation, anxiety, sleep disturbances    OBJECTIVE  /88   Pulse 82   Temp 97.8 °F (36.6 °C)   Ht 4' 11" (1.499 m)   Wt 62.4 kg (137 lb 8 oz)   SpO2 97%   BMI 27.77 kg/m²   Constitutional:   NAD, well appearing and well nourished      ENT:   Conjunctiva and lids: no injection, edema, or discharge     Pupils and iris: LUIS bilaterally    External inspection of ears and nose: normal without deformities or discharge. Otoscopic exam: Canals patent without erythema. Nasal mucosa, septum and turbinates: Normal or edema or discharge         Oropharynx:  Moist mucosa, normal tongue and tonsils without lesions. No erythema        Pulmonary:Respiratory effort normal rate and rhythm, no increased work of breathing. Auscultation of lungs:  Clear bilaterally with no adventitious breath sounds       Cardiovascular: regular rate and rhythm, S1 and S2, no murmur, no edema and/or varicosities of LE      Abdomen: Soft and non-distended     Positive bowel sounds      No heptomegaly or splenomegaly      Gu: no suprapubic tenderness or CVA tenderness, no urethral discharge  Lymphatic:  No anterior or posterior cervical lymphadenopathy         Musculoskeletal:  Gait and station: Normal gait      Digits and nails normal without clubbing or cyanosis       Inspection/palpation of joints, bones, and muscles:  No joint tenderness, swelling, full active and passive range of motion       Skin: Normal skin turgor and no rashes      Neuro:     Normal reflexes     Psych:   alert and oriented to person, place and time     normal mood and affect       Assessment/Plan:Diagnoses and all orders for this visit:    Anxiety  Comments:  Stable on current therapy. Orders:  -     ALPRAZolam (XANAX) 0.25 mg tablet; Take 1 tablet (0.25 mg total) by mouth 3 (three) times a day as needed for anxiety (Patient not taking: Reported on 11/16/2023)    Hypercholesterolemia  Comments:  Continue with low-fat diet  Orders:  -     Lipid panel; Future    Hypothyroidism, unspecified type  Comments:  Stable on levothyroxine therapy. Orders:  -     TSH, 3rd generation; Future    Gastroesophageal reflux disease without esophagitis  Comments:  Stable on PPI therapy. Other fatigue  -     Comprehensive metabolic panel;  Future    Chronic migraine without aura without status migrainosus, not intractable  Comments:  Continue current medications        Reviewed with patient plan to treat with above plan  I will see patient back in 6 mos or sooner prn.

## 2023-11-18 LAB — BACTERIA UR CULT: NORMAL

## 2023-11-23 LAB
LAB AP GYN PRIMARY INTERPRETATION: NORMAL
Lab: NORMAL

## 2023-12-05 ENCOUNTER — PROCEDURE VISIT (OUTPATIENT)
Dept: NEUROLOGY | Facility: CLINIC | Age: 58
End: 2023-12-05
Payer: MEDICARE

## 2023-12-05 VITALS — DIASTOLIC BLOOD PRESSURE: 80 MMHG | TEMPERATURE: 97.9 F | SYSTOLIC BLOOD PRESSURE: 134 MMHG

## 2023-12-05 DIAGNOSIS — G43.709 CHRONIC MIGRAINE WITHOUT AURA WITHOUT STATUS MIGRAINOSUS, NOT INTRACTABLE: Primary | ICD-10-CM

## 2023-12-05 PROCEDURE — 64615 CHEMODENERV MUSC MIGRAINE: CPT | Performed by: PHYSICIAN ASSISTANT

## 2023-12-05 NOTE — PROGRESS NOTES
Universal Protocol   Consent: Verbal consent obtained. Written consent obtained. Risks and benefits: risks, benefits and alternatives were discussed  Consent given by: patient  Time out: Immediately prior to procedure a "time out" was called to verify the correct patient, procedure, equipment, support staff and site/side marked as required. Patient understanding: patient states understanding of the procedure being performed  Patient consent: the patient's understanding of the procedure matches consent given  Procedure consent: procedure consent matches procedure scheduled  Relevant documents: relevant documents present and verified  Patient identity confirmed: verbally with patient      Chemodenervation     Date/Time  12/5/2023 9:30 AM     Performed by  Taniya Garces PA-C   Authorized by  Taniya Garces PA-C     Pre-procedure details      Prepped With: Alcohol     Anesthesia  (see MAR for exact dosages):      Anesthesia method:  None   Procedure details      Position:  Upright   Botox      Botox Type:  Type A    Brand:  Botox    mL's of Botulinum Toxin:  155    Final Concentration per CC:  200 units and 50 units    Needle Gauge:  30 G 2.5 inch   Procedures      Botox Procedures: chronic headache      Indications: migraines     Injection Location      Head / Face:  L superior cervical paraspinal, R superior cervical paraspinal, L , R , L frontalis, R frontalis, L medial occipitalis, R medial occipitalis, procerus, R temporalis, L temporalis, L superior trapezius and R superior trapezius    L  injection amount:  5 unit(s)    R  injection amount:  5 unit(s)    L lateral frontalis:  5 unit(s)    R lateral frontalis:  5 unit(s)    L medial frontalis:  5 unit(s)    R medial frontalis:  5 unit(s)    L temporalis injection amount:  20 unit(s)    R temporalis injection amount:  20 unit(s)    Procerus injection amount:  5 unit(s)    L medial occipitalis injection amount:  15 unit(s) R medial occipitalis injection amount:  15 unit(s)    L superior cervical paraspinal injection amount:  10 unit(s)    R superior cervical paraspinal injection amount:  10 unit(s)    L superior trapezius injection amount:  15 unit(s)    R superior trapezius injection amount:  15 unit(s)   Total Units      Total units used:  155    Total units discarded:  45   Post-procedure details      Chemodenervation:  Chronic migraine    Facial Nerve Location[de-identified]  Bilateral facial nerve    Patient tolerance of procedure:   Tolerated well, no immediate complications   Comments       All medically necessary

## 2023-12-07 DIAGNOSIS — F41.9 ANXIETY: Primary | ICD-10-CM

## 2023-12-07 RX ORDER — ESCITALOPRAM OXALATE 10 MG/1
10 TABLET ORAL DAILY
Qty: 30 TABLET | Refills: 5 | Status: SHIPPED | OUTPATIENT
Start: 2023-12-07 | End: 2024-06-04

## 2024-01-09 ENCOUNTER — HOSPITAL ENCOUNTER (OUTPATIENT)
Dept: ULTRASOUND IMAGING | Facility: HOSPITAL | Age: 59
Discharge: HOME | End: 2024-01-09
Attending: STUDENT IN AN ORGANIZED HEALTH CARE EDUCATION/TRAINING PROGRAM
Payer: MEDICARE

## 2024-01-09 DIAGNOSIS — R10.2 PELVIC PAIN: ICD-10-CM

## 2024-01-09 DIAGNOSIS — R31.29 OTHER MICROSCOPIC HEMATURIA: ICD-10-CM

## 2024-01-09 PROCEDURE — 76856 US EXAM PELVIC COMPLETE: CPT

## 2024-01-09 PROCEDURE — 76830 TRANSVAGINAL US NON-OB: CPT

## 2024-01-09 PROCEDURE — 76775 US EXAM ABDO BACK WALL LIM: CPT

## 2024-01-12 ENCOUNTER — RA CDI HCC (OUTPATIENT)
Dept: OTHER | Facility: HOSPITAL | Age: 59
End: 2024-01-12

## 2024-01-18 ENCOUNTER — OFFICE VISIT (OUTPATIENT)
Dept: FAMILY MEDICINE CLINIC | Facility: CLINIC | Age: 59
End: 2024-01-18
Payer: MEDICARE

## 2024-01-18 VITALS
HEIGHT: 59 IN | SYSTOLIC BLOOD PRESSURE: 150 MMHG | WEIGHT: 131 LBS | DIASTOLIC BLOOD PRESSURE: 100 MMHG | BODY MASS INDEX: 26.41 KG/M2 | TEMPERATURE: 98.6 F | OXYGEN SATURATION: 97 % | HEART RATE: 128 BPM

## 2024-01-18 DIAGNOSIS — G47.39 OTHER SLEEP APNEA: ICD-10-CM

## 2024-01-18 DIAGNOSIS — E03.9 HYPOTHYROIDISM, UNSPECIFIED TYPE: ICD-10-CM

## 2024-01-18 DIAGNOSIS — K21.9 GASTROESOPHAGEAL REFLUX DISEASE WITHOUT ESOPHAGITIS: ICD-10-CM

## 2024-01-18 DIAGNOSIS — I10 ESSENTIAL HYPERTENSION: ICD-10-CM

## 2024-01-18 DIAGNOSIS — F41.9 ANXIETY: Primary | ICD-10-CM

## 2024-01-18 PROCEDURE — 99214 OFFICE O/P EST MOD 30 MIN: CPT | Performed by: FAMILY MEDICINE

## 2024-01-18 RX ORDER — AMLODIPINE BESYLATE 5 MG/1
5 TABLET ORAL DAILY
Qty: 90 TABLET | Refills: 1 | Status: SHIPPED | OUTPATIENT
Start: 2024-01-18

## 2024-01-18 RX ORDER — AMLODIPINE BESYLATE 5 MG/1
5 TABLET ORAL DAILY
Qty: 30 TABLET | Refills: 5 | Status: SHIPPED | OUTPATIENT
Start: 2024-01-18 | End: 2024-01-18

## 2024-01-18 RX ORDER — ESCITALOPRAM OXALATE 10 MG/1
15 TABLET ORAL DAILY
Start: 2024-01-18 | End: 2024-01-25 | Stop reason: SDUPTHER

## 2024-01-22 NOTE — PROGRESS NOTES
Patient ID: Tiffany Herbert is a 58 y.o. female.    HPI: 58 y.o.female presents for follow up hypertension and hypothyroidism, GERD, sleep apnea and worsening anixety.   Pt denies any dizziness,  chest pain, palpitations, or dypsnea with exertion.  She notes worsening anxiety, stress and mood swings.  She denies any suicidality or depression.      SUBJECTIVE    Family History   Problem Relation Age of Onset    Parkinsonism Father     Dementia Father     Hypertension Daughter     Cervical cancer Maternal Grandmother     Bipolar disorder Sister     No Known Problems Mother      Social History     Socioeconomic History    Marital status:      Spouse name: Not on file    Number of children: Not on file    Years of education: Not on file    Highest education level: Not on file   Occupational History    Not on file   Tobacco Use    Smoking status: Former     Current packs/day: 0.00     Types: Cigarettes     Quit date:      Years since quittin.0     Passive exposure: Past    Smokeless tobacco: Never   Vaping Use    Vaping status: Never Used   Substance and Sexual Activity    Alcohol use: No    Drug use: No    Sexual activity: Not Currently   Other Topics Concern    Not on file   Social History Narrative    Not on file     Social Determinants of Health     Financial Resource Strain: Low Risk  (2023)    Overall Financial Resource Strain (CARDIA)     Difficulty of Paying Living Expenses: Not hard at all   Food Insecurity: No Food Insecurity (10/18/2019)    Received from [facility]    Hunger Vital Sign     Worried About Running Out of Food in the Last Year: Never true     Ran Out of Food in the Last Year: Never true   Transportation Needs: No Transportation Needs (2023)    PRAPARE - Transportation     Lack of Transportation (Medical): No     Lack of Transportation (Non-Medical): No   Physical Activity: Not on file   Stress: Not on file   Social Connections: Not on file   Intimate Partner Violence: Not  on file   Housing Stability: Not on file     Past Medical History:   Diagnosis Date    Bilateral breast cysts 2019 r    right breast cysts; dense breast tissue bilaterally    Cervical spinal stenosis 2015    Liver enzyme elevation     Migraines     Osteoporosis      Past Surgical History:   Procedure Laterality Date     SECTION      two    OVARIAN CYST SURGERY       No Known Allergies    Current Outpatient Medications:     baclofen 10 mg tablet, TAKE 1 TABLET(10 MG) BY MOUTH DAILY AT BEDTIME, Disp: 10 tablet, Rfl: 0    cyclobenzaprine (FLEXERIL) 5 mg tablet, Take 1 tablet (5 mg total) by mouth daily at bedtime, Disp: 30 tablet, Rfl: 3    divalproex sodium (DEPAKOTE ER) 250 mg 24 hr tablet, Take 1 tablet (250 mg total) by mouth daily at bedtime, Disp: 30 tablet, Rfl: 6    Erenumab-aooe 140 MG/ML SOAJ, Inject 140 mg under the skin every 30 (thirty) days, Disp: 1 mL, Rfl: 11    escitalopram (LEXAPRO) 10 mg tablet, Take 1.5 tablets (15 mg total) by mouth daily, Disp: , Rfl:     ketorolac (TORADOL) 10 mg tablet, Take 1 tablet (10 mg total) by mouth daily as needed for severe pain Max 1/day, 3/week, 10/month. Do not use with other OTC pain meds, Disp: 10 tablet, Rfl: 0    latanoprost (XALATAN) 0.005 % ophthalmic solution, INSTILL 1 DROP BOTH EYES EVERY NIGHT AT BEDTIME, Disp: , Rfl:     levothyroxine 75 mcg tablet, TAKE 1 TABLET(75 MCG) BY MOUTH DAILY EARLY MORNING, Disp: 90 tablet, Rfl: 0    omeprazole (PriLOSEC) 20 mg delayed release capsule, TAKE 1 CAPSULE(20 MG) BY MOUTH DAILY, Disp: 90 capsule, Rfl: 3    ondansetron (ZOFRAN) 4 mg tablet, Take 1 tablet (4 mg total) by mouth every 8 (eight) hours as needed for nausea or vomiting for up to 2 days, Disp: 6 tablet, Rfl: 0    rizatriptan (MAXALT-MLT) 10 mg disintegrating tablet, Take 1 tablet (10 mg total) by mouth as needed for migraine Take at the onset of migraine; if symptoms continue or return, may take another dose at least 2 hours after first dose. Take  "no more than 2 doses in a day., Disp: 9 tablet, Rfl: 6    SUMAtriptan Succinate 6 MG/0.5ML SOAJ, INJECT ONCE FOR SEVERE HEADACHE. MAY REPEAT AFTER 2 HORUS. MAX 2 INJECTIONS PER WEEK, Disp: 3 mL, Rfl: 6    ALPRAZolam (XANAX) 0.25 mg tablet, Take 1 tablet (0.25 mg total) by mouth 3 (three) times a day as needed for anxiety (Patient not taking: Reported on 1/18/2024), Disp: 60 tablet, Rfl: 1    amLODIPine (NORVASC) 5 mg tablet, TAKE 1 TABLET(5 MG) BY MOUTH DAILY, Disp: 90 tablet, Rfl: 1    dexamethasone (DECADRON) 1 mg tablet, Take 1 tablet (1 mg total) by mouth daily as needed (for migraine on day 2) (Patient not taking: Reported on 11/14/2023), Disp: 10 tablet, Rfl: 2    Review of Systems  Constitutional:     Denies fever, chills ,fatigue ,weakness ,weight loss, weight gain     ENT: Denies earache ,loss of hearing ,nosebleed, nasal discharge,nasal congestion ,sore throat ,hoarseness  Pulmonary: Denies shortness of breath ,cough  ,dyspnea on exertion, orthopnea  ,PND   Cardiovascular:  Denies bradycardia , tachycardia  ,palpations, lower extremity edema leg, claudication  Breast:  Denies new or changing breast lumps ,nipple discharge ,nipple changes  Abdomen:  Denies abdominal pain , anorexia , indigestion, nausea, vomiting, constipation, diarrhea  Musculoskeletal: Denies myalgias, arthralgias, joint swelling, joint stiffness , limb pain, limb swelling  Gu: denies dysuria, polyuria  Skin: Denies skin rash, skin lesion, skin wound, itching, dry skin  Neuro: Denies headache, numbness, tingling, confusion, loss of consciousness, dizziness, vertigo  Psychiatric: Denies feelings of depression, suicidal ideation, +anxiety, no sleep disturbances    OBJECTIVE  /100   Pulse (!) 128   Temp 98.6 °F (37 °C)   Ht 4' 11\" (1.499 m)   Wt 59.4 kg (131 lb)   SpO2 97%   BMI 26.46 kg/m²   Constitutional:   NAD, well appearing and well nourished      ENT:   Conjunctiva and lids: no injection, edema, or discharge     Pupils and " iris: LUIS bilaterally    External inspection of ears and nose: normal without deformities or discharge.      Otoscopic exam: Canals patent without erythema.       Nasal mucosa, septum and turbinates: Normal or edema or discharge         Oropharynx:  Moist mucosa, normal tongue and tonsils without lesions. No erythema        Pulmonary:Respiratory effort normal rate and rhythm, no increased work of breathing. Auscultation of lungs:  Clear bilaterally with no adventitious breath sounds       Cardiovascular: regular rate and rhythm, S1 and S2, no murmur, no edema and/or varicosities of LE      Abdomen: Soft and non-distended     Positive bowel sounds      No heptomegaly or splenomegaly      Gu: no suprapubic tenderness or CVA tenderness, no urethral discharge  Lymphatic:  No anterior or posterior cervical lymphadenopathy         Musculoskeletal:  Gait and station: Normal gait      Digits and nails normal without clubbing or cyanosis       Inspection/palpation of joints, bones, and muscles:  No joint tenderness, swelling, full active and passive range of motion       Skin: Normal skin turgor and no rashes      Neuro:     Normal reflexes      Psych:   alert and oriented to person, place and time     normal mood and affect       Assessment/Plan:Diagnoses and all orders for this visit:    Anxiety  Comments:  Increases lexapro to 15mg daily.  Orders:  -     escitalopram (LEXAPRO) 10 mg tablet; Take 1.5 tablets (15 mg total) by mouth daily    Essential hypertension  Comments:  Continue amlodipine tx.  Orders:  -     Discontinue: amLODIPine (NORVASC) 5 mg tablet; Take 1 tablet (5 mg total) by mouth daily    Hypothyroidism, unspecified type  Comments:  Stable on levothyroxine tx.    Gastroesophageal reflux disease without esophagitis  Comments:  Stable on PPI tx.    Other sleep apnea  Comments:  Continue sleep apnea        Reviewed with patient plan to treat with above plan.    Patient instructed to call in 72 hours if not  feeling better or if symptoms worse

## 2024-01-24 DIAGNOSIS — M48.02 CERVICAL SPINAL STENOSIS: ICD-10-CM

## 2024-01-25 ENCOUNTER — OFFICE VISIT (OUTPATIENT)
Dept: FAMILY MEDICINE CLINIC | Facility: CLINIC | Age: 59
End: 2024-01-25
Payer: MEDICARE

## 2024-01-25 VITALS
OXYGEN SATURATION: 97 % | HEART RATE: 92 BPM | BODY MASS INDEX: 26.61 KG/M2 | SYSTOLIC BLOOD PRESSURE: 120 MMHG | DIASTOLIC BLOOD PRESSURE: 80 MMHG | WEIGHT: 132 LBS | TEMPERATURE: 97.7 F | HEIGHT: 59 IN

## 2024-01-25 DIAGNOSIS — F41.9 ANXIETY: ICD-10-CM

## 2024-01-25 PROCEDURE — 99213 OFFICE O/P EST LOW 20 MIN: CPT | Performed by: FAMILY MEDICINE

## 2024-01-25 RX ORDER — ESCITALOPRAM OXALATE 10 MG/1
15 TABLET ORAL DAILY
Qty: 135 TABLET | Refills: 2 | Status: SHIPPED | OUTPATIENT
Start: 2024-01-25 | End: 2024-04-24

## 2024-01-25 RX ORDER — BACLOFEN 10 MG/1
10 TABLET ORAL
Qty: 30 TABLET | Refills: 0 | Status: SHIPPED | OUTPATIENT
Start: 2024-01-25

## 2024-01-29 NOTE — PROGRESS NOTES
Patient ID: Tiffany Herbert is a 58 y.o. female.    HPI: 58 y.o.female presents for recheck of anxiety.  She is feeling much better on lexapro.  She denies any mood swings, anhedonia or panic attacks.      SUBJECTIVE    Family History   Problem Relation Age of Onset    Parkinsonism Father     Dementia Father     Hypertension Daughter     Cervical cancer Maternal Grandmother     Bipolar disorder Sister     No Known Problems Mother      Social History     Socioeconomic History    Marital status:      Spouse name: Not on file    Number of children: Not on file    Years of education: Not on file    Highest education level: Not on file   Occupational History    Not on file   Tobacco Use    Smoking status: Former     Current packs/day: 0.00     Types: Cigarettes     Quit date:      Years since quittin.0     Passive exposure: Past    Smokeless tobacco: Never   Vaping Use    Vaping status: Never Used   Substance and Sexual Activity    Alcohol use: No    Drug use: No    Sexual activity: Not Currently   Other Topics Concern    Not on file   Social History Narrative    Not on file     Social Determinants of Health     Financial Resource Strain: Low Risk  (2023)    Overall Financial Resource Strain (CARDIA)     Difficulty of Paying Living Expenses: Not hard at all   Food Insecurity: No Food Insecurity (10/18/2019)    Received from [facility]    Hunger Vital Sign     Worried About Running Out of Food in the Last Year: Never true     Ran Out of Food in the Last Year: Never true   Transportation Needs: No Transportation Needs (2023)    PRAPARE - Transportation     Lack of Transportation (Medical): No     Lack of Transportation (Non-Medical): No   Physical Activity: Not on file   Stress: Not on file   Social Connections: Not on file   Intimate Partner Violence: Not on file   Housing Stability: Not on file     Past Medical History:   Diagnosis Date    Bilateral breast cysts 2019 r    right breast  cysts; dense breast tissue bilaterally    Cervical spinal stenosis 2015    Liver enzyme elevation     Migraines     Osteoporosis      Past Surgical History:   Procedure Laterality Date     SECTION      two    OVARIAN CYST SURGERY       No Known Allergies    Current Outpatient Medications:     ALPRAZolam (XANAX) 0.25 mg tablet, Take 1 tablet (0.25 mg total) by mouth 3 (three) times a day as needed for anxiety, Disp: 60 tablet, Rfl: 1    amLODIPine (NORVASC) 5 mg tablet, TAKE 1 TABLET(5 MG) BY MOUTH DAILY, Disp: 90 tablet, Rfl: 1    baclofen 10 mg tablet, Take 1 tablet (10 mg total) by mouth daily at bedtime, Disp: 30 tablet, Rfl: 0    divalproex sodium (DEPAKOTE ER) 250 mg 24 hr tablet, Take 1 tablet (250 mg total) by mouth daily at bedtime, Disp: 30 tablet, Rfl: 6    Erenumab-aooe 140 MG/ML SOAJ, Inject 140 mg under the skin every 30 (thirty) days, Disp: 1 mL, Rfl: 11    escitalopram (LEXAPRO) 10 mg tablet, Take 1.5 tablets (15 mg total) by mouth daily, Disp: 135 tablet, Rfl: 2    ketorolac (TORADOL) 10 mg tablet, Take 1 tablet (10 mg total) by mouth daily as needed for severe pain Max 1/day, 3/week, 10/month. Do not use with other OTC pain meds, Disp: 10 tablet, Rfl: 0    latanoprost (XALATAN) 0.005 % ophthalmic solution, INSTILL 1 DROP BOTH EYES EVERY NIGHT AT BEDTIME, Disp: , Rfl:     levothyroxine 75 mcg tablet, TAKE 1 TABLET(75 MCG) BY MOUTH DAILY EARLY MORNING, Disp: 90 tablet, Rfl: 0    omeprazole (PriLOSEC) 20 mg delayed release capsule, TAKE 1 CAPSULE(20 MG) BY MOUTH DAILY, Disp: 90 capsule, Rfl: 3    ondansetron (ZOFRAN) 4 mg tablet, Take 1 tablet (4 mg total) by mouth every 8 (eight) hours as needed for nausea or vomiting for up to 2 days, Disp: 6 tablet, Rfl: 0    rizatriptan (MAXALT-MLT) 10 mg disintegrating tablet, Take 1 tablet (10 mg total) by mouth as needed for migraine Take at the onset of migraine; if symptoms continue or return, may take another dose at least 2 hours after first dose.  "Take no more than 2 doses in a day., Disp: 9 tablet, Rfl: 6    SUMAtriptan Succinate 6 MG/0.5ML SOAJ, INJECT ONCE FOR SEVERE HEADACHE. MAY REPEAT AFTER 2 HORUS. MAX 2 INJECTIONS PER WEEK, Disp: 3 mL, Rfl: 6    cyclobenzaprine (FLEXERIL) 5 mg tablet, Take 1 tablet (5 mg total) by mouth daily at bedtime (Patient not taking: Reported on 1/25/2024), Disp: 30 tablet, Rfl: 3    dexamethasone (DECADRON) 1 mg tablet, Take 1 tablet (1 mg total) by mouth daily as needed (for migraine on day 2) (Patient not taking: Reported on 11/14/2023), Disp: 10 tablet, Rfl: 2    Review of Systems  Constitutional:     Denies fever, chills ,fatigue ,weakness ,weight loss, weight gain     ENT: Denies earache ,loss of hearing ,nosebleed, nasal discharge,nasal congestion ,sore throat ,hoarseness  Pulmonary: Denies shortness of breath ,cough  ,dyspnea on exertion, orthopnea  ,PND   Cardiovascular:  Denies bradycardia , tachycardia  ,palpations, lower extremity edema leg, claudication  Breast:  Denies new or changing breast lumps ,nipple discharge ,nipple changes  Abdomen:  Denies abdominal pain , anorexia , indigestion, nausea, vomiting, constipation, diarrhea  Musculoskeletal: Denies myalgias, arthralgias, joint swelling, joint stiffness , limb pain, limb swelling  Gu: denies dysuria, polyuria  Skin: Denies skin rash, skin lesion, skin wound, itching, dry skin  Neuro: Denies headache, numbness, tingling, confusion, loss of consciousness, dizziness, vertigo  Psychiatric: Denies feelings of depression, suicidal ideation, anxiety, sleep disturbances    OBJECTIVE  /80   Pulse 92   Temp 97.7 °F (36.5 °C)   Ht 4' 11\" (1.499 m)   Wt 59.9 kg (132 lb)   SpO2 97%   BMI 26.66 kg/m²   Constitutional:   NAD, well appearing and well nourished      ENT:   Conjunctiva and lids: no injection, edema, or discharge     Pupils and iris: LUIS bilaterally    External inspection of ears and nose: normal without deformities or discharge.      Otoscopic " exam: Canals patent without erythema.       Nasal mucosa, septum and turbinates: Normal or edema or discharge         Oropharynx:  Moist mucosa, normal tongue and tonsils without lesions. No erythema        Pulmonary:Respiratory effort normal rate and rhythm, no increased work of breathing. Auscultation of lungs:  Clear bilaterally with no adventitious breath sounds       Cardiovascular: regular rate and rhythm, S1 and S2, no murmur, no edema and/or varicosities of LE      Abdomen: Soft and non-distended     Positive bowel sounds    No heptomegaly or splenomegaly      Gu: no suprapubic tenderness or CVA tenderness, no urethral discharge  Lymphatic:  No anterior or posterior cervical lymphadenopathy         Musculoskeletal:  Gait and station: Normal gait      Digits and nails normal without clubbing or cyanosis       Inspection/palpation of joints, bones, and muscles:  No joint tenderness, swelling, full active and passive range of motion       Skin: Normal skin turgor and no rashes      Neuro:    Normal reflexes     Psych:   alert and oriented to person, place and time   normal mood and affect       Assessment/Plan:Diagnoses and all orders for this visit:    Anxiety  Comments:  Increases lexapro to 15mg daily.  Orders:  -     escitalopram (LEXAPRO) 10 mg tablet; Take 1.5 tablets (15 mg total) by mouth daily        Reviewed with patient plan to treat with above plan.    Patient instructed to call in 72 hours if not feeling better or if symptoms worsen

## 2024-02-11 DIAGNOSIS — E03.9 HYPOTHYROIDISM, UNSPECIFIED TYPE: ICD-10-CM

## 2024-02-11 RX ORDER — LEVOTHYROXINE SODIUM 0.07 MG/1
TABLET ORAL
Qty: 90 TABLET | Refills: 0 | Status: SHIPPED | OUTPATIENT
Start: 2024-02-11

## 2024-02-15 ENCOUNTER — ESTABLISHED COMPREHENSIVE EXAM (OUTPATIENT)
Dept: URBAN - METROPOLITAN AREA CLINIC 6 | Facility: CLINIC | Age: 59
End: 2024-02-15

## 2024-02-15 DIAGNOSIS — H40.033: ICD-10-CM

## 2024-02-15 DIAGNOSIS — H40.243: ICD-10-CM

## 2024-02-15 PROCEDURE — 92202 OPSCPY EXTND ON/MAC DRAW: CPT

## 2024-02-15 PROCEDURE — 92014 COMPRE OPH EXAM EST PT 1/>: CPT

## 2024-02-15 PROCEDURE — 92133 CPTRZD OPH DX IMG PST SGM ON: CPT

## 2024-02-15 PROCEDURE — 92020 GONIOSCOPY: CPT

## 2024-02-15 ASSESSMENT — TONOMETRY
OD_IOP_MMHG: 15
OS_IOP_MMHG: 14
OD_IOP_MMHG: 13
OS_IOP_MMHG: 12

## 2024-02-15 ASSESSMENT — VISUAL ACUITY
OD_CC: 20/30
OS_CC: 20/30

## 2024-02-17 DIAGNOSIS — K21.9 GERD (GASTROESOPHAGEAL REFLUX DISEASE): ICD-10-CM

## 2024-02-17 RX ORDER — OMEPRAZOLE 20 MG/1
CAPSULE, DELAYED RELEASE ORAL
Qty: 90 CAPSULE | Refills: 3 | Status: SHIPPED | OUTPATIENT
Start: 2024-02-17

## 2024-02-21 ENCOUNTER — TELEPHONE (OUTPATIENT)
Dept: NEUROLOGY | Facility: CLINIC | Age: 59
End: 2024-02-21

## 2024-02-21 NOTE — TELEPHONE ENCOUNTER
Patient needs Aimovig refill and prior authorization done.  [employer]ive can be faxed at [phone].  Please assist and call once done.

## 2024-02-22 NOTE — TELEPHONE ENCOUNTER
received vm from 2/21 at 11:46am-cristijovi, this is Tiffany hampton and my date of birth is February, 2nd 1965. I am calling because I left, at my last appointment I gave a Dr. Morales my renewal papers for amovig and I called [facility] and they said that they never received them. So I need to know what's going on with those papers. So if you could call me back at [phone]. I appreciate it.  thank you.  -------------------------------------------  Attempted to complete aimovig PA on [facility]  Key: [id number]  Received message  This medication or product was previously approved on [id number] from 2024-01-01 to 2024-12-31. **Please note: This request was submitted electronically. Formulary lowering, tiering exception, cost reduction and/or pre-benefit determination review (including prospective Medicare hospice reviews) requests cannot be requested using this method of submission. Providers contact us at [phone] for further assistance.     Called pt, advised that I do not see amgen forms in chart.  She states that her portion of the form was completed when she brought them to the office    [facility] ba-do you have amgen forms?     [phone]-ok to [facility]aved detailed message

## 2024-03-06 ENCOUNTER — TELEPHONE (OUTPATIENT)
Dept: NEUROLOGY | Facility: CLINIC | Age: 59
End: 2024-03-06

## 2024-03-06 ENCOUNTER — PROCEDURE VISIT (OUTPATIENT)
Dept: NEUROLOGY | Facility: CLINIC | Age: 59
End: 2024-03-06
Payer: MEDICARE

## 2024-03-06 VITALS — DIASTOLIC BLOOD PRESSURE: 82 MMHG | TEMPERATURE: 98.2 F | HEART RATE: 113 BPM | SYSTOLIC BLOOD PRESSURE: 117 MMHG

## 2024-03-06 DIAGNOSIS — G43.709 CHRONIC MIGRAINE WITHOUT AURA WITHOUT STATUS MIGRAINOSUS, NOT INTRACTABLE: Primary | ICD-10-CM

## 2024-03-06 PROCEDURE — 64615 CHEMODENERV MUSC MIGRAINE: CPT | Performed by: PHYSICIAN ASSISTANT

## 2024-03-06 NOTE — PROGRESS NOTES
"Universal Protocol   Consent: Verbal consent obtained. Written consent obtained.  Risks and benefits: risks, benefits and alternatives were discussed  Consent given by: patient  Time out: Immediately prior to procedure a \"time out\" was called to verify the correct patient, procedure, equipment, support staff and site/side marked as required.  Patient understanding: patient states understanding of the procedure being performed  Patient consent: the patient's understanding of the procedure matches consent given  Procedure consent: procedure consent matches procedure scheduled  Relevant documents: relevant documents present and verified  Patient identity confirmed: verbally with patient      Chemodenervation     Date/Time  3/6/2024 8:15 AM     Performed by  Betzaida Morales PA-C   Authorized by  Betzaida Morales PA-C     Pre-procedure details      Prepped With: Alcohol     Anesthesia  (see MAR for exact dosages):     Anesthesia method:  None   Procedure details      Position:  Upright   Botox      Botox Type:  Type A    Brand:  Botox    mL's of Botulinum Toxin:  200    Final Concentration per CC:  50 units    Needle Gauge:  30 G 2.5 inch   Procedures      Botox Procedures: chronic headache      Indications: migraines     Injection Location      Head / Face:  L superior cervical paraspinal, R superior cervical paraspinal, L , R , L frontalis, R frontalis, L medial occipitalis, R medial occipitalis, procerus, R temporalis, L temporalis, R superior trapezius and L superior trapezius    L  injection amount:  5 unit(s)    R  injection amount:  5 unit(s)    L lateral frontalis:  5 unit(s)    R lateral frontalis:  5 unit(s)    L medial frontalis:  5 unit(s)    R medial frontalis:  5 unit(s)    L temporalis injection amount:  20 unit(s)    R temporalis injection amount:  20 unit(s)    Procerus injection amount:  5 unit(s)    L medial occipitalis injection amount:  15 unit(s)    R medial " occipitalis injection amount:  15 unit(s)    L superior cervical paraspinal injection amount:  10 unit(s)    R superior cervical paraspinal injection amount:  10 unit(s)    L superior trapezius injection amount:  15 unit(s)    R superior trapezius injection amount:  15 unit(s)   Total Units      Total units used:  200    Total units discarded:  0   Post-procedure details      Chemodenervation:  Chronic migraine    Facial Nerve Location::  Bilateral facial nerve    Patient tolerance of procedure:  Tolerated well, no immediate complications   Comments       45 units right frontalis, temporalis, parietal and occipitalis  All medically necessary

## 2024-03-06 NOTE — LETTER
FACSIMILE TRANSMITTAL SHEET  to: amgen  from: [employer]ahn[employer]ele            company:   date: 3/20/2024          RECIPIENT's fax number:   total no. of pages including cover:           RECIPIENT'S Phone number: ( )   sender's FAX number:           rE: Rx   SENDER'S PHONE number:  [phone]          [] Urgent  [x] For Review[] Please Comment [] Please Reply   notes/Comments:        Please review rx and allergy list for Tiffany Herbert [birth year]  She sent her application via mail.               CONFIDENTIALITY NOTICE  This transmission is intended only for the use of the individual or entity to which it is addressed and may contain information that is privileged and confidential.  If the reader of this message is not the intended recipient, you are hereby notified that any disclosure, distribution, or copying of this information is strictly prohibited.  If you have received this transmission in error, please notify us immediately by telephone, and return the original documents to us at the above address via the [employer]

## 2024-03-06 NOTE — TELEPHONE ENCOUNTER
MSW phoned pt and LVM to inform if this writer can send yeimy via mail or email. Please call back

## 2024-03-06 NOTE — TELEPHONE ENCOUNTER
MSW spoke with pt who informed that she provided to staff at  completed amgen yeimy in dec. MSW apologized for the inconvenience and informed that yeimy was not scanned. Pt agreeable to receive yeimy via mail.   MSW will assist with rx     [employer]gen yeimy was sent to pt via mail.

## 2024-03-12 ENCOUNTER — OFFICE VISIT (OUTPATIENT)
Dept: FAMILY MEDICINE CLINIC | Facility: CLINIC | Age: 59
End: 2024-03-12
Payer: MEDICARE

## 2024-03-12 VITALS
DIASTOLIC BLOOD PRESSURE: 80 MMHG | HEART RATE: 104 BPM | SYSTOLIC BLOOD PRESSURE: 116 MMHG | OXYGEN SATURATION: 97 % | TEMPERATURE: 97.5 F | WEIGHT: 129.8 LBS | BODY MASS INDEX: 26.17 KG/M2 | HEIGHT: 59 IN

## 2024-03-12 DIAGNOSIS — F32.A DEPRESSION, UNSPECIFIED DEPRESSION TYPE: Primary | ICD-10-CM

## 2024-03-12 PROCEDURE — 99213 OFFICE O/P EST LOW 20 MIN: CPT | Performed by: FAMILY MEDICINE

## 2024-03-12 PROCEDURE — G2211 COMPLEX E/M VISIT ADD ON: HCPCS | Performed by: FAMILY MEDICINE

## 2024-03-12 RX ORDER — VENLAFAXINE HYDROCHLORIDE 150 MG/1
150 CAPSULE, EXTENDED RELEASE ORAL
Qty: 30 CAPSULE | Refills: 5 | Status: SHIPPED | OUTPATIENT
Start: 2024-03-12

## 2024-03-12 NOTE — TELEPHONE ENCOUNTER
MSW phoned pt - unable to connect with pt . MSW sent [employer] message to confirm that pt received amgen application.

## 2024-03-13 DIAGNOSIS — M48.02 CERVICAL SPINAL STENOSIS: ICD-10-CM

## 2024-03-13 RX ORDER — BACLOFEN 10 MG/1
10 TABLET ORAL
Qty: 30 TABLET | Refills: 0 | Status: SHIPPED | OUTPATIENT
Start: 2024-03-13

## 2024-03-13 NOTE — TELEPHONE ENCOUNTER
Refill must be reviewed and completed by the office or provider. The refill is unable to be approved by the medication management team.    Baclofen cannot be delegated

## 2024-03-13 NOTE — TELEPHONE ENCOUNTER
Refill must be reviewed and completed by the office or provider. The refill is unable to be approved by the medication management team.    Cannot be delegated

## 2024-03-13 NOTE — TELEPHONE ENCOUNTER
MSW received incoming call that she did not receive the application yet. Pt aware that yeimy was sent via mail and she will probably get it by the end of this week.

## 2024-03-19 NOTE — TELEPHONE ENCOUNTER
Incoming call from pt who informed that she received [employer] yeimy and sent back to [employer] yesterday via mail. Pt is aware that it will take around a week for them to receive the application. Pt reported understanding.

## 2024-03-19 NOTE — TELEPHONE ENCOUNTER
Betzaida - Are you agreeable to complete rx from [facility] for  aimovig?     Rx sent to [facility].     Thanks

## 2024-03-19 NOTE — PROGRESS NOTES
Patient ID: Tiffany Herbert is a 59 y.o. female.    HPI: 59 y.o.female presents for evaluation of depression.  She complains of anhedonia, crying, mood swings over the past few weeks and stressors going on with her daughter. She denies any suicidality.      SUBJECTIVE    Family History   Problem Relation Age of Onset    Parkinsonism Father     Dementia Father     Hypertension Daughter     Cervical cancer Maternal Grandmother     Bipolar disorder Sister     No Known Problems Mother      Social History     Socioeconomic History    Marital status:      Spouse name: Not on file    Number of children: Not on file    Years of education: Not on file    Highest education level: Not on file   Occupational History    Not on file   Tobacco Use    Smoking status: Former     Current packs/day: 0.00     Types: Cigarettes     Quit date:      Years since quittin.2     Passive exposure: Past    Smokeless tobacco: Never   Vaping Use    Vaping status: Never Used   Substance and Sexual Activity    Alcohol use: No    Drug use: No    Sexual activity: Not Currently   Other Topics Concern    Not on file   Social History Narrative    Not on file     Social Determinants of Health     Financial Resource Strain: Low Risk  (2023)    Overall Financial Resource Strain (CARDIA)     Difficulty of Paying Living Expenses: Not hard at all   Food Insecurity: No Food Insecurity (10/18/2019)    Received from [facility]    Hunger Vital Sign     Worried About Running Out of Food in the Last Year: Never true     Ran Out of Food in the Last Year: Never true   Transportation Needs: No Transportation Needs (2023)    PRAPARE - Transportation     Lack of Transportation (Medical): No     Lack of Transportation (Non-Medical): No   Physical Activity: Not on file   Stress: Not on file   Social Connections: Not on file   Intimate Partner Violence: Not on file   Housing Stability: Not on file     Past Medical History:   Diagnosis Date     Bilateral breast cysts 2019 r    right breast cysts; dense breast tissue bilaterally    Cervical spinal stenosis 2015    Liver enzyme elevation     Migraines     Osteoporosis      Past Surgical History:   Procedure Laterality Date     SECTION      two    OVARIAN CYST SURGERY       No Known Allergies    Current Outpatient Medications:     ALPRAZolam (XANAX) 0.25 mg tablet, Take 1 tablet (0.25 mg total) by mouth 3 (three) times a day as needed for anxiety, Disp: 60 tablet, Rfl: 1    amLODIPine (NORVASC) 5 mg tablet, TAKE 1 TABLET(5 MG) BY MOUTH DAILY, Disp: 90 tablet, Rfl: 1    divalproex sodium (DEPAKOTE ER) 250 mg 24 hr tablet, Take 1 tablet (250 mg total) by mouth daily at bedtime, Disp: 30 tablet, Rfl: 6    Erenumab-aooe 140 MG/ML SOAJ, Inject 140 mg under the skin every 30 (thirty) days, Disp: 1 mL, Rfl: 11    escitalopram (LEXAPRO) 10 mg tablet, Take 1.5 tablets (15 mg total) by mouth daily, Disp: 135 tablet, Rfl: 2    ketorolac (TORADOL) 10 mg tablet, Take 1 tablet (10 mg total) by mouth daily as needed for severe pain Max 1/day, 3/week, 10/month. Do not use with other OTC pain meds, Disp: 10 tablet, Rfl: 0    latanoprost (XALATAN) 0.005 % ophthalmic solution, INSTILL 1 DROP BOTH EYES EVERY NIGHT AT BEDTIME, Disp: , Rfl:     levothyroxine 75 mcg tablet, TAKE 1 TABLET(75 MCG) BY MOUTH DAILY EARLY MORNING, Disp: 90 tablet, Rfl: 0    omeprazole (PriLOSEC) 20 mg delayed release capsule, TAKE 1 CAPSULE(20 MG) BY MOUTH DAILY, Disp: 90 capsule, Rfl: 3    ondansetron (ZOFRAN) 4 mg tablet, Take 1 tablet (4 mg total) by mouth every 8 (eight) hours as needed for nausea or vomiting for up to 2 days, Disp: 6 tablet, Rfl: 0    rizatriptan (MAXALT-MLT) 10 mg disintegrating tablet, Take 1 tablet (10 mg total) by mouth as needed for migraine Take at the onset of migraine; if symptoms continue or return, may take another dose at least 2 hours after first dose. Take no more than 2 doses in a day., Disp: 9 tablet,  "Rfl: 6    SUMAtriptan Succinate 6 MG/0.5ML SOAJ, INJECT ONCE FOR SEVERE HEADACHE. MAY REPEAT AFTER 2 HORUS. MAX 2 INJECTIONS PER WEEK, Disp: 3 mL, Rfl: 6    venlafaxine (EFFEXOR-XR) 150 mg 24 hr capsule, Take 1 capsule (150 mg total) by mouth daily with breakfast, Disp: 30 capsule, Rfl: 5    baclofen 10 mg tablet, TAKE 1 TABLET(10 MG) BY MOUTH DAILY AT BEDTIME, Disp: 30 tablet, Rfl: 0    cyclobenzaprine (FLEXERIL) 5 mg tablet, Take 1 tablet (5 mg total) by mouth daily at bedtime (Patient not taking: Reported on 1/25/2024), Disp: 30 tablet, Rfl: 3    dexamethasone (DECADRON) 1 mg tablet, Take 1 tablet (1 mg total) by mouth daily as needed (for migraine on day 2) (Patient not taking: Reported on 11/14/2023), Disp: 10 tablet, Rfl: 2    Review of Systems  Constitutional:     Denies fever, chills ,fatigue ,weakness ,weight loss, weight gain     ENT: Denies earache ,loss of hearing ,nosebleed, nasal discharge,nasal congestion ,sore throat ,hoarseness  Pulmonary: Denies shortness of breath ,cough  ,dyspnea on exertion, orthopnea  ,PND   Cardiovascular:  Denies bradycardia , tachycardia  ,palpations, lower extremity edema leg, claudication  Breast:  Denies new or changing breast lumps ,nipple discharge ,nipple changes  Abdomen:  Denies abdominal pain , anorexia , indigestion, nausea, vomiting, constipation, diarrhea  Musculoskeletal: Denies myalgias, arthralgias, joint swelling, joint stiffness , limb pain, limb swelling  Gu: denies dysuria, polyuria  Skin: Denies skin rash, skin lesion, skin wound, itching, dry skin  Neuro: Denies headache, numbness, tingling, confusion, loss of consciousness, dizziness, vertigo  Psychiatric: + feelings of depression, no suicidal ideation, no anxiety, no sleep disturbances, + mood swings and anhedonia.      OBJECTIVE  /80   Pulse 104   Temp 97.5 °F (36.4 °C)   Ht 4' 11\" (1.499 m)   Wt 58.9 kg (129 lb 12.8 oz)   SpO2 97%   BMI 26.22 kg/m²   Constitutional:   NAD, well appearing " and well nourished      ENT:   Conjunctiva and lids: no injection, edema, or discharge     Pupils and iris: LUIS bilaterally    External inspection of ears and nose: normal without deformities or discharge.      Otoscopic exam: Canals patent without erythema.       Nasal mucosa, septum and turbinates: Normal or edema or discharge         Oropharynx:  Moist mucosa, normal tongue and tonsils without lesions. No erythema        Pulmonary:Respiratory effort normal rate and rhythm, no increased work of breathing. Auscultation of lungs:  Clear bilaterally with no adventitious breath sounds       Cardiovascular: regular rate and rhythm, S1 and S2, no murmur, no edema and/or varicosities of LE      Abdomen: Soft and non-distended     Positive bowel sounds      No heptomegaly or splenomegaly      Gu: no suprapubic tenderness or CVA tenderness, no urethral discharge  Lymphatic:  No anterior or posterior cervical lymphadenopathy         Musculoskeletal:  Gait and station: Normal gait      Digits and nails normal without clubbing or cyanosis       Inspection/palpation of joints, bones, and muscles:  No joint tenderness, swelling, full active and passive range of motion       Skin: Normal skin turgor and no rashes      Neuro:     Normal reflexes      Psych:   alert and oriented to person, place and time     + depression, anhedonia, mood swings but no suicidality      Assessment/Plan:Diagnoses and all orders for this visit:    Depression, unspecified depression type  -     venlafaxine (EFFEXOR-XR) 150 mg 24 hr capsule; Take 1 capsule (150 mg total) by mouth daily with breakfast        Reviewed with patient plan to treat with above plan.    Patient instructed to call in 72 hours if not feeling better or if symptoms worsen

## 2024-03-22 NOTE — TELEPHONE ENCOUNTER
MSW phoned [facility] at [phone] and spoke with Carla who informed that they received prescription. They are aware that pt sent her application via mail. MSW will continue to follow

## 2024-03-26 ENCOUNTER — OFFICE VISIT (OUTPATIENT)
Dept: FAMILY MEDICINE CLINIC | Facility: CLINIC | Age: 59
End: 2024-03-26
Payer: MEDICARE

## 2024-03-26 VITALS
BODY MASS INDEX: 26.41 KG/M2 | HEIGHT: 59 IN | HEART RATE: 92 BPM | SYSTOLIC BLOOD PRESSURE: 116 MMHG | OXYGEN SATURATION: 99 % | DIASTOLIC BLOOD PRESSURE: 84 MMHG | TEMPERATURE: 97.5 F | WEIGHT: 131 LBS

## 2024-03-26 DIAGNOSIS — F33.41 RECURRENT MAJOR DEPRESSIVE DISORDER, IN PARTIAL REMISSION (HCC): Primary | ICD-10-CM

## 2024-03-26 PROCEDURE — 99213 OFFICE O/P EST LOW 20 MIN: CPT | Performed by: FAMILY MEDICINE

## 2024-03-26 PROCEDURE — G2211 COMPLEX E/M VISIT ADD ON: HCPCS | Performed by: FAMILY MEDICINE

## 2024-04-08 NOTE — PROGRESS NOTES
Patient ID: Tiffany Herbert is a 59 y.o. female.    HPI: 59 y.o.female presents for follow up of depression.  She is feeling better with no mood swings.      SUBJECTIVE    Family History   Problem Relation Age of Onset    Parkinsonism Father     Dementia Father     Hypertension Daughter     Cervical cancer Maternal Grandmother     Bipolar disorder Sister     No Known Problems Mother      Social History     Socioeconomic History    Marital status:      Spouse name: Not on file    Number of children: Not on file    Years of education: Not on file    Highest education level: Not on file   Occupational History    Not on file   Tobacco Use    Smoking status: Former     Current packs/day: 0.00     Types: Cigarettes     Quit date:      Years since quittin.2     Passive exposure: Past    Smokeless tobacco: Never   Vaping Use    Vaping status: Never Used   Substance and Sexual Activity    Alcohol use: No    Drug use: No    Sexual activity: Not Currently   Other Topics Concern    Not on file   Social History Narrative    Not on file     Social Determinants of Health     Financial Resource Strain: Low Risk  (2023)    Overall Financial Resource Strain (CARDIA)     Difficulty of Paying Living Expenses: Not hard at all   Food Insecurity: No Food Insecurity (10/18/2019)    Received from [facility]    Hunger Vital Sign     Worried About Running Out of Food in the Last Year: Never true     Ran Out of Food in the Last Year: Never true   Transportation Needs: No Transportation Needs (2023)    PRAPARE - Transportation     Lack of Transportation (Medical): No     Lack of Transportation (Non-Medical): No   Physical Activity: Not on file   Stress: Not on file   Social Connections: Not on file   Intimate Partner Violence: Not on file   Housing Stability: Not on file     Past Medical History:   Diagnosis Date    Bilateral breast cysts 2019 r    right breast cysts; dense breast tissue bilaterally    Cervical  spinal stenosis 2015    Liver enzyme elevation     Migraines     Osteoporosis      Past Surgical History:   Procedure Laterality Date     SECTION      two    OVARIAN CYST SURGERY       No Known Allergies    Current Outpatient Medications:     ALPRAZolam (XANAX) 0.25 mg tablet, Take 1 tablet (0.25 mg total) by mouth 3 (three) times a day as needed for anxiety, Disp: 60 tablet, Rfl: 1    amLODIPine (NORVASC) 5 mg tablet, TAKE 1 TABLET(5 MG) BY MOUTH DAILY, Disp: 90 tablet, Rfl: 1    baclofen 10 mg tablet, TAKE 1 TABLET(10 MG) BY MOUTH DAILY AT BEDTIME, Disp: 30 tablet, Rfl: 0    divalproex sodium (DEPAKOTE ER) 250 mg 24 hr tablet, Take 1 tablet (250 mg total) by mouth daily at bedtime, Disp: 30 tablet, Rfl: 6    Erenumab-aooe 140 MG/ML SOAJ, Inject 140 mg under the skin every 30 (thirty) days, Disp: 1 mL, Rfl: 11    ketorolac (TORADOL) 10 mg tablet, Take 1 tablet (10 mg total) by mouth daily as needed for severe pain Max 1/day, 3/week, 10/month. Do not use with other OTC pain meds, Disp: 10 tablet, Rfl: 0    latanoprost (XALATAN) 0.005 % ophthalmic solution, INSTILL 1 DROP BOTH EYES EVERY NIGHT AT BEDTIME, Disp: , Rfl:     levothyroxine 75 mcg tablet, TAKE 1 TABLET(75 MCG) BY MOUTH DAILY EARLY MORNING, Disp: 90 tablet, Rfl: 0    omeprazole (PriLOSEC) 20 mg delayed release capsule, TAKE 1 CAPSULE(20 MG) BY MOUTH DAILY, Disp: 90 capsule, Rfl: 3    ondansetron (ZOFRAN) 4 mg tablet, Take 1 tablet (4 mg total) by mouth every 8 (eight) hours as needed for nausea or vomiting for up to 2 days, Disp: 6 tablet, Rfl: 0    rizatriptan (MAXALT-MLT) 10 mg disintegrating tablet, Take 1 tablet (10 mg total) by mouth as needed for migraine Take at the onset of migraine; if symptoms continue or return, may take another dose at least 2 hours after first dose. Take no more than 2 doses in a day., Disp: 9 tablet, Rfl: 6    SUMAtriptan Succinate 6 MG/0.5ML SOAJ, INJECT ONCE FOR SEVERE HEADACHE. MAY REPEAT AFTER 2 HORUS. MAX 2  "INJECTIONS PER WEEK, Disp: 3 mL, Rfl: 6    venlafaxine (EFFEXOR-XR) 150 mg 24 hr capsule, Take 1 capsule (150 mg total) by mouth daily with breakfast, Disp: 30 capsule, Rfl: 5    cyclobenzaprine (FLEXERIL) 5 mg tablet, Take 1 tablet (5 mg total) by mouth daily at bedtime (Patient not taking: Reported on 1/25/2024), Disp: 30 tablet, Rfl: 3    dexamethasone (DECADRON) 1 mg tablet, Take 1 tablet (1 mg total) by mouth daily as needed (for migraine on day 2) (Patient not taking: Reported on 11/14/2023), Disp: 10 tablet, Rfl: 2    Review of Systems  Constitutional:     Denies fever, chills ,fatigue ,weakness ,weight loss, weight gain     ENT: Denies earache ,loss of hearing ,nosebleed, nasal discharge,nasal congestion ,sore throat ,hoarseness  Pulmonary: Denies shortness of breath ,cough  ,dyspnea on exertion, orthopnea  ,PND   Cardiovascular:  Denies bradycardia , tachycardia  ,palpations, lower extremity edema leg, claudication  Breast:  Denies new or changing breast lumps ,nipple discharge ,nipple changes  Abdomen:  Denies abdominal pain , anorexia , indigestion, nausea, vomiting, constipation, diarrhea  Musculoskeletal: Denies myalgias, arthralgias, joint swelling, joint stiffness , limb pain, limb swelling  Gu: denies dysuria, polyuria  Skin: Denies skin rash, skin lesion, skin wound, itching, dry skin  Neuro: Denies headache, numbness, tingling, confusion, loss of consciousness, dizziness, vertigo  Psychiatric: Denies feelings of depression, suicidal ideation, anxiety, sleep disturbances    OBJECTIVE  /84   Pulse 92   Temp 97.5 °F (36.4 °C)   Ht 4' 11\" (1.499 m)   Wt 59.4 kg (131 lb)   SpO2 99%   BMI 26.46 kg/m²   Constitutional:   NAD, well appearing and well nourished      ENT:   Conjunctiva and lids: no injection, edema, or discharge     Pupils and iris: LUIS bilaterally    External inspection of ears and nose: normal without deformities or discharge.      Otoscopic exam: Canals patent without " erythema.       Nasal mucosa, septum and turbinates: Normal or edema or discharge         Oropharynx:  Moist mucosa, normal tongue and tonsils without lesions. No erythema        Pulmonary:Respiratory effort normal rate and rhythm, no increased work of breathing. Auscultation of lungs:  Clear bilaterally with no adventitious breath sounds       Cardiovascular: regular rate and rhythm, S1 and S2, no murmur, no edema and/or varicosities of LE      Abdomen: Soft and non-distended     Positive bowel sounds      No heptomegaly or splenomegaly      Gu: no suprapubic tenderness or CVA tenderness, no urethral discharge  Lymphatic:  No anterior or posterior cervical lymphadenopathy         Musculoskeletal:  Gait and station: Normal gait      Digits and nails normal without clubbing or cyanosis       Inspection/palpation of joints, bones, and muscles:  No joint tenderness, swelling, full active and passive range of motion       Skin: Normal skin turgor and no rashes      Neuro:    Normal reflexes      Psych:   alert and oriented to person, place and time     normal mood and affect       Assessment/Plan:Diagnoses and all orders for this visit:    Recurrent major depressive disorder, in partial remission (HCC)  Comments:  Continue effexor xr therapy.        Reviewed with patient plan to treat with above plan.    Patient instructed to call in 72 hours if not feeling better or if symptoms worsen

## 2024-05-15 ENCOUNTER — OFFICE VISIT (OUTPATIENT)
Dept: NEUROLOGY | Facility: CLINIC | Age: 59
End: 2024-05-15
Payer: MEDICARE

## 2024-05-15 VITALS
HEIGHT: 59 IN | TEMPERATURE: 97.9 F | BODY MASS INDEX: 26.37 KG/M2 | SYSTOLIC BLOOD PRESSURE: 138 MMHG | DIASTOLIC BLOOD PRESSURE: 84 MMHG | WEIGHT: 130.8 LBS | HEART RATE: 92 BPM

## 2024-05-15 DIAGNOSIS — G43.019 INTRACTABLE MIGRAINE WITHOUT AURA AND WITHOUT STATUS MIGRAINOSUS: ICD-10-CM

## 2024-05-15 DIAGNOSIS — G43.709 CHRONIC MIGRAINE WITHOUT AURA WITHOUT STATUS MIGRAINOSUS, NOT INTRACTABLE: Primary | ICD-10-CM

## 2024-05-15 PROCEDURE — 99215 OFFICE O/P EST HI 40 MIN: CPT | Performed by: PHYSICIAN ASSISTANT

## 2024-05-15 RX ORDER — DIVALPROEX SODIUM 250 MG/1
500 TABLET, EXTENDED RELEASE ORAL
Qty: 180 TABLET | Refills: 2 | Status: SHIPPED | OUTPATIENT
Start: 2024-05-15

## 2024-05-15 RX ORDER — ATOGEPANT 60 MG/1
60 TABLET ORAL
Qty: 30 TABLET | Refills: 11 | Status: SHIPPED | OUTPATIENT
Start: 2024-05-15

## 2024-05-15 NOTE — PROGRESS NOTES
[facility]  PATIENT:  Tiffany Herbert  MRN:  [id number]  :  1965  DATE OF SERVICE:  5/15/2024      Assessment/Plan:     Chronic migraine without aura without status migrainosus, not intractable  Headache/migraine treatment:   Abortive medications (for immediate treatment of a headache):   It is ok to take ibuprofen, acetaminophen or naproxen (Advil, Tylenol,  Aleve, Excedrin) if they help your headaches you should limit these to No more than 3 times a week to avoid medication overuse/rebound headaches.      Continue sumatriptan injections at onset of migraine.  If this is effective, this is first choice    Alternative is rizatriptan MLT which dissolves on your tongue.  May repeat in 2 hours.  Limit of 3 a week or 9 a month.  IF this fails you can use the injection if needed    For your nausea/vomiting use Zofran as needed.        Prescription preventive medications for headaches/migraines   (to take every day to help prevent headaches - not to take at the time of headache):  [x] increase depakote 500 mg at bedtime  [x] Cyclobenzaprine 5 mg at bedtime as needed    [x] We will try to get Qulpita 60 mg approved.  This is to be taken at bedtime.  IF too expensive please let us know the cost so we can apply for assistance      [x] Continue botox 200 units every 91 days         Problem List Items Addressed This Visit          Cardiovascular and Mediastinum    Chronic migraine without aura without status migrainosus, not intractable - Primary     Headache/migraine treatment:   Abortive medications (for immediate treatment of a headache):   It is ok to take ibuprofen, acetaminophen or naproxen (Advil, Tylenol,  Aleve, Excedrin) if they help your headaches you should limit these to No more than 3 times a week to avoid medication overuse/rebound headaches.      Continue sumatriptan injections at onset of migraine.  If this is effective, this is first choice    Alternative is rizatriptan MLT  which dissolves on your tongue.  May repeat in 2 hours.  Limit of 3 a week or 9 a month.  IF this fails you can use the injection if needed    For your nausea/vomiting use Zofran as needed.        Prescription preventive medications for headaches/migraines   (to take every day to help prevent headaches - not to take at the time of headache):  [x] increase depakote 500 mg at bedtime  [x] Cyclobenzaprine 5 mg at bedtime as needed    [x] We will try to get Qulpita 60 mg approved.  This is to be taken at bedtime.  IF too expensive please let us know the cost so we can apply for assistance      [x] Continue botox 200 units every 91 days         Relevant Medications    Atogepant (Qulipta) 60 MG TABS    divalproex sodium (DEPAKOTE ER) 250 mg 24 hr tablet     Other Visit Diagnoses       Intractable migraine without aura and without status migrainosus        Relevant Medications    Atogepant (Qulipta) 60 MG TABS    divalproex sodium (DEPAKOTE ER) 250 mg 24 hr tablet                History of Present Illness:   We had the pleasure of evaluating Tiffany Herbert in neurological follow up  today for headaches.  As you know,  she is a 59 y.o.  right handed female.   Doesn't work but is on disability      Current medical illnesses:  QTc 9/10/2023 398 ms  History Tobacco use:quit    Sleep apnea  GERD  Idiopathic hypertension  Fatigue  Hypothyroidism    What medications do you take or have you taken for your headaches?   Current Preventive:   Alprazolam, venlafaxine  Amlodipine  Baclofen  Depakote  Aimovig    Current Abortive:   Dexamethasone  Ketorolac  Rizatriptan  Sumatriptan injections    Prior Preventive:   Acetazolamide  Escitalopram  Ajovy, Emgality  Gabapentin, topiramate    Prior Abortive:   Metoclopramide, ondansetron, prochlorperazine  Sumatriptan  Medrol pack     Interval updates as of 5/14/2024  Reports worsening since she last was seen.  Has not been able to obtain Aimovig due to cost.        Interval history as of  7/26/2023  Patient had to get re-approved for her Aimovig.  Didn't have a dose for 3+ months!!!  Therefore her migraines did worsen to 4-5 days a week (if not more).  She did have to go to there ER on 6/4/2023 (had a migraine for over 5 weeks at that time).  Did restart in June with her injections.  When she was getting the injections on time she was having 2 a week but would respond to her rizatriptan within an hour.  However, when was delayed and not receiving the Aimovig went on for weeks at one point     Average pain is a 5-6/10, no longer a 10/10     Interval history as of 2/8/2023  Patient states that she has improved. Gets about 7 but intensity is about a 7/10  If she takes the rizatriptan right away usually starts to go away with 30-60 minutes.  Occasionally has one that lasts more than a day.  Is doing well with the AImovig.       Interval History as of 5/23/2022  - has not started injection due to cost  Migraines are every week but vary in duration shortest time is 1 day up to 4 days, averaging 2 days but can have multiple migraines in a week.  Therefore she has migraines 3-5 days a week.     Average pain is a 10/10.  If catches immediately will be a 5-6/10    Patient is having vomiting again with her migraines and her neck pain is worse with her migraines      What is your current pain level ?  3/10    How often do the headaches occur?   - as of 5/14/2020: 5 days a week migraines   - as of 8/20/2020: 3-4 times per week, in July 2-3 really bad migraines that lasted 3-4 days    - as of 5/23/2022: 3-5 days a week will have migraines  - as of 2/8/2023:  8-10 a month, can last just a few hours but then some a few days  -As of 7/26/2023:2 a week when injecting aimovig and responsive to abortive medications, when was off her aimovig continuous for over 5 weeks and 4-5 a week prior to that one long stretch  - as of 5/15/2024:  3-4 times a week but has not been able to use the aimovig due to cost    What time of  the day do the headaches start?  A lot wakes up with them or mid-afternoon  How long do the headaches last? Over 4 hours up to 3 days  Are you ever headache free? Yes     Aura? without aura     Last eye exam: 2 years ago, normal except needing glasses         Where is your headache located and pain quality?   - Right or left side, worse when on right  - start back of neck   - worse on right side comes up the back of the head and goes to frontal/eye region on that side  - pulsating, pounding, sharp stabbing, pressure     What is the intensity of pain? Average: 7-8/10, worst 10/10    Alternative therapies used in the past for headaches? Massage, chiropractor,  Things that make the headache worse? No specific movements, any movement      Headache triggers:  unknown    Associated symptoms:   Photophobia, phonophobia, sensitivity to smells  Nausea, vomiting  Stiff sore neck  Problems with concentration  Lightheaded or dizzy  Tinnitus  Lacrimation  Runny stuffy nose    Number of days missed per month because of headaches:  Work (or school) days: NA  Social or Family activities: does miss occasionally due to this, but also doesn't do a lot     What time of the year do headaches occur more frequently? do not seem to be related to any time of the year  Have you seen someone else for headaches or pain? Yes, neurology, pain management  Have you had trigger point injection performed and how often? Yes  Have you had Botox injection performed and how often? Yes   Have you had epidural injections or transforaminal injections performed? Yes    Have you used CBD or THC for your headaches and how often? No    Are you current pregnant or planning on getting pregnant? No,  No - no regular periods since early 40s    Water: 2-4 bottles per day  Caffeine: 1-2 cups per day     Mood: - depression after  and granddaughter passing away     Have you ever had any Brain imaging? yes   12/11/2015 MRI brain  No intracranial abnormality.     I personally reviewed these images.  Recent laboratory data was reviewed.  Medications and allergies were reviewed.    Reviewed old notes from physician seen in the past- see above HPI for summary of previous encounters.     With botox has had a reduction of at least 7 migraine days with less abortive medication, less ER visits which correlates to headache diary      Past Medical History:   Diagnosis Date    Bilateral breast cysts June 2019 r    right breast cysts; dense breast tissue bilaterally    Cervical spinal stenosis 2015    Liver enzyme elevation     Migraines     Osteoporosis        Patient Active Problem List   Diagnosis    Hypothyroidism    GERD (gastroesophageal reflux disease)    Chronic migraine without aura without status migrainosus, not intractable    Closed displaced fracture of right calcaneus    Closed nondisplaced fracture of fourth metatarsal bone of right foot    Cervicalgia    Snores    Feeling tired    IIH (idiopathic intracranial hypertension)    Other sleep apnea       Medications:      Current Outpatient Medications   Medication Sig Dispense Refill    ALPRAZolam (XANAX) 0.25 mg tablet Take 1 tablet (0.25 mg total) by mouth 3 (three) times a day as needed for anxiety 60 tablet 1    amLODIPine (NORVASC) 5 mg tablet TAKE 1 TABLET(5 MG) BY MOUTH DAILY 90 tablet 1    Atogepant (Qulipta) 60 MG TABS Take 60 mg by mouth daily at bedtime 30 tablet 11    baclofen 10 mg tablet TAKE 1 TABLET(10 MG) BY MOUTH DAILY AT BEDTIME 30 tablet 0    cyclobenzaprine (FLEXERIL) 5 mg tablet Take 1 tablet (5 mg total) by mouth daily at bedtime 30 tablet 3    divalproex sodium (DEPAKOTE ER) 250 mg 24 hr tablet Take 2 tablets (500 mg total) by mouth daily at bedtime 180 tablet 2    ketorolac (TORADOL) 10 mg tablet Take 1 tablet (10 mg total) by mouth daily as needed for severe pain Max 1/day, 3/week, 10/month. Do not use with other OTC pain meds 10 tablet 0    latanoprost (XALATAN) 0.005 % ophthalmic solution INSTILL  1 DROP BOTH EYES EVERY NIGHT AT BEDTIME      levothyroxine 75 mcg tablet TAKE 1 TABLET(75 MCG) BY MOUTH DAILY EARLY MORNING 90 tablet 0    omeprazole (PriLOSEC) 20 mg delayed release capsule TAKE 1 CAPSULE(20 MG) BY MOUTH DAILY 90 capsule 3    ondansetron (ZOFRAN) 4 mg tablet Take 1 tablet (4 mg total) by mouth every 8 (eight) hours as needed for nausea or vomiting for up to 2 days 6 tablet 0    rizatriptan (MAXALT-MLT) 10 mg disintegrating tablet Take 1 tablet (10 mg total) by mouth as needed for migraine Take at the onset of migraine; if symptoms continue or return, may take another dose at least 2 hours after first dose. Take no more than 2 doses in a day. 9 tablet 6    SUMAtriptan Succinate 6 MG/0.5ML SOAJ INJECT ONCE FOR SEVERE HEADACHE. MAY REPEAT AFTER 2 HORUS. MAX 2 INJECTIONS PER WEEK 3 mL 6    venlafaxine (EFFEXOR-XR) 150 mg 24 hr capsule Take 1 capsule (150 mg total) by mouth daily with breakfast 30 capsule 5    dexamethasone (DECADRON) 1 mg tablet Take 1 tablet (1 mg total) by mouth daily as needed (for migraine on day 2) (Patient not taking: Reported on 11/14/2023) 10 tablet 2    Erenumab-aooe 140 MG/ML SOAJ Inject 140 mg under the skin every 30 (thirty) days (Patient not taking: Reported on 5/15/2024) 1 mL 11     No current facility-administered medications for this visit.        Allergies:    No Known Allergies    Family History:     Family History   Problem Relation Age of Onset    Parkinsonism Father     Dementia Father     Hypertension Daughter     Cervical cancer Maternal Grandmother     Bipolar disorder Sister     No Known Problems Mother        Social History:     Social History     Socioeconomic History    Marital status:      Spouse name: Not on file    Number of children: Not on file    Years of education: Not on file    Highest education level: Not on file   Occupational History    Not on file   Tobacco Use    Smoking status: Former     Current packs/day: 0.00     Types: Cigarettes     " Quit date:      Years since quittin.3     Passive exposure: Past    Smokeless tobacco: Never   Vaping Use    Vaping status: Never Used   Substance and Sexual Activity    Alcohol use: No    Drug use: No    Sexual activity: Not Currently   Other Topics Concern    Not on file   Social History Narrative    Not on file     Social Determinants of Health     Financial Resource Strain: Low Risk  (2023)    Overall Financial Resource Strain (CARDIA)     Difficulty of Paying Living Expenses: Not hard at all   Food Insecurity: No Food Insecurity (10/18/2019)    Received from [facility]    Hunger Vital Sign     Worried About Running Out of Food in the Last Year: Never true     Ran Out of Food in the Last Year: Never true   Transportation Needs: No Transportation Needs (2023)    PRAPARE - Transportation     Lack of Transportation (Medical): No     Lack of Transportation (Non-Medical): No   Physical Activity: Not on file   Stress: Not on file   Social Connections: Not on file   Intimate Partner Violence: Not on file   Housing Stability: Not on file      I have reviewed the patient's medical, social and surgical history as well as medications in detail and updated the computerized patient record.      Objective:   Physical Exam:                                                                   Vitals:            /84 (BP Location: Left arm, Patient Position: Sitting, Cuff Size: Standard)   Pulse 92   Temp 97.9 °F (36.6 °C) (Temporal)   Ht 4' 11\" (1.499 m)   Wt 59.3 kg (130 lb 12.8 oz)   BMI 26.42 kg/m²   BP Readings from Last 3 Encounters:   05/15/24 138/84   24 116/84   24 116/80     Pulse Readings from Last 3 Encounters:   05/15/24 92   24 92   24 104          CONSTITUTIONAL: Well developed, well nourished, well groomed. No dysmorphic features.     Eyes:  EOM normal      Neck:  Normal ROM, neck supple.      HEENT:  Normocephalic atraumatic.    Chest:  Respirations regular and " unlabored.    Psychiatric:  Normal behavior and appropriate affect      MENTAL STATUS  Orientation: Alert and oriented x 3  Fund of knowledge: Intact.    MOTOR (Upper and lower extremities)   Bulk/tone/abnormal movement: Normal muscle bulk and tone.      COORDINATION   Station/Gait: Normal baseline gait.         Review of Systems:   Review of Systems  Constitutional: Negative.    HENT: Negative.     Eyes: Negative.    Respiratory: Negative.     Cardiovascular: Negative.    Gastrointestinal: Negative.    Endocrine: Negative.    Genitourinary: Negative.    Musculoskeletal: Negative.    Skin: Negative.    Allergic/Immunologic: Negative.    Neurological:  Positive for headaches.   Hematological: Negative.    Psychiatric/Behavioral: Negative.   I personally reviewed the ROS entered by the MA      I have spent a total time of 4 minutes on 05/15/24 in caring for this patient including Prognosis, Risks and benefits of tx options, Instructions for management, Patient and family education, Importance of tx compliance, Risk factor reductions, Impressions, Counseling / Coordination of care, Documenting in the medical record, Reviewing / ordering tests, medicine, procedures  , Obtaining or reviewing history  , and Communicating with other healthcare professionals .      Author:  Betzaida Morales PA-C 5/15/2024 1:13 PM

## 2024-05-15 NOTE — PATIENT INSTRUCTIONS
Headache/migraine treatment:   Abortive medications (for immediate treatment of a headache):   It is ok to take ibuprofen, acetaminophen or naproxen (Advil, Tylenol,  Aleve, Excedrin) if they help your headaches you should limit these to No more than 3 times a week to avoid medication overuse/rebound headaches.      Continue sumatriptan injections at onset of migraine.  If this is effective, this is first choice    Alternative is rizatriptan MLT which dissolves on your tongue.  May repeat in 2 hours.  Limit of 3 a week or 9 a month.  IF this fails you can use the injection if needed    For your nausea/vomiting use Zofran as needed.        Prescription preventive medications for headaches/migraines   (to take every day to help prevent headaches - not to take at the time of headache):  [x] increase depakote 500 mg at bedtime  [x] Cyclobenzaprine 5 mg at bedtime as needed    [x] We will try to get Qulpita 60 mg approved.  This is to be taken at bedtime.  IF too expensive please let us know the cost so we can apply for assistance      [x] Continue botox 200 units every 91 days        *Typically these types of medications take time untill you see the benefit, although some may see improvement in days, often it may take weeks, especially if the medication is being titrated up to a beneficial level. Please contact us if there are any concerns or questions regarding the medication.       Lifestyle Recommendations:  [x] SLEEP - Maintain a regular sleep schedule: Adults need at least 7-8 hours of uninterrupted a night. Maintain good sleep hygiene:  Going to bed and waking up at consistent times, avoiding excessive daytime naps, avoiding caffeinated beverages in the evening, avoid excessive stimulation in the evening and generally using bed primarily for sleeping.  One hour before bedtime would recommend turning lights down lower, decreasing your activity (may read quietly, listen to music at a low volume). When you get into  bed, should eliminate all technology (no texting, emailing, playing with your phone, iPad or tablet in bed).  [x] HYDRATION - Maintain good hydration.  Drink  2L of fluid a day (4 typical small water bottles)  [x] DIET - Maintain good nutrition. In particular don't skip meals and try and eat healthy balanced meals regularly.  [x] TRIGGERS - Look for other triggers and avoid them: Limit caffeine to 1-2 cups a day or less. Avoid dietary triggers that you have noticed bring on your headaches (this could include aged cheese, peanuts, MSG, aspartame and nitrates).  [x] EXERCISE - physical exercise as we all know is good for you in many ways, and not only is good for your heart, but also is beneficial for your mental health, cognitive health and  chronic pain/headaches. I would encourage at the least 5 days of physical exercise weekly for at least 30 minutes.      Education and Follow-up  [x] Please call with any questions or concerns. Of course if any new concerning symptoms go to the emergency department.

## 2024-05-15 NOTE — ASSESSMENT & PLAN NOTE
Headache/migraine treatment:   Abortive medications (for immediate treatment of a headache):   It is ok to take ibuprofen, acetaminophen or naproxen (Advil, Tylenol,  Aleve, Excedrin) if they help your headaches you should limit these to No more than 3 times a week to avoid medication overuse/rebound headaches.      Continue sumatriptan injections at onset of migraine.  If this is effective, this is first choice    Alternative is rizatriptan MLT which dissolves on your tongue.  May repeat in 2 hours.  Limit of 3 a week or 9 a month.  IF this fails you can use the injection if needed    For your nausea/vomiting use Zofran as needed.        Prescription preventive medications for headaches/migraines   (to take every day to help prevent headaches - not to take at the time of headache):  [x] increase depakote 500 mg at bedtime  [x] Cyclobenzaprine 5 mg at bedtime as needed    [x] We will try to get Qulpita 60 mg approved.  This is to be taken at bedtime.  IF too expensive please let us know the cost so we can apply for assistance      [x] Continue botox 200 units every 91 days

## 2024-05-16 ENCOUNTER — APPOINTMENT (OUTPATIENT)
Dept: LAB | Facility: CLINIC | Age: 59
End: 2024-05-16
Payer: MEDICARE

## 2024-05-16 DIAGNOSIS — E78.00 HYPERCHOLESTEROLEMIA: ICD-10-CM

## 2024-05-16 DIAGNOSIS — R53.83 OTHER FATIGUE: ICD-10-CM

## 2024-05-16 DIAGNOSIS — E03.9 HYPOTHYROIDISM, UNSPECIFIED TYPE: ICD-10-CM

## 2024-05-16 LAB
ALBUMIN SERPL BCP-MCNC: 4.3 G/DL (ref 3.5–5)
ALP SERPL-CCNC: 170 U/L (ref 34–104)
ALT SERPL W P-5'-P-CCNC: 51 U/L (ref 7–52)
ANION GAP SERPL CALCULATED.3IONS-SCNC: 10 MMOL/L (ref 4–13)
AST SERPL W P-5'-P-CCNC: 49 U/L (ref 13–39)
BILIRUB SERPL-MCNC: 0.27 MG/DL (ref 0.2–1)
BUN SERPL-MCNC: 18 MG/DL (ref 5–25)
CALCIUM SERPL-MCNC: 8.9 MG/DL (ref 8.4–10.2)
CHLORIDE SERPL-SCNC: 104 MMOL/L (ref 96–108)
CHOLEST SERPL-MCNC: 245 MG/DL
CO2 SERPL-SCNC: 26 MMOL/L (ref 21–32)
CREAT SERPL-MCNC: 0.79 MG/DL (ref 0.6–1.3)
GFR SERPL CREATININE-BSD FRML MDRD: 81 ML/MIN/1.73SQ M
GLUCOSE P FAST SERPL-MCNC: 90 MG/DL (ref 65–99)
HDLC SERPL-MCNC: 72 MG/DL
LDLC SERPL CALC-MCNC: 153 MG/DL (ref 0–100)
NONHDLC SERPL-MCNC: 173 MG/DL
POTASSIUM SERPL-SCNC: 4 MMOL/L (ref 3.5–5.3)
PROT SERPL-MCNC: 7.1 G/DL (ref 6.4–8.4)
SODIUM SERPL-SCNC: 140 MMOL/L (ref 135–147)
TRIGL SERPL-MCNC: 102 MG/DL
TSH SERPL DL<=0.05 MIU/L-ACNC: 1.92 UIU/ML (ref 0.45–4.5)

## 2024-05-16 PROCEDURE — 80053 COMPREHEN METABOLIC PANEL: CPT

## 2024-05-16 PROCEDURE — 84443 ASSAY THYROID STIM HORMONE: CPT

## 2024-05-16 PROCEDURE — 80061 LIPID PANEL: CPT

## 2024-05-16 PROCEDURE — 36415 COLL VENOUS BLD VENIPUNCTURE: CPT

## 2024-05-18 DIAGNOSIS — E03.9 HYPOTHYROIDISM, UNSPECIFIED TYPE: ICD-10-CM

## 2024-05-18 RX ORDER — LEVOTHYROXINE SODIUM 0.07 MG/1
TABLET ORAL
Qty: 90 TABLET | Refills: 1 | Status: SHIPPED | OUTPATIENT
Start: 2024-05-18

## 2024-05-21 ENCOUNTER — OFFICE VISIT (OUTPATIENT)
Dept: FAMILY MEDICINE CLINIC | Facility: CLINIC | Age: 59
End: 2024-05-21
Payer: MEDICARE

## 2024-05-21 VITALS
SYSTOLIC BLOOD PRESSURE: 124 MMHG | DIASTOLIC BLOOD PRESSURE: 84 MMHG | BODY MASS INDEX: 26.57 KG/M2 | HEIGHT: 59 IN | HEART RATE: 92 BPM | TEMPERATURE: 97.3 F | WEIGHT: 131.8 LBS | OXYGEN SATURATION: 97 %

## 2024-05-21 DIAGNOSIS — I49.9 CARDIAC ARRHYTHMIA, UNSPECIFIED CARDIAC ARRHYTHMIA TYPE: ICD-10-CM

## 2024-05-21 DIAGNOSIS — K21.9 GASTROESOPHAGEAL REFLUX DISEASE WITHOUT ESOPHAGITIS: ICD-10-CM

## 2024-05-21 DIAGNOSIS — E03.9 HYPOTHYROIDISM, UNSPECIFIED TYPE: ICD-10-CM

## 2024-05-21 DIAGNOSIS — Z12.31 BREAST CANCER SCREENING BY MAMMOGRAM: ICD-10-CM

## 2024-05-21 DIAGNOSIS — Z00.00 MEDICARE ANNUAL WELLNESS VISIT, SUBSEQUENT: Primary | ICD-10-CM

## 2024-05-21 PROCEDURE — G0439 PPPS, SUBSEQ VISIT: HCPCS | Performed by: FAMILY MEDICINE

## 2024-05-21 PROCEDURE — 93000 ELECTROCARDIOGRAM COMPLETE: CPT | Performed by: FAMILY MEDICINE

## 2024-05-21 PROCEDURE — 99214 OFFICE O/P EST MOD 30 MIN: CPT | Performed by: FAMILY MEDICINE

## 2024-05-21 NOTE — PROGRESS NOTES
Ambulatory Visit  Name: Tiffany Herbert      : 1965      MRN: [id number]  Encounter Provider: Kirstin Saeed DO  Encounter Date: 2024   Encounter department: [facility]    Assessment & Plan   1. Medicare annual wellness visit, subsequent  2. Cardiac arrhythmia, unspecified cardiac arrhythmia type  Comments:  EKG NSR; no arrythmias detected  Orders:  -     POCT ECG  3. Hypothyroidism, unspecified type  Comments:  Cotninue levothyroxine therpay  4. Gastroesophageal reflux disease without esophagitis  Comments:  Stable on PPi tx.  5. Breast cancer screening by mammogram  -     Mammo screening bilateral w 3d & cad; Future       Preventive health issues were discussed with patient, and age appropriate screening tests were ordered as noted in patient's After Visit Summary. Personalized health advice and appropriate referrals for health education or preventive services given if needed, as noted in patient's After Visit Summary.    History of Present Illness     HPI   Patient Care Team:  Kirstin Saeed DO as PCP - General (Family Medicine)    Review of Systems   Constitutional:  Negative for appetite change, chills and fever.   HENT:  Negative for ear pain, facial swelling, rhinorrhea, sinus pain, sore throat and trouble swallowing.    Eyes:  Negative for discharge and redness.   Respiratory:  Negative for chest tightness, shortness of breath and wheezing.    Cardiovascular:  Negative for chest pain and palpitations.   Gastrointestinal:  Negative for abdominal pain, diarrhea, nausea and vomiting.   Endocrine: Negative for polyuria.   Genitourinary:  Negative for dysuria and urgency.   Musculoskeletal:  Negative for arthralgias and back pain.   Skin:  Negative for rash.   Neurological:  Negative for dizziness, weakness and headaches.   Hematological:  Negative for adenopathy.   Psychiatric/Behavioral:  Negative for behavioral problems, confusion and sleep  disturbance.    All other systems reviewed and are negative.    Medical History Reviewed by provider this encounter:       Annual Wellness Visit Questionnaire   Tiffany is here for her Subsequent Wellness visit. Last Medicare Wellness visit information reviewed, patient interviewed, no change since last AWV.     Health Risk Assessment:   Patient rates overall health as very good. Patient feels that their physical health rating is same. Patient is satisfied with their life. Eyesight was rated as slightly worse. Hearing was rated as slightly worse. Patient feels that their emotional and mental health rating is slightly better. Patients states they are never, rarely angry. Patient states they are always unusually tired/fatigued. Pain experienced in the last 7 days has been none. Patient states that she has experienced no weight loss or gain in last 6 months.     Depression Screening:   PHQ-2 Score: 0      Fall Risk Screening:   In the past year, patient has experienced: no history of falling in past year      Urinary Incontinence Screening:   Patient has leaked urine accidently in the last six months.     Home Safety:  Patient does not have trouble with stairs inside or outside of their home. Patient has working smoke alarms and has working carbon monoxide detector. Home safety hazards include: none.     Nutrition:   Current diet is Regular.     Medications:   Patient is currently taking over-the-counter supplements. OTC medications include: see medication list. Patient is able to manage medications.     Activities of Daily Living (ADLs)/Instrumental Activities of Daily Living (IADLs):   Walk and transfer into and out of bed and chair?: Yes  Dress and groom yourself?: Yes    Bathe or shower yourself?: Yes    Feed yourself? Yes  Do your laundry/housekeeping?: Yes  Manage your money, pay your bills and track your expenses?: Yes  Make your own meals?: Yes    Do your own shopping?: Yes    Previous Hospitalizations:   Any  hospitalizations or ED visits within the last 12 months?: Yes    How many hospitalizations have you had in the last year?: 1-2    Advance Care Planning:   Living will: No    Durable POA for healthcare: No    Advanced directive: No    Advanced directive counseling given: Yes    ACP document given: Yes    Patient declined ACP directive: No    End of Life Decisions reviewed with patient: Yes    Provider agrees with end of life decisions: Yes      Cognitive Screening:   Provider or family/friend/caregiver concerned regarding cognition?: No    PREVENTIVE SCREENINGS      Cardiovascular Screening:    General: Screening Not Indicated and History Lipid Disorder      Diabetes Screening:     General: Screening Current      Colorectal Cancer Screening:     General: Screening Current      Prostate Cancer Screening:    General: Screening Current      Breast Cancer Screening:     General: Screening Current      Cervical Cancer Screening:    General: Screening Current      Osteoporosis Screening:    General: Screening Current      Abdominal Aortic Aneurysm (AAA) Screening:        General: Screening Current and Screening Not Indicated      Lung Cancer Screening:     General: Screening Not Indicated      Hepatitis C Screening:    General: Screening Current    Screening, Brief Intervention, and Referral to Treatment (SBIRT)    Screening    Typical number of drinks in a week: 0    Single Item Drug Screening:  How often have you used an illegal drug (including marijuana) or a prescription medication for non-medical reasons in the past year? never    Single Item Drug Screen Score: 0  Interpretation: Negative screen for possible drug use disorder    Social Determinants of Health     Financial Resource Strain: Low Risk  (5/12/2023)    Overall Financial Resource Strain (CARDIA)     Difficulty of Paying Living Expenses: Not hard at all   Food Insecurity: No Food Insecurity (5/21/2024)    Hunger Vital Sign     Worried About Running Out of  "Food in the Last Year: Never true     Ran Out of Food in the Last Year: Never true   Transportation Needs: No Transportation Needs (5/21/2024)    PRAPARE - Transportation     Lack of Transportation (Medical): No     Lack of Transportation (Non-Medical): No   Housing Stability: Low Risk  (5/21/2024)    Housing Stability Vital Sign     Unable to Pay for Housing in the Last Year: No     Number of Times Moved in the Last Year: 1     Homeless in the Last Year: No   Utilities: Not At Risk (5/21/2024)    [facility] Utilities     Threatened with loss of utilities: No     No results found.    Objective     /84   Pulse 92   Temp (!) 97.3 °F (36.3 °C)   Ht 4' 11\" (1.499 m)   Wt 59.8 kg (131 lb 12.8 oz)   SpO2 97%   BMI 26.62 kg/m²     Physical Exam  Vitals and nursing note reviewed.   Constitutional:       General: She is not in acute distress.     Appearance: Normal appearance. She is not ill-appearing or diaphoretic.   HENT:      Head: Normocephalic and atraumatic.      Right Ear: Tympanic membrane, ear canal and external ear normal.      Left Ear: Tympanic membrane, ear canal and external ear normal.      Nose: Nose normal. No congestion or rhinorrhea.      Mouth/Throat:      Mouth: Mucous membranes are moist.      Pharynx: Oropharynx is clear. No posterior oropharyngeal erythema.   Eyes:      General:         Right eye: No discharge.         Left eye: No discharge.      Extraocular Movements: Extraocular movements intact.      Conjunctiva/sclera: Conjunctivae normal.      Pupils: Pupils are equal, round, and reactive to light.   Neck:      Vascular: No carotid bruit.   Cardiovascular:      Rate and Rhythm: Normal rate and regular rhythm.      Pulses: Normal pulses.      Heart sounds: Normal heart sounds. No murmur heard.     Comments: + ectopy  Pulmonary:      Effort: Pulmonary effort is normal. No respiratory distress.      Breath sounds: Normal breath sounds. No wheezing or rhonchi.   Abdominal:      General: " Abdomen is flat. Bowel sounds are normal. There is no distension.      Palpations: There is no mass.      Tenderness: There is no abdominal tenderness.   Musculoskeletal:         General: No swelling or deformity. Normal range of motion.      Cervical back: Normal range of motion and neck supple. No rigidity.      Right lower leg: No edema.      Left lower leg: No edema.   Lymphadenopathy:      Cervical: No cervical adenopathy.   Skin:     General: Skin is warm and dry.      Capillary Refill: Capillary refill takes less than 2 seconds.      Coloration: Skin is not jaundiced.      Findings: No bruising, erythema or rash.   Neurological:      General: No focal deficit present.      Mental Status: She is alert and oriented to person, place, and time.      Cranial Nerves: No cranial nerve deficit.      Sensory: No sensory deficit.      Gait: Gait normal.      Deep Tendon Reflexes: Reflexes normal.   Psychiatric:         Mood and Affect: Mood normal.         Behavior: Behavior normal.         Thought Content: Thought content normal.         Judgment: Judgment normal.       Administrative Statements   I have spent a total time of 20 minutes on 05/21/24 In caring for this patient including Diagnostic results, Prognosis, Risks and benefits of tx options, and Instructions for management.

## 2024-05-21 NOTE — PATIENT INSTRUCTIONS
Medicare Preventive Visit Patient Instructions  Thank you for completing your Welcome to Medicare Visit or Medicare Annual Wellness Visit today. Your next wellness visit will be due in one year (5/22/2025).  The screening/preventive services that you may require over the next 5-10 years are detailed below. Some tests may not apply to you based off risk factors and/or age. Screening tests ordered at today's visit but not completed yet may show as past due. Also, please note that scanned in results may not display below.  Preventive Screenings:  Service Recommendations Previous Testing/Comments   Colorectal Cancer Screening  * Colonoscopy    * Fecal Occult Blood Test (FOBT)/Fecal Immunochemical Test (FIT)  * Fecal DNA/Cologuard Test  * Flexible Sigmoidoscopy Age: 45-75 years old   Colonoscopy: every 10 years (may be performed more frequently if at higher risk)  OR  FOBT/FIT: every 1 year  OR  Cologuard: every 3 years  OR  Sigmoidoscopy: every 5 years  Screening may be recommended earlier than age 45 if at higher risk for colorectal cancer. Also, an individualized decision between you and your healthcare provider will decide whether screening between the ages of 76-85 would be appropriate. Colonoscopy: 01/31/2017  FOBT/FIT: Not on file  Cologuard: Not on file  Sigmoidoscopy: Not on file          Breast Cancer Screening Age: 40+ years old  Frequency: every 1-2 years  Not required if history of left and right mastectomy Mammogram: 05/26/2023        Cervical Cancer Screening Between the ages of 21-29, pap smear recommended once every 3 years.   Between the ages of 30-65, can perform pap smear with HPV co-testing every 5 years.   Recommendations may differ for women with a history of total hysterectomy, cervical cancer, or abnormal pap smears in past. Pap Smear: 11/16/2023        Hepatitis C Screening Once for adults born between 1945 and 1965  More frequently in patients at high risk for Hepatitis C Hep C Antibody: Not on  file        Diabetes Screening 1-2 times per year if you're at risk for diabetes or have pre-diabetes Fasting glucose: 90 mg/dL (5/16/2024)  A1C: No results in last 5 years (No results in last 5 years)      Cholesterol Screening Once every 5 years if you don't have a lipid disorder. May order more often based on risk factors. Lipid panel: 05/16/2024          Other Preventive Screenings Covered by Medicare:  Abdominal Aortic Aneurysm (AAA) Screening: covered once if your at risk. You're considered to be at risk if you have a family history of AAA.  Lung Cancer Screening: covers low dose CT scan once per year if you meet all of the following conditions: (1) Age 55-77; (2) No signs or symptoms of lung cancer; (3) Current smoker or have quit smoking within the last 15 years; (4) You have a tobacco smoking history of at least 20 pack years (packs per day multiplied by number of years you smoked); (5) You get a written order from a healthcare provider.  Glaucoma Screening: covered annually if you're considered high risk: (1) You have diabetes OR (2) Family history of glaucoma OR (3)  aged 50 and older OR (4)  American aged 65 and older  Osteoporosis Screening: covered every 2 years if you meet one of the following conditions: (1) You're estrogen deficient and at risk for osteoporosis based off medical history and other findings; (2) Have a vertebral abnormality; (3) On glucocorticoid therapy for more than 3 months; (4) Have primary hyperparathyroidism; (5) On osteoporosis medications and need to assess response to drug therapy.   Last bone density test (DXA Scan): 05/16/2022.  HIV Screening: covered annually if you're between the age of 15-65. Also covered annually if you are younger than 15 and older than 65 with risk factors for HIV infection. For pregnant patients, it is covered up to 3 times per pregnancy.    Immunizations:  Immunization Recommendations   Influenza Vaccine Annual influenza  vaccination during flu season is recommended for all persons aged >= 6 months who do not have contraindications   Pneumococcal Vaccine   * Pneumococcal conjugate vaccine = PCV13 (Prevnar 13), PCV15 (Vaxneuvance), PCV20 (Prevnar 20)  * Pneumococcal polysaccharide vaccine = PPSV23 (Pneumovax) Adults 19-65 yo with certain risk factors or if 65+ yo  If never received any pneumonia vaccine: recommend Prevnar 20 (PCV20)  Give PCV20 if previously received 1 dose of PCV13 or PPSV23   Hepatitis B Vaccine 3 dose series if at intermediate or high risk (ex: diabetes, end stage renal disease, liver disease)   Respiratory syncytial virus (RSV) Vaccine - COVERED BY MEDICARE PART D  * RSVPreF3 (Arexvy) CDC recommends that adults 60 years of age and older may receive a single dose of RSV vaccine using shared clinical decision-making (SCDM)   Tetanus (Td) Vaccine - COST NOT COVERED BY MEDICARE PART B Following completion of primary series, a booster dose should be given every 10 years to maintain immunity against tetanus. Td may also be given as tetanus wound prophylaxis.   Tdap Vaccine - COST NOT COVERED BY MEDICARE PART B Recommended at least once for all adults. For pregnant patients, recommended with each pregnancy.   Shingles Vaccine (Shingrix) - COST NOT COVERED BY MEDICARE PART B  2 shot series recommended in those 19 years and older who have or will have weakened immune systems or those 50 years and older     Health Maintenance Due:      Topic Date Due   • Hepatitis C Screening  Never done   • HIV Screening  Never done   • Breast Cancer Screening: Mammogram  05/26/2024   • Colorectal Cancer Screening  01/31/2027   • Cervical Cancer Screening  11/16/2028     Immunizations Due:      Topic Date Due   • COVID-19 Vaccine (3 - 2023-24 season) 09/01/2023     Advance Directives   What are advance directives?  Advance directives are legal documents that state your wishes and plans for medical care. These plans are made ahead of time  in case you lose your ability to make decisions for yourself. Advance directives can apply to any medical decision, such as the treatments you want, and if you want to donate organs.   What are the types of advance directives?  There are many types of advance directives, and each state has rules about how to use them. You may choose a combination of any of the following:  Living will:  This is a written record of the treatment you want. You can also choose which treatments you do not want, which to limit, and which to stop at a certain time. This includes surgery, medicine, IV fluid, and tube feedings.   Durable power of  for healthcare (DPAHC):  This is a written record that states who you want to make healthcare choices for you when you are unable to make them for yourself. This person, called a proxy, is usually a family member or a friend. You may choose more than 1 proxy.  Do not resuscitate (DNR) order:  A DNR order is used in case your heart stops beating or you stop breathing. It is a request not to have certain forms of treatment, such as CPR. A DNR order may be included in other types of advance directives.  Medical directive:  This covers the care that you want if you are in a coma, near death, or unable to make decisions for yourself. You can list the treatments you want for each condition. Treatment may include pain medicine, surgery, blood transfusions, dialysis, IV or tube feedings, and a ventilator (breathing machine).  Values history:  This document has questions about your views, beliefs, and how you feel and think about life. This information can help others choose the care that you would choose.  Why are advance directives important?  An advance directive helps you control your care. Although spoken wishes may be used, it is better to have your wishes written down. Spoken wishes can be misunderstood, or not followed. Treatments may be given even if you do not want them. An advance  directive may make it easier for your family to make difficult choices about your care.   Weight Management   Why it is important to manage your weight:  Being overweight increases your risk of health conditions such as heart disease, high blood pressure, type 2 diabetes, and certain types of cancer. It can also increase your risk for osteoarthritis, sleep apnea, and other respiratory problems. Aim for a slow, steady weight loss. Even a small amount of weight loss can lower your risk of health problems.  How to lose weight safely:  A safe and healthy way to lose weight is to eat fewer calories and get regular exercise. You can lose up about 1 pound a week by decreasing the number of calories you eat by 500 calories each day.   Healthy meal plan for weight management:  A healthy meal plan includes a variety of foods, contains fewer calories, and helps you stay healthy. A healthy meal plan includes the following:  Eat whole-grain foods more often.  A healthy meal plan should contain fiber. Fiber is the part of grains, fruits, and vegetables that is not broken down by your body. Whole-grain foods are healthy and provide extra fiber in your diet. Some examples of whole-grain foods are whole-wheat breads and pastas, oatmeal, brown rice, and bulgur.  Eat a variety of vegetables every day.  Include dark, leafy greens such as spinach, kale, lulu greens, and mustard greens. Eat yellow and orange vegetables such as carrots, sweet potatoes, and winter squash.   Eat a variety of fruits every day.  Choose fresh or canned fruit (canned in its own juice or light syrup) instead of juice. Fruit juice has very little or no fiber.  Eat low-fat dairy foods.  Drink fat-free (skim) milk or 1% milk. Eat fat-free yogurt and low-fat cottage cheese. Try low-fat cheeses such as mozzarella and other reduced-fat cheeses.  Choose meat and other protein foods that are low in fat.  Choose beans or other legumes such as split peas or lentils.  Choose fish, skinless poultry (chicken or turkey), or lean cuts of red meat (beef or pork). Before you cook meat or poultry, cut off any visible fat.   Use less fat and oil.  Try baking foods instead of frying them. Add less fat, such as margarine, sour cream, regular salad dressing and mayonnaise to foods. Eat fewer high-fat foods. Some examples of high-fat foods include french fries, doughnuts, ice cream, and cakes.  Eat fewer sweets.  Limit foods and drinks that are high in sugar. This includes candy, cookies, regular soda, and sweetened drinks.  Exercise:  Exercise at least 30 minutes per day on most days of the week. Some examples of exercise include walking, biking, dancing, and swimming. You can also fit in more physical activity by taking the stairs instead of the elevator or parking farther away from stores. Ask your healthcare provider about the best exercise plan for you.      © Copyright [employer] 2018 Information is for End User's use only and may not be sold, redistributed or otherwise used for commercial purposes. All illustrations and images included in CareNotes® are the copyrighted property of [employer] Inc[employer] or [employer]

## 2024-06-11 ENCOUNTER — PROCEDURE VISIT (OUTPATIENT)
Dept: NEUROLOGY | Facility: CLINIC | Age: 59
End: 2024-06-11
Payer: MEDICARE

## 2024-06-11 VITALS — TEMPERATURE: 97.5 F | HEART RATE: 92 BPM | SYSTOLIC BLOOD PRESSURE: 152 MMHG | DIASTOLIC BLOOD PRESSURE: 88 MMHG

## 2024-06-11 DIAGNOSIS — G43.709 CHRONIC MIGRAINE WITHOUT AURA WITHOUT STATUS MIGRAINOSUS, NOT INTRACTABLE: Primary | ICD-10-CM

## 2024-06-11 PROCEDURE — 64615 CHEMODENERV MUSC MIGRAINE: CPT | Performed by: PHYSICIAN ASSISTANT

## 2024-06-11 NOTE — PROGRESS NOTES
"Universal Protocol   Consent: Verbal consent obtained. Written consent obtained.  Risks and benefits: risks, benefits and alternatives were discussed  Consent given by: patient  Time out: Immediately prior to procedure a \"time out\" was called to verify the correct patient, procedure, equipment, support staff and site/side marked as required.  Patient understanding: patient states understanding of the procedure being performed  Patient consent: the patient's understanding of the procedure matches consent given  Procedure consent: procedure consent matches procedure scheduled  Relevant documents: relevant documents present and verified  Patient identity confirmed: verbally with patient      Chemodenervation     Date/Time  6/11/2024 9:15 AM     Performed by  Betzaida Morales PA-C   Authorized by  Betzaida Morales PA-C     Pre-procedure details      Preparation: Patient was prepped and draped in usual sterile fashion     Anesthesia  (see MAR for exact dosages):     Anesthesia method:  None   Procedure details      Position:  Upright   Botox      Botox Type:  Type A    Brand:  Botox    mL's of Botulinum Toxin:  200    mL's of preservative free sterile saline:  4    Final Concentration per CC:  50 units    Needle Gauge:  30 G 2.5 inch   Procedures      Botox Procedures: chronic headache     Injection Location      Head / Face:  L superior cervical paraspinal, R superior cervical paraspinal, L , R , R frontalis, L frontalis, R medial occipitalis, L medial occipitalis, procerus, R temporalis, L superior trapezius, R superior trapezius and L temporalis    L  injection amount:  5 unit(s)    R  injection amount:  5 unit(s)    L lateral frontalis:  5 unit(s)    R lateral frontalis:  5 unit(s)    L medial frontalis:  5 unit(s)    R medial frontalis:  5 unit(s)    L temporalis injection amount:  20 unit(s)    R temporalis injection amount:  20 unit(s)    Procerus injection amount:  5 unit(s)    L " medial occipitalis injection amount:  15 unit(s)    R medial occipitalis injection amount:  15 unit(s)    L superior cervical paraspinal injection amount:  10 unit(s)    R superior cervical paraspinal injection amount:  10 unit(s)    L superior trapezius injection amount:  15 unit(s)    R superior trapezius injection amount:  15 unit(s)   Total Units      Total units used:  200   Post-procedure details      Chemodenervation:  Chronic migraine    Facial Nerve Location::  Bilateral facial nerve    Patient tolerance of procedure:  Tolerated well, no immediate complications   Comments       35 units right frontalis, temporalis, parietal and occipitalis  10 units left occipitalis  All medically necessary

## 2024-06-14 DIAGNOSIS — M48.02 CERVICAL SPINAL STENOSIS: ICD-10-CM

## 2024-06-14 RX ORDER — BACLOFEN 10 MG/1
10 TABLET ORAL
Qty: 30 TABLET | Refills: 0 | Status: SHIPPED | OUTPATIENT
Start: 2024-06-14

## 2024-06-27 ENCOUNTER — HOSPITAL ENCOUNTER (OUTPATIENT)
Age: 59
Discharge: HOME | End: 2024-06-27
Payer: MEDICARE

## 2024-06-27 VITALS — BODY MASS INDEX: 26.41 KG/M2 | WEIGHT: 131 LBS | HEIGHT: 59 IN

## 2024-06-27 DIAGNOSIS — Z12.31 BREAST CANCER SCREENING BY MAMMOGRAM: ICD-10-CM

## 2024-06-27 PROCEDURE — 77063 BREAST TOMOSYNTHESIS BI: CPT

## 2024-06-27 PROCEDURE — 77067 SCR MAMMO BI INCL CAD: CPT

## 2024-07-22 DIAGNOSIS — U07.1 COVID-19: Primary | ICD-10-CM

## 2024-07-22 RX ORDER — NIRMATRELVIR AND RITONAVIR 300-100 MG
3 KIT ORAL 2 TIMES DAILY
Qty: 30 TABLET | Refills: 0 | Status: SHIPPED | OUTPATIENT
Start: 2024-07-22 | End: 2024-07-27

## 2024-09-06 NOTE — TELEPHONE ENCOUNTER
MSW received incoming call from patient who reported that she received [employer] through [address] [place] [address] a couple of years ago  She is agreeable to apply and will await for application in the mail  MSW will follow up with patient in a week  Subjective     Sabrina Jarvis is a very pleasant 17 y.o. female who presents with Sore Throat (Pt has a cough, sore throat x 2 days  )            Otalgia   There is pain in the left ear. This is a new problem. The current episode started 1 to 4 weeks ago. The problem occurs constantly. The problem has been unchanged. There has been no fever. The fever has been present for Less than 1 day. Associated symptoms include coughing, headaches, rhinorrhea and a sore throat. Pertinent negatives include no abdominal pain, diarrhea, ear discharge, hearing loss, neck pain or vomiting. Treatments tried: Sudafed. The treatment provided mild relief.       PMH:  has no past medical history on file.  MEDS:   Current Outpatient Medications:     cefdinir (OMNICEF) 300 MG Cap, Take 1 Capsule by mouth 2 times a day., Disp: 20 Capsule, Rfl: 0    fluticasone (FLONASE) 50 MCG/ACT nasal spray, Administer 1 Spray into affected nostril(S) every day., Disp: 16 g, Rfl: 0    promethazine-dextromethorphan (PROMETHAZINE-DM) 6.25-15 MG/5ML syrup, Take 5 mL by mouth 3 times a day as needed for Cough., Disp: 120 mL, Rfl: 0  ALLERGIES: No Known Allergies  SURGHX: No past surgical history on file.  SOCHX:  reports that she has never smoked. She has never used smokeless tobacco. She reports that she does not drink alcohol and does not use drugs.  FH: family history is not on file.      Review of Systems   Constitutional:  Negative for chills and fever.   HENT:  Positive for congestion, ear pain, rhinorrhea, sinus pain and sore throat. Negative for ear discharge and hearing loss.    Respiratory:  Positive for cough. Negative for shortness of breath and wheezing.    Cardiovascular: Negative.    Gastrointestinal:  Negative for abdominal pain, diarrhea, nausea and vomiting.   Musculoskeletal:  Negative for neck pain.   Neurological:  Positive for headaches. Negative for dizziness.         Medications, Allergies, and current problem list reviewed  "today in Epic           Objective     /68 (BP Location: Left arm, Patient Position: Sitting, BP Cuff Size: Adult)   Pulse (!) 101   Temp 36.8 °C (98.3 °F) (Temporal)   Resp 16   Ht 1.702 m (5' 7\")   Wt 87.8 kg (193 lb 9.6 oz)   SpO2 97%   BMI 30.32 kg/m²      Physical Exam  Vitals and nursing note reviewed.   Constitutional:       General: She is not in acute distress.     Appearance: Normal appearance. She is well-developed. She is not ill-appearing, toxic-appearing or diaphoretic.   HENT:      Head: Normocephalic and atraumatic.      Right Ear: Hearing, tympanic membrane, ear canal and external ear normal. There is impacted cerumen.      Left Ear: Hearing, tympanic membrane, ear canal and external ear normal. There is impacted cerumen.      Nose: No congestion or rhinorrhea.      Mouth/Throat:      Mouth: Mucous membranes are moist.      Dentition: Normal dentition. No dental caries.      Pharynx: Uvula midline. Pharyngeal swelling, oropharyngeal exudate and posterior oropharyngeal erythema present. No uvula swelling.      Tonsils: Tonsillar exudate present. No tonsillar abscesses.      Comments: Clear speech, managing oral secretions  Eyes:      General: No scleral icterus.        Right eye: No discharge.         Left eye: No discharge.      Conjunctiva/sclera: Conjunctivae normal.   Neck:      Thyroid: No thyromegaly.      Trachea: No tracheal deviation.   Cardiovascular:      Rate and Rhythm: Normal rate and regular rhythm.      Pulses: Normal pulses.      Heart sounds: Normal heart sounds.   Pulmonary:      Effort: Pulmonary effort is normal. No respiratory distress.      Breath sounds: Normal breath sounds. No wheezing, rhonchi or rales.   Musculoskeletal:      Cervical back: Normal range of motion and neck supple.   Lymphadenopathy:      Head:      Right side of head: Submandibular adenopathy present.      Left side of head: Submandibular adenopathy present.      Cervical: Cervical adenopathy " present.      Right cervical: No superficial cervical adenopathy.     Left cervical: No superficial cervical adenopathy.   Skin:     General: Skin is warm and dry.      Nails: There is no clubbing.   Neurological:      General: No focal deficit present.      Mental Status: She is alert and oriented to person, place, and time. Mental status is at baseline.   Psychiatric:         Mood and Affect: Mood normal.         Behavior: Behavior normal.         Thought Content: Thought content normal.         Judgment: Judgment normal.                    Procedure: Cerumen Removal  Risks and benefits of procedure discussed  Cerumen removed with curette and lavage after softening agent instilled by me with the help of my MA  Patient tolerated well  Post procedure exam with clear canal and normal TM           Assessment & Plan      Very pleasant 17-year-old female presenting with ear pain and sore throat.  Assessment & Plan  Tonsillitis with exudate  Patient has been having sore throat swollen lymph nodes and tonsillar exudate for the past several days.  No fever chills or bodyaches.  Recommended strep test however patient declines as she is uninsured.  We will treat symptomatically for the next few days and if no improvement she may begin antibiotic for presumed strep tonsillitis.    Orders:    cefdinir (OMNICEF) 300 MG Cap; Take 1 Capsule by mouth 2 times a day.    promethazine-dextromethorphan (PROMETHAZINE-DM) 6.25-15 MG/5ML syrup; Take 5 mL by mouth 3 times a day as needed for Cough.    Bilateral impacted cerumen  Cerumen removal.  Postprocedural exam shows clear TMs with middle ear effusion.    Orders:    fluticasone (FLONASE) 50 MCG/ACT nasal spray; Administer 1 Spray into affected nostril(S) every day.            I personally reviewed prior external notes and test results pertinent to today's visit. Return to clinic or go to ED if symptoms worsen or persist. Red flag symptoms and indications for ED discussed at length.  Patient/Parent/Guardian voices understanding.  AVS with post-visit instructions printed and provided or given verbally.  Follow-up with your primary care provider in 3-5 days. All side effects and potential interactions of prescribed medication discussed including allergic response, GI upset, tendon injury, rash, sedation, OCP effectiveness, etc.    Please note that this dictation was created using voice recognition software. I have made every reasonable attempt to correct obvious errors, but I expect that there are errors of grammar and possibly content that I did not discover before finalizing the note.

## 2024-09-10 ENCOUNTER — PROCEDURE VISIT (OUTPATIENT)
Dept: NEUROLOGY | Facility: CLINIC | Age: 59
End: 2024-09-10
Payer: MEDICARE

## 2024-09-10 VITALS
HEART RATE: 87 BPM | BODY MASS INDEX: 26.41 KG/M2 | HEIGHT: 59 IN | DIASTOLIC BLOOD PRESSURE: 83 MMHG | OXYGEN SATURATION: 97 % | TEMPERATURE: 98 F | WEIGHT: 131 LBS | SYSTOLIC BLOOD PRESSURE: 132 MMHG

## 2024-09-10 DIAGNOSIS — G43.709 CHRONIC MIGRAINE WITHOUT AURA WITHOUT STATUS MIGRAINOSUS, NOT INTRACTABLE: Primary | ICD-10-CM

## 2024-09-10 PROCEDURE — 64615 CHEMODENERV MUSC MIGRAINE: CPT | Performed by: PHYSICIAN ASSISTANT

## 2024-09-10 NOTE — PROGRESS NOTES
"Universal Protocol   procedure performed by consultantConsent: Verbal consent obtained. Written consent obtained.  Risks and benefits: risks, benefits and alternatives were discussed  Consent given by: patient  Time out: Immediately prior to procedure a \"time out\" was called to verify the correct patient, procedure, equipment, support staff and site/side marked as required.  Patient understanding: patient states understanding of the procedure being performed  Patient consent: the patient's understanding of the procedure matches consent given  Procedure consent: procedure consent matches procedure scheduled  Relevant documents: relevant documents present and verified  Patient identity confirmed: verbally with patient      Chemodenervation     Date/Time  9/10/2024 9:30 AM     Performed by  Betzaida Morales PA-C   Authorized by  Betzaida Morales PA-C     Pre-procedure details      Preparation: Patient was prepped and draped in usual sterile fashion     Anesthesia  (see MAR for exact dosages):     Anesthesia method:  None   Procedure details      Position:  Upright   Botox      Botox Type:  Type A    Brand:  Botox    mL's of Botulinum Toxin:  200    mL's of preservative free sterile saline:  4    Final Concentration per CC:  50 units    Needle Gauge:  30 G 2.5 inch   Procedures      Botox Procedures: chronic headache     Injection Location      Head / Face:  L superior cervical paraspinal, R superior cervical paraspinal, L , R , R frontalis, L frontalis, R medial occipitalis, L medial occipitalis, procerus, R temporalis, L superior trapezius, R superior trapezius and L temporalis    L  injection amount:  5 unit(s)    R  injection amount:  5 unit(s)    L lateral frontalis:  5 unit(s)    R lateral frontalis:  5 unit(s)    L medial frontalis:  5 unit(s)    R medial frontalis:  5 unit(s)    L temporalis injection amount:  20 unit(s)    R temporalis injection amount:  20 unit(s)    Procerus " injection amount:  5 unit(s)    L medial occipitalis injection amount:  15 unit(s)    R medial occipitalis injection amount:  15 unit(s)    L superior cervical paraspinal injection amount:  10 unit(s)    R superior cervical paraspinal injection amount:  10 unit(s)    L superior trapezius injection amount:  15 unit(s)    R superior trapezius injection amount:  15 unit(s)   Total Units      Total units used:  200   Post-procedure details      Chemodenervation:  Chronic migraine    Facial Nerve Location::  Bilateral facial nerve    Patient tolerance of procedure:  Tolerated well, no immediate complications   Comments       35 units right frontalis, temporalis, parietal and occipitalis  10 units left occipitalis  All medically necessary

## 2024-09-19 DIAGNOSIS — G43.709 CHRONIC MIGRAINE WITHOUT AURA WITHOUT STATUS MIGRAINOSUS, NOT INTRACTABLE: ICD-10-CM

## 2024-09-30 ENCOUNTER — OFFICE VISIT (OUTPATIENT)
Dept: FAMILY MEDICINE CLINIC | Facility: CLINIC | Age: 59
End: 2024-09-30
Payer: MEDICARE

## 2024-09-30 VITALS
DIASTOLIC BLOOD PRESSURE: 78 MMHG | HEART RATE: 95 BPM | TEMPERATURE: 97.3 F | BODY MASS INDEX: 26.61 KG/M2 | OXYGEN SATURATION: 96 % | WEIGHT: 132 LBS | SYSTOLIC BLOOD PRESSURE: 120 MMHG | HEIGHT: 59 IN

## 2024-09-30 DIAGNOSIS — K21.9 GASTROESOPHAGEAL REFLUX DISEASE WITHOUT ESOPHAGITIS: ICD-10-CM

## 2024-09-30 DIAGNOSIS — Z23 IMMUNIZATION DUE: ICD-10-CM

## 2024-09-30 DIAGNOSIS — R07.9 CHEST PAIN, UNSPECIFIED TYPE: Primary | ICD-10-CM

## 2024-09-30 PROCEDURE — G0008 ADMIN INFLUENZA VIRUS VAC: HCPCS | Performed by: FAMILY MEDICINE

## 2024-09-30 PROCEDURE — 90673 RIV3 VACCINE NO PRESERV IM: CPT | Performed by: FAMILY MEDICINE

## 2024-09-30 PROCEDURE — 99214 OFFICE O/P EST MOD 30 MIN: CPT | Performed by: FAMILY MEDICINE

## 2024-09-30 RX ORDER — PANTOPRAZOLE SODIUM 40 MG/1
40 TABLET, DELAYED RELEASE ORAL 2 TIMES DAILY
Qty: 60 TABLET | Refills: 0 | Status: SHIPPED | OUTPATIENT
Start: 2024-09-30 | End: 2024-10-30

## 2024-10-04 ENCOUNTER — IOP CHECK (OUTPATIENT)
Dept: URBAN - METROPOLITAN AREA CLINIC 6 | Facility: CLINIC | Age: 59
End: 2024-10-04

## 2024-10-04 DIAGNOSIS — H40.243: ICD-10-CM

## 2024-10-04 DIAGNOSIS — H40.033: ICD-10-CM

## 2024-10-04 PROCEDURE — 92012 INTRM OPH EXAM EST PATIENT: CPT

## 2024-10-04 PROCEDURE — 92083 EXTENDED VISUAL FIELD XM: CPT

## 2024-10-04 ASSESSMENT — VISUAL ACUITY
OS_CC: 20/25-1
OD_CC: 20/25

## 2024-10-04 ASSESSMENT — TONOMETRY
OS_IOP_MMHG: 19
OD_IOP_MMHG: 17

## 2024-10-07 NOTE — PROGRESS NOTES
Patient ID: Tiffany Herbert is a 59 y.o. female.    HPI: 59 y.o.female presents for evaluation of chest pain and GERD.  Pt has been having midsternal chest pain intermittently and acid reflux despite once a day pantoprazole.  She denies any dyspnea, wheezing or hoarseness.    SUBJECTIVE    Family History   Problem Relation Age of Onset    Parkinsonism Father     Dementia Father     Hypertension Daughter     Cervical cancer Maternal Grandmother     Bipolar disorder Sister     No Known Problems Mother      Social History     Socioeconomic History    Marital status:      Spouse name: Not on file    Number of children: Not on file    Years of education: Not on file    Highest education level: Not on file   Occupational History    Not on file   Tobacco Use    Smoking status: Former     Current packs/day: 0.00     Types: Cigarettes     Quit date:      Years since quittin.     Passive exposure: Past    Smokeless tobacco: Never   Vaping Use    Vaping status: Never Used   Substance and Sexual Activity    Alcohol use: No    Drug use: No    Sexual activity: Not Currently   Other Topics Concern    Not on file   Social History Narrative    Not on file     Social Determinants of Health     Financial Resource Strain: Low Risk  (2023)    Overall Financial Resource Strain (CARDIA)     Difficulty of Paying Living Expenses: Not hard at all   Food Insecurity: No Food Insecurity (2024)    Hunger Vital Sign     Worried About Running Out of Food in the Last Year: Never true     Ran Out of Food in the Last Year: Never true   Transportation Needs: No Transportation Needs (2024)    PRAPARE - Transportation     Lack of Transportation (Medical): No     Lack of Transportation (Non-Medical): No   Physical Activity: Not on file   Stress: Not on file   Social Connections: Unknown (2024)    Received from [facility]    Social Connections     How often do you feel lonely or isolated from those around you? (Adult -  for ages 18 years and over): Not on file   Intimate Partner Violence: Not on file   Housing Stability: Low Risk  (2024)    Housing Stability Vital Sign     Unable to Pay for Housing in the Last Year: No     Number of Times Moved in the Last Year: 1     Homeless in the Last Year: No     Past Medical History:   Diagnosis Date    Bilateral breast cysts 2019 r    right breast cysts; dense breast tissue bilaterally    Cervical spinal stenosis     Liver enzyme elevation     Migraines     Osteoporosis      Past Surgical History:   Procedure Laterality Date     SECTION      two    OVARIAN CYST SURGERY       No Known Allergies    Current Outpatient Medications:     Atogepant (Qulipta) 60 MG TABS, Take 60 mg by mouth daily at bedtime, Disp: 30 tablet, Rfl: 11    baclofen 10 mg tablet, TAKE 1 TABLET(10 MG) BY MOUTH DAILY AT BEDTIME, Disp: 30 tablet, Rfl: 0    cyclobenzaprine (FLEXERIL) 5 mg tablet, Take 1 tablet (5 mg total) by mouth daily at bedtime, Disp: 30 tablet, Rfl: 3    divalproex sodium (DEPAKOTE ER) 250 mg 24 hr tablet, Take 2 tablets (500 mg total) by mouth daily at bedtime, Disp: 180 tablet, Rfl: 2    ketorolac (TORADOL) 10 mg tablet, Take 1 tablet (10 mg total) by mouth daily as needed for severe pain Max 1/day, 3/week, 10/month. Do not use with other OTC pain meds, Disp: 10 tablet, Rfl: 0    latanoprost (XALATAN) 0.005 % ophthalmic solution, INSTILL 1 DROP BOTH EYES EVERY NIGHT AT BEDTIME, Disp: , Rfl:     levothyroxine 75 mcg tablet, TAKE 1 TABLET(75 MCG) BY MOUTH DAILY EARLY MORNING, Disp: 90 tablet, Rfl: 1    omeprazole (PriLOSEC) 20 mg delayed release capsule, TAKE 1 CAPSULE(20 MG) BY MOUTH DAILY, Disp: 90 capsule, Rfl: 3    ondansetron (ZOFRAN) 4 mg tablet, Take 1 tablet (4 mg total) by mouth every 8 (eight) hours as needed for nausea or vomiting for up to 2 days, Disp: 6 tablet, Rfl: 0    pantoprazole (PROTONIX) 40 mg tablet, Take 1 tablet (40 mg total) by mouth 2 (two) times a day,  "Disp: 60 tablet, Rfl: 0    rizatriptan (MAXALT-MLT) 10 mg disintegrating tablet, Take 1 tablet (10 mg total) by mouth as needed for migraine Take at the onset of migraine; if symptoms continue or return, may take another dose at least 2 hours after first dose. Take no more than 2 doses in a day., Disp: 9 tablet, Rfl: 6    SUMAtriptan Succinate 6 MG/0.5ML SOAJ, INJECT ONCE FOR SEVERE HEADACHE. MAY REPEAT AFTER 2 HORUS. MAX 2 INJECTIONS PER WEEK, Disp: 3 mL, Rfl: 6    venlafaxine (EFFEXOR-XR) 150 mg 24 hr capsule, Take 1 capsule (150 mg total) by mouth daily with breakfast, Disp: 30 capsule, Rfl: 5    Review of Systems  Constitutional:     Denies fever, chills ,fatigue ,weakness ,weight loss, weight gain     ENT: Denies earache ,loss of hearing ,nosebleed, nasal discharge,nasal congestion ,sore throat ,hoarseness  Pulmonary: Denies shortness of breath ,cough  ,dyspnea on exertion, orthopnea  ,PND   Cardiovascular:  Denies bradycardia , tachycardia  ,palpations, lower extremity edema leg, claudication + midsternal chest pain  Breast:  Denies new or changing breast lumps ,nipple discharge ,nipple changes  Abdomen:  Denies abdominal pain , anorexia , indigestion, nausea, vomiting, constipation, diarrhea, + acid reflux  Musculoskeletal: Denies myalgias, arthralgias, joint swelling, joint stiffness , limb pain, limb swelling  Gu: denies dysuria, polyuria  Skin: Denies skin rash, skin lesion, skin wound, itching, dry skin  Neuro: Denies headache, numbness, tingling, confusion, loss of consciousness, dizziness, vertigo  Psychiatric: Denies feelings of depression, suicidal ideation, anxiety, sleep disturbances    OBJECTIVE  /78 (BP Location: Left arm, Patient Position: Sitting, Cuff Size: Adult)   Pulse 95   Temp (!) 97.3 °F (36.3 °C)   Ht 4' 11\" (1.499 m)   Wt 59.9 kg (132 lb)   SpO2 96%   BMI 26.66 kg/m²   Constitutional:   NAD, well appearing and well nourished      ENT:   Conjunctiva and lids: no injection, " edema, or discharge     Pupils and iris: LUIS bilaterally    External inspection of ears and nose: normal without deformities or discharge.      Otoscopic exam: Canals patent without erythema.       Nasal mucosa, septum and turbinates: Normal or edema or discharge         Oropharynx:  Moist mucosa, normal tongue and tonsils without lesions. No erythema        Pulmonary:Respiratory effort normal rate and rhythm, no increased work of breathing. Auscultation of lungs:  Clear bilaterally with no adventitious breath sounds       Cardiovascular: regular rate and rhythm, S1 and S2, no murmur, no edema and/or varicosities of LE      Abdomen: Soft and non-distended     Positive bowel sounds      No heptomegaly or splenomegaly      Gu: no suprapubic tenderness or CVA tenderness, no urethral discharge  Lymphatic:  No anterior or posterior cervical lymphadenopathy         Musculoskeletal:  Gait and station: Normal gait      Digits and nails normal without clubbing or cyanosis       Inspection/palpation of joints, bones, and muscles:  No joint tenderness, swelling, full active and passive range of motion       Skin: Normal skin turgor and no rashes      Neuro:       Normal reflexes      Psych:   alert and oriented to person, place and time     normal mood and affect       Assessment/Plan:Diagnoses and all orders for this visit:    Chest pain, unspecified type  -     Echo stress test, exercise; Future    Gastroesophageal reflux disease without esophagitis  -     pantoprazole (PROTONIX) 40 mg tablet; Take 1 tablet (40 mg total) by mouth 2 (two) times a day    Immunization due  -     Flublok (recombinant) 0.5 mL IM        Reviewed with patient plan to treat with above plan.    Patient instructed to call in 72 hours if not feeling better or if symptoms worsen

## 2024-10-08 DIAGNOSIS — F32.A DEPRESSION, UNSPECIFIED DEPRESSION TYPE: ICD-10-CM

## 2024-10-09 RX ORDER — VENLAFAXINE HYDROCHLORIDE 150 MG/1
CAPSULE, EXTENDED RELEASE ORAL
Qty: 30 CAPSULE | Refills: 5 | Status: SHIPPED | OUTPATIENT
Start: 2024-10-09

## 2024-11-01 ENCOUNTER — HOSPITAL ENCOUNTER (OUTPATIENT)
Dept: NON INVASIVE DIAGNOSTICS | Facility: HOSPITAL | Age: 59
Discharge: HOME | End: 2024-11-01

## 2024-11-01 DIAGNOSIS — K21.9 GASTROESOPHAGEAL REFLUX DISEASE WITHOUT ESOPHAGITIS: ICD-10-CM

## 2024-11-01 RX ORDER — PANTOPRAZOLE SODIUM 40 MG/1
40 TABLET, DELAYED RELEASE ORAL 2 TIMES DAILY
Qty: 180 TABLET | Refills: 0 | Status: SHIPPED | OUTPATIENT
Start: 2024-11-01

## 2024-11-19 DIAGNOSIS — G43.709 CHRONIC MIGRAINE WITHOUT AURA WITHOUT STATUS MIGRAINOSUS, NOT INTRACTABLE: ICD-10-CM

## 2024-11-19 RX ORDER — RIZATRIPTAN BENZOATE 10 MG/1
TABLET, ORALLY DISINTEGRATING ORAL
Qty: 9 TABLET | Refills: 5 | Status: SHIPPED | OUTPATIENT
Start: 2024-11-19

## 2024-11-21 ENCOUNTER — OFFICE VISIT (OUTPATIENT)
Dept: FAMILY MEDICINE CLINIC | Facility: CLINIC | Age: 59
End: 2024-11-21
Payer: MEDICARE

## 2024-11-21 VITALS
HEIGHT: 59 IN | OXYGEN SATURATION: 98 % | DIASTOLIC BLOOD PRESSURE: 84 MMHG | TEMPERATURE: 97.6 F | HEART RATE: 94 BPM | BODY MASS INDEX: 27.13 KG/M2 | SYSTOLIC BLOOD PRESSURE: 122 MMHG | WEIGHT: 134.6 LBS

## 2024-11-21 DIAGNOSIS — J01.90 ACUTE SINUSITIS, RECURRENCE NOT SPECIFIED, UNSPECIFIED LOCATION: Primary | ICD-10-CM

## 2024-11-21 DIAGNOSIS — G43.709 CHRONIC MIGRAINE WITHOUT AURA WITHOUT STATUS MIGRAINOSUS, NOT INTRACTABLE: ICD-10-CM

## 2024-11-21 PROCEDURE — 96372 THER/PROPH/DIAG INJ SC/IM: CPT | Performed by: FAMILY MEDICINE

## 2024-11-21 PROCEDURE — 99214 OFFICE O/P EST MOD 30 MIN: CPT | Performed by: FAMILY MEDICINE

## 2024-11-21 RX ORDER — PREDNISONE 10 MG/1
TABLET ORAL
Qty: 26 TABLET | Refills: 0 | Status: SHIPPED | OUTPATIENT
Start: 2024-11-21

## 2024-11-21 RX ORDER — KETOROLAC TROMETHAMINE 30 MG/ML
60 INJECTION, SOLUTION INTRAMUSCULAR; INTRAVENOUS ONCE
Status: COMPLETED | OUTPATIENT
Start: 2024-11-21 | End: 2024-11-21

## 2024-11-21 RX ORDER — CEFUROXIME AXETIL 500 MG/1
500 TABLET ORAL EVERY 12 HOURS SCHEDULED
Qty: 20 TABLET | Refills: 0 | Status: SHIPPED | OUTPATIENT
Start: 2024-11-21 | End: 2024-12-01

## 2024-11-21 RX ORDER — KETOROLAC TROMETHAMINE 10 MG/1
10 TABLET, FILM COATED ORAL DAILY PRN
Qty: 10 TABLET | Refills: 1 | Status: SHIPPED | OUTPATIENT
Start: 2024-11-21

## 2024-11-21 RX ADMIN — KETOROLAC TROMETHAMINE 60 MG: 30 INJECTION, SOLUTION INTRAMUSCULAR; INTRAVENOUS at 08:46

## 2024-11-25 DIAGNOSIS — M54.2 CERVICALGIA: ICD-10-CM

## 2024-11-26 RX ORDER — CYCLOBENZAPRINE HCL 5 MG
5 TABLET ORAL
Qty: 30 TABLET | Refills: 3 | Status: SHIPPED | OUTPATIENT
Start: 2024-11-26

## 2024-11-27 ENCOUNTER — HOSPITAL ENCOUNTER (OUTPATIENT)
Dept: NON INVASIVE DIAGNOSTICS | Facility: HOSPITAL | Age: 59
Discharge: HOME | End: 2024-11-27
Payer: MEDICARE

## 2024-11-27 VITALS — WEIGHT: 134 LBS | HEIGHT: 59 IN | BODY MASS INDEX: 27.01 KG/M2

## 2024-11-27 DIAGNOSIS — R07.9 CHEST PAIN, UNSPECIFIED TYPE: ICD-10-CM

## 2024-11-27 LAB
APICAL FOUR CHAMBER EJECTION FRACTION: 60 %
ARRHY DURING EX: NORMAL
CHEST PAIN STATEMENT: NORMAL
MAX DIASTOLIC BP: 90 MMHG
MAX HR PERCENT: 96 %
MAX HR: 153 BPM
MAX PREDICTED HEART RATE: 160 BPM
PROTOCOL NAME: NORMAL
RATE PRESSURE PRODUCT: NORMAL
REASON FOR TERMINATION: NORMAL
SL CV LV EF: 60
SL CV STRESS RECOVERY BP: NORMAL MMHG
SL CV STRESS RECOVERY HR: 93 BPM
SL CV STRESS RECOVERY O2 SAT: 98 %
SL CV STRESS STAGE REACHED: 3
STRESS ANGINA INDEX: 0
STRESS BASELINE BP: 98 MMHG
STRESS BASELINE HR: 83 BPM
STRESS PEAK HR: 153 BPM
STRESS POST ESTIMATED WORKLOAD: 8.5 METS
STRESS POST EXERCISE DUR MIN: 7 MIN
STRESS POST EXERCISE DUR MIN: 7 MIN
STRESS POST EXERCISE DUR SEC: 0 SEC
STRESS POST O2 SAT PEAK: 96 %
STRESS POST PEAK BP: 138 MMHG
STRESS POST PEAK HR: 169 BPM
STRESS POST PEAK SYSTOLIC BP: 138 MMHG
TARGET HR FORMULA: NORMAL
TEST INDICATION: NORMAL

## 2024-11-27 PROCEDURE — 93350 STRESS TTE ONLY: CPT | Performed by: INTERNAL MEDICINE

## 2024-11-27 PROCEDURE — 93350 STRESS TTE ONLY: CPT

## 2024-11-29 ENCOUNTER — RESULTS FOLLOW-UP (OUTPATIENT)
Dept: FAMILY MEDICINE CLINIC | Facility: CLINIC | Age: 59
End: 2024-11-29

## 2024-11-29 ENCOUNTER — TELEPHONE (OUTPATIENT)
Age: 59
End: 2024-11-29

## 2024-11-29 NOTE — TELEPHONE ENCOUNTER
11/29/24    Patient Canceled appt from 11/25/24 has been Rescheduled as an ADD-ON for 12/02/24, 10:00 AM With Miss. Morales at the [facility].     Patient stated that she canceled her 11/25/24 due to her headaches being bad, and now they are back and needs to see her Provider and can't wait until her [facility] APPT.       Any questions, please contact Patient.   Thank You.

## 2024-12-04 ENCOUNTER — OFFICE VISIT (OUTPATIENT)
Dept: NEUROLOGY | Facility: CLINIC | Age: 59
End: 2024-12-04
Payer: MEDICARE

## 2024-12-04 VITALS
WEIGHT: 135 LBS | BODY MASS INDEX: 27.21 KG/M2 | SYSTOLIC BLOOD PRESSURE: 156 MMHG | DIASTOLIC BLOOD PRESSURE: 100 MMHG | HEART RATE: 90 BPM | HEIGHT: 59 IN | TEMPERATURE: 97.6 F

## 2024-12-04 DIAGNOSIS — G93.2 IIH (IDIOPATHIC INTRACRANIAL HYPERTENSION): ICD-10-CM

## 2024-12-04 DIAGNOSIS — R51.9 WORSENING HEADACHES: ICD-10-CM

## 2024-12-04 DIAGNOSIS — E53.8 VITAMIN B12 DEFICIENCY: ICD-10-CM

## 2024-12-04 DIAGNOSIS — E55.9 VITAMIN D DEFICIENCY: ICD-10-CM

## 2024-12-04 DIAGNOSIS — G43.709 CHRONIC MIGRAINE WITHOUT AURA WITHOUT STATUS MIGRAINOSUS, NOT INTRACTABLE: Primary | ICD-10-CM

## 2024-12-04 DIAGNOSIS — G43.019 INTRACTABLE MIGRAINE WITHOUT AURA AND WITHOUT STATUS MIGRAINOSUS: ICD-10-CM

## 2024-12-04 DIAGNOSIS — M54.2 CERVICALGIA: ICD-10-CM

## 2024-12-04 DIAGNOSIS — R29.818 SUSPECTED SLEEP APNEA: ICD-10-CM

## 2024-12-04 PROCEDURE — 96372 THER/PROPH/DIAG INJ SC/IM: CPT | Performed by: PHYSICIAN ASSISTANT

## 2024-12-04 PROCEDURE — 99215 OFFICE O/P EST HI 40 MIN: CPT | Performed by: PHYSICIAN ASSISTANT

## 2024-12-04 RX ORDER — DEXAMETHASONE 4 MG/1
TABLET ORAL
Qty: 9 TABLET | Refills: 0 | Status: SHIPPED | OUTPATIENT
Start: 2024-12-04

## 2024-12-04 RX ORDER — PROCHLORPERAZINE EDISYLATE 5 MG/ML
10 INJECTION INTRAMUSCULAR; INTRAVENOUS ONCE
Status: COMPLETED | OUTPATIENT
Start: 2024-12-04 | End: 2024-12-04

## 2024-12-04 RX ORDER — KETOROLAC TROMETHAMINE 30 MG/ML
60 INJECTION, SOLUTION INTRAMUSCULAR; INTRAVENOUS ONCE
Status: COMPLETED | OUTPATIENT
Start: 2024-12-04 | End: 2024-12-04

## 2024-12-04 RX ORDER — CEFUROXIME AXETIL 250 MG/1
TABLET ORAL
Qty: 3 ML | Refills: 6 | Status: SHIPPED | OUTPATIENT
Start: 2024-12-04

## 2024-12-04 RX ORDER — PROPRANOLOL HYDROCHLORIDE 10 MG/1
10 TABLET ORAL 2 TIMES DAILY
Qty: 60 TABLET | Refills: 2 | Status: SHIPPED | OUTPATIENT
Start: 2024-12-04 | End: 2024-12-05

## 2024-12-04 RX ORDER — NAPROXEN 500 MG/1
500 TABLET ORAL 2 TIMES DAILY WITH MEALS
Qty: 20 TABLET | Refills: 2 | Status: SHIPPED | OUTPATIENT
Start: 2024-12-04

## 2024-12-04 RX ORDER — RIZATRIPTAN BENZOATE 10 MG/1
10 TABLET, ORALLY DISINTEGRATING ORAL AS NEEDED
Qty: 12 TABLET | Refills: 5 | Status: SHIPPED | OUTPATIENT
Start: 2024-12-04

## 2024-12-04 RX ADMIN — KETOROLAC TROMETHAMINE 60 MG: 30 INJECTION, SOLUTION INTRAMUSCULAR; INTRAVENOUS at 15:18

## 2024-12-04 RX ADMIN — PROCHLORPERAZINE EDISYLATE 10 MG: 5 INJECTION INTRAMUSCULAR; INTRAVENOUS at 15:19

## 2024-12-04 NOTE — PROGRESS NOTES
Review of Systems   Constitutional:  Negative for appetite change and fever.   HENT: Negative.  Negative for hearing loss, tinnitus, trouble swallowing and voice change.    Eyes:  Positive for photophobia and visual disturbance (white spots). Negative for pain.   Respiratory: Negative.  Negative for shortness of breath.    Cardiovascular: Negative.  Negative for palpitations.   Gastrointestinal:  Positive for nausea and vomiting.   Endocrine: Negative.  Negative for cold intolerance.   Genitourinary: Negative.  Negative for dysuria, frequency and urgency.   Musculoskeletal: Negative.  Negative for myalgias and neck pain.   Skin: Negative.  Negative for rash.   Neurological:  Positive for headaches (4-5 a week). Negative for dizziness, tremors, seizures, syncope, facial asymmetry, speech difficulty, weakness, light-headedness and numbness.   Hematological: Negative.  Does not bruise/bleed easily.   Psychiatric/Behavioral:  Positive for sleep disturbance. Negative for confusion and hallucinations.

## 2024-12-04 NOTE — TELEPHONE ENCOUNTER
Patient called to [facility] missed appointment 12/2/24 due to her migraine; Scheduled ADD ON 12/4/24 2:30 PM Betzaida Morales PA-C [facility].      Thank you

## 2024-12-04 NOTE — PROGRESS NOTES
Patient ID: Tiffany Herbert is a 59 y.o. female.    HPI: 59 y.o.female presenting with symptoms of sinus pain,pressure, nasal congestion, pnd dry cough , ear and throat pain.  She also has been experiencing migraines along with these symptoms, but imitrex has not been helping.  She is interested in trying something else.      SUBJECTIVE    Family History   Problem Relation Age of Onset    Parkinsonism Father     Dementia Father     Hypertension Daughter     Cervical cancer Maternal Grandmother     Bipolar disorder Sister     No Known Problems Mother      Social History     Socioeconomic History    Marital status:      Spouse name: Not on file    Number of children: Not on file    Years of education: Not on file    Highest education level: Not on file   Occupational History    Not on file   Tobacco Use    Smoking status: Former     Current packs/day: 0.00     Types: Cigarettes     Quit date:      Years since quittin.     Passive exposure: Past    Smokeless tobacco: Never   Vaping Use    Vaping status: Never Used   Substance and Sexual Activity    Alcohol use: No    Drug use: No    Sexual activity: Not Currently   Other Topics Concern    Not on file   Social History Narrative    Not on file     Social Drivers of Health     Financial Resource Strain: Low Risk  (2023)    Overall Financial Resource Strain (CARDIA)     Difficulty of Paying Living Expenses: Not hard at all   Food Insecurity: No Food Insecurity (2024)    Nursing - Inadequate Food Risk Classification     Worried About Running Out of Food in the Last Year: Never true     Ran Out of Food in the Last Year: Never true     Ran Out of Food in the Last Year: Not on file   Transportation Needs: No Transportation Needs (2024)    PRAPARE - Transportation     Lack of Transportation (Medical): No     Lack of Transportation (Non-Medical): No   Physical Activity: Not on file   Stress: Not on file   Social Connections: Unknown (2024)     Received from [employer]     How often do you feel lonely or isolated from those around you? (Adult - for ages 18 years and over): Not on file   Intimate Partner Violence: Not on file   Housing Stability: Low Risk  (2024)    Housing Stability Vital Sign     Unable to Pay for Housing in the Last Year: No     Number of Times Moved in the Last Year: 1     Homeless in the Last Year: No     Past Medical History:   Diagnosis Date    Bilateral breast cysts 2019 r    right breast cysts; dense breast tissue bilaterally    Cervical spinal stenosis     Liver enzyme elevation     Migraines     Osteoporosis      Past Surgical History:   Procedure Laterality Date     SECTION      two    OVARIAN CYST SURGERY       No Known Allergies    Current Outpatient Medications:     Atogepant (Qulipta) 60 MG TABS, Take 60 mg by mouth daily at bedtime, Disp: 30 tablet, Rfl: 11    baclofen 10 mg tablet, TAKE 1 TABLET(10 MG) BY MOUTH DAILY AT BEDTIME, Disp: 30 tablet, Rfl: 0    divalproex sodium (DEPAKOTE ER) 250 mg 24 hr tablet, Take 2 tablets (500 mg total) by mouth daily at bedtime, Disp: 180 tablet, Rfl: 2    ketorolac (TORADOL) 10 mg tablet, Take 1 tablet (10 mg total) by mouth daily as needed for severe pain Max 1/day, 3/week, 10/month. Do not use with other OTC pain meds, Disp: 10 tablet, Rfl: 1    latanoprost (XALATAN) 0.005 % ophthalmic solution, INSTILL 1 DROP BOTH EYES EVERY NIGHT AT BEDTIME, Disp: , Rfl:     levothyroxine 75 mcg tablet, TAKE 1 TABLET(75 MCG) BY MOUTH DAILY EARLY MORNING, Disp: 90 tablet, Rfl: 1    ondansetron (ZOFRAN) 4 mg tablet, Take 1 tablet (4 mg total) by mouth every 8 (eight) hours as needed for nausea or vomiting for up to 2 days, Disp: 6 tablet, Rfl: 0    pantoprazole (PROTONIX) 40 mg tablet, TAKE 1 TABLET(40 MG) BY MOUTH TWICE DAILY, Disp: 180 tablet, Rfl: 0    predniSONE 10 mg tablet, 3 tabs po bid x2 days, then 2 tabs po bid x2 days, then 1 tab bid x2 days,  "then 1 daily until done., Disp: 26 tablet, Rfl: 0    rizatriptan (MAXALT-MLT) 10 mg disintegrating tablet, TAKE 1 TABLET BY MOUTH AS NEEDED FOR MIGRAINE. IF SYMPTOMS CONTINUE, OR RETURN, MAY TAKE ANOTHER DOSE AT LEAST 2 HOURS AFTER FIRST. MAX 2 DOSES/DAY, Disp: 9 tablet, Rfl: 5    venlafaxine (EFFEXOR-XR) 150 mg 24 hr capsule, TAKE 1 CAPSULE(150 MG) BY MOUTH DAILY WITH BREAKFAST, Disp: 30 capsule, Rfl: 5    cyclobenzaprine (FLEXERIL) 5 mg tablet, TAKE 1 TABLET(5 MG) BY MOUTH DAILY AT BEDTIME, Disp: 30 tablet, Rfl: 3    omeprazole (PriLOSEC) 20 mg delayed release capsule, TAKE 1 CAPSULE(20 MG) BY MOUTH DAILY (Patient not taking: Reported on 11/21/2024), Disp: 90 capsule, Rfl: 3    Review of Systems  Constitutional:     Denies fever, chills, fatigue, weakness ,weight loss, weight gain      ENT: Denies earache, loss of hearing, nosebleed, nasal discharge,but complains of nasal congestion, sore throat,hoarseness and sinus pain and pressure    Pulmonary: Denies shortness of breath ,cough , dyspnea on exertionon, orthopnea ,+ PND   Cardiovascular:  Denies bradycardia , tachycardia ,palpations, lower extremity, edema leg, claudication  Breast:  Denies new or changing breast lumps,  nipple discharge, nipple changes,  Abdomen:  Denies abdominal pain , anorexia ,indigestion, nausea ,vomiting, constipation , diarrhea  Musculoskeletal: Denies myalgias, arthralgias, joint swelling, joint stiffness ,limb pain, limb swelling  Lymph:+ swollen glands  Gu: no dysuria or urinary frequency  Skin: Denies skin rash, skin lesion, skin wound, itching,dry skin  Neuro: +headaches, numbness, tingling, confusion, loss of consciousness, dizziness ,vertigo  Psychiatric: Denies feelings of depression, suicidal ideation, anxiety, sleep disturbances    OBJECTIVE  /84   Pulse 94   Temp 97.6 °F (36.4 °C)   Ht 4' 11\" (1.499 m)   Wt 61.1 kg (134 lb 9.6 oz)   SpO2 98%   BMI 27.19 kg/m²   Constitutional:   NAD, well appearing and well " nourished      ENT:   Conjunctiva and lids: no injection, edema, or discharge    Pupils and iris: LUIS bilaterally   External inspection of ears and nose: normal without deformities or discharge.      Otoscopic exam: Canals patent ; tm are dull, with with erythem and effusions  ENasal mucosa, septum and turbinates: Turbinae injection with discharge   Oropharynx:  Moist mucosa, normal tongue and tonsils without lesions.Erythema and injection  of post pharynx with pnd      Pulmonary:Respiratory effort normal rate and rhythm, no increased work of breathing. Auscultation of lungs:  Clear bilaterally with no adventitious breath sounds       Cardiovascular: regular rate and rhythm, S1 and S2, no murmur, no edema and/or varicosities of LE      Abdomen: Soft and non-distended    Positive bowel sounds    No heptomegaly or splenomegaly    Lymphatic: Anterior  cervical lymphadenopathy         Muscskeletal:  Gait and station: Normal gait     Digits and nails normal without clubbing or cyanosis     Inspection/palpation of joints, bones, and muscles:  No joint tenderness, swelling, full active and passive range of motion      Gu: no suprabubic tenderness, CVA tenderness or urethral discharge  Skin: Normal skin turgor and no rashes    Neuro:    Normal reflexes   Psych:   alert and oriented to person, place and time  normal mood and affect      Assessment/Plan:Diagnoses and all orders for this visit:    Acute sinusitis, recurrence not specified, unspecified location  -     predniSONE 10 mg tablet; 3 tabs po bid x2 days, then 2 tabs po bid x2 days, then 1 tab bid x2 days, then 1 daily until done.  -     cefuroxime (CEFTIN) 500 mg tablet; Take 1 tablet (500 mg total) by mouth every 12 (twelve) hours for 10 days    Chronic migraine without aura without status migrainosus, not intractable  -     ketorolac (TORADOL) 10 mg tablet; Take 1 tablet (10 mg total) by mouth daily as needed for severe pain Max 1/day, 3/week, 10/month. Do not  use with other OTC pain meds  -     ketorolac (TORADOL) 60 mg/2 mL IM injection 60 mg        Reviewed with patient plan to treat with above plan.    Patient instructed to call in 72 hours if not feeling better or if symptoms worsen

## 2024-12-04 NOTE — ASSESSMENT & PLAN NOTE
Headache/migraine treatment:   Abortive medications (for immediate treatment of a headache):   It is ok to take ibuprofen, acetaminophen or naproxen (Advil, Tylenol,  Aleve, Excedrin) if they help your headaches you should limit these to No more than 3 times a week to avoid medication overuse/rebound headaches.   At onset of migraine, take  rizatriptan MLT which dissolves on your tongue.  May repeat in 2 hours.  Limit of 3 a week or 9 a month.  IF this fails you can use the injection if needed  Take naproxen 500 mg with either of the triptans and then if has been longer than 8 hours take a bedtime on migraine nights    Continue sumatriptan injections at onset of migraine.  If this is effective, this is first choice    For your nausea/vomiting use Zofran as needed.     Prescription preventive medications for headaches/migraines   (to take every day to help prevent headaches - not to take at the time of headache):  [x] Depakote 500 mg at bedtime  [x] Cyclobenzaprine 5 mg at bedtime as needed  [x] start propranolol 10 mg twice a day  [x] We will try to get Vyepti 100 mg every 84 days approvedIF too expensive please let us know the cost so we can apply for assistance      [x] Continue botox 200 units every 91 days      For next 5 days take decadron 4 mg 2 tabs for 2 days then 1 tabs for 3 days

## 2024-12-04 NOTE — ASSESSMENT & PLAN NOTE
Due to the fact that patient has worsening headaches despite appropriate preventative and rescue treatment, we will perform MRI of the brain to ensure no nefarious structural abnormalities could be causing these worsening headaches

## 2024-12-04 NOTE — PROGRESS NOTES
Neurology Ambulatory Visit  Name: Tiffany Herbert       : 1965       MRN: [id number]   Encounter Provider: Betzaida Morales PA-C   Encounter Date: 2024  Encounter [facility]: [facility] [place] [id number]. Chronic migraine without aura without status migrainosus, not intractable  Assessment & Plan:  Headache/migraine treatment:   Abortive medications (for immediate treatment of a headache):   It is ok to take ibuprofen, acetaminophen or naproxen (Advil, Tylenol,  Aleve, Excedrin) if they help your headaches you should limit these to No more than 3 times a week to avoid medication overuse/rebound headaches.   At onset of migraine, take  rizatriptan MLT which dissolves on your tongue.  May repeat in 2 hours.  Limit of 3 a week or 9 a month.  IF this fails you can use the injection if needed  Take naproxen 500 mg with either of the triptans and then if has been longer than 8 hours take a bedtime on migraine nights    Continue sumatriptan injections at onset of migraine.  If this is effective, this is first choice    For your nausea/vomiting use Zofran as needed.     Prescription preventive medications for headaches/migraines   (to take every day to help prevent headaches - not to take at the time of headache):  [x] Depakote 500 mg at bedtime  [x] Cyclobenzaprine 5 mg at bedtime as needed  [x] start propranolol 10 mg twice a day  [x] We will try to get Vyepti 100 mg every 84 days approvedIF too expensive please let us know the cost so we can apply for assistance      [x] Continue botox 200 units every 91 days      For next 5 days take decadron 4 mg 2 tabs for 2 days then 1 tabs for 3 days  Orders:  -     SUMAtriptan Succinate 6 MG/0.5ML SOAJ; INJECT ONCE FOR SEVERE HEADACHE. MAY REPEAT AFTER 2 HORUS. MAX 2 INJECTIONS PER WEEK  -     rizatriptan (MAXALT-MLT) 10 mg disintegrating tablet; Take 1 tablet (10 mg total) by mouth as needed for migraine Take at the onset of migraine; if symptoms continue  or return, may take another dose at least 2 hours after first dose. Take no more than 2 doses in a day.  2. Worsening headaches  Assessment & Plan:  Due to the fact that patient has worsening headaches despite appropriate preventative and rescue treatment, we will perform MRI of the brain to ensure no nefarious structural abnormalities could be causing these worsening headaches  Orders:  -     MRI brain with and without contrast; Future; Expected date: 12/04/2024  -     TSH + Free T4; Future  3. Cervicalgia  4. Suspected sleep apnea  Assessment & Plan:  Referral for sleep medicine to address her sleep issues and test for sleep apnea  Orders:  -     Ambulatory Referral to Sleep Medicine; Future  5. IIH (idiopathic intracranial hypertension)  6. Intractable migraine without aura and without status migrainosus  -     SUMAtriptan Succinate 6 MG/0.5ML SOAJ; INJECT ONCE FOR SEVERE HEADACHE. MAY REPEAT AFTER 2 HORUS. MAX 2 INJECTIONS PER WEEK  -     naproxen (Naprosyn) 500 mg tablet; Take 1 tablet (500 mg total) by mouth 2 (two) times a day with meals  -     propranolol (INDERAL) 10 mg tablet; Take 1 tablet (10 mg total) by mouth 2 (two) times a day  -     dexamethasone (DECADRON) 4 mg tablet; Take 2 tabs for 2 days then take 1 tab for 3-5 days (until headaches break then stop)  -     ketorolac (TORADOL) 60 mg/2 mL IM injection 60 mg  -     prochlorperazine (COMPAZINE) injection 10 mg  -     Comprehensive metabolic panel; Future  -     CBC (Includes Diff/Plt) (Refl); Future  7. Vitamin D deficiency  -     Vitamin D 25 hydroxy; Future  8. Vitamin B12 deficiency  -     Vitamin B12; Future      History of Present Illness     HPI   Tiffany Herbert is a 59 y.o. Female who presents for headaches    Doesn't work but is on disability        Current medical illnesses:  QTc 9/10/2023 398 ms  History Tobacco use:quit     Sleep apnea  GERD  Idiopathic hypertension  Fatigue  Hypothyroidism     What medications do you take or have you  taken for your headaches?   Current Preventive:   Alprazolam, venlafaxine  Amlodipine  Baclofen  Depakote  Qulipta--took for 3 months (?)     Current Abortive:   Dexamethasone  Ketorolac  Rizatriptan  Sumatriptan injections     Prior Preventive:   Acetazolamide  Escitalopram  Ajovy, Emgality,Aimovig  Gabapentin, topiramate     Prior Abortive:   Metoclopramide, ondansetron, prochlorperazine  Sumatriptan  Medrol pack      Interval update as of 12/4/2024:  Patient has been doing worse since last seen.  She did stop her Qulipta in approximately September as she did not feel it weeks helping.  Patient also has not gotten her sleep study due to not feeling well when it was originally scheduled and then unable to reschedule it.    Interval updates as of 5/14/2024  Reports worsening since she last was seen.  Has not been able to obtain Aimovig due to cost.          Interval history as of 7/26/2023  Patient had to get re-approved for her Aimovig.  Didn't have a dose for 3+ months!!!  Therefore her migraines did worsen to 4-5 days a week (if not more).  She did have to go to there ER on 6/4/2023 (had a migraine for over 5 weeks at that time).  Did restart in June with her injections.  When she was getting the injections on time she was having 2 a week but would respond to her rizatriptan within an hour.  However, when was delayed and not receiving the Aimovig went on for weeks at one point     Average pain is a 5-6/10, no longer a 10/10     Interval history as of 2/8/2023  Patient states that she has improved. Gets about 7 but intensity is about a 7/10  If she takes the rizatriptan right away usually starts to go away with 30-60 minutes.  Occasionally has one that lasts more than a day.  Is doing well with the AImovig.       Interval History as of 5/23/2022  - has not started injection due to cost  Migraines are every week but vary in duration shortest time is 1 day up to 4 days, averaging 2 days but can have multiple  migraines in a week.  Therefore she has migraines 3-5 days a week.     Average pain is a 10/10.  If catches immediately will be a 5-6/10    Patient is having vomiting again with her migraines and her neck pain is worse with her migraines      What is your current pain level ?  3/10     How often do the headaches occur?   - as of 5/14/2020: 5 days a week migraines   - as of 8/20/2020: 3-4 times per week, in July 2-3 really bad migraines that lasted 3-4 days    - as of 5/23/2022: 3-5 days a week will have migraines  - as of 2/8/2023:  8-10 a month, can last just a few hours but then some a few days  -As of 7/26/2023:2 a week when injecting aimovig and responsive to abortive medications, when was off her aimovig continuous for over 5 weeks and 4-5 a week prior to that one long stretch  - as of 5/15/2024:  3-4 times a week but has not been able to use the aimovig due to cost  - as of 12/4/2024:  5+ times a week     What time of the day do the headaches start? varies; a lot wakes up with them or mid-afternoon  How long do the headaches last? Over 4 hours up to 3 days  Are you ever headache free? Yes     Aura? without aura     Last eye exam: maybe 6 months ago, 2 years ago, normal except needing glasses       Where is your headache located and pain quality?   - Right or left side, worse when on right  - start back of neck   - worse on right side comes up the back of the head and goes to frontal/eye region on that side  - pulsating, pounding, sharp stabbing, pressure     What is the intensity of pain? Average:7-8/10, worst 10/10     Alternative therapies used in the past for headaches? Massage, chiropractor,  Things that make the headache worse? No specific movements, any movement      Headache triggers:  unknown     Associated symptoms:   Photophobia, phonophobia, sensitivity to smells  Nausea, vomiting  Stiff sore neck  Problems with concentration  Lightheaded or dizzy  Tinnitus  Lacrimation  Runny stuffy nose     Number  of days missed per month because of headaches:  Work (or school) days: NA  Social or Family activities: does miss occasionally due to this, but also doesn't do a lot      What time of the year do headaches occur more frequently? do not seem to be related to any time of the year  Have you seen someone else for headaches or pain? Yes, neurology, pain management  Have you had trigger point injection performed and how often? Yes  Have you had Botox injection performed and how often? Yes   Have you had epidural injections or transforaminal injections performed? Yes     Have you used CBD or THC for your headaches and how often? No     Are you current pregnant or planning on getting pregnant? No,  No - no regular periods since early 40s     Water: 2-4 bottles per day  Caffeine: 1-2 cups per day     Mood: - depression after  and granddaughter passing away      Have you ever had any Brain imaging? yes   12/11/2015 MRI brain  No intracranial abnormality.    I personally reviewed these images.  Recent laboratory data was reviewed.  Medications and allergies were reviewed.     Reviewed old notes from physician seen in the past- see above HPI for summary of previous encounters.      With botox has had a reduction of at least 7 migraine days with less abortive medication, less ER visits which correlates to headache diary       Review of Systems  Constitutional:  Negative for appetite change and fever.   HENT: Negative.  Negative for hearing loss, tinnitus, trouble swallowing and voice change.    Eyes:  Positive for photophobia and visual disturbance (white spots). Negative for pain.   Respiratory: Negative.  Negative for shortness of breath.    Cardiovascular: Negative.  Negative for palpitations.   Gastrointestinal:  Positive for nausea and vomiting.   Endocrine: Negative.  Negative for cold intolerance.   Genitourinary: Negative.  Negative for dysuria, frequency and urgency.   Musculoskeletal: Negative.  Negative for  "myalgias and neck pain.   Skin: Negative.  Negative for rash.   Neurological:  Positive for headaches (4-5 a week). Negative for dizziness, tremors, seizures, syncope, facial asymmetry, speech difficulty, weakness, light-headedness and numbness.   Hematological: Negative.  Does not bruise/bleed easily.   Psychiatric/Behavioral:  Positive for sleep disturbance. Negative for confusion and hallucinations.    I personally reviewed and updated the ROS that was entered by the medical assistant        Objective     /100 (BP Location: Left arm, Patient Position: Sitting, Cuff Size: Standard)   Pulse 90   Temp 97.6 °F (36.4 °C) (Temporal)   Ht 4' 11\" (1.499 m)   Wt 61.2 kg (135 lb)   BMI 27.27 kg/m²    Physical Exam  Neurologic Exam  CONSTITUTIONAL: Well developed, well nourished, well groomed. No dysmorphic features.     Eyes:  EOM normal      Neck:  Normal ROM, neck supple.      HEENT:  Normocephalic atraumatic.    Chest:  Respirations regular and unlabored.    Psychiatric:  Normal behavior and appropriate affect      MENTAL STATUS  Orientation: Alert and oriented x 3  Fund of knowledge: Intact.  COORDINATION   Station/Gait: Normal baseline gait.    Administrative Statements     I have spent a total time of 60 minutes in caring for this patient on the day of the visit/encounter including Diagnostic results, Prognosis, Risks and benefits of tx options, Instructions for management, Patient and family education, Importance of tx compliance, Risk factor reductions, Impressions, Counseling / Coordination of care, Documenting in the medical record, Reviewing / ordering tests, medicine, procedures  , and Obtaining or reviewing history  .      "

## 2024-12-04 NOTE — PATIENT INSTRUCTIONS
Headache/migraine treatment:   Abortive medications (for immediate treatment of a headache):   It is ok to take ibuprofen, acetaminophen or naproxen (Advil, Tylenol,  Aleve, Excedrin) if they help your headaches you should limit these to No more than 3 times a week to avoid medication overuse/rebound headaches.   At onset of migraine, take  rizatriptan MLT which dissolves on your tongue.  May repeat in 2 hours.  Limit of 3 a week or 9 a month.  IF this fails you can use the injection if needed  Take naproxen 500 mg with either of the triptans and then if has been longer than 8 hours take a bedtime on migraine nights    Continue sumatriptan injections at onset of migraine.  If this is effective, this is first choice    For your nausea/vomiting use Zofran as needed.     Prescription preventive medications for headaches/migraines   (to take every day to help prevent headaches - not to take at the time of headache):  [x] Depakote 500 mg at bedtime  [x] Cyclobenzaprine 5 mg at bedtime as needed  [x] start propranolol 10 mg twice a day  [x] We will try to get Vyepti 100 mg every 84 days approvedIF too expensive please let us know the cost so we can apply for assistance      [x] Continue botox 200 units every 91 days      For next 5 days take decadron 4 mg 2 tabs for 2 days then 1 tabs for 3 days    *Typically these types of medications take time untill you see the benefit, although some may see improvement in days, often it may take weeks, especially if the medication is being titrated up to a beneficial level. Please contact us if there are any concerns or questions regarding the medication.       Lifestyle Recommendations:  [x] SLEEP - Maintain a regular sleep schedule: Adults need at least 7-8 hours of uninterrupted a night. Maintain good sleep hygiene:  Going to bed and waking up at consistent times, avoiding excessive daytime naps, avoiding caffeinated beverages in the evening, avoid excessive stimulation in the  evening and generally using bed primarily for sleeping.  One hour before bedtime would recommend turning lights down lower, decreasing your activity (may read quietly, listen to music at a low volume). When you get into bed, should eliminate all technology (no texting, emailing, playing with your phone, iPad or tablet in bed).  [x] HYDRATION - Maintain good hydration.  Drink  2L of fluid a day (4 typical small water bottles)  [x] DIET - Maintain good nutrition. In particular don't skip meals and try and eat healthy balanced meals regularly.  [x] TRIGGERS - Look for other triggers and avoid them: Limit caffeine to 1-2 cups a day or less. Avoid dietary triggers that you have noticed bring on your headaches (this could include aged cheese, peanuts, MSG, aspartame and nitrates).  [x] EXERCISE - physical exercise as we all know is good for you in many ways, and not only is good for your heart, but also is beneficial for your mental health, cognitive health and  chronic pain/headaches. I would encourage at the least 5 days of physical exercise weekly for at least 30 minutes.      Education and Follow-up  [x] Please call with any questions or concerns. Of course if any new concerning symptoms go to the emergency department.

## 2024-12-05 RX ORDER — PROPRANOLOL HYDROCHLORIDE 10 MG/1
10 TABLET ORAL 2 TIMES DAILY
Qty: 180 TABLET | Refills: 0 | Status: SHIPPED | OUTPATIENT
Start: 2024-12-05

## 2024-12-06 ENCOUNTER — RESULTS FOLLOW-UP (OUTPATIENT)
Dept: NEUROLOGY | Facility: CLINIC | Age: 59
End: 2024-12-06

## 2024-12-06 ENCOUNTER — APPOINTMENT (OUTPATIENT)
Dept: LAB | Facility: CLINIC | Age: 59
End: 2024-12-06
Payer: MEDICARE

## 2024-12-06 DIAGNOSIS — R51.9 WORSENING HEADACHES: ICD-10-CM

## 2024-12-06 DIAGNOSIS — G43.019 INTRACTABLE MIGRAINE WITHOUT AURA AND WITHOUT STATUS MIGRAINOSUS: ICD-10-CM

## 2024-12-06 DIAGNOSIS — E53.8 VITAMIN B12 DEFICIENCY: ICD-10-CM

## 2024-12-06 DIAGNOSIS — E55.9 VITAMIN D DEFICIENCY: ICD-10-CM

## 2024-12-06 LAB
25(OH)D3 SERPL-MCNC: 26.5 NG/ML (ref 30–100)
ALBUMIN SERPL BCG-MCNC: 4.2 G/DL (ref 3.5–5)
ALP SERPL-CCNC: 112 U/L (ref 34–104)
ALT SERPL W P-5'-P-CCNC: 39 U/L (ref 7–52)
ANION GAP SERPL CALCULATED.3IONS-SCNC: 8 MMOL/L (ref 4–13)
AST SERPL W P-5'-P-CCNC: 33 U/L (ref 13–39)
BASOPHILS # BLD AUTO: 0.05 THOUSANDS/ÂΜL (ref 0–0.1)
BASOPHILS NFR BLD AUTO: 1 % (ref 0–1)
BILIRUB SERPL-MCNC: 0.3 MG/DL (ref 0.2–1)
BUN SERPL-MCNC: 13 MG/DL (ref 5–25)
CALCIUM SERPL-MCNC: 9 MG/DL (ref 8.4–10.2)
CHLORIDE SERPL-SCNC: 103 MMOL/L (ref 96–108)
CO2 SERPL-SCNC: 30 MMOL/L (ref 21–32)
CREAT SERPL-MCNC: 0.76 MG/DL (ref 0.6–1.3)
EOSINOPHIL # BLD AUTO: 0.22 THOUSAND/ÂΜL (ref 0–0.61)
EOSINOPHIL NFR BLD AUTO: 3 % (ref 0–6)
ERYTHROCYTE [DISTWIDTH] IN BLOOD BY AUTOMATED COUNT: 13 % (ref 11.6–15.1)
GFR SERPL CREATININE-BSD FRML MDRD: 85 ML/MIN/1.73SQ M
GLUCOSE P FAST SERPL-MCNC: 83 MG/DL (ref 65–99)
HCT VFR BLD AUTO: 42.8 % (ref 34.8–46.1)
HGB BLD-MCNC: 13.8 G/DL (ref 11.5–15.4)
IMM GRANULOCYTES # BLD AUTO: 0.02 THOUSAND/UL (ref 0–0.2)
IMM GRANULOCYTES NFR BLD AUTO: 0 % (ref 0–2)
LYMPHOCYTES # BLD AUTO: 2.7 THOUSANDS/ÂΜL (ref 0.6–4.47)
LYMPHOCYTES NFR BLD AUTO: 37 % (ref 14–44)
MCH RBC QN AUTO: 30 PG (ref 26.8–34.3)
MCHC RBC AUTO-ENTMCNC: 32.2 G/DL (ref 31.4–37.4)
MCV RBC AUTO: 93 FL (ref 82–98)
MONOCYTES # BLD AUTO: 0.58 THOUSAND/ÂΜL (ref 0.17–1.22)
MONOCYTES NFR BLD AUTO: 8 % (ref 4–12)
NEUTROPHILS # BLD AUTO: 3.77 THOUSANDS/ÂΜL (ref 1.85–7.62)
NEUTS SEG NFR BLD AUTO: 51 % (ref 43–75)
NRBC BLD AUTO-RTO: 0 /100 WBCS
PLATELET # BLD AUTO: 322 THOUSANDS/UL (ref 149–390)
PMV BLD AUTO: 10.2 FL (ref 8.9–12.7)
POTASSIUM SERPL-SCNC: 3.9 MMOL/L (ref 3.5–5.3)
PROT SERPL-MCNC: 6.8 G/DL (ref 6.4–8.4)
RBC # BLD AUTO: 4.6 MILLION/UL (ref 3.81–5.12)
SODIUM SERPL-SCNC: 141 MMOL/L (ref 135–147)
T4 FREE SERPL-MCNC: 1.22 NG/DL (ref 0.61–1.12)
TSH SERPL DL<=0.05 MIU/L-ACNC: 4.67 UIU/ML (ref 0.45–4.5)
VIT B12 SERPL-MCNC: 300 PG/ML (ref 180–914)
WBC # BLD AUTO: 7.34 THOUSAND/UL (ref 4.31–10.16)

## 2024-12-06 PROCEDURE — 82306 VITAMIN D 25 HYDROXY: CPT

## 2024-12-06 PROCEDURE — 84439 ASSAY OF FREE THYROXINE: CPT

## 2024-12-06 PROCEDURE — 85025 COMPLETE CBC W/AUTO DIFF WBC: CPT

## 2024-12-06 PROCEDURE — 36415 COLL VENOUS BLD VENIPUNCTURE: CPT

## 2024-12-06 PROCEDURE — 80053 COMPREHEN METABOLIC PANEL: CPT

## 2024-12-06 PROCEDURE — 84443 ASSAY THYROID STIM HORMONE: CPT

## 2024-12-06 PROCEDURE — 82607 VITAMIN B-12: CPT

## 2024-12-06 NOTE — TELEPHONE ENCOUNTER
Betzaida Morales PA-C to Neurology Concussion & Migraine Team 5 Regarding results: 6       12/6/24  3:27 PM  Result Note  Please call the patient regarding her abnormal result.  Patient needs to call her primary care doctor about her thyroid.  It is still abnormal.  In addition, her vitamin D is low.  Please have her start over-the-counter vitamin D3 5000 units daily.  Her B12 is also low at 300.  She needs to start taking over-the-counter B12 sublingual (under the tongue) 500 to 1000 mcg a day as well as weekly injections for 4 weeks then monthly for 5 months

## 2024-12-07 DIAGNOSIS — E03.9 HYPOTHYROIDISM, UNSPECIFIED TYPE: ICD-10-CM

## 2024-12-09 RX ORDER — LEVOTHYROXINE SODIUM 75 UG/1
TABLET ORAL
Qty: 90 TABLET | Refills: 1 | Status: SHIPPED | OUTPATIENT
Start: 2024-12-09

## 2024-12-10 ENCOUNTER — PROCEDURE VISIT (OUTPATIENT)
Dept: NEUROLOGY | Facility: CLINIC | Age: 59
End: 2024-12-10
Payer: MEDICARE

## 2024-12-10 VITALS — TEMPERATURE: 98.1 F | DIASTOLIC BLOOD PRESSURE: 90 MMHG | HEART RATE: 72 BPM | SYSTOLIC BLOOD PRESSURE: 146 MMHG

## 2024-12-10 DIAGNOSIS — G43.709 CHRONIC MIGRAINE WITHOUT AURA WITHOUT STATUS MIGRAINOSUS, NOT INTRACTABLE: Primary | ICD-10-CM

## 2024-12-10 PROCEDURE — 64615 CHEMODENERV MUSC MIGRAINE: CPT | Performed by: PHYSICIAN ASSISTANT

## 2024-12-10 NOTE — PROGRESS NOTES
"Universal Protocol   procedure performed by consultantConsent: Verbal consent obtained. Written consent obtained.  Risks and benefits: risks, benefits and alternatives were discussed  Consent given by: patient  Time out: Immediately prior to procedure a \"time out\" was called to verify the correct patient, procedure, equipment, support staff and site/side marked as required.  Patient understanding: patient states understanding of the procedure being performed  Patient consent: the patient's understanding of the procedure matches consent given  Procedure consent: procedure consent matches procedure scheduled  Relevant documents: relevant documents present and verified  Patient identity confirmed: verbally with patient      Chemodenervation     Date/Time  12/10/2024 9:30 AM     Performed by  Betzaida Morales PA-C   Authorized by  Betzaida Morales PA-C     Pre-procedure details      Preparation: Patient was prepped and draped in usual sterile fashion     Anesthesia  (see MAR for exact dosages):     Anesthesia method:  None   Procedure details      Position:  Upright   Botox      Botox Type:  Type A    Brand:  Botox    mL's of Botulinum Toxin:  200    mL's of preservative free sterile saline:  4    Final Concentration per CC:  50 units    Needle Gauge:  30 G 2.5 inch   Procedures      Botox Procedures: chronic headache     Injection Location      Head / Face:  L superior cervical paraspinal, R superior cervical paraspinal, L , R , R frontalis, L frontalis, R medial occipitalis, L medial occipitalis, procerus, R temporalis, L superior trapezius, R superior trapezius and L temporalis    L  injection amount:  5 unit(s)    R  injection amount:  5 unit(s)    L lateral frontalis:  5 unit(s)    R lateral frontalis:  5 unit(s)    L medial frontalis:  5 unit(s)    R medial frontalis:  5 unit(s)    L temporalis injection amount:  20 unit(s)    R temporalis injection amount:  20 unit(s)    Procerus " injection amount:  5 unit(s)    L medial occipitalis injection amount:  15 unit(s)    R medial occipitalis injection amount:  15 unit(s)    L superior cervical paraspinal injection amount:  10 unit(s)    R superior cervical paraspinal injection amount:  10 unit(s)    L superior trapezius injection amount:  15 unit(s)    R superior trapezius injection amount:  15 unit(s)   Total Units      Total units used:  200   Post-procedure details      Chemodenervation:  Chronic migraine    Facial Nerve Location::  Bilateral facial nerve    Patient tolerance of procedure:  Tolerated well, no immediate complications   Comments       35 units right frontalis, temporalis, parietal and occipitalis  10 units left occipitalis  All medically necessary

## 2024-12-19 ENCOUNTER — HOSPITAL ENCOUNTER (OUTPATIENT)
Dept: MRI IMAGING | Facility: HOSPITAL | Age: 59
End: 2024-12-19
Payer: MEDICARE

## 2024-12-19 DIAGNOSIS — R51.9 WORSENING HEADACHES: ICD-10-CM

## 2024-12-19 PROCEDURE — 70553 MRI BRAIN STEM W/O & W/DYE: CPT

## 2024-12-19 PROCEDURE — A9585 GADOBUTROL INJECTION: HCPCS | Performed by: PHYSICIAN ASSISTANT

## 2024-12-19 RX ORDER — GADOBUTROL 604.72 MG/ML
6 INJECTION INTRAVENOUS
Status: COMPLETED | OUTPATIENT
Start: 2024-12-19 | End: 2024-12-19

## 2024-12-19 RX ADMIN — GADOBUTROL 6 ML: 604.72 INJECTION INTRAVENOUS at 08:17

## 2025-01-01 DIAGNOSIS — M48.02 CERVICAL SPINAL STENOSIS: ICD-10-CM

## 2025-01-02 RX ORDER — BACLOFEN 10 MG/1
10 TABLET ORAL
Qty: 30 TABLET | Refills: 0 | Status: SHIPPED | OUTPATIENT
Start: 2025-01-02

## 2025-01-11 ENCOUNTER — TELEPHONE (OUTPATIENT)
Age: 60
End: 2025-01-11

## 2025-01-11 NOTE — TELEPHONE ENCOUNTER
Cc'd chart   ID [id number]    Per enc 3/6/24-Qulipta PA  24    Qulipta PA initiated on CMM. Jamison [id number]  This medication or product was previously approved on [id number] from 2025 to 2025

## 2025-01-28 DIAGNOSIS — G43.019 INTRACTABLE MIGRAINE WITHOUT AURA AND WITHOUT STATUS MIGRAINOSUS: ICD-10-CM

## 2025-01-29 RX ORDER — NAPROXEN 500 MG/1
500 TABLET ORAL 2 TIMES DAILY WITH MEALS
Qty: 20 TABLET | Refills: 2 | Status: SHIPPED | OUTPATIENT
Start: 2025-01-29

## 2025-02-03 DIAGNOSIS — K21.9 GASTROESOPHAGEAL REFLUX DISEASE WITHOUT ESOPHAGITIS: ICD-10-CM

## 2025-02-03 RX ORDER — PANTOPRAZOLE SODIUM 40 MG/1
40 TABLET, DELAYED RELEASE ORAL 2 TIMES DAILY
Qty: 180 TABLET | Refills: 1 | Status: SHIPPED | OUTPATIENT
Start: 2025-02-03

## 2025-02-09 NOTE — PROGRESS NOTES
Name: Tiffany Herbert      : 1965      MRN: [id number]  Encounter Provider: Ronald Sotelo MD  Encounter Date: 2/10/2025   Encounter department: [facility]    :  Assessment & Plan  Excessive daytime sleepiness  The differential diagnosis for excessive daytime sleepiness is broad.  It includes insufficient sleep, medical and psychiatric conditions, neurologic conditions, central disorders of hypersomnolence, or primary sleep disordered breathing.  The patient's symptoms of excessive daytime sleepiness may be secondary to undiagnosed sleep disordered breathing.  Sleep study has been ordered at today's visit.    Orders:    Diagnostic Sleep Study; Future    Snoring  Snoring / Concern for Obstructive Sleep Apnea - Discussed pathophysiology of LYNDON, consequences of untreated LYNDON and treatment options including PAP therapy, mandibular advancement device, positional therapy, and surgical referral. - Discussed risks of sleepy driving and mitigation strategies (napping). Patient agrees to not drive if tired or sleepy. - Advised avoidance of alcohol and centrally acting medications as these can worsen LYNDON.  -Patient presents with symptoms of snoring, excessive daytime sleepiness with an elevated [place] scale, daily napping, intermittent unrefreshing sleep quality.  Her constellation of signs and symptoms may be suggestive of sleep disordered breathing.  Order has been placed today in the office for sleep study.  -I have asked the patient to return to the office after sleep testing is completed to review study results and discuss treatment options.  Patient verbalized understanding.    Orders:    Diagnostic Sleep Study; Future    Suspected sleep apnea  As above.   Orders:    Ambulatory Referral to Sleep Medicine    Diagnostic Sleep Study; Future    Gastroesophageal reflux disease without esophagitis  Reviewed the association between sleep and gastroesophageal reflux disease. Encouraged  "patient to consume last large meal at least 3 hours before bedtime.  Reviewed optimal sleeping position to minimize acid reflux episodes.         Chronic migraine without aura without status migrainosus, not intractable  There is an association between headaches and sleep disordered breathing.  Patient with a history of chronic migraines for which she follows regularly with neurology.  She is receiving Botox injections given severity of migraine headaches.  For sleep testing as above and further evaluation.         History of Present Illness     Pertinent positives/negatives included in HPI and also as noted:     Objective   /86   Pulse 88   Ht 4' 11\" (1.499 m)   Wt 65.3 kg (144 lb)   SpO2 98%   BMI 29.08 kg/m²     Neck Circumference: 13.5  [facility] New Patient Evaluation    Ms. Herbert is a 60 y.o. female with a PMH of migraine headaches, hypothyroidism, GERD, who presents as a new patient for evaluation of sleep disordered breathing.     I have reviewed the 12/4/2024 neurology office note with Betzaida Morales PA-C.    I have reviewed the 11/21/2024 family medicine office note with Kirstin Saeed DO.    History of Presenting Illness:    The patient snores. There are no choking and gasping episodes. There are no witnessed apneas. 1-2x episode(s) of nocturia occur per night. The patient sleeps on her stomach. She sleeps with one pillow. There are no reports of nocturnal behaviors.     The patient's [place] sleepiness scale score is 10/24 (<=10 is normal). She has not been sleepy while driving. She has not had a fall-asleep motor vehicle accident. There are no reports of hypnagogic hallucinations, cataplexy or sleep paralysis.    In terms of the patient's sleep/wake cycle symptoms:  Bedtime: 10:00pm   SOL: Brief < 60 Minutes   Nocturnal awakenings: 1-2x, Nocturia  Wakeup time: 8:30am   Naps: 1x Daily for 45 Minutes-60 Minutes    Total sleep time estimate: 8-9 hours.     She has not tried " any medications for poor sleep in the past.    Her legs do not bother her on trying to initiate sleep. Occasional leg cramps.     Past Medical History:   Diagnosis Date    Bilateral breast cysts 2019 r    right breast cysts; dense breast tissue bilaterally    Cervical spinal stenosis     Liver enzyme elevation     Migraines     Osteoporosis         Past Surgical History:   Procedure Laterality Date     SECTION      two    OVARIAN CYST SURGERY        No prior upper airway surgeries.     No Known Allergies     Current Outpatient Medications on File Prior to Visit   Medication Sig Dispense Refill    baclofen 10 mg tablet TAKE 1 TABLET(10 MG) BY MOUTH DAILY AT BEDTIME 30 tablet 0    cyclobenzaprine (FLEXERIL) 5 mg tablet TAKE 1 TABLET(5 MG) BY MOUTH DAILY AT BEDTIME 30 tablet 3    dexamethasone (DECADRON) 4 mg tablet Take 2 tabs for 2 days then take 1 tab for 3-5 days (until headaches break then stop) 9 tablet 0    divalproex sodium (DEPAKOTE ER) 250 mg 24 hr tablet Take 2 tablets (500 mg total) by mouth daily at bedtime 180 tablet 2    ketorolac (TORADOL) 10 mg tablet Take 1 tablet (10 mg total) by mouth daily as needed for severe pain Max 1/day, 3/week, 10/month. Do not use with other OTC pain meds 10 tablet 1    latanoprost (XALATAN) 0.005 % ophthalmic solution INSTILL 1 DROP BOTH EYES EVERY NIGHT AT BEDTIME      levothyroxine 75 mcg tablet TAKE 1 TABLET(75 MCG) BY MOUTH DAILY EARLY MORNING 90 tablet 1    naproxen (NAPROSYN) 500 mg tablet TAKE 1 TABLET(500 MG) BY MOUTH TWICE DAILY WITH MEALS 20 tablet 2    pantoprazole (PROTONIX) 40 mg tablet TAKE 1 TABLET(40 MG) BY MOUTH TWICE DAILY 180 tablet 1    propranolol (INDERAL) 10 mg tablet TAKE 1 TABLET(10 MG) BY MOUTH TWICE DAILY 180 tablet 0    rizatriptan (MAXALT-MLT) 10 mg disintegrating tablet Take 1 tablet (10 mg total) by mouth as needed for migraine Take at the onset of migraine; if symptoms continue or return, may take another dose at least 2  hours after first dose. Take no more than 2 doses in a day. 12 tablet 5    SUMAtriptan Succinate 6 MG/0.5ML SOAJ INJECT ONCE FOR SEVERE HEADACHE. MAY REPEAT AFTER 2 HORUS. MAX 2 INJECTIONS PER WEEK 3 mL 6    venlafaxine (EFFEXOR-XR) 150 mg 24 hr capsule TAKE 1 CAPSULE(150 MG) BY MOUTH DAILY WITH BREAKFAST 30 capsule 5    Atogepant (Qulipta) 60 MG TABS Take 60 mg by mouth daily at bedtime (Patient not taking: Reported on 12/4/2024) 30 tablet 11    omeprazole (PriLOSEC) 20 mg delayed release capsule TAKE 1 CAPSULE(20 MG) BY MOUTH DAILY (Patient not taking: Reported on 12/4/2024) 90 capsule 3    ondansetron (ZOFRAN) 4 mg tablet Take 1 tablet (4 mg total) by mouth every 8 (eight) hours as needed for nausea or vomiting for up to 2 days 6 tablet 0    predniSONE 10 mg tablet 3 tabs po bid x2 days, then 2 tabs po bid x2 days, then 1 tab bid x2 days, then 1 daily until done. (Patient not taking: Reported on 2/10/2025) 26 tablet 0     No current facility-administered medications on file prior to visit.     Family History: Her family history includes Bipolar disorder in her sister; Cervical cancer in her maternal grandmother; Dementia in her father; Hypertension in her daughter; No Known Problems in her mother; Parkinsonism in her father.    Mother has snoring.     Social History:   Job: Disabled  Caffeine: 1 Cup of Coffee in AM Daily  Alcohol: Denied  Drugs: Denied  Tobacco: Denied  Vape: Denied  Exercise: Walking and Use Exercise Bike    Patient's medications, allergies, past medical, surgical, social and family histories were reviewed in the electronic medical record and updated as appropriate.    Review of Systems: On review of systems, the patient reports: Occasional AM Headaches, reports regular nasal sinus congestion, does not wake with dry mouth and dry throat in morning.    Vitals:    02/10/25 1120   BP: 122/86   Pulse: 88   SpO2: 98%       Physical Examination:  Gen: No acute distress, not visibly anxious, speaking  "comfortably  H&N: MM III; no retro/micrognathia; underbite; no macroglossia; no visible thyromegaly  Neuro: Alert and oriented x3, interactive  Psych: Pleasant, normal affect  Skin: No visible rashes  Respiratory: No accessory muscle use, breathing comfortably  Cardiac: No visible edema of extremities  Abdomen: Soft, NT, nondistended  Musculoskeletal: Normal ROM Intact of Extremities    Study Results:  I reviewed the following labs:    No results found for: \"FERRITIN\"    Lab Results   Component Value Date    WBC 7.34 12/06/2024    HGB 13.8 12/06/2024    HCT 42.8 12/06/2024    MCV 93 12/06/2024     12/06/2024       This SmartLink has not been configured with any valid records.       Lab Results   Component Value Date    SODIUM 141 12/06/2024    K 3.9 12/06/2024     12/06/2024    CO2 30 12/06/2024    BUN 13 12/06/2024    CREATININE 0.76 12/06/2024    GLUC 97 09/10/2023    CALCIUM 9.0 12/06/2024       This SmartLink has not been configured with any valid records.       Lab Results   Component Value Date    JIC6OUQWHFWM 4.668 (H) 12/06/2024    TSH 1.18 01/16/2020          Results/Data:  I have reviewed the results and report from the 11/27/2024 stress testing: Left Ventricle: Left ventricular cavity size is normal. The left ventricular ejection fraction is 60% by single dimension measurement. Systolic function is normal. Wall motion is normal.     Independent Findings Reviewed: Normal left ventricular ejection fraction no wall motion abnormalities.    I have reviewed the results and report from the 12/19/2024 brain MRI.    Independent Findings Reviewed: No acute intracranial normality.    I answered the patient's questions to the best of my ability. We will continue with longitudinal follow-up for evaluation of sleep disordered breathing. Follow-up will be after sleep testing is completed.    Ronald Sotelo MD  Sleep Medicine and Internal Medicine  [facility] " "Network  02/10/25      Portions of the record may have been created with voice recognition software.  Occasional wrong word or \"sound a like\" substitutions may have occurred due to the inherent limitations of voice recognition software.  Read the chart carefully and recognize, using context, where substitutions have occurred.         "

## 2025-02-10 ENCOUNTER — OFFICE VISIT (OUTPATIENT)
Dept: SLEEP CENTER | Facility: CLINIC | Age: 60
End: 2025-02-10
Payer: MEDICARE

## 2025-02-10 VITALS
SYSTOLIC BLOOD PRESSURE: 122 MMHG | BODY MASS INDEX: 29.03 KG/M2 | HEART RATE: 88 BPM | WEIGHT: 144 LBS | HEIGHT: 59 IN | DIASTOLIC BLOOD PRESSURE: 86 MMHG | OXYGEN SATURATION: 98 %

## 2025-02-10 DIAGNOSIS — R29.818 SUSPECTED SLEEP APNEA: ICD-10-CM

## 2025-02-10 DIAGNOSIS — R06.83 SNORING: ICD-10-CM

## 2025-02-10 DIAGNOSIS — G47.19 EXCESSIVE DAYTIME SLEEPINESS: Primary | ICD-10-CM

## 2025-02-10 DIAGNOSIS — G43.709 CHRONIC MIGRAINE WITHOUT AURA WITHOUT STATUS MIGRAINOSUS, NOT INTRACTABLE: ICD-10-CM

## 2025-02-10 DIAGNOSIS — K21.9 GASTROESOPHAGEAL REFLUX DISEASE WITHOUT ESOPHAGITIS: ICD-10-CM

## 2025-02-10 PROCEDURE — G2211 COMPLEX E/M VISIT ADD ON: HCPCS | Performed by: STUDENT IN AN ORGANIZED HEALTH CARE EDUCATION/TRAINING PROGRAM

## 2025-02-10 PROCEDURE — 99204 OFFICE O/P NEW MOD 45 MIN: CPT | Performed by: STUDENT IN AN ORGANIZED HEALTH CARE EDUCATION/TRAINING PROGRAM

## 2025-02-10 NOTE — PATIENT INSTRUCTIONS
"- A sleep study was ordered for you. It can be an in lab sleep study or a portable at home sleep study. It depends on what insurance approves.  Some insurances will only cover home sleep studies unless you have significant medical conditions like stroke or heart attack within the last 6 months, severe COPD, or the use of supplemental oxygen.    - The order goes electronically to the sleep center staff.    - The sleep center will get approval from your insurance and then will call you to schedule the sleep study.    - If you do not hear from the sleep center in 1 week, please call us to check the status of your order.    - There are different locations to get sleep study done.    - Please make sure you know what is the copay associated with your sleep study. Approval does not mean coverage.     - Once your sleep study is done, it can take up to 2 weeks to get results (depending on the volume).    - A follow up appointment to discuss sleep study results is required in most cases. On that visit, we will discuss results and treatment options.    - If your sleep study shows severe sleep apnea please expect contact from the sleep center. We will try to expedite your follow up appointment.    - The best way to communicate with us in through [facility]. Make sure your account is active.    - Once you start CPAP therapy, it is mandatory by insurance, to have a follow up appointment with us within 31-90 days of getting CPAP.     Patient Education     Sleep apnea in adults   The Basics   Written by the doctors and editors at [facility]   What is sleep apnea? -- Sleep apnea is a condition that makes you stop breathing for short periods while you are asleep. There are 2 types of sleep apnea. One is called \"obstructive sleep apnea.\" The other is called \"central sleep apnea.\"  In obstructive sleep apnea, you stop breathing because your throat narrows or closes (figure 1). In central sleep apnea, you stop breathing because your " "brain does not send the right signals to your muscles to make you breathe. When people talk about sleep apnea, they are usually referring to obstructive sleep apnea, which is what this article is about.  People with sleep apnea do not know that they stop breathing when they are asleep. But they do sometimes wake up startled or gasping for breath. They also often hear from loved ones that they snore.  What are the symptoms of sleep apnea? -- The main symptoms of sleep apnea are loud snoring, tiredness, and daytime sleepiness. Other symptoms can include:   Restless sleep   Waking up choking or gasping   Morning headaches, dry mouth, or sore throat   Waking up often to urinate   Waking up feeling unrested or groggy   Trouble thinking clearly or remembering things  Some people with sleep apnea don't have symptoms, or don't realize that they have them.  Should I see a doctor or nurse? -- Yes. If you think that you might have sleep apnea, see your doctor.  Is there a test for sleep apnea? -- Yes. First, your doctor or nurse will ask about your symptoms. If you have a bed partner, they might also ask that person if you snore or gasp in your sleep. If the doctor or nurse suspects that you have sleep apnea, they might send you for a \"sleep study.\"  Sleep studies can sometimes be done at home, but they are usually done in a sleep lab. For the study, you spend the night in the lab, and you are hooked up to different machines that monitor your heart rate, breathing, and other body functions. The results of the test tell your doctor or nurse if you have the disorder.  Is there anything I can do on my own to help my sleep apnea? -- Yes. Some things that might help:   Try to avoid sleeping on your back, if possible. This might help some people.   Lose weight, if you have excess body weight.   Get regular physical activity. This might help you lose weight. But even if it doesn't, being active is good for your health.   Avoid " "alcohol, especially in the evening. Alcohol can make sleep apnea worse.  How is sleep apnea treated? -- Treatment can include:   Weight loss - As mentioned above, weight loss can help if you have excess weight or obesity. But losing weight can be challenging, and it takes time to lose enough weight to help with your sleep apnea. Most people need other treatment while they work on losing weight.   CPAP - The most effective treatment for sleep apnea is a device that keeps your airway open while you sleep. Treatment with this device is called \"continuous positive airway pressure\" (\"CPAP\"). People getting CPAP wear a face mask at night that keeps them breathing (figure 2).  If your doctor or nurse recommends a CPAP machine, be patient about using it. The mask might seem uncomfortable to wear at first, and the machine might seem noisy, but using the machine can really help you. People with sleep apnea who use a CPAP machine feel more rested and generally feel better.  If CPAP does not work, your doctor might suggest other treatment. Options might include:   An oral device - This is a device that you wear in your mouth. It is called an \"oral appliance\" or \"mandibular advancement device.\" It helps keep your airway open while you sleep.   Hypoglossal nerve stimulation - This involves a procedure to implant a small device into your chest. The device has a wire that connects to the nerve under your tongue. While you are sleeping, it sends an electrical signal that causes the tongue to push forward. This helps open up your airway.   Surgery to widen your airway - This might involve removing your tonsils or other tissue that blocks the airway.  Is sleep apnea dangerous? -- It can be. Risks include:   Accidents - People with sleep apnea do not get good-quality sleep, so they are often tired and not alert. This puts them at risk for car accidents and other types of accidents.   Other health problems - Studies show that people " with sleep apnea are more likely than others to have high blood pressure, heart attacks, and other serious heart problems. Some people also have mood changes or depression.  In people with severe sleep apnea, getting treated (for example, with CPAP) can help lower these risks.  All topics are updated as new evidence becomes available and our peer review process is complete.  This topic retrieved from [employer] on: Feb 28, 2024.  Topic 47877 Version 18.0  Release: 32.2.4 - C32.58  © 2024 UpToDate, Inc. and/or its affiliates. All rights reserved.  figure 1: Airway in a person with sleep apnea     Normally, when a person sleeps, the airway remains open, and air can pass from the nose and mouth to the lungs. In a person with sleep apnea, parts of the throat and mouth drop into the airway and block off the flow of air. This can cause loud snoring and interrupt breathing for short periods.  Graphic 16014 Version 6.0  figure 2: Continuous positive airway pressure (CPAP) for sleep apnea     The CPAP mask gently blows air into your nose while you sleep. It puts just enough pressure on your airway to keep it from closing. The mask in this picture fits over just the nose. Other CPAP devices have masks that fit over the nose and mouth.  Graphic 97257 Version 5.0  Consumer Information Use and Disclaimer   Disclaimer: This generalized information is a limited summary of diagnosis, treatment, and/or medication information. It is not meant to be comprehensive and should be used as a tool to help the user understand and/or assess potential diagnostic and treatment options. It does NOT include all information about conditions, treatments, medications, side effects, or risks that may apply to a specific patient. It is not intended to be medical advice or a substitute for the medical advice, diagnosis, or treatment of a health care provider based on the health care provider's examination and assessment of a patient's specific and unique  circumstances. Patients must speak with a health care provider for complete information about their health, medical questions, and treatment options, including any risks or benefits regarding use of medications. This information does not endorse any treatments or medications as safe, effective, or approved for treating a specific patient. UpToDate, Inc. and its affiliates disclaim any warranty or liability relating to this information or the use thereof.The use of this information is governed by the Terms of Use, available at https://www.woltersMyHealthTeamsuwer.com/en/know/clinical-effectiveness-terms. 2024© UpToDate, Inc. and its affiliates and/or licensors. All rights reserved.  Copyright   © 2024 UpToDate, Inc. and/or its affiliates. All rights reserved.

## 2025-02-10 NOTE — ASSESSMENT & PLAN NOTE
Reviewed the association between sleep and gastroesophageal reflux disease. Encouraged patient to consume last large meal at least 3 hours before bedtime.  Reviewed optimal sleeping position to minimize acid reflux episodes.

## 2025-02-10 NOTE — ASSESSMENT & PLAN NOTE
There is an association between headaches and sleep disordered breathing.  Patient with a history of chronic migraines for which she follows regularly with neurology.  She is receiving Botox injections given severity of migraine headaches.  For sleep testing as above and further evaluation.

## 2025-02-28 DIAGNOSIS — M48.02 CERVICAL SPINAL STENOSIS: ICD-10-CM

## 2025-02-28 RX ORDER — BACLOFEN 10 MG/1
10 TABLET ORAL
Qty: 30 TABLET | Refills: 0 | Status: SHIPPED | OUTPATIENT
Start: 2025-02-28

## 2025-03-11 ENCOUNTER — PROCEDURE VISIT (OUTPATIENT)
Dept: NEUROLOGY | Facility: CLINIC | Age: 60
End: 2025-03-11
Payer: MEDICARE

## 2025-03-11 VITALS — DIASTOLIC BLOOD PRESSURE: 83 MMHG | SYSTOLIC BLOOD PRESSURE: 121 MMHG | TEMPERATURE: 97.7 F | HEART RATE: 95 BPM

## 2025-03-11 DIAGNOSIS — G43.709 CHRONIC MIGRAINE WITHOUT AURA WITHOUT STATUS MIGRAINOSUS, NOT INTRACTABLE: Primary | ICD-10-CM

## 2025-03-11 PROCEDURE — 64615 CHEMODENERV MUSC MIGRAINE: CPT | Performed by: PHYSICIAN ASSISTANT

## 2025-03-11 NOTE — PROGRESS NOTES
"Universal Protocol   procedure performed by consultantConsent: Verbal consent obtained. Written consent obtained.  Risks and benefits: risks, benefits and alternatives were discussed  Consent given by: patient  Time out: Immediately prior to procedure a \"time out\" was called to verify the correct patient, procedure, equipment, support staff and site/side marked as required.  Patient understanding: patient states understanding of the procedure being performed  Patient consent: the patient's understanding of the procedure matches consent given  Procedure consent: procedure consent matches procedure scheduled  Relevant documents: relevant documents present and verified  Patient identity confirmed: verbally with patient      Chemodenervation     Date/Time  3/11/2025 8:00 AM     Performed by  Betzaida Morales PA-C   Authorized by  Betzaida Morales PA-C     Pre-procedure details      Preparation: Patient was prepped and draped in usual sterile fashion     Anesthesia  (see MAR for exact dosages):     Anesthesia method:  None   Procedure details      Position:  Upright   Botox      Botox Type:  Type A    Brand:  Botox    mL's of Botulinum Toxin:  200    mL's of preservative free sterile saline:  4    Final Concentration per CC:  50 units    Needle Gauge:  30 G 2.5 inch   Procedures      Botox Procedures: chronic headache     Injection Location      Head / Face:  L superior cervical paraspinal, R superior cervical paraspinal, L , R , R frontalis, L frontalis, R medial occipitalis, L medial occipitalis, procerus, R temporalis, L superior trapezius, R superior trapezius and L temporalis    L  injection amount:  5 unit(s)    R  injection amount:  5 unit(s)    L lateral frontalis:  5 unit(s)    R lateral frontalis:  5 unit(s)    L medial frontalis:  5 unit(s)    R medial frontalis:  5 unit(s)    L temporalis injection amount:  20 unit(s)    R temporalis injection amount:  20 unit(s)    Procerus " injection amount:  5 unit(s)    L medial occipitalis injection amount:  15 unit(s)    R medial occipitalis injection amount:  15 unit(s)    L superior cervical paraspinal injection amount:  10 unit(s)    R superior cervical paraspinal injection amount:  10 unit(s)    L superior trapezius injection amount:  15 unit(s)    R superior trapezius injection amount:  15 unit(s)   Total Units      Total units used:  200   Post-procedure details      Chemodenervation:  Chronic migraine    Facial Nerve Location::  Bilateral facial nerve    Patient tolerance of procedure:  Tolerated well, no immediate complications   Comments       35 units right frontalis, temporalis, parietal and occipitalis  10 units left occipitalis  All medically necessary

## 2025-03-20 ENCOUNTER — TELEPHONE (OUTPATIENT)
Dept: NEUROLOGY | Facility: CLINIC | Age: 60
End: 2025-03-20

## 2025-04-01 DIAGNOSIS — G43.019 INTRACTABLE MIGRAINE WITHOUT AURA AND WITHOUT STATUS MIGRAINOSUS: ICD-10-CM

## 2025-04-01 RX ORDER — PROPRANOLOL HYDROCHLORIDE 10 MG/1
10 TABLET ORAL 2 TIMES DAILY
Qty: 180 TABLET | Refills: 1 | Status: SHIPPED | OUTPATIENT
Start: 2025-04-01

## 2025-04-05 ENCOUNTER — APPOINTMENT (EMERGENCY)
Dept: CT IMAGING | Facility: HOSPITAL | Age: 60
End: 2025-04-05
Payer: MEDICARE

## 2025-04-05 ENCOUNTER — HOSPITAL ENCOUNTER (EMERGENCY)
Facility: HOSPITAL | Age: 60
Discharge: HOME | End: 2025-04-05
Attending: EMERGENCY MEDICINE
Payer: MEDICARE

## 2025-04-05 VITALS
OXYGEN SATURATION: 96 % | HEART RATE: 89 BPM | DIASTOLIC BLOOD PRESSURE: 77 MMHG | TEMPERATURE: 97.6 F | RESPIRATION RATE: 18 BRPM | SYSTOLIC BLOOD PRESSURE: 167 MMHG

## 2025-04-05 DIAGNOSIS — G43.909 MIGRAINE: Primary | ICD-10-CM

## 2025-04-05 LAB
ALBUMIN SERPL BCG-MCNC: 4.6 G/DL (ref 3.5–5)
ALP SERPL-CCNC: 130 U/L (ref 34–104)
ALT SERPL W P-5'-P-CCNC: 26 U/L (ref 7–52)
ANION GAP SERPL CALCULATED.3IONS-SCNC: 12 MMOL/L (ref 4–13)
AST SERPL W P-5'-P-CCNC: 20 U/L (ref 13–39)
BASOPHILS # BLD AUTO: 0.05 THOUSANDS/ÂΜL (ref 0–0.1)
BASOPHILS NFR BLD AUTO: 1 % (ref 0–1)
BILIRUB SERPL-MCNC: 0.44 MG/DL (ref 0.2–1)
BUN SERPL-MCNC: 19 MG/DL (ref 5–25)
CALCIUM SERPL-MCNC: 9.3 MG/DL (ref 8.4–10.2)
CHLORIDE SERPL-SCNC: 104 MMOL/L (ref 96–108)
CO2 SERPL-SCNC: 24 MMOL/L (ref 21–32)
CREAT SERPL-MCNC: 0.84 MG/DL (ref 0.6–1.3)
EOSINOPHIL # BLD AUTO: 0.07 THOUSAND/ÂΜL (ref 0–0.61)
EOSINOPHIL NFR BLD AUTO: 1 % (ref 0–6)
ERYTHROCYTE [DISTWIDTH] IN BLOOD BY AUTOMATED COUNT: 12.4 % (ref 11.6–15.1)
GFR SERPL CREATININE-BSD FRML MDRD: 75 ML/MIN/1.73SQ M
GLUCOSE SERPL-MCNC: 131 MG/DL (ref 65–140)
HCT VFR BLD AUTO: 44.2 % (ref 34.8–46.1)
HGB BLD-MCNC: 14.7 G/DL (ref 11.5–15.4)
IMM GRANULOCYTES # BLD AUTO: 0.02 THOUSAND/UL (ref 0–0.2)
IMM GRANULOCYTES NFR BLD AUTO: 0 % (ref 0–2)
LYMPHOCYTES # BLD AUTO: 1.49 THOUSANDS/ÂΜL (ref 0.6–4.47)
LYMPHOCYTES NFR BLD AUTO: 23 % (ref 14–44)
MCH RBC QN AUTO: 30.1 PG (ref 26.8–34.3)
MCHC RBC AUTO-ENTMCNC: 33.3 G/DL (ref 31.4–37.4)
MCV RBC AUTO: 90 FL (ref 82–98)
MONOCYTES # BLD AUTO: 0.38 THOUSAND/ÂΜL (ref 0.17–1.22)
MONOCYTES NFR BLD AUTO: 6 % (ref 4–12)
NEUTROPHILS # BLD AUTO: 4.52 THOUSANDS/ÂΜL (ref 1.85–7.62)
NEUTS SEG NFR BLD AUTO: 69 % (ref 43–75)
NRBC BLD AUTO-RTO: 0 /100 WBCS
PLATELET # BLD AUTO: 324 THOUSANDS/UL (ref 149–390)
PMV BLD AUTO: 9.5 FL (ref 8.9–12.7)
POTASSIUM SERPL-SCNC: 3.5 MMOL/L (ref 3.5–5.3)
PROT SERPL-MCNC: 7.4 G/DL (ref 6.4–8.4)
RBC # BLD AUTO: 4.89 MILLION/UL (ref 3.81–5.12)
SODIUM SERPL-SCNC: 140 MMOL/L (ref 135–147)
WBC # BLD AUTO: 6.53 THOUSAND/UL (ref 4.31–10.16)

## 2025-04-05 PROCEDURE — 99284 EMERGENCY DEPT VISIT MOD MDM: CPT

## 2025-04-05 PROCEDURE — 96365 THER/PROPH/DIAG IV INF INIT: CPT

## 2025-04-05 PROCEDURE — 96372 THER/PROPH/DIAG INJ SC/IM: CPT

## 2025-04-05 PROCEDURE — 96375 TX/PRO/DX INJ NEW DRUG ADDON: CPT

## 2025-04-05 PROCEDURE — 36415 COLL VENOUS BLD VENIPUNCTURE: CPT

## 2025-04-05 PROCEDURE — 80053 COMPREHEN METABOLIC PANEL: CPT

## 2025-04-05 PROCEDURE — 85025 COMPLETE CBC W/AUTO DIFF WBC: CPT

## 2025-04-05 PROCEDURE — 70450 CT HEAD/BRAIN W/O DYE: CPT

## 2025-04-05 RX ORDER — MAGNESIUM SULFATE HEPTAHYDRATE 40 MG/ML
2 INJECTION, SOLUTION INTRAVENOUS ONCE
Status: COMPLETED | OUTPATIENT
Start: 2025-04-05 | End: 2025-04-05

## 2025-04-05 RX ORDER — METOCLOPRAMIDE HYDROCHLORIDE 5 MG/ML
10 INJECTION INTRAMUSCULAR; INTRAVENOUS ONCE
Status: COMPLETED | OUTPATIENT
Start: 2025-04-05 | End: 2025-04-05

## 2025-04-05 RX ORDER — SUMATRIPTAN 6 MG/.5ML
6 INJECTION, SOLUTION SUBCUTANEOUS ONCE
Status: COMPLETED | OUTPATIENT
Start: 2025-04-05 | End: 2025-04-05

## 2025-04-05 RX ORDER — DIPHENHYDRAMINE HYDROCHLORIDE 50 MG/ML
25 INJECTION, SOLUTION INTRAMUSCULAR; INTRAVENOUS ONCE
Status: COMPLETED | OUTPATIENT
Start: 2025-04-05 | End: 2025-04-05

## 2025-04-05 RX ADMIN — MAGNESIUM SULFATE HEPTAHYDRATE 2 G: 40 INJECTION, SOLUTION INTRAVENOUS at 04:19

## 2025-04-05 RX ADMIN — DIPHENHYDRAMINE HYDROCHLORIDE 25 MG: 50 INJECTION, SOLUTION INTRAMUSCULAR; INTRAVENOUS at 04:09

## 2025-04-05 RX ADMIN — METOCLOPRAMIDE 10 MG: 5 INJECTION, SOLUTION INTRAMUSCULAR; INTRAVENOUS at 04:11

## 2025-04-05 RX ADMIN — SUMATRIPTAN 6 MG: 6 INJECTION, SOLUTION SUBCUTANEOUS at 04:21

## 2025-04-05 RX ADMIN — SODIUM CHLORIDE 1000 ML: 0.9 INJECTION, SOLUTION INTRAVENOUS at 04:07

## 2025-04-05 NOTE — DISCHARGE INSTRUCTIONS
Please follow-up with neurology that you had a headache that you are unable to manage at home and required a migraine cocktail for in the ER.  Continue management at their direction.  Return to the emergency department if symptoms worsen.

## 2025-04-05 NOTE — ED PROVIDER NOTES
Time reflects when diagnosis was documented in both MDM as applicable and the Disposition within this note       Time User Action Codes Description Comment    4/5/2025  5:47 AM Delfina Schreiber Add [G43.909] Migraine           ED Disposition       ED Disposition   Discharge    Condition   Stable    Date/Time   Sat Apr 5, 2025  5:47 AM    Comment   Tiffany Herbert discharge to home/self care.                   Assessment & Plan       Medical Decision Making  Patient seen, examined and noted to have migraine.  Patient coming in with a migraine that has been persistent since Thursday.  She sees neurology for her migraines and recently started getting Botox however does not feel it is helping.  She has not been able to control this migraine at home as she has been experiencing significant vomiting and is not able to keep any of her medications down.  She took sumatriptan and Excedrin earlier in the day without any relief.  Is seeing some spots in her vision which is not typical for her migraines.  Will order head CT based on new visual symptoms and treat with a migraine cocktail.  Head CT was without any intracranial abnormalities and migraines entirely resolved with migraine cocktail.  Encouraged neurology follow-up.  Return precautions given.    Differential diagnosis includes but is not limited to migraine headache, atypical migraine headache, tension headache, cluster headache; doubt ICH, SAH, tumor, temporal arteritis     Patient's labs notable for: CBC and CMP largely unremarkable    Imaging revealed:   CT head without any intracranial abnormalities     Patient appears well, is nontoxic appearing, she expresses understanding and agrees with plan of care at this time.  In light of this patient would benefit from outpatient management.    Patient at time of discharge well-appearing in no acute distress, all questions answered. Patient agreeable to plan.  Patient's vitals, lab/imaging results, diagnosis, and  treatment plan were discussed with the patient. All new/changed medications were discussed with patient, specifically, route of administration, how often and when to take, and where they can be picked up. Strict return precautions as well as close follow up with PCP was discussed with the patient and the patient was agreeable to my recommendations. Patient verbally acknowledged understanding of the above communications. Strict return precautions were provided. All labs reviewed and utilized in the medical decision making process.     Amount and/or Complexity of Data Reviewed  Labs: ordered.  Radiology: ordered.    Risk  Prescription drug management.             Medications   metoclopramide (REGLAN) injection 10 mg (10 mg Intravenous Given 4/5/25 0411)   diphenhydrAMINE (BENADRYL) injection 25 mg (25 mg Intravenous Given 4/5/25 0409)   magnesium sulfate 2 g/50 mL IVPB (premix) 2 g (0 g Intravenous Stopped 4/5/25 0516)   sodium chloride 0.9 % bolus 1,000 mL (0 mL Intravenous Stopped 4/5/25 0516)   SUMAtriptan (IMITREX) subcutaneous injection 6 mg (6 mg Subcutaneous Given 4/5/25 0421)       ED Risk Strat Scores                            SBIRT 20yo+      Flowsheet Row Most Recent Value   Initial Alcohol Screen: US AUDIT-C     1. How often do you have a drink containing alcohol? 0 Filed at: 04/05/2025 0350   2. How many drinks containing alcohol do you have on a typical day you are drinking?  0 Filed at: 04/05/2025 0350   3a. Male UNDER 65: How often do you have five or more drinks on one occasion? 0 Filed at: 04/05/2025 0350   3b. FEMALE Any Age, or MALE 65+: How often do you have 4 or more drinks on one occassion? 0 Filed at: 04/05/2025 0350   Audit-C Score 0 Filed at: 04/05/2025 0350   NORA: How many times in the past year have you...    Used an illegal drug or used a prescription medication for non-medical reasons? Never Filed at: 04/05/2025 0350                            History of Present Illness       Chief  Complaint   Patient presents with    Migraine     Patient comes in reporting migraine since Thursday. Reports hx of migraines. Rates pain 9/10. Denies blurred vision, or dizziness. States seeing spots.Took sumatriptan and excedrin migraine PTA w/o relief. Reports N/V.        Past Medical History:   Diagnosis Date    Bilateral breast cysts 2019 r    right breast cysts; dense breast tissue bilaterally    Cervical spinal stenosis 2015    Liver enzyme elevation     Migraines     Osteoporosis       Past Surgical History:   Procedure Laterality Date     SECTION      two    OVARIAN CYST SURGERY        Family History   Problem Relation Age of Onset    Parkinsonism Father     Dementia Father     Hypertension Daughter     Cervical cancer Maternal Grandmother     Bipolar disorder Sister     No Known Problems Mother       Social History     Tobacco Use    Smoking status: Former     Current packs/day: 0.00     Types: Cigarettes     Quit date:      Years since quittin.2     Passive exposure: Past    Smokeless tobacco: Never   Vaping Use    Vaping status: Never Used   Substance Use Topics    Alcohol use: No    Drug use: No      E-Cigarette/Vaping    E-Cigarette Use Never User       E-Cigarette/Vaping Substances    Nicotine No     THC No     CBD No     Flavoring No     Other No     Unknown No       I have reviewed and agree with the history as documented.     Tiffany Herbert is a 60 y.o. female with a PMH of migraines presenting to the ED on 2025 with migraine. Patient endorses that she has had a migraine that she has been unable to control since Thursday.  She states all week she had intermittent migraines however this pain has been present since Thursday.  She is experiencing significant nausea and vomiting so was unable to keep her medications down.  Also having photophobia.  She does use sumatriptan at home and last used it this morning but did not feel it helped.  Recently started getting Botox  for migraines.  Follows with neurology for this.  States that she had some spots in her vision which is not typical for her migraines. Patient denies blurry vision, head trauma, weakness, numbness, tingling, dizziness or any other complaints at this time.             Review of Systems   Constitutional:  Negative for chills and fever.   HENT:  Negative for congestion and rhinorrhea.    Eyes:  Positive for photophobia. Negative for visual disturbance.   Respiratory:  Negative for cough and shortness of breath.    Cardiovascular:  Negative for chest pain and palpitations.   Gastrointestinal:  Positive for nausea and vomiting. Negative for abdominal pain.   Neurological:  Positive for headaches. Negative for dizziness, tremors, seizures, syncope, weakness, light-headedness and numbness.   All other systems reviewed and are negative.          Objective       ED Triage Vitals   Temperature Pulse Blood Pressure Respirations SpO2 Patient Position - Orthostatic VS   04/05/25 0347 04/05/25 0347 04/05/25 0347 04/05/25 0347 04/05/25 0347 04/05/25 0500   97.6 °F (36.4 °C) 97 (!) 173/86 20 95 % Lying      Temp Source Heart Rate Source BP Location FiO2 (%) Pain Score    04/05/25 0347 04/05/25 0500 04/05/25 0500 -- 04/05/25 0400    Oral Monitor Right arm  10 - Worst Possible Pain      Vitals      Date and Time Temp Pulse SpO2 Resp BP Pain Score FACES Pain Rating User   04/05/25 0500 -- 89 96 % 18 167/77 -- --    04/05/25 0400 -- -- -- -- -- 10 - Worst Possible Pain --    04/05/25 0347 97.6 °F (36.4 °C) 97 95 % 20 173/86 -- --             Physical Exam  Vitals and nursing note reviewed.   Constitutional:       General: She is not in acute distress.     Appearance: Normal appearance. She is normal weight. She is not ill-appearing, toxic-appearing or diaphoretic.   HENT:      Head: Normocephalic and atraumatic.      Right Ear: Tympanic membrane, ear canal and external ear normal. There is no impacted cerumen.      Left Ear:  Tympanic membrane, ear canal and external ear normal. There is no impacted cerumen.      Nose: Nose normal. No congestion or rhinorrhea.      Mouth/Throat:      Mouth: Mucous membranes are moist.   Eyes:      Extraocular Movements: Extraocular movements intact.      Conjunctiva/sclera: Conjunctivae normal.      Pupils: Pupils are equal, round, and reactive to light.   Cardiovascular:      Rate and Rhythm: Normal rate and regular rhythm.      Heart sounds: Normal heart sounds. No murmur heard.     No friction rub. No gallop.   Pulmonary:      Effort: Pulmonary effort is normal. No respiratory distress.      Breath sounds: Normal breath sounds. No wheezing, rhonchi or rales.   Abdominal:      General: Abdomen is flat.   Musculoskeletal:         General: No signs of injury. Normal range of motion.      Cervical back: Normal range of motion and neck supple. No rigidity.   Skin:     General: Skin is warm.      Capillary Refill: Capillary refill takes less than 2 seconds.      Coloration: Skin is not pale.      Findings: No erythema.   Neurological:      General: No focal deficit present.      Mental Status: She is alert and oriented to person, place, and time. Mental status is at baseline.      GCS: GCS eye subscore is 4. GCS verbal subscore is 5. GCS motor subscore is 6.      Cranial Nerves: Cranial nerves 2-12 are intact. No cranial nerve deficit, dysarthria or facial asymmetry.      Sensory: Sensation is intact. No sensory deficit.      Motor: Motor function is intact. No weakness, tremor, atrophy, abnormal muscle tone, seizure activity or pronator drift.      Gait: Gait is intact. Gait normal.   Psychiatric:         Mood and Affect: Mood normal.         Behavior: Behavior normal.         Results Reviewed       Procedure Component Value Units Date/Time    Comprehensive metabolic panel [[id number]]  (Abnormal) Collected: 04/05/25 4774    Lab Status: Final result Specimen: Blood from Arm, Left Updated: 04/05/25 4542      Sodium 140 mmol/L      Potassium 3.5 mmol/L      Chloride 104 mmol/L      CO2 24 mmol/L      ANION GAP 12 mmol/L      BUN 19 mg/dL      Creatinine 0.84 mg/dL      Glucose 131 mg/dL      Calcium 9.3 mg/dL      AST 20 U/L      ALT 26 U/L      Alkaline Phosphatase 130 U/L      Total Protein 7.4 g/dL      Albumin 4.6 g/dL      Total Bilirubin 0.44 mg/dL      eGFR 75 ml/min/1.73sq m     Narrative:      National Kid[employer] Foundation guidelines for Chronic Kidney Disease (CKD):     Stage 1 with normal or high GFR (GFR > 90 mL/min/1.73 square meters)    Stage 2 Mild CKD (GFR = 60-89 mL/min/1.73 square meters)    Stage 3A Moderate CKD (GFR = 45-59 mL/min/1.73 square meters)    Stage 3B Moderate CKD (GFR = 30-44 mL/min/1.73 square meters)    Stage 4 Severe CKD (GFR = 15-29 mL/min/1.73 square meters)    Stage 5 End Stage CKD (GFR <15 mL/min/1.73 square meters)  Note: GFR calculation is accurate only with a steady state creatinine    CBC and differential [[id number]] Collected: 04/05/25 0407    Lab Status: Final result Specimen: Blood from Arm, Left Updated: 04/05/25 0435     WBC 6.53 Thousand/uL      RBC 4.89 Million/uL      Hemoglobin 14.7 g/dL      Hematocrit 44.2 %      MCV 90 fL      MCH 30.1 pg      MCHC 33.3 g/dL      RDW 12.4 %      MPV 9.5 fL      Platelets 324 Thousands/uL      nRBC 0 /100 WBCs      Segmented % 69 %      Immature Grans % 0 %      Lymphocytes % 23 %      Monocytes % 6 %      Eosinophils Relative 1 %      Basophils Relative 1 %      Absolute Neutrophils 4.52 Thousands/µL      Absolute Immature Grans 0.02 Thousand/uL      Absolute Lymphocytes 1.49 Thousands/µL      Absolute Monocytes 0.38 Thousand/µL      Eosinophils Absolute 0.07 Thousand/µL      Basophils Absolute 0.05 Thousands/µL             CT head without contrast   Final Interpretation by Shabbir Trevizo MD (04/05 0543)      No acute intracranial abnormality.                  Workstation performed: [id number]             Procedures    ED  Medication and Procedure Management   Prior to Admission Medications   Prescriptions Last Dose Informant Patient Reported? Taking?   Atogepant (Qulipta) 60 MG TABS  Self No No   Sig: Take 60 mg by mouth daily at bedtime   Patient not taking: Reported on 12/4/2024   SUMAtriptan Succinate 6 MG/0.5ML SOAJ   No No   Sig: INJECT ONCE FOR SEVERE HEADACHE. MAY REPEAT AFTER 2 HORUS. MAX 2 INJECTIONS PER WEEK   baclofen 10 mg tablet   No No   Sig: TAKE 1 TABLET(10 MG) BY MOUTH DAILY AT BEDTIME   cyclobenzaprine (FLEXERIL) 5 mg tablet   No No   Sig: TAKE 1 TABLET(5 MG) BY MOUTH DAILY AT BEDTIME   dexamethasone (DECADRON) 4 mg tablet   No No   Sig: Take 2 tabs for 2 days then take 1 tab for 3-5 days (until headaches break then stop)   divalproex sodium (DEPAKOTE ER) 250 mg 24 hr tablet  Self No No   Sig: Take 2 tablets (500 mg total) by mouth daily at bedtime   ketorolac (TORADOL) 10 mg tablet   No No   Sig: Take 1 tablet (10 mg total) by mouth daily as needed for severe pain Max 1/day, 3/week, 10/month. Do not use with other OTC pain meds   latanoprost (XALATAN) 0.005 % ophthalmic solution  Self Yes No   Sig: INSTILL 1 DROP BOTH EYES EVERY NIGHT AT BEDTIME   levothyroxine 75 mcg tablet   No No   Sig: TAKE 1 TABLET(75 MCG) BY MOUTH DAILY EARLY MORNING   naproxen (NAPROSYN) 500 mg tablet   No No   Sig: TAKE 1 TABLET(500 MG) BY MOUTH TWICE DAILY WITH MEALS   omeprazole (PriLOSEC) 20 mg delayed release capsule  Self No No   Sig: TAKE 1 CAPSULE(20 MG) BY MOUTH DAILY   Patient not taking: Reported on 12/4/2024   ondansetron (ZOFRAN) 4 mg tablet  Self No No   Sig: Take 1 tablet (4 mg total) by mouth every 8 (eight) hours as needed for nausea or vomiting for up to 2 days   pantoprazole (PROTONIX) 40 mg tablet   No No   Sig: TAKE 1 TABLET(40 MG) BY MOUTH TWICE DAILY   predniSONE 10 mg tablet   No No   Sig: 3 tabs po bid x2 days, then 2 tabs po bid x2 days, then 1 tab bid x2 days, then 1 daily until done.   Patient not taking: Reported  on 2/10/2025   propranolol (INDERAL) 10 mg tablet   No No   Sig: TAKE 1 TABLET(10 MG) BY MOUTH TWICE DAILY   rizatriptan (MAXALT-MLT) 10 mg disintegrating tablet   No No   Sig: Take 1 tablet (10 mg total) by mouth as needed for migraine Take at the onset of migraine; if symptoms continue or return, may take another dose at least 2 hours after first dose. Take no more than 2 doses in a day.   venlafaxine (EFFEXOR-XR) 150 mg 24 hr capsule  Self No No   Sig: TAKE 1 CAPSULE(150 MG) BY MOUTH DAILY WITH BREAKFAST      Facility-Administered Medications Last Administration Doses Remaining   Botulinum Toxin Type A SOLR 200 Units 3/11/2025  8:39 AM 3        Discharge Medication List as of 4/5/2025  5:50 AM        CONTINUE these medications which have NOT CHANGED    Details   Atogepant (Qulipta) 60 MG TABS Take 60 mg by mouth daily at bedtime, Starting Wed 5/15/2024, Normal      baclofen 10 mg tablet TAKE 1 TABLET(10 MG) BY MOUTH DAILY AT BEDTIME, Starting Fri 2/28/2025, Normal      cyclobenzaprine (FLEXERIL) 5 mg tablet TAKE 1 TABLET(5 MG) BY MOUTH DAILY AT BEDTIME, Starting Tue 11/26/2024, Normal      dexamethasone (DECADRON) 4 mg tablet Take 2 tabs for 2 days then take 1 tab for 3-5 days (until headaches break then stop), Normal      divalproex sodium (DEPAKOTE ER) 250 mg 24 hr tablet Take 2 tablets (500 mg total) by mouth daily at bedtime, Starting Wed 5/15/2024, Normal      ketorolac (TORADOL) 10 mg tablet Take 1 tablet (10 mg total) by mouth daily as needed for severe pain Max 1/day, 3/week, 10/month. Do not use with other OTC pain meds, Starting Thu 11/21/2024, Normal      latanoprost (XALATAN) 0.005 % ophthalmic solution INSTILL 1 DROP BOTH EYES EVERY NIGHT AT BEDTIME, Historical Med      levothyroxine 75 mcg tablet TAKE 1 TABLET(75 MCG) BY MOUTH DAILY EARLY MORNING, Normal      naproxen (NAPROSYN) 500 mg tablet TAKE 1 TABLET(500 MG) BY MOUTH TWICE DAILY WITH MEALS, Starting Wed 1/29/2025, Normal      omeprazole  (PriLOSEC) 20 mg delayed release capsule TAKE 1 CAPSULE(20 MG) BY MOUTH DAILY, Normal      ondansetron (ZOFRAN) 4 mg tablet Take 1 tablet (4 mg total) by mouth every 8 (eight) hours as needed for nausea or vomiting for up to 2 days, Starting Sun 9/10/2023, Until Wed 12/4/2024 at 2359, Normal      pantoprazole (PROTONIX) 40 mg tablet TAKE 1 TABLET(40 MG) BY MOUTH TWICE DAILY, Starting Mon 2/3/2025, Normal      predniSONE 10 mg tablet 3 tabs po bid x2 days, then 2 tabs po bid x2 days, then 1 tab bid x2 days, then 1 daily until done., Normal      propranolol (INDERAL) 10 mg tablet TAKE 1 TABLET(10 MG) BY MOUTH TWICE DAILY, Starting Tue 4/1/2025, Normal      rizatriptan (MAXALT-MLT) 10 mg disintegrating tablet Take 1 tablet (10 mg total) by mouth as needed for migraine Take at the onset of migraine; if symptoms continue or return, may take another dose at least 2 hours after first dose. Take no more than 2 doses in a day., Starting Wed 12/4/2024, Normal      SUMAtriptan Succinate 6 MG/0.5ML SOAJ INJECT ONCE FOR SEVERE HEADACHE. MAY REPEAT AFTER 2 HORUS. MAX 2 INJECTIONS PER WEEK, Normal      venlafaxine (EFFEXOR-XR) 150 mg 24 hr capsule TAKE 1 CAPSULE(150 MG) BY MOUTH DAILY WITH BREAKFAST, Normal           No discharge procedures on file.  ED SEPSIS DOCUMENTATION   Time reflects when diagnosis was documented in both MDM as applicable and the Disposition within this note       Time User Action Codes Description Comment    4/5/2025  5:47 AM Delfina Schreiber Add [G43.909] Migraine                  Delfina Schreiber PA-C  04/05/25 1910

## 2025-04-12 DIAGNOSIS — G43.019 INTRACTABLE MIGRAINE WITHOUT AURA AND WITHOUT STATUS MIGRAINOSUS: ICD-10-CM

## 2025-04-14 DIAGNOSIS — G43.019 INTRACTABLE MIGRAINE WITHOUT AURA AND WITHOUT STATUS MIGRAINOSUS: ICD-10-CM

## 2025-04-14 RX ORDER — NAPROXEN 500 MG/1
500 TABLET ORAL 2 TIMES DAILY WITH MEALS
Qty: 20 TABLET | Refills: 2 | Status: SHIPPED | OUTPATIENT
Start: 2025-04-14

## 2025-04-14 RX ORDER — DIVALPROEX SODIUM 250 MG/1
TABLET, FILM COATED, EXTENDED RELEASE ORAL
Qty: 60 TABLET | Refills: 0 | Status: SHIPPED | OUTPATIENT
Start: 2025-04-14

## 2025-04-14 RX ORDER — DIVALPROEX SODIUM 250 MG/1
TABLET, FILM COATED, EXTENDED RELEASE ORAL
Qty: 180 TABLET | OUTPATIENT
Start: 2025-04-14

## 2025-04-17 ENCOUNTER — TELEPHONE (OUTPATIENT)
Age: 60
End: 2025-04-17

## 2025-04-17 NOTE — TELEPHONE ENCOUNTER
Pt called in to r/s her appt 5/21/25 at 9:00 am with Marianna Jolly.  as she has 2 appts back to back and would be on time for her appt.     Pt is now r/s for 5/28/25 at 11:30 am with Marianna Jolly.     GLADYS

## 2025-05-01 DIAGNOSIS — F32.A DEPRESSION, UNSPECIFIED DEPRESSION TYPE: ICD-10-CM

## 2025-05-01 RX ORDER — VENLAFAXINE HYDROCHLORIDE 150 MG/1
CAPSULE, EXTENDED RELEASE ORAL
Qty: 30 CAPSULE | Refills: 0 | Status: SHIPPED | OUTPATIENT
Start: 2025-05-01

## 2025-05-06 ENCOUNTER — OFFICE VISIT (OUTPATIENT)
Dept: NEUROLOGY | Facility: CLINIC | Age: 60
End: 2025-05-06
Payer: MEDICARE

## 2025-05-06 ENCOUNTER — TELEPHONE (OUTPATIENT)
Age: 60
End: 2025-05-06

## 2025-05-06 VITALS
HEART RATE: 107 BPM | HEIGHT: 59 IN | BODY MASS INDEX: 28.75 KG/M2 | DIASTOLIC BLOOD PRESSURE: 97 MMHG | SYSTOLIC BLOOD PRESSURE: 127 MMHG | TEMPERATURE: 97.8 F | WEIGHT: 142.6 LBS

## 2025-05-06 DIAGNOSIS — G43.019 INTRACTABLE MIGRAINE WITHOUT AURA AND WITHOUT STATUS MIGRAINOSUS: ICD-10-CM

## 2025-05-06 DIAGNOSIS — G43.709 CHRONIC MIGRAINE WITHOUT AURA WITHOUT STATUS MIGRAINOSUS, NOT INTRACTABLE: Primary | ICD-10-CM

## 2025-05-06 PROCEDURE — 99215 OFFICE O/P EST HI 40 MIN: CPT | Performed by: PHYSICIAN ASSISTANT

## 2025-05-06 RX ORDER — MAGNESIUM OXIDE 400 MG/1
400 TABLET ORAL AS NEEDED
Qty: 30 TABLET | Refills: 1 | Status: SHIPPED | OUTPATIENT
Start: 2025-05-06

## 2025-05-06 RX ORDER — NARATRIPTAN 2.5 MG/1
2.5 TABLET ORAL ONCE AS NEEDED
Qty: 12 TABLET | Refills: 3 | Status: SHIPPED | OUTPATIENT
Start: 2025-05-06

## 2025-05-06 RX ORDER — SODIUM CHLORIDE 9 MG/ML
20 INJECTION, SOLUTION INTRAVENOUS ONCE
OUTPATIENT
Start: 2025-05-19

## 2025-05-06 RX ORDER — DIPHENHYDRAMINE HCL 50 MG
50 CAPSULE ORAL EVERY 6 HOURS PRN
Qty: 30 CAPSULE | Refills: 0 | Status: SHIPPED | OUTPATIENT
Start: 2025-05-06

## 2025-05-06 RX ORDER — METOCLOPRAMIDE 10 MG/1
10 TABLET ORAL EVERY 8 HOURS PRN
Qty: 20 TABLET | Refills: 2 | Status: SHIPPED | OUTPATIENT
Start: 2025-05-06

## 2025-05-06 RX ORDER — PROPRANOLOL HYDROCHLORIDE 10 MG/1
20 TABLET ORAL 2 TIMES DAILY
Qty: 180 TABLET | Refills: 1 | Status: SHIPPED | OUTPATIENT
Start: 2025-05-06

## 2025-05-06 NOTE — TELEPHONE ENCOUNTER
REASON FOR CONVERSATION: Diagnosis Code for [facility]  Recd call from Avni from [employer] requesting that the diagnosis code be faxed to; [phone] in order to enroll pt in the [facility] program.     CB #: [phone]; opt 1

## 2025-05-06 NOTE — PROGRESS NOTES
Name: Tiffany Herbert      : 1965      MRN: [id number]  Encounter Provider: Betzaida Morales PA-C  Encounter Date: 2025   Encounter department: NEUROLOGY [facility]  :  Assessment & Plan  Chronic migraine without aura without status migrainosus, not intractable  Preventative:  Continue cyclobenzaprine 5 mg at bedtime  Increase propranolol to 20 mg twice a day  Continue divalproex sodium 250 mg at bedtime  Continue medication from other providers  We will authorize for Vyepti 100 mg every 84 days  Hold botox at this time    Abortive:  If ubrelvy is approved and affordable, take at onset of migraine along with metoclopramide 10 mg, benadryl 25-50 mg, magnesium 400 mg and naproxen 500 mg.  May repeat ubrelvy in 2 hours if needed. Metoclopramide is every 8 hours as needed.  If not able to obtain ublrevy or ubrelvy fails, take naratriptan 2.5 mg at onset of migraine along with metoclopramide 10 mg, benadryl 25-50 mg, magnesium 400 mg.and naproxen 500 mg.   On nights migraine take 2 of the divalproex sodium 250 mg tablets  If fails will reach out.  Orders:  •  Ubrogepant (UBRELVY) 100 MG tablet; Take 1 tablet (100 mg total) by mouth if needed (migraine) Take 1 tablet (100 mg) one time as needed for migraine. May repeat one additional tablet (100 mg) at least two hours after the first dose. Do not use more than two doses per day  •  diphenhydrAMINE (BENADRYL) 50 mg capsule; Take 1 capsule (50 mg total) by mouth every 6 (six) hours as needed for itching  •  magnesium oxide (MAG-OX) 400 mg tablet; Take 1 tablet (400 mg total) by mouth if needed (for migraine rescue)  •  metoclopramide (Reglan) 10 mg tablet; Take 1 tablet (10 mg total) by mouth every 8 (eight) hours as needed (for migraine)  •  naratriptan (AMERGE) 2.5 MG tablet; Take 1 tablet (2.5 mg total) by mouth once as needed for migraine 2.5 mg at onset of headache, may repeat in 4 hours if needed.  Limit of 3 a week or 12 a  month    Intractable migraine without aura and without status migrainosus    Orders:  •  propranolol (INDERAL) 10 mg tablet; Take 2 tablets (20 mg total) by mouth 2 (two) times a day          History of Present Illness   HPI   Tiffany Herbert is a 59 y.o. Female with past medical history of sleep apnea, GERD, hypothyroidism, cervicalgia and fatigue who presents for headaches     Doesn't work but is on disability      Current medical illnesses:  QTc 9/10/2023 398 ms  History Tobacco use:quit     What medications do you take or have you taken for your headaches?   Current Preventive:   Alprazolam, venlafaxine  Amlodipine  Baclofen  Depakote  Botox     Current Abortive:   Dexamethasone  Ketorolac  Rizatriptan  Sumatriptan injections     Prior Preventive:   Acetazolamide  Escitalopram  Ajovy, Emgality,Aimovig  Gabapentin, topiramate  Qulipta     Prior Abortive:   Metoclopramide, ondansetron, prochlorperazine  Sumatriptan  Medrol pack      Interval update as of 5/6/2025:  Patient did see sleep medicine and has a diagnostic sleep study scheduled for 6/6/2025.  Patient did present to the ER on 4/5/2025.  Had been ongoing for a few days.  Has been experiencing vomiting and not able to keep her medication down.  She has spots in her vision.  CT of the head was without any intracranial abnormalities and migraine resolved with migraine cocktail which included, Reglan, Benadryl, magnesium, IV bolus and sumatriptan Doesn't feel botox is helping.  Doesn't recall qulipta being beneficial last year.  Feels worse and rescue medications are not effective.  Maybe goes away for a few hours but usually returns    Interval update as of 12/4/2024:  Patient has been doing worse since last seen.  She did stop her Qulipta in approximately September as she did not feel it weeks helping.  Patient also has not gotten her sleep study due to not feeling well when it was originally scheduled and then unable to reschedule it.     Interval updates  as of 5/14/2024  Reports worsening since she last was seen.  Has not been able to obtain Aimovig due to cost.          Interval history as of 7/26/2023  Patient had to get re-approved for her Aimovig.  Didn't have a dose for 3+ months!!!  Therefore her migraines did worsen to 4-5 days a week (if not more).  She did have to go to there ER on 6/4/2023 (had a migraine for over 5 weeks at that time).  Did restart in June with her injections.  When she was getting the injections on time she was having 2 a week but would respond to her rizatriptan within an hour.  However, when was delayed and not receiving the Aimovig went on for weeks at one point     Average pain is a 5-6/10, no longer a 10/10     Interval history as of 2/8/2023  Patient states that she has improved. Gets about 7 but intensity is about a 7/10  If she takes the rizatriptan right away usually starts to go away with 30-60 minutes.  Occasionally has one that lasts more than a day.  Is doing well with the AImovig.       Interval History as of 5/23/2022  - has not started injection due to cost  Migraines are every week but vary in duration shortest time is 1 day up to 4 days, averaging 2 days but can have multiple migraines in a week.  Therefore she has migraines 3-5 days a week.     Average pain is a 10/10.  If catches immediately will be a 5-6/10    Patient is having vomiting again with her migraines and her neck pain is worse with her migraines      What is your current pain level ?  0/10     How often do the headaches occur?   - as of 5/14/2020: 5 days a week migraines   - as of 8/20/2020: 3-4 times per week, in July 2-3 really bad migraines that lasted 3-4 days    - as of 5/23/2022: 3-5 days a week will have migraines  - as of 2/8/2023:  8-10 a month, can last just a few hours but then some a few days  -As of 7/26/2023:2 a week when injecting aimovig and responsive to abortive medications, when was off her aimovig continuous for over 5 weeks and 4-5 a  week prior to that one long stretch  - as of 5/15/2024:  3-4 times a week but has not been able to use the aimovig due to cost  - as of 12/4/2024:  5+ times a week  - as of 5/6/2025  3 a week     What time of the day do the headaches start? varies; a lot wakes up with them or mid-afternoon  How long do the headaches last? averages 5-6 hours; but can be over 4 hours up to 3 days  Are you ever headache free? Yes     Aura? without aura     Last eye exam: maybe 6 months ago, 2 years ago, normal except needing glasses       Where is your headache located and pain quality?   - Right or left side, worse when on right  - start back of neck   - worse on right side comes up the back of the head and goes to frontal/eye region on that side  - pulsating, pounding, sharp stabbing, pressure     What is the intensity of pain? Average:6-7/10, worst 10/10     Alternative therapies used in the past for headaches? Massage, chiropractor,  Things that make the headache worse? No specific movements, any movement      Headache triggers:  unknown     Associated symptoms:   Photophobia, phonophobia, sensitivity to smells  Nausea, vomiting  Stiff sore neck  Problems with concentration  Lightheaded or dizzy  Tinnitus  Lacrimation  Runny stuffy nose     Number of days missed per month because of headaches:  Work (or school) days: NA  Social or Family activities: does miss occasionally due to this, but also doesn't do a lot      What time of the year do headaches occur more frequently? do not seem to be related to any time of the year  Have you seen someone else for headaches or pain? Yes, neurology, pain management  Have you had trigger point injection performed and how often? Yes  Have you had Botox injection performed and how often? Yes   Have you had epidural injections or transforaminal injections performed? Yes     Have you used CBD or THC for your headaches and how often? No     Are you current pregnant or planning on getting pregnant?  "No,  No - no regular periods since early 40s     Water: 2-4 bottles per day  Caffeine: 1-2 cups per day     Mood: - depression after  and granddaughter passing away      Have you ever had any Brain imaging? yes   12/11/2015 MRI brain  No intracranial abnormality.     12/19/2024 MRI brain  No acute infarct, significant mass effect or midline shift.   Reviewed images with patient during office visit on 5/6/2025.   I personally reviewed these images.  Recent laboratory data was reviewed.  Medications and allergies were reviewed.     Reviewed old notes from physician seen in the past- see above HPI for summary of previous encounters.      With botox has had a reduction of at least 7 migraine days with less abortive medication, less ER visits which correlates to headache diary     Review of Systems I have personally reviewed the MA's review of systems and made changes as necessary.         Objective   /97 (BP Location: Left arm, Patient Position: Sitting, Cuff Size: Standard)   Pulse (!) 107   Temp 97.8 °F (36.6 °C) (Temporal)   Ht 4' 11\" (1.499 m)   Wt 64.7 kg (142 lb 9.6 oz)   BMI 28.80 kg/m²     Physical Exam  Neurological Exam  CONSTITUTIONAL: Well developed, well nourished, well groomed. No dysmorphic features.     HEENT:  Normocephalic atraumatic.    Chest:  Respirations regular and unlabored.    Psychiatric:  Normal behavior and appropriate affect      MENTAL STATUS  Orientation: Alert and oriented x 3  Fund of knowledge: Intact.        Administrative Statements   I have spent a total time of 43 minutes in caring for this patient on the day of the visit/encounter including Diagnostic results, Prognosis, Risks and benefits of tx options, Instructions for management, Patient and family education, Importance of tx compliance, Risk factor reductions, Impressions, Counseling / Coordination of care, Documenting in the medical record, Reviewing/placing orders in the medical record (including tests, " medications, and/or procedures), and Obtaining or reviewing history  .

## 2025-05-06 NOTE — PATIENT INSTRUCTIONS
1. Chronic migraine without aura without status migrainosus, not intractable  Assessment & Plan:  Preventative:  Continue cyclobenzaprine 5 mg at bedtime  Increase propranolol to 20 mg twice a day  Continue divalproex sodium 250 mg at bedtime  Continue medication from other providers  We will authorize for Vyepti 100 mg every 84 days  Hold botox at this time    Abortive:  If ubrelvy is approved and affordable, take at onset of migraine along with metoclopramide 10 mg, benadryl 25-50 mg, magnesium 400 mg and naproxen 500 mg.  May repeat ubrelvy in 2 hours if needed. Metoclopramide is every 8 hours as needed.  If not able to obtain ublrevy or ubrelvy fails, take naratriptan 2.5 mg at onset of migraine along with metoclopramide 10 mg, benadryl 25-50 mg, magnesium 400 mg.and naproxen 500 mg.   On nights migraine take 2 of the divalproex sodium 250 mg tablets    If fails will reach out.  Orders:    Ubrogepant (UBRELVY) 100 MG tablet; Take 1 tablet (100 mg total) by mouth if needed (migraine) Take 1 tablet (100 mg) one time as needed for migraine. May repeat one additional tablet (100 mg) at least two hours after the first dose. Do not use more than two doses per day    diphenhydrAMINE (BENADRYL) 50 mg capsule; Take 1 capsule (50 mg total) by mouth every 6 (six) hours as needed for itching    magnesium oxide (MAG-OX) 400 mg tablet; Take 1 tablet (400 mg total) by mouth if needed (for migraine rescue)    metoclopramide (Reglan) 10 mg tablet; Take 1 tablet (10 mg total) by mouth every 8 (eight) hours as needed (for migraine)    naratriptan (AMERGE) 2.5 MG tablet; Take 1 tablet (2.5 mg total) by mouth once as needed for migraine 2.5 mg at onset of headache, may repeat in 4 hours if needed.  Limit of 3 a week or 12 a month      Orders:  -     Ubrogepant (UBRELVY) 100 MG tablet; Take 1 tablet (100 mg total) by mouth if needed (migraine) Take 1 tablet (100 mg) one time as needed for migraine. May repeat one additional tablet  (100 mg) at least two hours after the first dose. Do not use more than two doses per day  -     diphenhydrAMINE (BENADRYL) 50 mg capsule; Take 1 capsule (50 mg total) by mouth every 6 (six) hours as needed for itching  -     magnesium oxide (MAG-OX) 400 mg tablet; Take 1 tablet (400 mg total) by mouth if needed (for migraine rescue)  -     metoclopramide (Reglan) 10 mg tablet; Take 1 tablet (10 mg total) by mouth every 8 (eight) hours as needed (for migraine)  -     naratriptan (AMERGE) 2.5 MG tablet; Take 1 tablet (2.5 mg total) by mouth once as needed for migraine 2.5 mg at onset of headache, may repeat in 4 hours if needed.  Limit of 3 a week or 12 a month  2. Intractable migraine without aura and without status migrainosus  Assessment & Plan:    Orders:    propranolol (INDERAL) 10 mg tablet; Take 2 tablets (20 mg total) by mouth 2 (two) times a day    Orders:  -     propranolol (INDERAL) 10 mg tablet; Take 2 tablets (20 mg total) by mouth 2 (two) times a day

## 2025-05-06 NOTE — ASSESSMENT & PLAN NOTE
Preventative:  Continue cyclobenzaprine 5 mg at bedtime  Increase propranolol to 20 mg twice a day  Continue divalproex sodium 250 mg at bedtime  Continue medication from other providers  We will authorize for Vyepti 100 mg every 84 days  Hold botox at this time    Abortive:  If ubrelvy is approved and affordable, take at onset of migraine along with metoclopramide 10 mg, benadryl 25-50 mg, magnesium 400 mg and naproxen 500 mg.  May repeat ubrelvy in 2 hours if needed. Metoclopramide is every 8 hours as needed.  If not able to obtain ublrevy or ubrelvy fails, take naratriptan 2.5 mg at onset of migraine along with metoclopramide 10 mg, benadryl 25-50 mg, magnesium 400 mg.and naproxen 500 mg.   On nights migraine take 2 of the divalproex sodium 250 mg tablets  If fails will reach out.  Orders:  •  Ubrogepant (UBRELVY) 100 MG tablet; Take 1 tablet (100 mg total) by mouth if needed (migraine) Take 1 tablet (100 mg) one time as needed for migraine. May repeat one additional tablet (100 mg) at least two hours after the first dose. Do not use more than two doses per day  •  diphenhydrAMINE (BENADRYL) 50 mg capsule; Take 1 capsule (50 mg total) by mouth every 6 (six) hours as needed for itching  •  magnesium oxide (MAG-OX) 400 mg tablet; Take 1 tablet (400 mg total) by mouth if needed (for migraine rescue)  •  metoclopramide (Reglan) 10 mg tablet; Take 1 tablet (10 mg total) by mouth every 8 (eight) hours as needed (for migraine)  •  naratriptan (AMERGE) 2.5 MG tablet; Take 1 tablet (2.5 mg total) by mouth once as needed for migraine 2.5 mg at onset of headache, may repeat in 4 hours if needed.  Limit of 3 a week or 12 a month

## 2025-05-06 NOTE — ASSESSMENT & PLAN NOTE
Orders:  •  propranolol (INDERAL) 10 mg tablet; Take 2 tablets (20 mg total) by mouth 2 (two) times a day

## 2025-05-07 NOTE — TELEPHONE ENCOUNTER
Faxed to [facility] [phone] the updated enrollment form with diagnosis code. Noted on fax cover sheet:    Patient Tiffany Herbert  : 1965    Updated enrollment attached. Diagnosis code G43.709    Will follow up.

## 2025-05-08 NOTE — TELEPHONE ENCOUNTER
Henrry from [employer] [facility] calling to f/u on diagnosis request. Advised her fax was sent 5/7/25 4:11pm. [facility] has not yet recd fax.     Jolene barba

## 2025-05-08 NOTE — TELEPHONE ENCOUNTER
Re faxed to [facility] [phone] the updated enrollment form with diagnosis code. Noted on fax cover sheet:    Patient Tiffany Herbert  : 1965     Updated enrollment attached. Diagnosis code G43.709

## 2025-05-14 NOTE — TELEPHONE ENCOUNTER
Called [facility] spoke with Randall LERMA regarding Vyepti determination. Determination pending.    Will follow up.

## 2025-05-15 ENCOUNTER — PATIENT MESSAGE (OUTPATIENT)
Dept: NEUROLOGY | Facility: CLINIC | Age: 60
End: 2025-05-15

## 2025-05-15 NOTE — TELEPHONE ENCOUNTER
Received fax from [employer] regarding determination.    [facility] approved site  V[facility]pt[facility] approved medication    Called patient and provided update. Patient would like to call [facility] to scheduled the appointment. Sending infusion center contact information through patient's portal.    Will follow up.

## 2025-05-23 ENCOUNTER — TELEPHONE (OUTPATIENT)
Age: 60
End: 2025-05-23

## 2025-05-23 NOTE — TELEPHONE ENCOUNTER
[facility] called in to check is summary benefit was received. They are refaxing over benefit summary.

## 2025-05-28 ENCOUNTER — TELEPHONE (OUTPATIENT)
Dept: OTHER | Facility: OTHER | Age: 60
End: 2025-05-28

## 2025-05-28 ENCOUNTER — TELEPHONE (OUTPATIENT)
Age: 60
End: 2025-05-28

## 2025-05-28 NOTE — TELEPHONE ENCOUNTER
"Incoming call from pharmacist with [facility]. Patient has active prescriptions for Naratriptan, Rizatriptan and Sumatriptan Injection.  Pharmacist is questioning if this is correct and why patient needs multiple triptans.     Per Betzaida's office notes from 5-6-25,  Abortive:    \"If not able to obtain ublrevy or ubrelvy fails, take naratriptan 2.5 mg at onset of migraine.\"     Current Abortive:   Dexamethasone  Ketorolac  Rizatriptan  Sumatriptan injections        Betzaida,  Please confirm if you would like patient to have all three prescriptions remain active.  Thank you!    Clinical,  Please call [facility] and advise.   Thank you!!!!!           "

## 2025-05-28 NOTE — TELEPHONE ENCOUNTER
Called [facility] and spoke to carlos.  made her aware of below. She will deactivate  sumatriptan inj and rizatriptan scripts.    Ubrelvy needs PA  [id number]  [id number]  Group[id number]  ID-[id number]  [phone]    Located PA request on CMM  Ubrelvy PA completed on CMM  Key: [id number]    Received immediate approval on CMM  Ubrelvy approved thru 12/31/25    Left message for pharm making them aware of approval

## 2025-05-28 NOTE — TELEPHONE ENCOUNTER
I want her to have the Naratriptan and the Ubrelvy.  The sumatriptan injections and the rizatriptan are not effective

## 2025-05-29 ENCOUNTER — TELEPHONE (OUTPATIENT)
Age: 60
End: 2025-05-29

## 2025-05-29 NOTE — TELEPHONE ENCOUNTER
Naila already approved and message left for pharm yesterday.    Called [facility] and spoke to carlos.  Made her aware of ubrelvy approval.  She did not get vm that I left yesterday.   She was able to get a paid claim and she confirmed that all of pt's scripts were transferred to [facility].    Called pt and made her aware of ubrelvy approval

## 2025-05-29 NOTE — TELEPHONE ENCOUNTER
Patient called in stated her Pharmacy has changed to [facility] on [address] in [facility]. Patient requesting her script be sent to them. Patient also states her insurance company requires a pre authorization for the medication.    Ubrogepant (UBRELVY) 100 MG tablet     Can you please assit.    Thank you.

## 2025-05-29 NOTE — PATIENT COMMUNICATION
Patient called for update on how to schedule vyepti infusions; said she is not able to access her my chart. I gave her number for my chart assistance; also gave her number to [facility] to schedule; patient was appreciative. Also gave her instructions on ubrelvy and naratriptan (see encounter dated 5/28); patient verbalized understanding; no further questions/concerns at this time.

## 2025-06-09 ENCOUNTER — TELEPHONE (OUTPATIENT)
Dept: INFUSION CENTER | Facility: CLINIC | Age: 60
End: 2025-06-09

## 2025-06-09 NOTE — TELEPHONE ENCOUNTER
Patient called to rescheduled her 6/13 appt as it is her daughter's birthday. Patient rescheduled to 6/20/25 at 2PM, patient appreciative.

## 2025-06-13 DIAGNOSIS — F32.A DEPRESSION, UNSPECIFIED DEPRESSION TYPE: ICD-10-CM

## 2025-06-13 RX ORDER — VENLAFAXINE HYDROCHLORIDE 150 MG/1
CAPSULE, EXTENDED RELEASE ORAL
Qty: 30 CAPSULE | Refills: 0 | Status: SHIPPED | OUTPATIENT
Start: 2025-06-13

## 2025-06-16 ENCOUNTER — TELEPHONE (OUTPATIENT)
Dept: NEUROLOGY | Facility: CLINIC | Age: 60
End: 2025-06-16

## 2025-06-16 NOTE — LETTER
6/17/2025      Tiffany Herbert  [address] [place] [address]      Tiffany,     Please see attached application to complete for financial assistance for Ubrelvy.  Please include copies of your prescription card and any income documentation requested by the program.  Let me know if you have questions.  Once complete, you can send back to me at the address below.  Thank you,        Sincerely,      Delfina Grant  Outpatient   [facility]  [address] [place] [address], [address]  [place] [address]  [phone]  [email or web address]

## 2025-06-16 NOTE — TELEPHONE ENCOUNTER
Salma Garcia, RN to Neurology  (Selected Message)        6/16/25  1:46 PM  Any assistance available    6/16/25 12:41 PM  Delfina Huitron RN routed this conversation to Neurology Concussion & Migraine Team 5  Tiffany Herbert to P Neurology Pod Clinical (supporting Betzaida Morales PA-C)        6/14/25 11:38 AM  The 783 dollars for Ubrelvy is way too expensive

## 2025-06-17 NOTE — TELEPHONE ENCOUNTER
FAINA called patient at [phone].  Discussed PAP with [employer] Patient Access Support and patient would like a copy mailed to her mailing address.  Confirmed mailing address.  FAINA informed patient will have copy mailed to her and check back next week to ensure receipt.  SW remains available.

## 2025-06-18 ENCOUNTER — TELEPHONE (OUTPATIENT)
Dept: INFUSION CENTER | Facility: CLINIC | Age: 60
End: 2025-06-18

## 2025-06-18 NOTE — TELEPHONE ENCOUNTER
New patient phone call completed. Date time and location of appointment confirmed. Infusion center policies reviewed. All questions answered.

## 2025-06-19 NOTE — TELEPHONE ENCOUNTER
Infusion appointment on 6/13/25 cancelled and rescheduled for 6/20/2025. Therapy plan required update. Plan updated.    Will follow up with patient post infusion.

## 2025-06-20 ENCOUNTER — HOSPITAL ENCOUNTER (OUTPATIENT)
Dept: INFUSION CENTER | Facility: CLINIC | Age: 60
End: 2025-06-20
Attending: PSYCHIATRY & NEUROLOGY
Payer: MEDICARE

## 2025-06-20 VITALS
DIASTOLIC BLOOD PRESSURE: 101 MMHG | TEMPERATURE: 97.3 F | HEART RATE: 96 BPM | SYSTOLIC BLOOD PRESSURE: 140 MMHG | OXYGEN SATURATION: 94 %

## 2025-06-20 DIAGNOSIS — G43.019 INTRACTABLE MIGRAINE WITHOUT AURA AND WITHOUT STATUS MIGRAINOSUS: Primary | ICD-10-CM

## 2025-06-20 PROCEDURE — 96365 THER/PROPH/DIAG IV INF INIT: CPT

## 2025-06-20 RX ORDER — SODIUM CHLORIDE 9 MG/ML
20 INJECTION, SOLUTION INTRAVENOUS ONCE
Status: COMPLETED | OUTPATIENT
Start: 2025-06-20 | End: 2025-06-20

## 2025-06-20 RX ORDER — SODIUM CHLORIDE 9 MG/ML
20 INJECTION, SOLUTION INTRAVENOUS ONCE
OUTPATIENT
Start: 2025-09-12

## 2025-06-20 RX ADMIN — EPTINEZUMAB-JJMR 100 MG: 100 INJECTION INTRAVENOUS at 14:47

## 2025-06-20 RX ADMIN — SODIUM CHLORIDE 20 ML/HR: 0.9 INJECTION, SOLUTION INTRAVENOUS at 14:17

## 2025-06-20 NOTE — PROGRESS NOTES
Pt tolerated tx without issue. PIV removed. Pt confirmed next appointment on 9/12 @0730 AN. AVS declined.

## 2025-06-20 NOTE — PLAN OF CARE
Problem: Potential for Falls  Goal: Patient will remain free of falls  Description: INTERVENTIONS:  - Educate patient/family on patient safety including physical limitations  - Instruct patient to call for assistance with activity   - Consider consulting OT/PT to assist with strengthening/mobility based on AM PAC & JH-HLM score  - Consult OT/PT to assist with strengthening/mobility   - Keep Call bell within reach  - Keep bed low and locked with side rails adjusted as appropriate  - Keep care items and personal belongings within reach    Problem: Knowledge Deficit  Goal: Patient/family/caregiver demonstrates understanding of disease process, treatment plan, medications, and discharge instructions  Description: Complete learning assessment and assess knowledge base.  Interventions:  - Provide teaching at level of understanding  - Provide teaching via preferred learning methods  Outcome: Progressing

## 2025-06-20 NOTE — PROGRESS NOTES
Pt arrives to infusion center for Vyepti. Offers no complaints. PIV accessed without issue. Pt sitting comfortably in chair, wheels locked, call bell within reach.

## 2025-06-25 ENCOUNTER — RA CDI HCC (OUTPATIENT)
Dept: OTHER | Facility: HOSPITAL | Age: 60
End: 2025-06-25

## 2025-06-25 NOTE — TELEPHONE ENCOUNTER
FAINA called patient at [phone].  Patient received application in mail.  Said she will work on filling it out.  SW awaiting patient section of PAP.  FAINA remains available.

## 2025-06-27 ENCOUNTER — OFFICE VISIT (OUTPATIENT)
Dept: FAMILY MEDICINE CLINIC | Facility: CLINIC | Age: 60
End: 2025-06-27
Payer: MEDICARE

## 2025-06-27 VITALS
HEIGHT: 59 IN | SYSTOLIC BLOOD PRESSURE: 112 MMHG | TEMPERATURE: 97.6 F | HEART RATE: 117 BPM | BODY MASS INDEX: 27.38 KG/M2 | DIASTOLIC BLOOD PRESSURE: 76 MMHG | OXYGEN SATURATION: 94 % | WEIGHT: 135.8 LBS

## 2025-06-27 DIAGNOSIS — K21.9 GASTROESOPHAGEAL REFLUX DISEASE WITHOUT ESOPHAGITIS: ICD-10-CM

## 2025-06-27 DIAGNOSIS — F41.9 ANXIETY: ICD-10-CM

## 2025-06-27 DIAGNOSIS — E03.9 HYPOTHYROIDISM, UNSPECIFIED TYPE: ICD-10-CM

## 2025-06-27 DIAGNOSIS — E78.00 HYPERCHOLESTEROLEMIA: ICD-10-CM

## 2025-06-27 DIAGNOSIS — Z78.0 ASYMPTOMATIC POSTMENOPAUSAL ESTROGEN DEFICIENCY: ICD-10-CM

## 2025-06-27 DIAGNOSIS — Z12.31 ENCOUNTER FOR SCREENING MAMMOGRAM FOR BREAST CANCER: ICD-10-CM

## 2025-06-27 DIAGNOSIS — Z00.00 MEDICARE ANNUAL WELLNESS VISIT, SUBSEQUENT: Primary | ICD-10-CM

## 2025-06-27 PROCEDURE — G2211 COMPLEX E/M VISIT ADD ON: HCPCS | Performed by: FAMILY MEDICINE

## 2025-06-27 PROCEDURE — 99214 OFFICE O/P EST MOD 30 MIN: CPT | Performed by: FAMILY MEDICINE

## 2025-06-27 PROCEDURE — G0439 PPPS, SUBSEQ VISIT: HCPCS | Performed by: FAMILY MEDICINE

## 2025-06-27 RX ORDER — CHOLECALCIFEROL (VITAMIN D3) 50 MCG
TABLET ORAL
COMMUNITY

## 2025-06-27 NOTE — PROGRESS NOTES
Name: Tiffany Herbert      : 1965      MRN: [id number]  Encounter Provider: Kirstin Saeed DO  Encounter Date: 2025   Encounter department: [facility]  :  Assessment & Plan  Medicare annual wellness visit, subsequent         Hypercholesterolemia  Continue with low-fat diet  Orders:    Lipid panel; Future    Hypothyroidism, unspecified type  Stable on levothyroxine therapy       Gastroesophageal reflux disease without esophagitis  Continue pantoprazole therapy       Anxiety  Patient is having difficulty at home with her daughter and granddaughter and would like a consultation with a psychologist for talk therapy.  Orders:    Ambulatory referral to Psych Services; Future    Encounter for screening mammogram for breast cancer    Orders:    Mammo screening bilateral w 3d and cad; Future    Asymptomatic postmenopausal estrogen deficiency    Orders:    DXA bone density spine hip and pelvis; Future       Preventive health issues were discussed with patient, and age appropriate screening tests were ordered as noted in patient's After Visit Summary. Personalized health advice and appropriate referrals for health education or preventive services given if needed, as noted in patient's After Visit Summary.    History of Present Illness     The patient presents for her Medicare wellness subsequent visit.  Her hypothyroidism, hypercholesterolemia, GERD, are all well-controlled.  She is due for a mammogram and a bone density test but otherwise is up-to-date with her preventative care.  She is having some home difficulties with her daughter and granddaughter would like some therapy to deal with the anxiety she is experiencing.  She denies any depression and does not want to be medicated for this problem.       Patient Care Team:  Kirstin Saeed DO as PCP - General (Family Medicine)    Review of Systems   Constitutional:  Negative for appetite change, chills and fever.    HENT:  Negative for ear pain, facial swelling, rhinorrhea, sinus pain, sore throat and trouble swallowing.    Eyes:  Negative for discharge and redness.   Respiratory:  Negative for chest tightness, shortness of breath and wheezing.    Cardiovascular:  Negative for chest pain and palpitations.   Gastrointestinal:  Negative for abdominal pain, diarrhea, nausea and vomiting.   Endocrine: Negative for polyuria.   Genitourinary:  Negative for dysuria and urgency.   Musculoskeletal:  Negative for arthralgias and back pain.   Skin:  Negative for rash.   Neurological:  Negative for dizziness, weakness and headaches.   Hematological:  Negative for adenopathy.   Psychiatric/Behavioral:  Negative for behavioral problems, confusion and sleep disturbance. The patient is nervous/anxious.    All other systems reviewed and are negative.    Medical History Reviewed by provider this encounter:       Annual Wellness Visit Questionnaire   Tiffany is here for her Subsequent Wellness visit. Last Medicare Wellness visit information reviewed, patient interviewed, no change since last AWV.     Health Risk Assessment:   Patient rates overall health as fair. Patient feels that their physical health rating is slightly worse. Patient is dissatisfied with their life. Eyesight was rated as same. Hearing was rated as slightly worse. Patient feels that their emotional and mental health rating is slightly worse. Patients states they are never, rarely angry. Patient states they are often unusually tired/fatigued. Pain experienced in the last 7 days has been some. Patient's pain rating has been 7/10. Patient states that she has experienced no weight loss or gain in last 6 months.     Depression Screening:   PHQ-2 Score: 1      Fall Risk Screening:   In the past year, patient has experienced: no history of falling in past year      Urinary Incontinence Screening:   Patient has leaked urine accidently in the last six months.     Home Safety:  Patient  does not have trouble with stairs inside or outside of their home. Patient has working smoke alarms and has working carbon monoxide detector. Home safety hazards include: none.     Nutrition:   Current diet is Low Carb and No Added Salt.     Medications:   Patient is currently taking over-the-counter supplements. OTC medications include: see medication list. Patient is able to manage medications.     Activities of Daily Living (ADLs)/Instrumental Activities of Daily Living (IADLs):   Walk and transfer into and out of bed and chair?: Yes  Dress and groom yourself?: Yes    Bathe or shower yourself?: Yes    Feed yourself? Yes  Do your laundry/housekeeping?: Yes  Manage your money, pay your bills and track your expenses?: Yes  Make your own meals?: Yes    Do your own shopping?: Yes    Previous Hospitalizations:   Any hospitalizations or ED visits within the last 12 months?: Yes    How many hospitalizations have you had in the last year?: 1-2    Advance Care Planning:   Living will: No    Durable POA for healthcare: No    Advanced directive: No    Advanced directive counseling given: Yes    ACP document given: Yes    Patient declined ACP directive: No    End of Life Decisions reviewed with patient: Yes    Provider agrees with end of life decisions: Yes      Cognitive Screening:   Provider or family/friend/caregiver concerned regarding cognition?: No    Preventive Screenings      Cardiovascular Screening:    General: Screening Current      Diabetes Screening:     General: Screening Current      Colorectal Cancer Screening:     General: Screening Current      Breast Cancer Screening:     General: Screening Current    Due for: Mammogram        Cervical Cancer Screening:    General: Screening Current      Osteoporosis Screening:    General: Screening Current    Due for: DXA Axial      Abdominal Aortic Aneurysm (AAA) Screening:        General: Screening Current and Screening Not Indicated      Lung Cancer Screening:      "General: Screening Not Indicated      Hepatitis C Screening:    General: Screening Current    Immunizations:  - Immunizations due: Prevnar 20    Screening, Brief Intervention, and Referral to Treatment (SBIRT)     Screening    Typical number of drinks in a week: 0    Single Item Drug Screening:  How often have you used an illegal drug (including marijuana) or a prescription medication for non-medical reasons in the past year? never    Single Item Drug Screen Score: 0  Interpretation: Negative screen for possible drug use disorder    Social Drivers of Health     Financial Resource Strain: Low Risk  (5/12/2023)    Overall Financial Resource Strain (CARDIA)     Difficulty of Paying Living Expenses: Not hard at all   Food Insecurity: No Food Insecurity (6/27/2025)    Nursing - Inadequate Food Risk Classification     Worried About Running Out of Food in the Last Year: Never true     Ran Out of Food in the Last Year: Never true   Transportation Needs: No Transportation Needs (6/27/2025)    PRAPARE - Transportation     Lack of Transportation (Medical): No     Lack of Transportation (Non-Medical): No   Housing Stability: Low Risk  (6/27/2025)    Housing Stability Vital Sign     Unable to Pay for Housing in the Last Year: No     Number of Times Moved in the Last Year: 0     Homeless in the Last Year: No   Utilities: Not At Risk (6/27/2025)    [facility] Utilities     Threatened with loss of utilities: No     No results found.    Objective   Ht 4' 11\" (1.499 m)   BMI 28.80 kg/m²     Physical Exam  Vitals and nursing note reviewed.   Constitutional:       General: She is not in acute distress.     Appearance: Normal appearance. She is well-developed. She is not ill-appearing or diaphoretic.   HENT:      Head: Normocephalic and atraumatic.      Right Ear: Tympanic membrane, ear canal and external ear normal.      Left Ear: Tympanic membrane, ear canal and external ear normal.      Nose: Nose normal. No congestion or rhinorrhea.      " Mouth/Throat:      Mouth: Mucous membranes are moist.      Pharynx: Oropharynx is clear. No oropharyngeal exudate or posterior oropharyngeal erythema.     Eyes:      General: No scleral icterus.        Right eye: No discharge.         Left eye: No discharge.      Extraocular Movements: Extraocular movements intact.      Conjunctiva/sclera: Conjunctivae normal.      Pupils: Pupils are equal, round, and reactive to light.     Neck:      Thyroid: No thyromegaly.      Vascular: No carotid bruit or JVD.      Trachea: No tracheal deviation.     Cardiovascular:      Rate and Rhythm: Normal rate and regular rhythm.      Pulses: Normal pulses.      Heart sounds: Normal heart sounds. No murmur heard.  Pulmonary:      Effort: Pulmonary effort is normal. No respiratory distress.      Breath sounds: Normal breath sounds. No stridor. No wheezing, rhonchi or rales.   Abdominal:      General: Abdomen is flat. Bowel sounds are normal. There is no distension.      Palpations: Abdomen is soft. There is no mass.      Tenderness: There is no abdominal tenderness. There is no guarding or rebound.     Musculoskeletal:         General: No swelling, tenderness or deformity. Normal range of motion.      Cervical back: Normal range of motion and neck supple. No rigidity.      Right lower leg: No edema.      Left lower leg: No edema.   Lymphadenopathy:      Cervical: No cervical adenopathy.     Skin:     General: Skin is warm and dry.      Capillary Refill: Capillary refill takes less than 2 seconds.      Coloration: Skin is not jaundiced.      Findings: No bruising, erythema or rash.     Neurological:      General: No focal deficit present.      Mental Status: She is alert and oriented to person, place, and time.      Cranial Nerves: No cranial nerve deficit.      Sensory: No sensory deficit.      Motor: No abnormal muscle tone.      Coordination: Coordination normal.      Gait: Gait normal.      Deep Tendon Reflexes: Reflexes are normal and  symmetric. Reflexes normal.     Psychiatric:         Mood and Affect: Mood normal.         Behavior: Behavior normal.         Thought Content: Thought content normal.         Judgment: Judgment normal.

## 2025-06-29 DIAGNOSIS — M48.02 CERVICAL SPINAL STENOSIS: ICD-10-CM

## 2025-06-30 ENCOUNTER — TELEPHONE (OUTPATIENT)
Age: 60
End: 2025-06-30

## 2025-06-30 DIAGNOSIS — E03.9 HYPOTHYROIDISM, UNSPECIFIED TYPE: ICD-10-CM

## 2025-06-30 NOTE — TELEPHONE ENCOUNTER
Patient called to see if there was a recommendation made for a therapist for the patient to talk to. She thought Dr. Saeed was sending a referral but patient has not received a call to set up appt with therapist.  Please advise how patient can get ahold of a therapist. She asked for a female if possible.  Please contact the patient with info on appt set up.

## 2025-07-01 ENCOUNTER — TELEPHONE (OUTPATIENT)
Age: 60
End: 2025-07-01

## 2025-07-01 DIAGNOSIS — F41.9 ANXIETY: Primary | ICD-10-CM

## 2025-07-01 RX ORDER — LEVOTHYROXINE SODIUM 75 UG/1
TABLET ORAL
Qty: 90 TABLET | Refills: 1 | Status: SHIPPED | OUTPATIENT
Start: 2025-07-01

## 2025-07-01 NOTE — TELEPHONE ENCOUNTER
Patient returned call and I relayed the message from Dr. Saeed.  She did receive a text message from psych to schedule.  No further action.

## 2025-07-01 NOTE — TELEPHONE ENCOUNTER
Patient called in and stated she was given a referral for talk therapy. Writer checked the chart and was able to explain the wait list for the patient.       Talk therapy/ wants female/ [place]

## 2025-07-02 ENCOUNTER — TELEPHONE (OUTPATIENT)
Age: 60
End: 2025-07-02

## 2025-07-02 DIAGNOSIS — F41.9 ANXIETY: Primary | ICD-10-CM

## 2025-07-02 RX ORDER — BACLOFEN 10 MG/1
10 TABLET ORAL
Qty: 30 TABLET | Refills: 0 | Status: SHIPPED | OUTPATIENT
Start: 2025-07-02

## 2025-07-02 NOTE — TELEPHONE ENCOUNTER
Pt called in to see if she can be scheduled writer let her know to have her PCP send in a STAT to be seen sooner than 6m. Pt understood

## 2025-07-02 NOTE — TELEPHONE ENCOUNTER
Patient returned call.     Patient shared she is interested on tt services.     Writer attempted to contact previous writer.

## 2025-07-02 NOTE — TELEPHONE ENCOUNTER
Writer attempted to contact pt regarding referral for [facility] to verify services needed to schedule an appt. Lvm to call writer back.

## 2025-07-02 NOTE — TELEPHONE ENCOUNTER
Writer returned pt's call. Writer informed pt that there were openings available with an Integrated therapist. Pt prefers Yuliya Lenz and asked if the STAT referral that she requested to her PCP came through. Writer could verify that referral was just received and informed pt that referral will be send to proper person/dept to call her back to schedule an appt if available. Pt verbalize understanding. Referral closed.

## 2025-07-03 ENCOUNTER — APPOINTMENT (EMERGENCY)
Dept: CT IMAGING | Facility: HOSPITAL | Age: 60
End: 2025-07-03
Payer: MEDICARE

## 2025-07-03 ENCOUNTER — HOSPITAL ENCOUNTER (EMERGENCY)
Facility: HOSPITAL | Age: 60
Discharge: HOME | End: 2025-07-03
Attending: STUDENT IN AN ORGANIZED HEALTH CARE EDUCATION/TRAINING PROGRAM
Payer: MEDICARE

## 2025-07-03 VITALS
SYSTOLIC BLOOD PRESSURE: 148 MMHG | DIASTOLIC BLOOD PRESSURE: 77 MMHG | OXYGEN SATURATION: 97 % | RESPIRATION RATE: 19 BRPM | TEMPERATURE: 97.7 F | HEART RATE: 94 BPM

## 2025-07-03 DIAGNOSIS — R11.0 NAUSEA: ICD-10-CM

## 2025-07-03 DIAGNOSIS — R10.13 EPIGASTRIC PAIN: Primary | ICD-10-CM

## 2025-07-03 LAB
2HR DELTA HS TROPONIN: 1 NG/L
ALBUMIN SERPL BCG-MCNC: 4.9 G/DL (ref 3.5–5)
ALP SERPL-CCNC: 124 U/L (ref 34–104)
ALT SERPL W P-5'-P-CCNC: 18 U/L (ref 7–52)
ANION GAP SERPL CALCULATED.3IONS-SCNC: 10 MMOL/L (ref 4–13)
AST SERPL W P-5'-P-CCNC: 25 U/L (ref 13–39)
BASOPHILS # BLD AUTO: 0.06 THOUSANDS/ÂΜL (ref 0–0.1)
BASOPHILS NFR BLD AUTO: 1 % (ref 0–1)
BILIRUB DIRECT SERPL-MCNC: 0.09 MG/DL (ref 0–0.2)
BILIRUB SERPL-MCNC: 0.56 MG/DL (ref 0.2–1)
BUN SERPL-MCNC: 11 MG/DL (ref 5–25)
CALCIUM SERPL-MCNC: 9.5 MG/DL (ref 8.4–10.2)
CARDIAC TROPONIN I PNL SERPL HS: 3 NG/L (ref ?–50)
CARDIAC TROPONIN I PNL SERPL HS: 4 NG/L (ref ?–50)
CHLORIDE SERPL-SCNC: 103 MMOL/L (ref 96–108)
CO2 SERPL-SCNC: 25 MMOL/L (ref 21–32)
CREAT SERPL-MCNC: 0.88 MG/DL (ref 0.6–1.3)
D DIMER PPP FEU-MCNC: 0.32 UG/ML FEU
EOSINOPHIL # BLD AUTO: 0.16 THOUSAND/ÂΜL (ref 0–0.61)
EOSINOPHIL NFR BLD AUTO: 2 % (ref 0–6)
ERYTHROCYTE [DISTWIDTH] IN BLOOD BY AUTOMATED COUNT: 13.2 % (ref 11.6–15.1)
GFR SERPL CREATININE-BSD FRML MDRD: 71 ML/MIN/1.73SQ M
GLUCOSE SERPL-MCNC: 93 MG/DL (ref 65–140)
HCT VFR BLD AUTO: 42.1 % (ref 34.8–46.1)
HGB BLD-MCNC: 14.3 G/DL (ref 11.5–15.4)
IMM GRANULOCYTES # BLD AUTO: 0.03 THOUSAND/UL (ref 0–0.2)
IMM GRANULOCYTES NFR BLD AUTO: 0 % (ref 0–2)
LIPASE SERPL-CCNC: 15 U/L (ref 11–82)
LYMPHOCYTES # BLD AUTO: 2.73 THOUSANDS/ÂΜL (ref 0.6–4.47)
LYMPHOCYTES NFR BLD AUTO: 35 % (ref 14–44)
MCH RBC QN AUTO: 29.2 PG (ref 26.8–34.3)
MCHC RBC AUTO-ENTMCNC: 34 G/DL (ref 31.4–37.4)
MCV RBC AUTO: 86 FL (ref 82–98)
MONOCYTES # BLD AUTO: 0.63 THOUSAND/ÂΜL (ref 0.17–1.22)
MONOCYTES NFR BLD AUTO: 8 % (ref 4–12)
NEUTROPHILS # BLD AUTO: 4.2 THOUSANDS/ÂΜL (ref 1.85–7.62)
NEUTS SEG NFR BLD AUTO: 54 % (ref 43–75)
NRBC BLD AUTO-RTO: 0 /100 WBCS
PLATELET # BLD AUTO: 284 THOUSANDS/UL (ref 149–390)
PMV BLD AUTO: 9.5 FL (ref 8.9–12.7)
POTASSIUM SERPL-SCNC: 3.8 MMOL/L (ref 3.5–5.3)
PROT SERPL-MCNC: 7.5 G/DL (ref 6.4–8.4)
RBC # BLD AUTO: 4.89 MILLION/UL (ref 3.81–5.12)
SODIUM SERPL-SCNC: 138 MMOL/L (ref 135–147)
WBC # BLD AUTO: 7.81 THOUSAND/UL (ref 4.31–10.16)

## 2025-07-03 PROCEDURE — 96374 THER/PROPH/DIAG INJ IV PUSH: CPT

## 2025-07-03 PROCEDURE — 85379 FIBRIN DEGRADATION QUANT: CPT

## 2025-07-03 PROCEDURE — 85025 COMPLETE CBC W/AUTO DIFF WBC: CPT

## 2025-07-03 PROCEDURE — 36415 COLL VENOUS BLD VENIPUNCTURE: CPT

## 2025-07-03 PROCEDURE — 80048 BASIC METABOLIC PNL TOTAL CA: CPT

## 2025-07-03 PROCEDURE — 74177 CT ABD & PELVIS W/CONTRAST: CPT

## 2025-07-03 PROCEDURE — 83690 ASSAY OF LIPASE: CPT

## 2025-07-03 PROCEDURE — 96361 HYDRATE IV INFUSION ADD-ON: CPT

## 2025-07-03 PROCEDURE — 80076 HEPATIC FUNCTION PANEL: CPT

## 2025-07-03 PROCEDURE — 99284 EMERGENCY DEPT VISIT MOD MDM: CPT

## 2025-07-03 PROCEDURE — 84484 ASSAY OF TROPONIN QUANT: CPT

## 2025-07-03 PROCEDURE — 96375 TX/PRO/DX INJ NEW DRUG ADDON: CPT

## 2025-07-03 PROCEDURE — 99285 EMERGENCY DEPT VISIT HI MDM: CPT

## 2025-07-03 PROCEDURE — 93005 ELECTROCARDIOGRAM TRACING: CPT

## 2025-07-03 RX ORDER — ALUMINUM HYDROXIDE, MAGNESIUM HYDROXIDE, SIMETHICONE 400; 400; 40 MG/10ML; MG/10ML; MG/10ML
30 SUSPENSION ORAL
Qty: 355 ML | Refills: 0 | Status: SHIPPED | OUTPATIENT
Start: 2025-07-03

## 2025-07-03 RX ORDER — FAMOTIDINE 20 MG/1
20 TABLET, FILM COATED ORAL DAILY
Qty: 30 TABLET | Refills: 0 | Status: SHIPPED | OUTPATIENT
Start: 2025-07-03

## 2025-07-03 RX ORDER — MORPHINE SULFATE 4 MG/ML
4 INJECTION, SOLUTION INTRAMUSCULAR; INTRAVENOUS ONCE
Status: COMPLETED | OUTPATIENT
Start: 2025-07-03 | End: 2025-07-03

## 2025-07-03 RX ORDER — ONDANSETRON 4 MG/1
4 TABLET, FILM COATED ORAL EVERY 6 HOURS
Qty: 12 TABLET | Refills: 0 | Status: SHIPPED | OUTPATIENT
Start: 2025-07-03

## 2025-07-03 RX ORDER — MAGNESIUM HYDROXIDE/ALUMINUM HYDROXICE/SIMETHICONE 120; 1200; 1200 MG/30ML; MG/30ML; MG/30ML
30 SUSPENSION ORAL ONCE
Status: COMPLETED | OUTPATIENT
Start: 2025-07-03 | End: 2025-07-03

## 2025-07-03 RX ORDER — PANTOPRAZOLE SODIUM 20 MG/1
40 TABLET, DELAYED RELEASE ORAL DAILY
Qty: 28 TABLET | Refills: 0 | Status: SHIPPED | OUTPATIENT
Start: 2025-07-03 | End: 2025-07-17

## 2025-07-03 RX ORDER — PANTOPRAZOLE SODIUM 40 MG/10ML
40 INJECTION, POWDER, LYOPHILIZED, FOR SOLUTION INTRAVENOUS ONCE
Status: COMPLETED | OUTPATIENT
Start: 2025-07-03 | End: 2025-07-03

## 2025-07-03 RX ORDER — FAMOTIDINE 10 MG/ML
20 INJECTION, SOLUTION INTRAVENOUS ONCE
Status: COMPLETED | OUTPATIENT
Start: 2025-07-03 | End: 2025-07-03

## 2025-07-03 RX ADMIN — IOHEXOL 100 ML: 350 INJECTION, SOLUTION INTRAVENOUS at 16:50

## 2025-07-03 RX ADMIN — MORPHINE SULFATE 4 MG: 4 INJECTION INTRAVENOUS at 16:07

## 2025-07-03 RX ADMIN — PANTOPRAZOLE SODIUM 40 MG: 40 INJECTION, POWDER, FOR SOLUTION INTRAVENOUS at 17:11

## 2025-07-03 RX ADMIN — SODIUM CHLORIDE 1000 ML: 0.9 INJECTION, SOLUTION INTRAVENOUS at 16:07

## 2025-07-03 RX ADMIN — FAMOTIDINE 20 MG: 10 INJECTION INTRAVENOUS at 17:11

## 2025-07-03 RX ADMIN — ALUMINUM HYDROXIDE, MAGNESIUM HYDROXIDE, AND DIMETHICONE 30 ML: 200; 20; 200 SUSPENSION ORAL at 17:11

## 2025-07-03 NOTE — DISCHARGE INSTRUCTIONS
I suspect you have inflammation of your stomach and surrounding structures. Please take protonix daily in the early morning for the next two weeks. For pain, take pepcid and maalox. For nausea, take zofran.  Follow up with GI as you may need an endoscopy, if they feel it is necessary.

## 2025-07-04 LAB
ATRIAL RATE: 105 BPM
P AXIS: 60 DEGREES
PR INTERVAL: 166 MS
QRS AXIS: 26 DEGREES
QRSD INTERVAL: 72 MS
QT INTERVAL: 336 MS
QTC INTERVAL: 444 MS
T WAVE AXIS: 57 DEGREES
VENTRICULAR RATE: 105 BPM

## 2025-07-04 PROCEDURE — 93010 ELECTROCARDIOGRAM REPORT: CPT | Performed by: INTERNAL MEDICINE

## 2025-07-07 ENCOUNTER — TELEPHONE (OUTPATIENT)
Age: 60
End: 2025-07-07

## 2025-07-07 NOTE — TELEPHONE ENCOUNTER
"Behavioral Health Outpatient Intake Questions    Referred By   : PCP    Please advise interviewee that they need to answer all questions truthfully to allow for best care, and any misrepresentations of information may affect their ability to be seen at this clinic   => Was this discussed? Yes     If Minor Child (under age 18)    Who is/are the legal guardian(s) of the child?     Is there a custody agreement?      If \"YES\"- Custody orders must be obtained prior to scheduling the first appointment  In addition, Consent to Treatment must be signed by all legal guardians prior to scheduling the first appointment    If \"NO\"- Consent to Treatment must be signed by all legal guardians prior to scheduling the first appointment    Behavioral Health Outpatient Intake History -     Presenting Problem (in patient's own words):   Hard time dealing w/ certain things pertaining grand-daughter, family does not really understand her feelings. Needs to express feelings to somebody to see how she should go about it.    Are there any communication barriers for this patient?                                                    If yes, please describe barriers:   If there is a unique situation, please refer to [facility] Gal[facility]/Selma Padilla for final determination.    Are you taking any psychiatric medications? Yes     If \"YES\" -What are they Venlafaxine    If \"YES\" -Who prescribes? PCP    Has the Patient previously received outpatient Talk Therapy or Medication Management from [facility]        If \"YES\"- When, Where and with Whom?         If \"NO\" -Has Patient received these services elsewhere? Yes.      If \"YES\" -When, Where, and with Whom? Talk Therapy    Has the Patient abused alcohol or other substances in the last 6 months ? No  No concerns of substance abuse are reported.     If \"YES\" -What substance, How much, How often?     If illegal substance: Refer to [facility] (for [facility]) or [facility].   If Alcohol in excess of 10 " "drinks per week:  Refer to [facility] (for [facility]) or [facility]    Legal History-     Is this treatment court ordered? No   If \"yes \"send to :  Talk Therapy : Send to Geovanni Galvan for final determination   Med Management: Send to Dr. Forbes for final determination     Has the Patient been convicted of a felony?  No   If \"Yes\" send to -When, What?  Talk Therapy: Send to Geovanni Galvan for final determination   Med Management: Send to Dr. Forbes for final determination     ACCEPTED as a patient Yes  If \"Yes\" Appointment Date: 07/23/2025 at 12:00 pm w/ Wendy Carvajal LCSW     Referred Elsewhere? No  If “Yes” - (Where? Ex: [facility], [facility], [facility], [facility], etc.)       Name of Insurance Co: Medicare; AAR  Insurance ID# [id number]; [id number]   Insurance Phone #  If ins is primary or secondary?  If patient is a minor, parents information such as Name, D.O.B of guarantor.  "

## 2025-07-14 ENCOUNTER — TELEPHONE (OUTPATIENT)
Dept: PSYCHIATRY | Facility: CLINIC | Age: 60
End: 2025-07-14

## 2025-07-14 NOTE — TELEPHONE ENCOUNTER
One week follow up call for New Patient appointment with Wendy Carvajal [[id number]] on 7/23/25  was made on 7/7/25. Writer informed patient of New Patient paperwork needing to be completed 5 days prior to the appointment. Writer confirmed paperwork has been sent via My Chart.

## 2025-07-22 NOTE — TELEPHONE ENCOUNTER
Received call from  for patient to r/s NP appt. Writer r/s NP appt originally for 7/23 and f/u appt.     Np appt r/s for 8/4 at 11 am and 2 week f/u 8/18 at 11 am with Pete

## 2025-07-29 ENCOUNTER — TELEPHONE (OUTPATIENT)
Dept: PSYCHIATRY | Facility: CLINIC | Age: 60
End: 2025-07-29

## 2025-08-04 ENCOUNTER — OFFICE VISIT (OUTPATIENT)
Dept: BEHAVIORAL/MENTAL HEALTH CLINIC | Facility: CLINIC | Age: 60
End: 2025-08-04
Attending: NURSE PRACTITIONER
Payer: MEDICARE

## 2025-08-04 DIAGNOSIS — F33.0 MAJOR DEPRESSIVE DISORDER, RECURRENT, MILD (HCC): Primary | ICD-10-CM

## 2025-08-04 DIAGNOSIS — F41.9 ANXIETY: ICD-10-CM

## 2025-08-04 PROCEDURE — 90791 PSYCH DIAGNOSTIC EVALUATION: CPT | Performed by: GENERAL ACUTE CARE HOSPITAL

## 2025-08-05 RX ORDER — KETOROLAC TROMETHAMINE 10 MG/1
10 TABLET, FILM COATED ORAL DAILY PRN
Qty: 10 TABLET | Refills: 0 | Status: SHIPPED | OUTPATIENT
Start: 2025-08-05

## 2025-08-13 ENCOUNTER — HOSPITAL ENCOUNTER (OUTPATIENT)
Dept: RADIOLOGY | Facility: MEDICAL CENTER | Age: 60
Discharge: HOME | End: 2025-08-13
Attending: FAMILY MEDICINE
Payer: MEDICARE

## 2025-08-13 DIAGNOSIS — Z78.0 ASYMPTOMATIC POSTMENOPAUSAL ESTROGEN DEFICIENCY: ICD-10-CM

## 2025-08-13 PROCEDURE — 77080 DXA BONE DENSITY AXIAL: CPT

## 2025-08-17 DIAGNOSIS — F32.A DEPRESSION, UNSPECIFIED DEPRESSION TYPE: ICD-10-CM

## 2025-08-18 ENCOUNTER — SOCIAL WORK (OUTPATIENT)
Dept: BEHAVIORAL/MENTAL HEALTH CLINIC | Facility: CLINIC | Age: 60
End: 2025-08-18
Payer: MEDICARE

## 2025-08-18 DIAGNOSIS — F33.0 MAJOR DEPRESSIVE DISORDER, RECURRENT, MILD (HCC): Primary | ICD-10-CM

## 2025-08-18 PROCEDURE — 90837 PSYTX W PT 60 MINUTES: CPT | Performed by: GENERAL ACUTE CARE HOSPITAL

## 2025-08-18 RX ORDER — VENLAFAXINE HYDROCHLORIDE 150 MG/1
CAPSULE, EXTENDED RELEASE ORAL
Qty: 30 CAPSULE | Refills: 5 | Status: SHIPPED | OUTPATIENT
Start: 2025-08-18

## (undated) RX ORDER — BIMATOPROST 0.1 MG/ML: 1 SOLUTION/ DROPS OPHTHALMIC EVERY EVENING